# Patient Record
Sex: MALE | Race: WHITE | Employment: FULL TIME | URBAN - METROPOLITAN AREA
[De-identification: names, ages, dates, MRNs, and addresses within clinical notes are randomized per-mention and may not be internally consistent; named-entity substitution may affect disease eponyms.]

---

## 2017-01-23 ENCOUNTER — OFFICE VISIT (OUTPATIENT)
Dept: DERMATOLOGY | Facility: AMBULATORY SURGERY CENTER | Age: 52
End: 2017-01-23

## 2017-01-23 VITALS
OXYGEN SATURATION: 96 % | WEIGHT: 202 LBS | HEIGHT: 68 IN | SYSTOLIC BLOOD PRESSURE: 124 MMHG | HEART RATE: 68 BPM | BODY MASS INDEX: 30.62 KG/M2 | DIASTOLIC BLOOD PRESSURE: 80 MMHG | RESPIRATION RATE: 18 BRPM | TEMPERATURE: 98.5 F

## 2017-01-23 DIAGNOSIS — D22.9 MULTIPLE BENIGN NEVI: ICD-10-CM

## 2017-01-23 DIAGNOSIS — D18.01 CHERRY ANGIOMA: ICD-10-CM

## 2017-01-23 DIAGNOSIS — L70.8 OTHER ACNE: ICD-10-CM

## 2017-01-23 DIAGNOSIS — L81.4 LENTIGINES: ICD-10-CM

## 2017-01-23 DIAGNOSIS — E80.1 PORPHYRIA CUTANEA TARDA (HCC): ICD-10-CM

## 2017-01-23 DIAGNOSIS — L57.0 ACTINIC KERATOSES: Primary | ICD-10-CM

## 2017-01-23 DIAGNOSIS — Z85.828 HISTORY OF NONMELANOMA SKIN CANCER: ICD-10-CM

## 2017-01-23 RX ORDER — FLUOROURACIL 50 MG/G
CREAM TOPICAL
Qty: 40 G | Refills: 0 | Status: SHIPPED | OUTPATIENT
Start: 2017-01-23 | End: 2017-05-05

## 2017-01-23 NOTE — PROGRESS NOTES
Chief Complaint   Patient presents with    New Patient    Skin Exam     concern with several areas on face, nose, and ears

## 2017-01-23 NOTE — MR AVS SNAPSHOT
Visit Information Date & Time Provider Department Dept. Phone Encounter #  
 1/23/2017  8:00 AM Sandy Fallon NP [facility] [phone] [id number] Upcoming Health Maintenance Date Due Hepatitis C Screening 1965 FOBT Q 1 YEAR AGE 50-75 11/8/2013 INFLUENZA AGE 9 TO ADULT 8/1/2016 DTaP/Tdap/Td series (2 - Td) 8/16/2023 Allergies as of 1/23/2017  Review Complete On: 1/23/2017 By: Keke Obregon LPN Severity Noted Reaction Type Reactions Aldara [Imiquimod]  05/05/2015   Topical Contact Dermatitis Bactrim [Sulfamethoprim Ds]  03/27/2011    Other (comments) Lipitor [Atorvastatin]  05/05/2015   Side Effect Myalgia Current Immunizations  Reviewed on 3/22/2013 Name Date Tdap 8/16/2013 11:38 AM  
  
 Not reviewed this visit Vitals BP Pulse Temp Resp Height(growth percentile) Weight(growth percentile) 124/80 (BP 1 Location: Left arm, BP Patient Position: Sitting) 68 98.5 °F (36.9 °C) (Oral) 18 5' 8\" (1.727 m) 202 lb (91.6 kg) SpO2 BMI Smoking Status 96% 30.71 kg/m2 Never Smoker Vitals History BMI and BSA Data Body Mass Index Body Surface Area 30.71 kg/m 2 2.1 m 2 Preferred Pharmacy Pharmacy Name Phone [id number] [place], [place] - [address] [place] [address] [phone] Your Updated Medication List  
  
   
This list is accurate as of: 1/23/17  8:20 AM.  Always use your most recent med list.  
  
  
  
  
 albuterol 90 mcg/actuation inhaler Commonly known as:  PROVENTIL HFA, VENTOLIN HFA, PROAIR HFA Take 2 Puffs by inhalation every four (4) hours as needed for Wheezing. ALDARA 5 % cream  
Generic drug:  imiquimod Apply  to affected area nightly. apply a thin layer as directed  
  
 aspirin 81 mg chewable tablet Take 1 Tab by mouth daily. azelastine 137 mcg (0.1 %) nasal spray Commonly known as:  ASTELIN  
 1 [employer] [place] by Both Nostrils route two (2) times a day. Use in each nostril as directed Dextromethorphan-guaiFENesin 60-1,200 mg Tm12 Commonly known as:  Noe & Noe DM Take 1 Tab by mouth two (2) times daily as needed. Indications: COUGH  
  
 dicyclomine 10 mg capsule Commonly known as:  BENTYL Take 10 mg by mouth as needed. For abd cramping DULoxetine 30 mg capsule Commonly known as:  CYMBALTA TAKE ONE CAPSULE BY MOUTH EVERY DAY FOR BACK PAIN  
  
 fenofibrate nanocrystallized 145 mg tablet Commonly known as:  [employer] Take 1 Tab by mouth daily. For hypercholesterolemia FLORASTOR 250 mg capsule Generic drug:  Saccharomyces boulardii Take 250 mg by mouth two (2) times a day. fluticasone 50 mcg/actuation nasal spray Commonly known as:  [place] Fryer Use two sprays in each nostril once daily for allergies. HYDROcodone-acetaminophen 7.5-325 mg per tablet Commonly known as:  [place] Mello Take 1 Tab by mouth every six (6) hours as needed for Pain.  
  
 montelukast 10 mg tablet Commonly known as:  SINGULAIR  
TAKE 1 TABLET BY MOUTH EVERY DAY FOR ALLERGIES  
  
 NAPROSYN 500 mg tablet Generic drug:  naproxen Take 500 mg by mouth two (2) times daily (with meals). omeprazole 20 mg capsule Commonly known as:  PRILOSEC  
TAKE ONE CAPSULE BY MOUTH EVERY DAY FOR IBS  
  
 propranolol LA 60 mg SR capsule Commonly known as:  INDERAL LA Take 1 Cap by mouth daily. For migraine prevention SIMETHICONE Take 120 mg by mouth as needed. SUMAtriptan 100 mg tablet Commonly known as:  IMITREX Take 1 Tab by mouth once as needed for Migraine. ZyrTEC 10 mg tablet Generic drug:  cetirizine Take 10 mg by mouth daily. ZyrTEC-D 5-120 mg per tablet Generic drug:  cetirizine-psuedoePHEDrine Take 1 Tab by mouth daily. Introducing \[facility]\"" & HEALTH SERVICES! Dear Vianney Brenner: Thank you for requesting a [employer] account.   Our records indicate that you already have an active [employer] account. You can access your account anytime at [email or web address]. "[employer] Did you know that you can access your hospital and ER discharge instructions at any time in [employer]ia? You can also review all of your test results from your hospital stay or ER visit. Additional Information If you have questions, please visit the Frequently Asked Questions section of the Med[employer]ia website at [email or web address]. "[employer] Remember, [employer]ia is NOT to be used for urgent needs. For medical emergencies, dial 911. Now available from your iPhone and Android! Please provide this summary of care documentation to your next provider. Your primary care clinician is listed as Vesna Ayers. If you have any questions after today's visit, please call [phone].

## 2017-01-23 NOTE — PROGRESS NOTES
Written by Fina Mckeon, as dictated by Kei Loredo, [address]. Name: Kendra Butler       Age: 46 y.o. Date: 1/23/2017    Chief Complaint:   Chief Complaint   Patient presents with    New Patient    Skin Exam     concern with several areas on face, nose, and ears       Subjective:    HPI  Mr. Kendra Butler is a 46 y.o. male who presents as a new patient to [place] [address] for a skin exam.  The patient was self-referred for this evaluation. The patient has had a skin exam in the past (he states 1.5 yrs ago) - he used to follow with Dr. Miguel Euceda every 3-6 months until he retired - and the patient does have current complaints related to his skin. He is concerned about a recurrent spot on his right cheek and a few bumps on his nose, non-resolving and tender that repeatedly scale and peel. His wife is concerned about a crusted lesion on his right ear, also non-resolving with recurrent scabbing. He also reports that he's broken out recently on his neck, likely from sweating and his sweatshirt rubbing. He admits to picking. Mr. Kendra Butler is feeling well and in his usual state of health today. The patient's pertinent skin history includes: Multiple basal cell carcinomas; multiple rounds of Aldara - face, forehead, neck - for 8 weeks approx. 2-3 years ago. He reports that he started wearing sun screen and sun shirts approximately four years ago. History of porphyria cutaneous tarda. ROS: Constitutional: Negative. Dermatological : positive for - skin lesion changes      Social History     Social History    Marital status:      Spouse name: N/A    Number of children: N/A    Years of education: N/A     Occupational History    Not on file.      Social History Main Topics    Smoking status: Never Smoker    Smokeless tobacco: Never Used    Alcohol use Yes    Drug use: No      Comment: occasional marijuana, quit cocaine 15 yrs ago    Sexual activity: Yes     Partners: Female     Other Topics Concern    Not on file     Social History Narrative       Family History   Problem Relation Age of Onset    Hypertension Brother     Diabetes Brother     Diabetes Mother     Hypertension Mother     Cancer Mother      lung       Past Medical History   Diagnosis Date    Back pain     Cancer ([facility] [address]      skin cancer    Frequent nosebleeds     Joint pain     Joint swelling     Other ill-defined conditions(799.89)      High cholesterol. 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[facility] [address]     Sinus complaint        Past Surgical History   Procedure Laterality Date    Hx orthopaedic       bilateral thumb surgery    Hx heent       septurhinoplasty       Current Outpatient Prescriptions   Medication Sig Dispense Refill    SUMAtriptan (IMITREX) 100 mg tablet Take 1 Tab by mouth once as needed for Migraine. 12 Tab 1    Dextromethorphan-guaiFENesin (MUCINEX DM) 60-1,200 mg TM12 Take 1 Tab by mouth two (2) times daily as needed. Indications: COUGH 20 Tab 1    omeprazole (PRILOSEC) 20 mg capsule TAKE ONE CAPSULE BY MOUTH EVERY DAY FOR IBS 90 Cap 11    fenofibrate nanocrystallized (TRICOR) 145 mg tablet Take 1 Tab by mouth daily. For hypercholesterolemia 90 Tab 11    DULoxetine (CYMBALTA) 30 mg capsule TAKE ONE CAPSULE BY MOUTH EVERY DAY FOR BACK PAIN 90 Cap 11    azelastine (ASTELIN) 137 mcg (0.1 %) nasal spray 1 [place] by Both Nostrils route two (2) times a day. Use in each nostril as directed 3 Bottle 11    fluticasone (FLONASE) 50 mcg/actuation nasal spray Use two sprays in each nostril once daily for allergies. 3 Bottle 11    montelukast (SINGULAIR) 10 mg tablet TAKE 1 TABLET BY MOUTH EVERY DAY FOR ALLERGIES 90 Tab 11    cetirizine-psuedoePHEDrine (ZYRTEC-D) 5-120 mg per tablet Take 1 Tab by mouth daily.  propranolol LA (INDERAL LA) 60 mg SR capsule Take 1 Cap by mouth daily.  For migraine prevention 30 Cap 5    albuterol (PROVENTIL HFA, VENTOLIN HFA) 90 mcg/actuation inhaler Take 2 Puffs by inhalation every four (4) hours as needed for Wheezing. 1 Inhaler 0    HYDROcodone-acetaminophen (NORCO) 7.5-325 mg per tablet Take 1 Tab by mouth every six (6) hours as needed for Pain. 80 Tab 3    aspirin 81 mg chewable tablet Take 1 Tab by mouth daily. 30 Tab 11    saccharomyces boulardii (FLORASTOR) 250 mg capsule Take 250 mg by mouth two (2) times a day.  dicyclomine (BENTYL) 10 mg capsule Take 10 mg by mouth as needed. For abd cramping      SIMETHICONE Take 120 mg by mouth as needed.  imiquimod (ALDARA) 5 % cream Apply  to affected area nightly. apply a thin layer as directed      naproxen (NAPROSYN) 500 mg tablet Take 500 mg by mouth two (2) times daily (with meals).  cetirizine (ZYRTEC) 10 mg tablet Take 10 mg by mouth daily. Allergies   Allergen Reactions    Aldara [Imiquimod] Contact Dermatitis    Bactrim [Sulfamethoprim Ds] Other (comments)    Lipitor [Atorvastatin] Myalgia         Objective:    Visit Vitals    /80 (BP 1 Location: Left arm, BP Patient Position: Sitting)    Pulse 68    Temp 98.5 °F (36.9 °C) (Oral)    Resp 18    Ht 5' 8\" (1.727 m)    Wt 91.6 kg (202 lb)    SpO2 96%    BMI 30.71 kg/m2       Iban Montalvo is a 46 y.o. male who appears well and in no distress. He is awake, alert, and oriented. There is no preauricular, submandibular, or cervical lymphadenopathy. A skin examination was performed including his scalp, face (including eyelid), ears, neck, chest, back, abdomen, upper extremities (including digits/nails), lower extremities, breasts, buttocks; genital skin was not examined. He is heavily freckled and fair skinned. He has acne with multiple excoriations on his arms and neck. He has diffuse lentigines, scattered red papules consistent with cherry angiomas, and few pink intradermal nevi, no concerning features.  He has numerous thin-scaled actinic keratoses on his cheeks, forehead, upper lip, ears, and hands. Assessment/Plan:    1. Actinic Keratoses. The diagnosis of this precancerous lesion related to sun exposure was reviewed. Verbal consent was obtained. I treated 2 lesions with cryotherapy and post-cryotherapy care was reviewed. We discussed options for treating the diffuse actinic keratoses on his face including cryotherapy, Sampson-U light therapy and Efudex chemo cream. Given the patient's history of prophyria, the patient was advised that he may react intensely to Sampson-U light therapy. I instead prescribed Efudex to apply twice daily for one week - use for one week out of each month for four months straight. 2. Acne. The diagnosis was discussed and over the counter recommendations including Selsun Sampson were made for treatment and to avoid picking. 3. Normal nevi. The diagnosis of normal nevi was reviewed. I discussed sun protection, sunscreen use, the warning signs of skin cancer, the need for self-skin examinations, and the need for regular practitioner exams every 6 months. The patient should follow up sooner as needed if new, changing, or symptomatic skin lesions arise. 4. Cherry angiomas. The diagnosis was reviewed and the patient was reassured that no treatment is needed for these benign lesions. 5. Solar lentigos. The diagnosis and relationship to sun exposure was reviewed. Sun protection advised. 6. Personal history of skin cancer. I discussed sun protection, sunscreen use, the warning signs of skin cancer, the need for self-skin examinations, and the need for regular practitioner exams every 6 months. The patient should follow up sooner as needed if new, changing, or symptomatic skin lesions arise. 7. History of porphyria. No evidence of activity at this time. He should continue to avoid triggers. This plan was reviewed with the patient and patient agrees. All questions were answered.     This scribe documentation was reviewed by me and accurately reflects the examination and decisions made by me. [facility]   OFFICE PROCEDURE PROGRESS NOTE   Chart reviewed for the following:   Pierce WEBB, have reviewed the History, Physical and updated the Allergic reactions for [place] [facility]. TIME OUT performed immediately prior to start of procedure:   Sharon WEBB, have performed the following reviews on [place] [facility]    prior to the start of the procedure:     * Patient was identified by name and date of birth   * Agreement on procedure being performed was verified   * Risks and Benefits explained to the patient   * Procedure site verified and marked as necessary   * Patient was positioned for comfort   * Verbal consent was obtained    Time: 8:42 AM  Date of procedure: 1/23/2017  Procedure performed by: Victoria Clemente.  Jermain Stephens DNP  Provider assisted by: self   Patient assisted by: self   How tolerated by patient: tolerated the procedure well with no complications   Comments: none

## 2017-01-24 ENCOUNTER — HOSPITAL ENCOUNTER (EMERGENCY)
Age: 52
Discharge: HOME | End: 2017-01-24
Attending: EMERGENCY MEDICINE
Payer: COMMERCIAL

## 2017-01-24 ENCOUNTER — APPOINTMENT (OUTPATIENT)
Dept: CT IMAGING | Age: 52
End: 2017-01-24
Attending: EMERGENCY MEDICINE
Payer: COMMERCIAL

## 2017-01-24 VITALS
OXYGEN SATURATION: 98 % | RESPIRATION RATE: 18 BRPM | HEIGHT: 68 IN | WEIGHT: 212.52 LBS | DIASTOLIC BLOOD PRESSURE: 89 MMHG | BODY MASS INDEX: 32.21 KG/M2 | TEMPERATURE: 98.5 F | HEART RATE: 98 BPM | SYSTOLIC BLOOD PRESSURE: 147 MMHG

## 2017-01-24 DIAGNOSIS — J20.9 ACUTE BRONCHITIS, UNSPECIFIED ORGANISM: Primary | ICD-10-CM

## 2017-01-24 LAB
ALBUMIN SERPL BCP-MCNC: 4 G/DL (ref 3.5–5)
ALBUMIN/GLOB SERPL: 1.3 {RATIO} (ref 1.1–2.2)
ALP SERPL-CCNC: 53 U/L (ref 45–117)
ALT SERPL-CCNC: 33 U/L (ref 12–78)
ANION GAP BLD CALC-SCNC: 10 MMOL/L (ref 5–15)
APPEARANCE UR: CLEAR
AST SERPL W P-5'-P-CCNC: 25 U/L (ref 15–37)
BACTERIA URNS QL MICRO: NEGATIVE /HPF
BASOPHILS # BLD AUTO: 0 K/UL (ref 0–0.1)
BASOPHILS # BLD: 1 % (ref 0–1)
BILIRUB SERPL-MCNC: 0.5 MG/DL (ref 0.2–1)
BILIRUB UR QL: NEGATIVE
BUN SERPL-MCNC: 19 MG/DL (ref 6–20)
BUN/CREAT SERPL: 16 (ref 12–20)
CALCIUM SERPL-MCNC: 8 MG/DL (ref 8.5–10.1)
CHLORIDE SERPL-SCNC: 110 MMOL/L (ref 97–108)
CO2 SERPL-SCNC: 25 MMOL/L (ref 21–32)
COLOR UR: NORMAL
CREAT SERPL-MCNC: 1.2 MG/DL (ref 0.7–1.3)
EOSINOPHIL # BLD: 0.1 K/UL (ref 0–0.4)
EOSINOPHIL NFR BLD: 2 % (ref 0–7)
EPITH CASTS URNS QL MICRO: NORMAL /LPF
ERYTHROCYTE [DISTWIDTH] IN BLOOD BY AUTOMATED COUNT: 12.6 % (ref 11.5–14.5)
GLOBULIN SER CALC-MCNC: 3 G/DL (ref 2–4)
GLUCOSE BLD STRIP.AUTO-MCNC: 82 MG/DL (ref 65–100)
GLUCOSE SERPL-MCNC: 75 MG/DL (ref 65–100)
GLUCOSE UR STRIP.AUTO-MCNC: NEGATIVE MG/DL
HCT VFR BLD AUTO: 34.8 % (ref 36.6–50.3)
HGB BLD-MCNC: 12 G/DL (ref 12.1–17)
HGB UR QL STRIP: NEGATIVE
HYALINE CASTS URNS QL MICRO: NORMAL /LPF (ref 0–5)
KETONES UR QL STRIP.AUTO: NEGATIVE MG/DL
LEUKOCYTE ESTERASE UR QL STRIP.AUTO: NEGATIVE
LIPASE SERPL-CCNC: 77 U/L (ref 73–393)
LYMPHOCYTES # BLD AUTO: 24 % (ref 12–49)
LYMPHOCYTES # BLD: 1.1 K/UL (ref 0.8–3.5)
MCH RBC QN AUTO: 30 PG (ref 26–34)
MCHC RBC AUTO-ENTMCNC: 34.5 G/DL (ref 30–36.5)
MCV RBC AUTO: 87 FL (ref 80–99)
MONOCYTES # BLD: 0.3 K/UL (ref 0–1)
MONOCYTES NFR BLD AUTO: 7 % (ref 5–13)
NEUTS SEG # BLD: 3.2 K/UL (ref 1.8–8)
NEUTS SEG NFR BLD AUTO: 66 % (ref 32–75)
NITRITE UR QL STRIP.AUTO: NEGATIVE
PH UR STRIP: 6 [PH] (ref 5–8)
PLATELET # BLD AUTO: 196 K/UL (ref 150–400)
POTASSIUM SERPL-SCNC: 3.4 MMOL/L (ref 3.5–5.1)
PROT SERPL-MCNC: 7 G/DL (ref 6.4–8.2)
PROT UR STRIP-MCNC: NEGATIVE MG/DL
RBC # BLD AUTO: 4 M/UL (ref 4.1–5.7)
RBC #/AREA URNS HPF: NORMAL /HPF (ref 0–5)
SERVICE CMNT-IMP: NORMAL
SODIUM SERPL-SCNC: 145 MMOL/L (ref 136–145)
SP GR UR REFRACTOMETRY: 1.03 (ref 1–1.03)
UROBILINOGEN UR QL STRIP.AUTO: 0.2 EU/DL (ref 0.2–1)
WBC # BLD AUTO: 4.7 K/UL (ref 4.1–11.1)
WBC URNS QL MICRO: NORMAL /HPF (ref 0–4)

## 2017-01-24 PROCEDURE — 80053 COMPREHEN METABOLIC PANEL: CPT | Performed by: EMERGENCY MEDICINE

## 2017-01-24 PROCEDURE — 99283 EMERGENCY DEPT VISIT LOW MDM: CPT

## 2017-01-24 PROCEDURE — 77030029684 HC NEB SM VOL KT MONA -A

## 2017-01-24 PROCEDURE — 36415 COLL VENOUS BLD VENIPUNCTURE: CPT | Performed by: EMERGENCY MEDICINE

## 2017-01-24 PROCEDURE — 96374 THER/PROPH/DIAG INJ IV PUSH: CPT

## 2017-01-24 PROCEDURE — 74011636320 HC RX REV CODE- 636/320: Performed by: EMERGENCY MEDICINE

## 2017-01-24 PROCEDURE — 83690 ASSAY OF LIPASE: CPT | Performed by: EMERGENCY MEDICINE

## 2017-01-24 PROCEDURE — 74177 CT ABD & PELVIS W/CONTRAST: CPT

## 2017-01-24 PROCEDURE — 96361 HYDRATE IV INFUSION ADD-ON: CPT

## 2017-01-24 PROCEDURE — 85025 COMPLETE CBC W/AUTO DIFF WBC: CPT | Performed by: EMERGENCY MEDICINE

## 2017-01-24 PROCEDURE — 74011000250 HC RX REV CODE- 250: Performed by: EMERGENCY MEDICINE

## 2017-01-24 PROCEDURE — 81001 URINALYSIS AUTO W/SCOPE: CPT | Performed by: EMERGENCY MEDICINE

## 2017-01-24 PROCEDURE — 82962 GLUCOSE BLOOD TEST: CPT

## 2017-01-24 PROCEDURE — 94640 AIRWAY INHALATION TREATMENT: CPT

## 2017-01-24 PROCEDURE — 74011250636 HC RX REV CODE- 250/636: Performed by: EMERGENCY MEDICINE

## 2017-01-24 RX ORDER — METHYLPREDNISOLONE 4 MG/1
TABLET ORAL
Qty: 1 DOSE PACK | Refills: 0 | Status: SHIPPED | OUTPATIENT
Start: 2017-01-24 | End: 2017-05-05 | Stop reason: ALTCHOICE

## 2017-01-24 RX ORDER — AZITHROMYCIN 250 MG/1
TABLET, FILM COATED ORAL
Qty: 6 TAB | Refills: 0 | Status: SHIPPED | OUTPATIENT
Start: 2017-01-24 | End: 2017-01-29

## 2017-01-24 RX ORDER — ALBUTEROL SULFATE 90 UG/1
2 AEROSOL, METERED RESPIRATORY (INHALATION)
Qty: 1 INHALER | Refills: 1 | Status: SHIPPED | OUTPATIENT
Start: 2017-01-24 | End: 2018-06-12 | Stop reason: SDUPTHER

## 2017-01-24 RX ORDER — SODIUM CHLORIDE 9 MG/ML
50 INJECTION, SOLUTION INTRAVENOUS
Status: COMPLETED | OUTPATIENT
Start: 2017-01-24 | End: 2017-01-24

## 2017-01-24 RX ORDER — SODIUM CHLORIDE 0.9 % (FLUSH) 0.9 %
10 SYRINGE (ML) INJECTION
Status: COMPLETED | OUTPATIENT
Start: 2017-01-24 | End: 2017-01-24

## 2017-01-24 RX ORDER — IPRATROPIUM BROMIDE AND ALBUTEROL SULFATE 2.5; .5 MG/3ML; MG/3ML
3 SOLUTION RESPIRATORY (INHALATION) ONCE
Status: COMPLETED | OUTPATIENT
Start: 2017-01-24 | End: 2017-01-24

## 2017-01-24 RX ADMIN — IPRATROPIUM BROMIDE AND ALBUTEROL SULFATE 3 ML: .5; 3 SOLUTION RESPIRATORY (INHALATION) at 13:25

## 2017-01-24 RX ADMIN — Medication 10 ML: at 14:32

## 2017-01-24 RX ADMIN — SODIUM CHLORIDE 1000 ML: 900 INJECTION, SOLUTION INTRAVENOUS at 13:27

## 2017-01-24 RX ADMIN — SODIUM CHLORIDE 50 ML/HR: 900 INJECTION, SOLUTION INTRAVENOUS at 14:32

## 2017-01-24 RX ADMIN — SODIUM CHLORIDE 1000 ML: 900 INJECTION, SOLUTION INTRAVENOUS at 12:42

## 2017-01-24 RX ADMIN — IOPAMIDOL 100 ML: 755 INJECTION, SOLUTION INTRAVENOUS at 14:32

## 2017-01-24 RX ADMIN — METHYLPREDNISOLONE SODIUM SUCCINATE 125 MG: 125 INJECTION, POWDER, FOR SOLUTION INTRAMUSCULAR; INTRAVENOUS at 13:21

## 2017-01-24 NOTE — ED TRIAGE NOTES
Pt is not complaining of any abd pain at this time but instead the upper resp stuff. Last BM per pt was possibly Sunday, PO intake has been soup for the last 4 days, has been taking tylenol for possible fevers.

## 2017-01-24 NOTE — ED PROVIDER NOTES
HPI Comments: Jerrell Christian is a 46 y.o. male with PMHx significant for sinusitis,allergic rhinitis,bronchitis who presents ambulatory from [facility] to the ED for CT scan for possible ileus . Pt reports that he has been sick with sinusitis like sxs for about a week and developed a hacking cough with mild white sputum production two days ago. He notes that his sxs are consistent with prior episodes of sinusitis/bronchitis. He states that he went to urgent care a where he had a blood sugar of 69 and a heart rate of 120 (states he was given a liter of fluid). Pt notes that he had a normal CBC/BMP/EKG and had an x-ray that ruled out SBO and suggested possible ileus. Pt also notes that he has taken ibuprofen and cough syrup over the weekend. He states that his last bowel movement was on 1/22/17. Pt denies any abdominal pain, N/V/D, fever. PCP: Rich Klein MD    Social hx: smoking (-) EtOH (+)    There are no other complaints, changes, or physical findings at this time. The history is provided by the patient. Past Medical History:   Diagnosis Date    Back pain     Basal cell carcinoma 2012     3 on left side of neck and 1 on right side of neck and nasal bridge area with first dx starting aprroximately 12 yrs ago    Cancer [facility])      skin cancer    Family history of skin cancer     Frequent nosebleeds     Joint pain     Joint swelling     Other ill-defined conditions(799.89)      High cholesterol.     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[facility] [address]     Sinus complaint     Skin cancer     Sun-damaged skin     Sunburn, blistering        Past Surgical History:   Procedure Laterality Date    Hx orthopaedic       bilateral thumb surgery    Hx heent       septurhinoplasty         Family History:   Problem Relation Age of Onset    Hypertension Brother     Diabetes Brother     Diabetes Mother     Hypertension Mother     Cancer Mother      lung       Social History     Social History    Marital status:      Spouse name: N/A    Number of children: N/A    Years of education: N/A     Occupational History    Not on file. Social History Main Topics    Smoking status: Never Smoker    Smokeless tobacco: Never Used    Alcohol use Yes    Drug use: No      Comment: occasional marijuana, quit cocaine 15 yrs ago    Sexual activity: Yes     Partners: Female     Other Topics Concern    Not on file     Social History Narrative         ALLERGIES: Aldara [imiquimod]; Bactrim [sulfamethoprim ds]; and Lipitor [atorvastatin]    Review of Systems   Constitutional: Negative for activity change, chills and fever. HENT: Negative for congestion and sore throat. Eyes: Negative for pain and redness. Respiratory: Positive for cough. Negative for chest tightness and shortness of breath. Cardiovascular: Negative for chest pain and palpitations. Gastrointestinal: Negative for abdominal pain, diarrhea, nausea and vomiting. Genitourinary: Negative for dysuria, frequency and urgency. Musculoskeletal: Negative for back pain and neck pain. Skin: Negative for rash. Neurological: Negative for syncope, light-headedness and headaches. Psychiatric/Behavioral: Negative for confusion. All other systems reviewed and are negative. Vitals:    01/24/17 1223 01/24/17 1521 01/24/17 1537   BP: 167/84 (!) 154/94 147/89   Pulse: (!) 110 98    Resp: 16 16 18   Temp: 98.5 °F (36.9 °C)     SpO2: 97% 100% 98%   Weight: 96.4 kg (212 lb 8.4 oz)     Height: 5' 8\" (1.727 m)              Physical Exam   Constitutional: He is oriented to person, place, and time. He appears well-developed and well-nourished. No distress. HENT:   Head: Normocephalic. Nose: Nose normal.   Mouth/Throat: Oropharynx is clear and moist. No oropharyngeal exudate. Eyes: Conjunctivae are normal. Pupils are equal, round, and reactive to light. No scleral icterus. Neck: Normal range of motion. Neck supple. No JVD present.  No tracheal deviation present. No thyromegaly present. Cardiovascular: Normal rate, regular rhythm and intact distal pulses. Exam reveals no gallop and no friction rub. No murmur heard. Pulmonary/Chest: Effort normal and breath sounds normal. No stridor. No respiratory distress. He has no wheezes. He has no rales. Abdominal: Soft. Bowel sounds are normal. He exhibits no distension. There is no tenderness. There is no rebound and no guarding. Musculoskeletal: Normal range of motion. He exhibits no edema. Lymphadenopathy:     He has no cervical adenopathy. Neurological: He is alert and oriented to person, place, and time. No cranial nerve deficit. He exhibits normal muscle tone. Coordination normal.   Skin: Skin is warm and dry. No rash noted. He is not diaphoretic. No erythema. Psychiatric: He has a normal mood and affect. His behavior is normal.   Nursing note and vitals reviewed.        MDM  Number of Diagnoses or Management Options  Acute bronchitis, unspecified organism:   Diagnosis management comments: DDx: ileus, bronchitis, sinustitis       Amount and/or Complexity of Data Reviewed  Clinical lab tests: ordered and reviewed  Tests in the radiology section of CPT®: ordered and reviewed  Review and summarize past medical records: yes    Risk of Complications, Morbidity, and/or Mortality  General comments: Pt well appearing; abd benign; abd pelvis ct negative; cbc normal; lipase normal; cmp negative will treat as sinusitis/bronchitis; Lilian Crowe MD      Patient Progress  Patient progress: stable    ED Course       Procedures      LABORATORY TESTS:  Recent Results (from the past 12 hour(s))   CBC WITH AUTOMATED DIFF    Collection Time: 01/24/17 12:51 PM   Result Value Ref Range    WBC 4.7 4.1 - 11.1 K/uL    RBC 4.00 (L) 4.10 - 5.70 M/uL    HGB 12.0 (L) 12.1 - 17.0 g/dL    HCT 34.8 (L) 36.6 - 50.3 %    MCV 87.0 80.0 - 99.0 FL    MCH 30.0 26.0 - 34.0 PG    MCHC 34.5 30.0 - 36.5 g/dL    RDW 12.6 11.5 - 14.5 % PLATELET 225 400 - 702 K/uL    NEUTROPHILS 66 32 - 75 %    LYMPHOCYTES 24 12 - 49 %    MONOCYTES 7 5 - 13 %    EOSINOPHILS 2 0 - 7 %    BASOPHILS 1 0 - 1 %    ABS. NEUTROPHILS 3.2 1.8 - 8.0 K/UL    ABS. LYMPHOCYTES 1.1 0.8 - 3.5 K/UL    ABS. MONOCYTES 0.3 0.0 - 1.0 K/UL    ABS. EOSINOPHILS 0.1 0.0 - 0.4 K/UL    ABS. BASOPHILS 0.0 0.0 - 0.1 K/UL   METABOLIC PANEL, COMPREHENSIVE    Collection Time: 01/24/17 12:51 PM   Result Value Ref Range    Sodium 145 136 - 145 mmol/L    Potassium 3.4 (L) 3.5 - 5.1 mmol/L    Chloride 110 (H) 97 - 108 mmol/L    CO2 25 21 - 32 mmol/L    Anion gap 10 5 - 15 mmol/L    Glucose 75 65 - 100 mg/dL    BUN 19 6 - 20 MG/DL    Creatinine 1.20 0.70 - 1.30 MG/DL    BUN/Creatinine ratio 16 12 - 20      GFR est AA >60 >60 ml/min/1.73m2    GFR est non-AA >60 >60 ml/min/1.73m2    Calcium 8.0 (L) 8.5 - 10.1 MG/DL    Bilirubin, total 0.5 0.2 - 1.0 MG/DL    ALT 33 12 - 78 U/L    AST 25 15 - 37 U/L    Alk.  phosphatase 53 45 - 117 U/L    Protein, total 7.0 6.4 - 8.2 g/dL    Albumin 4.0 3.5 - 5.0 g/dL    Globulin 3.0 2.0 - 4.0 g/dL    A-G Ratio 1.3 1.1 - 2.2     LIPASE    Collection Time: 01/24/17 12:51 PM   Result Value Ref Range    Lipase 77 73 - 393 U/L   GLUCOSE, POC    Collection Time: 01/24/17  1:29 PM   Result Value Ref Range    Glucose (POC) 82 65 - 100 mg/dL    Performed by Titi Rand W/MICROSCOPIC    Collection Time: 01/24/17  2:56 PM   Result Value Ref Range    Color YELLOW/STRAW      Appearance CLEAR CLEAR      Specific gravity 1.029 1.003 - 1.030      pH (UA) 6.0 5.0 - 8.0      Protein NEGATIVE  NEG mg/dL    Glucose NEGATIVE  NEG mg/dL    Ketone NEGATIVE  NEG mg/dL    Bilirubin NEGATIVE  NEG      Blood NEGATIVE  NEG      Urobilinogen 0.2 0.2 - 1.0 EU/dL    Nitrites NEGATIVE  NEG      Leukocyte Esterase NEGATIVE  NEG      WBC 0-4 0 - 4 /hpf    RBC 0-5 0 - 5 /hpf    Epithelial cells FEW FEW /lpf    Bacteria NEGATIVE  NEG /hpf    Hyaline Cast 0-2 0 - 5 /lpf       IMAGING RESULTS:  CT ABD PELV W CONT   Final ResultINDICATION: Abdominal pain; sent here for abd pelvis ct; ileus on plain films;     COMPARISON: 2/10/2008     TECHNIQUE:   Following the uneventful intravenous administration of 100 cc Isovue-370, thin  axial images were obtained through the abdomen and pelvis. Coronal and sagittal  reconstructions were generated. Oral contrast was not administered. CT dose  reduction was achieved through use of a standardized protocol tailored for this  examination and automatic exposure control for dose modulation.     FINDINGS:   LUNG BASES: Clear. LIVER: Generalized fatty infiltration of the liver is noted. There is no biliary  dilatation or focal hepatic abnormality. GALLBLADDER: Unremarkable. SPLEEN: No mass.     PANCREAS: No mass or ductal dilatation. ADRENALS: Unremarkable. KIDNEYS: Small lucency measuring several millimeters noted along the periphery  of the right mid kidney which is too small to characterize completely kidneys  are otherwise unremarkable. No hydronephrosis.     GI: No dilatation or wall thickening. No evidence of bowel obstruction. There is  fecal material primarily in the right colon.     APPENDIX: Unremarkable. PERITONEUM: No ascites or pneumoperitoneum. RETROPERITONEUM: No lymphadenopathy or aortic aneurysm.        URINARY BLADDER: No mass or calculus. PELVIS: No adenopathy or abnormal mass. BONES: Degenerative changes of the lumbar spine noted. ADDITIONAL COMMENTS: N/A     IMPRESSION  IMPRESSION:     1. No acute finding in the abdomen or pelvis. Specifically there is no evidence  of bowel obstruction or inflammation. 2. Generalized fatty infiltration of the liver.           MEDICATIONS GIVEN:  Medications   sodium chloride 0.9 % bolus infusion 1,000 mL (1,000 mL IntraVENous New Bag 1/24/17 1327)   sodium chloride 0.9 % bolus infusion 1,000 mL (1,000 mL IntraVENous New Bag 1/24/17 1242)   albuterol-ipratropium (DUO-NEB) 2.5 MG-0.5 MG/3 ML (3 mL Nebulization Given 1/24/17 1325)   methylPREDNISolone (PF) (SOLU-MEDROL) injection 125 mg (125 mg IntraVENous Given 1/24/17 1321)   0.9% sodium chloride infusion (50 mL/hr IntraVENous New Bag 1/24/17 1432)   iopamidol (ISOVUE-370) 76 % injection 100 mL (100 mL IntraVENous Given 1/24/17 1432)   sodium chloride (NS) flush 10 mL (10 mL IntraVENous Given 1/24/17 1432)       IMPRESSION:  1. Acute bronchitis, unspecified organism        PLAN:  1. Current Discharge Medication List      START taking these medications    Details   azithromycin (ZITHROMAX Z-HUMBLE) 250 mg tablet Take as directed  Qty: 6 Tab, Refills: 0      methylPREDNISolone (MEDROL, HUMBLE,) 4 mg tablet Take as directed  Qty: 1 Dose Pack, Refills: 0         CONTINUE these medications which have CHANGED    Details   albuterol (PROVENTIL HFA, VENTOLIN HFA, PROAIR HFA) 90 mcg/actuation inhaler Take 2 Puffs by inhalation every four (4) hours as needed for Wheezing. Qty: 1 Inhaler, Refills: 1         CONTINUE these medications which have NOT CHANGED    Details   fluorouracil (EFUDEX) 5 % chemo cream Apply twice daily for one week - use for one week of each month for four months straight then stop  Qty: 40 g, Refills: 0    Associated Diagnoses: Actinic keratoses      SUMAtriptan (IMITREX) 100 mg tablet Take 1 Tab by mouth once as needed for Migraine. Qty: 12 Tab, Refills: 1    Associated Diagnoses: Nonintractable migraine, unspecified migraine type      Dextromethorphan-guaiFENesin (MUCINEX DM) 60-1,200 mg TM12 Take 1 Tab by mouth two (2) times daily as needed. Indications: COUGH  Qty: 20 Tab, Refills: 1    Associated Diagnoses: Cough      omeprazole (PRILOSEC) 20 mg capsule TAKE ONE CAPSULE BY MOUTH EVERY DAY FOR IBS  Qty: 90 Cap, Refills: 11    Associated Diagnoses: Irritable bowel syndrome without diarrhea      fenofibrate nanocrystallized (TRICOR) 145 mg tablet Take 1 Tab by mouth daily.  For hypercholesterolemia  Qty: 90 Tab, Refills: 11    Associated Diagnoses: Statin intolerance; Hypercholesterolemia without hypertriglyceridemia      DULoxetine (CYMBALTA) 30 mg capsule TAKE ONE CAPSULE BY MOUTH EVERY DAY FOR BACK PAIN  Qty: 90 Cap, Refills: 11    Associated Diagnoses: Pain      azelastine (ASTELIN) 137 mcg (0.1 %) nasal spray 1 [place] by Both Nostrils route two (2) times a day. Use in each nostril as directed  Qty: 3 Bottle, Refills: 11    Associated Diagnoses: Allergic rhinitis due to pollen      fluticasone (FLONASE) 50 mcg/actuation nasal spray Use two sprays in each nostril once daily for allergies. Qty: 3 Bottle, Refills: 11    Associated Diagnoses: Allergic rhinitis due to pollen      montelukast (SINGULAIR) 10 mg tablet TAKE 1 TABLET BY MOUTH EVERY DAY FOR ALLERGIES  Qty: 90 Tab, Refills: 11    Associated Diagnoses: Other allergic rhinitis      cetirizine-psuedoePHEDrine (ZYRTEC-D) 5-120 mg per tablet Take 1 Tab by mouth daily. propranolol LA (INDERAL LA) 60 mg SR capsule Take 1 Cap by mouth daily. For migraine prevention  Qty: 30 Cap, Refills: 5    Associated Diagnoses: Nonintractable migraine, unspecified migraine type      HYDROcodone-acetaminophen (NORCO) 7.5-325 mg per tablet Take 1 Tab by mouth every six (6) hours as needed for Pain. Qty: 80 Tab, Refills: 3      aspirin 81 mg chewable tablet Take 1 Tab by mouth daily. Qty: 30 Tab, Refills: 11      saccharomyces boulardii (FLORASTOR) 250 mg capsule Take 250 mg by mouth two (2) times a day. dicyclomine (BENTYL) 10 mg capsule Take 10 mg by mouth as needed. For abd cramping      SIMETHICONE Take 120 mg by mouth as needed. imiquimod (ALDARA) 5 % cream Apply  to affected area nightly. apply a thin layer as directed      naproxen (NAPROSYN) 500 mg tablet Take 500 mg by mouth two (2) times daily (with meals). cetirizine (ZYRTEC) 10 mg tablet Take 10 mg by mouth daily.            2.   Follow-up Information     Follow up With Details Comments Contact Vanna Kinney MD Schedule an appointment as soon as possible for a visit in 1 week  [address] [id number]  [phone]          Return to ED if worse     DISCHARGE NOTE  3:40 PM  The patient has been re-evaluated and is ready for discharge. Reviewed available results with patient. Counseled pt on diagnosis and care plan. Pt has expressed understanding, and all questions have been answered. Pt agrees with plan and agrees to F/U as recommended, or return to the ED if their sxs worsen. Discharge instructions have been provided and explained to the pt, along with reasons to return to the ED. Written by Viry Reyes, ED Scribe, as dictated by Angella Ojeda MD.    This note is prepared by Viry Reyes, acting as Scribe for Angella Ojeda MD.    Angella Ojeda MD: The scribe's documentation has been prepared under my direction and personally reviewed by me in its entirety. I confirm that the note above accurately reflects all work, treatment, procedures, and medical decision making performed by me.

## 2017-01-24 NOTE — DISCHARGE INSTRUCTIONS
Bronchitis: Care Instructions  Your Care Instructions    Bronchitis is inflammation of the bronchial tubes, which carry air to the lungs. The tubes swell and produce mucus, or phlegm. The mucus and inflamed bronchial tubes make you cough. You may have trouble breathing. Most cases of bronchitis are caused by viruses like those that cause colds. Antibiotics usually do not help and they may be harmful. Bronchitis usually develops rapidly and lasts about 2 to 3 weeks in otherwise healthy people. Follow-up care is a key part of your treatment and safety. Be sure to make and go to all appointments, and call your doctor if you are having problems. It's also a good idea to know your test results and keep a list of the medicines you take. How can you care for yourself at home? · Take all medicines exactly as prescribed. Call your doctor if you think you are having a problem with your medicine. · Get some extra rest.  · Take an over-the-counter pain medicine, such as acetaminophen (Tylenol), ibuprofen (Advil, Motrin), or naproxen (Aleve) to reduce fever and relieve body aches. Read and follow all instructions on the label. · Do not take two or more pain medicines at the same time unless the doctor told you to. Many pain medicines have acetaminophen, which is Tylenol. Too much acetaminophen (Tylenol) can be harmful. · Take an over-the-counter cough medicine that contains dextromethorphan to help quiet a dry, hacking cough so that you can sleep. Avoid cough medicines that have more than one active ingredient. Read and follow all instructions on the label. · Breathe moist air from a humidifier, hot shower, or sink filled with hot water. The heat and moisture will thin mucus so you can cough it out. · Do not smoke. Smoking can make bronchitis worse. If you need help quitting, talk to your doctor about stop-smoking programs and medicines. These can increase your chances of quitting for good.   When should you call for help? Call 911 anytime you think you may need emergency care. For example, call if:  · You have severe trouble breathing. Call your doctor now or seek immediate medical care if:  · You have new or worse trouble breathing. · You cough up dark brown or bloody mucus (sputum). · You have a new or higher fever. · You have a new rash. Watch closely for changes in your health, and be sure to contact your doctor if:  · You cough more deeply or more often, especially if you notice more mucus or a change in the color of your mucus. · You are not getting better as expected. Where can you learn more? Go to http://marie-coleman.info/. Enter H333 in the search box to learn more about \"Bronchitis: Care Instructions. \"  Current as of: May 23, 2016  Content Version: 11.1  © 7709-0030 [employer]. Care instructions adapted under license by [employer] (which disclaims liability or warranty for this information). If you have questions about a medical condition or this instruction, always ask your healthcare professional. [address] any warranty or liability for your use of this information.

## 2017-01-24 NOTE — ED NOTES
Patient discharged by LP. No acute distress noted at discharge. Family at bedside at [facility]. Patient alert, oriented, ambulatory; refuses questions at DC. IV removed catheter fully intact.

## 2017-01-24 NOTE — ED TRIAGE NOTES
Pt coming here from an urgent care, he originally went for upper resp and after and xray was sent here for possible ileus.

## 2017-01-24 NOTE — ED NOTES
Bedside and Verbal shift change report given to BOUBACAR Marie (oncoming nurse) by Vin Olmedo RN (offgoing nurse). Report included the following information SBAR, Kardex, ED Summary and MAR.

## 2017-04-04 ENCOUNTER — OFFICE VISIT (OUTPATIENT)
Dept: PRIMARY CARE CLINIC | Age: 52
End: 2017-04-04

## 2017-04-04 VITALS
WEIGHT: 213.8 LBS | OXYGEN SATURATION: 96 % | HEART RATE: 88 BPM | TEMPERATURE: 97.7 F | BODY MASS INDEX: 32.4 KG/M2 | RESPIRATION RATE: 16 BRPM | DIASTOLIC BLOOD PRESSURE: 111 MMHG | SYSTOLIC BLOOD PRESSURE: 153 MMHG | HEIGHT: 68 IN

## 2017-04-04 DIAGNOSIS — M54.42 ACUTE LEFT-SIDED LOW BACK PAIN WITH LEFT-SIDED SCIATICA: Primary | ICD-10-CM

## 2017-04-04 RX ORDER — CYCLOBENZAPRINE HCL 10 MG
5-10 TABLET ORAL
Qty: 20 TAB | Refills: 0 | Status: SHIPPED | OUTPATIENT
Start: 2017-04-04 | End: 2017-10-20 | Stop reason: SDUPTHER

## 2017-04-04 RX ORDER — METHOCARBAMOL 500 MG/1
1000 TABLET, FILM COATED ORAL
Qty: 45 TAB | Refills: 0 | Status: SHIPPED | OUTPATIENT
Start: 2017-04-04 | End: 2017-10-20 | Stop reason: ALTCHOICE

## 2017-04-04 NOTE — PATIENT INSTRUCTIONS
Sciatica: Care Instructions  Your Care Instructions    Sciatica (say \"lqe-VQ-ez-kuh\") is an irritation of one of the sciatic nerves, which come from the spinal cord in the lower back. The sciatic nerves and their branches extend down through the buttock to the foot. Sciatica can develop when an injured disc in the back presses against a spinal nerve root. Its main symptom is pain, numbness, or weakness that is often worse in the leg or foot than in the back. Sciatica often will improve and go away with time. Early treatment usually includes medicines and exercises to relieve pain. Follow-up care is a key part of your treatment and safety. Be sure to make and go to all appointments, and call your doctor if you are having problems. It's also a good idea to know your test results and keep a list of the medicines you take. How can you care for yourself at home? · Take pain medicines exactly as directed. ¨ If the doctor gave you a prescription medicine for pain, take it as prescribed. ¨ If you are not taking a prescription pain medicine, ask your doctor if you can take an over-the-counter medicine. · Use heat or ice to relieve pain. ¨ To apply heat, put a warm water bottle, heating pad set on low, or warm cloth on your back. Do not go to sleep with a heating pad on your skin. ¨ To use ice, put ice or a cold pack on the area for 10 to 20 minutes at a time. Put a thin cloth between the ice and your skin. · Avoid sitting if possible, unless it feels better than standing. · Alternate lying down with short walks. Increase your walking distance as you are able to without making your symptoms worse. · Do not do anything that makes your symptoms worse. When should you call for help? Call 911 anytime you think you may need emergency care. For example, call if:  · You are unable to move a leg at all.   Call your doctor now or seek immediate medical care if:  · You have new or worse symptoms in your legs or buttocks. Symptoms may include:  ¨ Numbness or tingling. ¨ Weakness. ¨ Pain. · You lose bladder or bowel control. Watch closely for changes in your health, and be sure to contact your doctor if:  · You are not getting better as expected. Where can you learn more? Go to http://marie-coleman.info/. Enter [phone] in the search box to learn more about \"Sciatica: Care Instructions. \"  Current as of: May 23, 2016  Content Version: 11.2  © 2384-3011 [employer]. Care instructions adapted under license by [employer] (which disclaims liability or warranty for this information). If you have questions about a medical condition or this instruction, always ask your healthcare professional. [address] any warranty or liability for your use of this information. Sciatica: Exercises  Your Care Instructions  Here are some examples of typical rehabilitation exercises for your condition. Start each exercise slowly. Ease off the exercise if you start to have pain. Your doctor or physical therapist will tell you when you can start these exercises and which ones will work best for you. When you are not being active, find a comfortable position for rest. Some people are comfortable on the floor or a medium-firm bed with a small pillow under their head and another under their knees. Some people prefer to lie on their side with a pillow between their knees. Don't stay in one position for too long. Take short walks (10 to 20 minutes) every 2 to 3 hours. Avoid slopes, hills, and stairs until you feel better. Walk only distances you can manage without pain, especially leg pain. How to do the exercises  Back stretches    1. Get down on your hands and knees on the floor. 2. Relax your head and allow it to droop. Round your back up toward the ceiling until you feel a nice stretch in your upper, middle, and lower back.  Hold this stretch for as long as it feels comfortable, or about 15 to 30 seconds. 3. Return to the starting position with a flat back while you are on your hands and knees. 4. Let your back sway by pressing your stomach toward the floor. Lift your buttocks toward the ceiling. 5. Hold this position for 15 to 30 seconds. 6. Repeat 2 to 4 times. Follow-up care is a key part of your treatment and safety. Be sure to make and go to all appointments, and call your doctor if you are having problems. It's also a good idea to know your test results and keep a list of the medicines you take. Where can you learn more? Go to http://marie-coleman.info/. Enter M684 in the search box to learn more about \"Sciatica: Exercises. \"  Current as of: May 23, 2016  Content Version: 11.2  © 1621-9097 [employer]. Care instructions adapted under license by [employer] (which disclaims liability or warranty for this information). If you have questions about a medical condition or this instruction, always ask your healthcare professional. [place] [address] any warranty or liability for your use of this information.

## 2017-04-04 NOTE — PROGRESS NOTES
Chief Complaint   Patient presents with    Back Pain     c/o consistent back pain radiating down his L leg x 4 weeks , states he has had back problems in the past, has been taking BC powder to help     Spasms

## 2017-04-04 NOTE — PROGRESS NOTES
Subjective:   History: This patient is a 46 y.o. male with a chief complaint of back pain. Left sided started 4 weeks ago after pushing heavy stuff at work. Has hx of spinal stenosis, scoliosis, bone spurs. He has been going to the chiropractor which helps. He has been taking alleve and BC powder. He was seen at [facility] 10 days ago, and was given solumedrol IM and 3 days of prednisone which helped for 2 days, states pain is back now. Reports pain shooting to left leg. No bowel or bladder incontinence. No fever, night sweats, or weight changes. No saddle anesthesia. Review of Systems:  General/Constitutional:  No fever, chills, sweats, fatigue, night sweats, weakness, weight loss or weight gain   Head: No headache, no trauma   Neck: No swelling, masses, stiffness, pain, or limited movement   Cardiac: No chest pain   Respiratory: No cough, shortness of breath, or dyspnea on exertion   GI: No incontinence. No nausea/vomiting, diarrhea, abdominal pain, bloody or dark stools  : No incontinence. No change in urinary habits. Peripheral Vascular: No edema, coldness, numbness, discoloration, pain, or paresthesias   Musculoskeletal: As per HPI  Neurological: No saddle distribution paresthesia. No siatic pain. No loss of consciousness, dizziness, seizures, dysarthria, cognitive changes, problems with balance, or unilateral weakness. Past Medical History:   Diagnosis Date    Back pain     Basal cell carcinoma 2012    3 on left side of neck and 1 on right side of neck and nasal bridge area with first dx starting aprroximately 12 yrs ago    Cancer [facility])     skin cancer    Family history of skin cancer     Frequent nosebleeds     Joint pain     Joint swelling     Other ill-defined conditions     High cholesterol.     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[facility] [address]     Sinus complaint     Skin cancer     Sun-damaged skin     Sunburn, blistering      Family History   Problem Relation Age of Onset    Hypertension Brother  Diabetes Brother     Diabetes Mother     Hypertension Mother     Cancer Mother      lung     Current Outpatient Prescriptions   Medication Sig Dispense Refill    fluorouracil (EFUDEX) 5 % chemo cream Apply twice daily for one week - use for one week of each month for four months straight then stop 40 g 0    SUMAtriptan (IMITREX) 100 mg tablet Take 1 Tab by mouth once as needed for Migraine. 12 Tab 1    omeprazole (PRILOSEC) 20 mg capsule TAKE ONE CAPSULE BY MOUTH EVERY DAY FOR IBS 90 Cap 11    fenofibrate nanocrystallized (TRICOR) 145 mg tablet Take 1 Tab by mouth daily. For hypercholesterolemia 90 Tab 11    DULoxetine (CYMBALTA) 30 mg capsule TAKE ONE CAPSULE BY MOUTH EVERY DAY FOR BACK PAIN 90 Cap 11    montelukast (SINGULAIR) 10 mg tablet TAKE 1 TABLET BY MOUTH EVERY DAY FOR ALLERGIES 90 Tab 11    propranolol LA (INDERAL LA) 60 mg SR capsule Take 1 Cap by mouth daily. For migraine prevention 30 Cap 5    aspirin 81 mg chewable tablet Take 1 Tab by mouth daily. 30 Tab 11    dicyclomine (BENTYL) 10 mg capsule Take 10 mg by mouth as needed. For abd cramping      naproxen (NAPROSYN) 500 mg tablet Take 500 mg by mouth two (2) times daily (with meals).  cetirizine (ZYRTEC) 10 mg tablet Take 10 mg by mouth daily.  methylPREDNISolone (MEDROL, HUMBLE,) 4 mg tablet Take as directed 1 Dose Pack 0    albuterol (PROVENTIL HFA, VENTOLIN HFA, PROAIR HFA) 90 mcg/actuation inhaler Take 2 Puffs by inhalation every four (4) hours as needed for Wheezing. 1 Inhaler 1    Dextromethorphan-guaiFENesin (MUCINEX DM) 60-1,200 mg TM12 Take 1 Tab by mouth two (2) times daily as needed. Indications: COUGH 20 Tab 1    azelastine (ASTELIN) 137 mcg (0.1 %) nasal spray 1 [place] by Both Nostrils route two (2) times a day. Use in each nostril as directed 3 Bottle 11    fluticasone (FLONASE) 50 mcg/actuation nasal spray Use two sprays in each nostril once daily for allergies.  3 Bottle 11    cetirizine-psuedoePHEDrine (ZYRTEC-D) 5-120 mg per tablet Take 1 Tab by mouth daily.  HYDROcodone-acetaminophen (NORCO) 7.5-325 mg per tablet Take 1 Tab by mouth every six (6) hours as needed for Pain. 80 Tab 3    saccharomyces boulardii (FLORASTOR) 250 mg capsule Take 250 mg by mouth two (2) times a day.  SIMETHICONE Take 120 mg by mouth as needed.  imiquimod (ALDARA) 5 % cream Apply  to affected area nightly. apply a thin layer as directed       Allergies   Allergen Reactions    Aldara [Imiquimod] Contact Dermatitis    Bactrim [Sulfamethoprim Ds] Other (comments)    Lipitor [Atorvastatin] Myalgia     Social History     Social History    Marital status:      Spouse name: N/A    Number of children: N/A    Years of education: N/A     Occupational History    Not on file. Social History Main Topics    Smoking status: Never Smoker    Smokeless tobacco: Never Used    Alcohol use Yes    Drug use: No      Comment: occasional marijuana, quit cocaine 15 yrs ago    Sexual activity: Yes     Partners: Female     Other Topics Concern    Not on file     Social History Narrative       Objective:     Visit Vitals    BP (!) 153/111 (BP 1 Location: Left arm, BP Patient Position: Sitting)    Pulse 88    Temp 97.7 °F (36.5 °C) (Oral)    Resp 16    Ht 5' 8\" (1.727 m)    Wt 213 lb 12.8 oz (97 kg)    SpO2 96%    BMI 32.51 kg/m2       General: Alert and oriented and in no acute distress. Responds to all questions appropriately. LUNGS: Respirations unlabored. Skin: No obvious rash.   MSK:    Posture: Normal   Deformity: None    ROM: limited due to pain       Gait: Normal       Palpation:    L1-L5: No tenderness    Sacrum: No tenderness    Coccyx: No tenderness    Left Paraspinal: pain in lower muscle    Right Paraspinal: No tenderness     Strength (0-5/5)    Hip Flexion:   Left: 5/5  Right: 5/5    Hip Extension:  Left: 5/5  Right: 5/5    Hip Abduction:  Left: 5/5  Right: 5/5    Hip Adduction:  Left: 5/5  Right: 5/5    Knee Extension:  Left: 4/5  Right: 5/5    Knee Flexion:   Left: 5/5  Right: 5/5    Ankle dorsiflexion:  Left: 5/5  Right: 5/5    Ankle plantarflexion:  Left: 5/5  Right: 5/5    Great toe extension:  Left: 5/5  Right: 5/5     Sensation: Intact, no deficits      DTR:    Patella:  Left: +2  Right: +2       Special test:    Straight leg: Left: positive   Right: Negative         Assessment:       ICD-10-CM ICD-9-CM    1. Acute left-sided low back pain with left-sided sciatica M54.42 724.2 methocarbamol (ROBAXIN) 500 mg tablet     724.3 cyclobenzaprine (FLEXERIL) 10 mg tablet     Sciatica with positive SLR on left side. Finished course of steroids last week. Currently taking plenty of NSAIDs with inadequate pain relief. Advised patient and wife to avoid too much NSAIDs to prevent GI ulcers or kidney problems. Patient states robaxin and flexeril usually help with such spasms, take as prescribed. Advised to see ortho to consider local injection. Continue seeing chiropractor. 1. Home Exercise Program as per handout. 2. Ice 15 minutes, three times a day PRN and after exercise. Can alternate with heat for 15 minutes.

## 2017-04-04 NOTE — MR AVS SNAPSHOT
Visit Information Date & Time Provider Department Dept. Phone Encounter #  
 4/4/2017  8:15 AM Yohan BanksAmish [id number] Your Appointments 7/27/2017  8:00 AM  
Office Visit with JOHN Simms [address] [place] [address]) Appt Note: est pt; 6 mo skin exam  
 [facility] [address]  
[address] [place] [address] [address] [facility] [address] Upcoming Health Maintenance Date Due Hepatitis C Screening 1965 FOBT Q 1 YEAR AGE 50-75 11/8/2013 INFLUENZA AGE 9 TO ADULT 8/1/2016 DTaP/Tdap/Td series (2 - Td) 8/16/2023 Allergies as of 4/4/2017  Review Complete On: 4/4/2017 By: Yohan Banks MD  
  
 Severity Noted Reaction Type Reactions Aldara [Imiquimod]  05/05/2015   Topical Contact Dermatitis Bactrim [Sulfamethoprim Ds]  03/27/2011    Other (comments) Lipitor [Atorvastatin]  05/05/2015   Side Effect Myalgia Current Immunizations  Reviewed on 3/22/2013 Name Date Tdap 8/16/2013 11:38 AM  
  
 Not reviewed this visit You Were Diagnosed With   
  
 Codes Comments Acute left-sided low back pain with left-sided sciatica    -  Primary ICD-10-CM: M54.42 
ICD-9-CM: 724.2, 724.3 Vitals BP Pulse Temp Resp Height(growth percentile) Weight(growth percentile) (!) 153/111 (BP 1 Location: Left arm, BP Patient Position: Sitting) 88 97.7 °F (36.5 °C) (Oral) 16 5' 8\" (1.727 m) 213 lb 12.8 oz (97 kg) SpO2 BMI Smoking Status 96% 32.51 kg/m2 Never Smoker Vitals History BMI and BSA Data Body Mass Index Body Surface Area 32.51 kg/m 2 2.16 m 2 Preferred Pharmacy Pharmacy Name Phone [id number] [place] [facility] [phone] [phone] Your Updated Medication List  
  
   
This list is accurate as of: 4/4/17  8:38 AM.  Always use your most recent med list.  
  
  
  
  
 albuterol 90 mcg/actuation inhaler Commonly known as:  PROVENTIL HFA, VENTOLIN HFA, PROAIR HFA Take 2 Puffs by inhalation every four (4) hours as needed for Wheezing. ALDARA 5 % cream  
Generic drug:  imiquimod Apply  to affected area nightly. apply a thin layer as directed  
  
 aspirin 81 mg chewable tablet Take 1 Tab by mouth daily. azelastine 137 mcg (0.1 %) nasal spray Commonly known as:  ASTELIN  
1 Spray by Both Nostrils route two (2) times a day. Use in each nostril as directed  
  
 cyclobenzaprine 10 mg tablet Commonly known as:  FLEXERIL Take 0.5-1 Tabs by mouth nightly as needed for Muscle Spasm(s). Dextromethorphan-guaiFENesin 60-1,200 mg Tm12 Commonly known as:  [address] Take 1 Tab by mouth two (2) times daily as needed. Indications: COUGH  
  
 dicyclomine 10 mg capsule Commonly known as:  BENTYL Take 10 mg by mouth as needed. For abd cramping DULoxetine 30 mg capsule Commonly known as:  CYMBALTA TAKE ONE CAPSULE BY MOUTH EVERY DAY FOR BACK PAIN  
  
 fenofibrate nanocrystallized 145 mg tablet Commonly known as:  [employer] Take 1 Tab by mouth daily. For hypercholesterolemia FLORASTOR 250 mg capsule Generic drug:  Saccharomyces boulardii Take 250 mg by mouth two (2) times a day. fluorouracil 5 % chemo cream  
Commonly known as:  EFUDEX Apply twice daily for one week - use for one week of each month for four months straight then stop  
  
 fluticasone 50 mcg/actuation nasal spray Commonly known as:  [place] Use two sprays in each nostril once daily for allergies. HYDROcodone-acetaminophen 7.5-325 mg per tablet Commonly known as:   [place] Take 1 Tab by mouth every six (6) hours as needed for Pain. methocarbamol 500 mg tablet Commonly known as:  ROBAXIN Take 2 Tabs by mouth four (4) times daily as needed. methylPREDNISolone 4 mg tablet Commonly known as:  MEDROL (HUMBLE) Take as directed  
  
 montelukast 10 mg tablet Commonly known as:  SINGULAIR  
TAKE 1 TABLET BY MOUTH EVERY DAY FOR ALLERGIES  
  
 NAPROSYN 500 mg tablet Generic drug:  naproxen Take 500 mg by mouth two (2) times daily (with meals). omeprazole 20 mg capsule Commonly known as:  PRILOSEC  
TAKE ONE CAPSULE BY MOUTH EVERY DAY FOR IBS  
  
 propranolol LA 60 mg SR capsule Commonly known as:  INDERAL LA Take 1 Cap by mouth daily. For migraine prevention SIMETHICONE Take 120 mg by mouth as needed. SUMAtriptan 100 mg tablet Commonly known as:  IMITREX Take 1 Tab by mouth once as needed for Migraine. ZyrTEC 10 mg tablet Generic drug:  cetirizine Take 10 mg by mouth daily. ZyrTEC-D 5-120 mg per tablet Generic drug:  cetirizine-psuedoePHEDrine Take 1 Tab by mouth daily. Prescriptions Sent to Pharmacy Refills  
 methocarbamol (ROBAXIN) 500 mg tablet 0 Sig: Take 2 Tabs by mouth four (4) times daily as needed. Class: Normal  
 Pharmacy: [id number] [address] Ph #: [phone] Route: Oral  
 cyclobenzaprine (FLEXERIL) 10 mg tablet 0 Sig: Take 0.5-1 Tabs by mouth nightly as needed for Muscle Spasm(s). Class: Normal  
 Pharmacy: [facility]/pharmacy #[id number] [address] Ph #: [phone] Route: Oral  
  
Patient Instructions Sciatica: Care Instructions Your Care Instructions Sciatica (say \"dts-YU-md-kuh\") is an irritation of one of the sciatic nerves, which come from the spinal cord in the lower back. The sciatic nerves and their branches extend down through the buttock to the foot. Sciatica can develop when an injured disc in the back presses against a spinal nerve root. Its main symptom is pain, numbness, or weakness that is often worse in the leg or foot than in the back. Sciatica often will improve and go away with time.  Early treatment usually includes medicines and exercises to relieve pain. Follow-up care is a key part of your treatment and safety. Be sure to make and go to all appointments, and call your doctor if you are having problems. It's also a good idea to know your test results and keep a list of the medicines you take. How can you care for yourself at home? · Take pain medicines exactly as directed. ¨ If the doctor gave you a prescription medicine for pain, take it as prescribed. ¨ If you are not taking a prescription pain medicine, ask your doctor if you can take an over-the-counter medicine. · Use heat or ice to relieve pain. ¨ To apply heat, put a warm water bottle, heating pad set on low, or warm cloth on your back. Do not go to sleep with a heating pad on your skin. ¨ To use ice, put ice or a cold pack on the area for 10 to 20 minutes at a time. Put a thin cloth between the ice and your skin. · Avoid sitting if possible, unless it feels better than standing. · Alternate lying down with short walks. Increase your walking distance as you are able to without making your symptoms worse. · Do not do anything that makes your symptoms worse. When should you call for help? Call 911 anytime you think you may need emergency care. For example, call if: 
· You are unable to move a leg at all. Call your doctor now or seek immediate medical care if: 
· You have new or worse symptoms in your legs or buttocks. Symptoms may include: ¨ Numbness or tingling. ¨ Weakness. ¨ Pain. · You lose bladder or bowel control. Watch closely for changes in your health, and be sure to contact your doctor if: 
· You are not getting better as expected. Where can you learn more? Go to http://marie-coleman.info/. Enter [phone] in the search box to learn more about \"Sciatica: Care Instructions. \" Current as of: May 23, 2016 Content Version: 11.2 © 1507-0681 [employer].  Care instructions adapted under license by [address] (which disclaims liability or warranty for this information). If you have questions about a medical condition or this instruction, always ask your healthcare professional. [address] any warranty or liability for your use of this information. Sciatica: Exercises Your Care Instructions Here are some examples of typical rehabilitation exercises for your condition. Start each exercise slowly. Ease off the exercise if you start to have pain. Your doctor or physical therapist will tell you when you can start these exercises and which ones will work best for you. When you are not being active, find a comfortable position for rest. Some people are comfortable on the floor or a medium-firm bed with a small pillow under their head and another under their knees. Some people prefer to lie on their side with a pillow between their knees. Don't stay in one position for too long. Take short walks (10 to 20 minutes) every 2 to 3 hours. Avoid slopes, hills, and stairs until you feel better. Walk only distances you can manage without pain, especially leg pain. How to do the exercises Back stretches 1. Get down on your hands and knees on the floor. 2. Relax your head and allow it to droop. Round your back up toward the ceiling until you feel a nice stretch in your upper, middle, and lower back. Hold this stretch for as long as it feels comfortable, or about 15 to 30 seconds. 3. Return to the starting position with a flat back while you are on your hands and knees. 4. Let your back sway by pressing your stomach toward the floor. Lift your buttocks toward the ceiling. 5. Hold this position for 15 to 30 seconds. 6. Repeat 2 to 4 times. Follow-up care is a key part of your treatment and safety. Be sure to make and go to all appointments, and call your doctor if you are having problems.  It's also a good idea to know your test results and keep a list of the medicines you take. Where can you learn more? Go to http://marie-coleman.info/. Enter N982 in the search box to learn more about \"Sciatica: Exercises. \" Current as of: May 23, 2016 Content Version: 11.2 © 2051-7314 [employer]. Care instructions adapted under license by [employer] (which disclaims liability or warranty for this information). If you have questions about a medical condition or this instruction, always ask your healthcare professional. [address] any warranty or liability for your use of this information. Introducing [facility]! Dear Birdie Johnson: Thank you for requesting a [employer] account. Our records indicate that you already have an active [employer] account. You can access your account anytime at [email or web address] Did you know that you can access your hospital and ER discharge instructions at any time in [employer]? You can also review all of your test results from your hospital stay or ER visit. Additional Information If you have questions, please visit the Frequently Asked Questions section of the [employer] website at [email or web address] Remember, [employer] is NOT to be used for urgent needs. For medical emergencies, dial 911. Now available from your iPhone and Android! Please provide this summary of care documentation to your next provider. Your primary care clinician is listed as Eloisa Bunn. If you have any questions after today's visit, please call [phone].

## 2017-05-05 ENCOUNTER — OFFICE VISIT (OUTPATIENT)
Dept: FAMILY MEDICINE CLINIC | Age: 52
End: 2017-05-05

## 2017-05-05 VITALS
HEIGHT: 68 IN | TEMPERATURE: 98.5 F | OXYGEN SATURATION: 96 % | RESPIRATION RATE: 18 BRPM | HEART RATE: 94 BPM | SYSTOLIC BLOOD PRESSURE: 139 MMHG | BODY MASS INDEX: 31.52 KG/M2 | DIASTOLIC BLOOD PRESSURE: 83 MMHG | WEIGHT: 208 LBS

## 2017-05-05 DIAGNOSIS — G89.29 CHRONIC BILATERAL LOW BACK PAIN, WITH SCIATICA PRESENCE UNSPECIFIED: Primary | ICD-10-CM

## 2017-05-05 DIAGNOSIS — G43.009 NONINTRACTABLE MIGRAINE, UNSPECIFIED MIGRAINE TYPE: ICD-10-CM

## 2017-05-05 DIAGNOSIS — J32.9 SINUSITIS, UNSPECIFIED CHRONICITY, UNSPECIFIED LOCATION: ICD-10-CM

## 2017-05-05 DIAGNOSIS — R04.0 FREQUENT EPISTAXIS: ICD-10-CM

## 2017-05-05 DIAGNOSIS — M54.5 CHRONIC BILATERAL LOW BACK PAIN, WITH SCIATICA PRESENCE UNSPECIFIED: Primary | ICD-10-CM

## 2017-05-05 DIAGNOSIS — E78.5 HYPERLIPIDEMIA, UNSPECIFIED HYPERLIPIDEMIA TYPE: ICD-10-CM

## 2017-05-05 RX ORDER — SUMATRIPTAN 100 MG/1
100 TABLET, FILM COATED ORAL
Qty: 12 TAB | Refills: 1 | Status: SHIPPED | OUTPATIENT
Start: 2017-05-05 | End: 2017-10-20 | Stop reason: SDUPTHER

## 2017-05-05 NOTE — PROGRESS NOTES
Chief Complaint   Patient presents with   [facility] Patient, former patient of Dr. Cherry Rodgers, routine visit

## 2017-05-05 NOTE — PROGRESS NOTES
Dheeraj La. is a 46 y.o. male who presents with the following complaints:  Chief Complaint   Patient presents with   [address] Patient, former patient of Dr. Christoper Cushing, routine visit     Sinus Infection     Patient currently being treated for active sinus infection        Subjective:    HPI:   Presents to establish care, PCP leaving clinic. Accompanied by wife, who is an RN at [employer] [place]. C/o frequent nosebleeds, ongoing for several years. Reports cold air is a primary trigger, but has had several in the past 1-2 weeks since weather is warmer. Reports frequent sinus infections, currently on day 3-4 of doxycyline, prednisone prescribed at [facility] earlier this week for sinusitis. Hx of septoplasty. Reports good compliance with flonase, astelin. Reports chronic lumbar pain, has been well controlled recently on cymbalta. Wife reports hx of spinal stenosis, bone spurs, and thoracic scoliosis. Tried gabapentin in the past for the pain, could not tolerate due to drowsiness. Requests refill on imitrex. Reports use a few times a month, some months not at all. Previously tried propranolol for headaches, but his made him too drowsy. F/u hyperlipidemia: reports good compliance with fenofibrate, unable to tolerate statins. Last checked about 2 years ago. No fasting today. Generally compliant with diet recommendations. Has ongoing f/u with dermatology for several skin cancers on the face and neck. Pertinent PMH/FH/SH:  Past Medical History:   Diagnosis Date    Back pain     Basal cell carcinoma 2012    3 on left side of neck and 1 on right side of neck and nasal bridge area with first dx starting aprroximately 12 yrs ago    Cancer [facility])     skin cancer    Family history of skin cancer     Frequent nosebleeds     Joint pain     Joint swelling     Other ill-defined conditions     High cholesterol.     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[facility] [address]     Sinus complaint     Skin cancer     Sun-damaged skin     Sunburn, blistering      Past Surgical History:   Procedure Laterality Date    HX HEENT      septurhinoplasty    HX ORTHOPAEDIC      bilateral thumb surgery     Family History   Problem Relation Age of Onset    Hypertension Brother     Diabetes Brother     Diabetes Mother     Hypertension Mother     Cancer Mother      lung     Social History     Social History    Marital status:      Spouse name: N/A    Number of children: N/A    Years of education: N/A     Social History Main Topics    Smoking status: Never Smoker    Smokeless tobacco: Never Used    Alcohol use Yes    Drug use: No      Comment: occasional marijuana, quit cocaine 15 yrs ago    Sexual activity: Yes     Partners: Female     Other Topics Concern    None     Social History Narrative     Advanced Directives: N      Patient Active Problem List    Diagnosis    Sinusitis    Hypercholesterolemia without hypertriglyceridemia    H/O cardiac catheterization     3/2013 with EF 60%, no significant CAD noted.       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[facility] [address]    Actinic keratosis due to exposure to sunlight    IBS (irritable bowel syndrome)    Statin intolerance    High triglycerides    ACS (acute coronary syndrome) ([facility] [address]    Hyperlipidemia       Nurse notes were reviewed and are correct  Review of Systems - negative except as listed above in the HPI    Objective:     Vitals:    05/05/17 0837   BP: 139/83   Pulse: 94   Resp: 18   Temp: 98.5 °F (36.9 °C)   TempSrc: Oral   SpO2: 96%   Weight: 208 lb (94.3 kg)   Height: 5' 8\" (1.727 m)     Physical Examination: General appearance - alert, well appearing, and in no distress, oriented to person, place, and time, overweight and well hydrated  Mental status - normal mood, behavior, speech, dress, motor activity, and thought processes  Nose - normal and patent, no erythema, discharge or polyps  Neck - supple, no significant adenopathy  Chest - clear to auscultation, no wheezes, rales or rhonchi, symmetric air entry  Heart - normal rate, regular rhythm, normal S1, S2, no murmurs, rubs, clicks or gallops  Back exam - full range of motion, no tenderness, palpable spasm or pain on motion  Neurological - alert, oriented, normal speech, no focal findings or movement disorder noted  Skin - normal coloration and turgor    Assessment/ Plan:   Cecil Cruz was seen today for establish care and sinus infection. Diagnoses and all orders for this visit:    Chronic bilateral low back pain, with sciatica presence unspecified  Doing well on duloxetine, continue at current dose    Frequent epistaxis  Refer to ent for further assessment- may have something a little further back than we can see that could be cauterized  -     REFERRAL TO ENT-OTOLARYNGOLOGY    Nonintractable migraine, unspecified migraine type  Symptoms less than 4x a month- ok to stay off of prophylaxis for now  Sumatriptan abortive therapy  -     SUMAtriptan (IMITREX) 100 mg tablet; Take 1 Tab by mouth once as needed for Migraine. Hyperlipidemia, unspecified hyperlipidemia type  Continue fenofibrate  Statin intolerant  TLC Diet:  -- Saturated fat <7% of calories, cholesterol <200 mg/day  -- Consider increased viscous (soluble) fiber (10-25 g/day) and plant stanols/sterols  (2g/day) as therapeutic options to enhance LDL lowering  Weight management  Increased physical activity. Check fasting lipids at next appt    Sinusitis, unspecified chronicity, unspecified location  Current acute episode       Follow-up Disposition:  Return in about 3 months (around 8/5/2017) for cpe, fasting labs. I have discussed the diagnosis with the patient and the intended plan as seen in the above orders. The patient has received an after-visit summary and questions were answered concerning future plans. The patient verbalizes understanding.     Medication Side Effects and Warnings were discussed with patient: yes  Patient Labs were reviewed and or requested: yes  Patient Past Records were reviewed and or requested: yes    Patient Instructions        Learning About Relief for Back Pain  What is back tension and strain? Back strain happens when you overstretch, or pull, a muscle in your back. You may hurt your back in an accident or when you exercise or lift something. Most back pain will get better with rest and time. You can take care of yourself at home to help your back heal.  What can you do first to relieve back pain? When you first feel back pain, try these steps:  · Walk. Take a short walk (10 to 20 minutes) on a level surface (no slopes, hills, or stairs) every 2 to 3 hours. Walk only distances you can manage without pain, especially leg pain. · Relax. Find a comfortable position for rest. Some people are comfortable on the floor or a medium-firm bed with a small pillow under their head and another under their knees. Some people prefer to lie on their side with a pillow between their knees. Don't stay in one position for too long. · Try heat or ice. Try using a heating pad on a low or medium setting, or take a warm shower, for 15 to 20 minutes every 2 to 3 hours. Or you can buy single-use heat wraps that last up to 8 hours. You can also try an ice pack for 10 to 15 minutes every 2 to 3 hours. You can use an ice pack or a bag of frozen vegetables wrapped in a thin towel. There is not strong evidence that either heat or ice will help, but you can try them to see if they help. You may also want to try switching between heat and cold. · Take pain medicine exactly as directed. ¨ If the doctor gave you a prescription medicine for pain, take it as prescribed. ¨ If you are not taking a prescription pain medicine, ask your doctor if you can take an over-the-counter medicine. What else can you do? · Stretch and exercise.  Exercises that increase flexibility may relieve your pain and make it easier for your muscles to keep your spine in a good, neutral position. And don't forget to keep walking. · Do self-massage. You can use self-massage to unwind after work or school or to energize yourself in the morning. You can easily massage your feet, hands, or neck. Self-massage works best if you are in comfortable clothes and are sitting or lying in a comfortable position. Use oil or lotion to massage bare skin. · Reduce stress. Back pain can lead to a vicious [place]: Distress about the pain tenses the muscles in your back, which in turn causes more pain. Learn how to relax your mind and your muscles to lower your stress. Where can you learn more? Go to http://marie-coleman.info/. Enter K343 in the search box to learn more about \"Learning About Relief for Back Pain. \"  Current as of: May 23, 2016  Content Version: 11.2  © 6860-9298 [employer]. Care instructions adapted under license by [employer] (which disclaims liability or warranty for this information). If you have questions about a medical condition or this instruction, always ask your healthcare professional. [address] any warranty or liability for your use of this information. Chronic Sinusitis: Care Instructions  Your Care Instructions    Sinusitis is an infection of the lining of the sinus cavities in your head. It causes pain and pressure in your head and face. Sinusitis can be short-term (acute) or long-term (chronic). Chronic sinusitis lasts 12 weeks or longer. It is often caused by a bacterial or fungal infection. Other things, such as allergies, may also be involved. Chronic sinusitis may be hard to treat. It can lead to permanent changes in the mucous membranes that line the sinuses. It may make future sinus infections more likely. The infection may take some time to treat. Antibiotics are usually used if the infection is caused by bacteria. You may also need to use a corticosteroid nasal spray. If the infection is not cured after you try two or more different antibiotics, you may want to talk with your doctor about surgery or allergy testing. If the sinusitis is caused by a fungal infection, you may need to take antifungals or other medicines. You may also need surgery. Follow-up care is a key part of your treatment and safety. Be sure to make and go to all appointments, and call your doctor if you are having problems. It's also a good idea to know your test results and keep a list of the medicines you take. How can you care for yourself at home? Medicines  · Be safe with medicines. Take your medicines exactly as prescribed. Call your doctor if you think you are having a problem with your medicine. You will get more details on the specific medicines your doctor prescribes. · Take your antibiotics as directed. Do not stop taking them just because you feel better. You need to take the full course of antibiotics. · Your doctor may recommend a corticosteroid nasal spray, wash, drops, or pills. Take this medicine exactly as prescribed. At home  · Breathe warm, moist air. You can use a steamy shower, a hot bath, or a sink filled with hot water. Avoid cold, dry air. Using a humidifier in your home may help. Follow the instructions for cleaning the machine. · Use saline (saltwater) nasal washes every day. This helps keep your nasal passages open. It also can wash out mucus and bacteria. ¨ You can buy saline nose drops at a grocery store or drugstore. ¨ You can make your own at home. Add 1 teaspoon of salt and 1 teaspoon of baking soda to 2 cups of distilled water. If you make your own, fill a bulb syringe with the solution. Then insert the tip into your nostril and squeeze gently. Damian Nunnery your nose. · Put a warm, wet towel or a warm gel pack on your face 3 or 4 times a day. Leave it on 5 to 10 minutes each time.   · Do not smoke or breathe secondhand smoke. Smoking can make sinusitis worse. If you need help quitting, talk to your doctor about stop-smoking programs and medicines. These can increase your chances of quitting for good. When should you call for help? Call your doctor now or seek immediate medical care if:  · You have new or worse swelling or redness in face or around eyes. Watch closely for changes in your health, and be sure to contact your doctor if:  · You have a new or higher fever. · You have new or worse facial pain. · The mucus from your nose becomes thicker (like pus) or has new blood in it. · You do not get better as expected. Where can you learn more? Go to http://marie-coleman.info/. Enter R190 in the search box to learn more about \"Chronic Sinusitis: Care Instructions. \"  Current as of: July 29, 2016  Content Version: 11.2  © 5319-3702 [employer]. Care instructions adapted under license by [employer] (which disclaims liability or warranty for this information). If you have questions about a medical condition or this instruction, always ask your healthcare professional. [address] any warranty or liability for your use of this information. Nosebleeds: Care Instructions  Your Care Instructions    Nosebleeds are common, especially if you have colds or allergies. Many things can cause a nosebleed. Some nosebleeds stop on their own with pressure. Others need packing. Some get cauterized (sealed). If you have gauze or other packing materials in your nose, you will need to follow up with your doctor to have the packing removed. You may need more treatment if you get nosebleeds a lot. The doctor has checked you carefully, but problems can develop later. If you notice any problems or new symptoms, get medical treatment right away. Follow-up care is a key part of your treatment and safety.  Be sure to make and go to all appointments, and call your doctor if you are having problems. It's also a good idea to know your test results and keep a list of the medicines you take. How can you care for yourself at home? · If you get another nosebleed:  ¨ Sit up and tilt your head slightly forward. This keeps blood from going down your throat. ¨ Use your thumb and index finger to pinch your nose shut for 10 minutes. Use a clock. Do not check to see if the bleeding has stopped before the 10 minutes are up. If the bleeding has not stopped, pinch your nose shut for another 10 minutes. ¨ When the bleeding has stopped, try not to pick, rub, or blow your nose for 12 hours. Avoiding these things helps keep your nose from bleeding again. · If your doctor prescribed antibiotics, take them as directed. Do not stop taking them just because you feel better. You need to take the full course of antibiotics. To prevent nosebleeds  · Do not blow your nose too hard. · Try not to lift or strain after a nosebleed. · Raise your head on a pillow while you sleep. · Put a thin layer of a saline- or water-based nasal gel, such as NasoGel, inside your nose. Put it on the septum, which divides your nostrils. This will prevent dryness that can cause nosebleeds. · Use a vaporizer or humidifier to add moisture to your bedroom. Follow the directions for cleaning the machine. · Do not use aspirin, ibuprofen (Advil, Motrin), or naproxen (Aleve) for 36 to 48 hours after a nosebleed unless your doctor tells you to. You can use acetaminophen (Tylenol) for pain relief. · Talk to your doctor about stopping any other medicines you are taking. Some medicines may make you more likely to get a nosebleed. · Do not use cold medicines or nasal sprays without first talking to your doctor. They can make your nose dry. When should you call for help? Call 911 anytime you think you may need emergency care. For example, call if:  · You passed out (lost consciousness).   Call your doctor now or seek immediate medical care if:  · You get another nosebleed and your nose is still bleeding after you have applied pressure 3 times for 10 minutes each time (30 minutes total). · There is a lot of blood running down the back of your throat even after you pinch your nose and tilt your head forward. · You have a fever. · You have sinus pain. Watch closely for changes in your health, and be sure to contact your doctor if:  · You get nosebleeds often, even if they stop. · You do not get better as expected. Where can you learn more? Go to http://marie-coleman.info/. Enter S156 in the search box to learn more about \"Nosebleeds: Care Instructions. \"  Current as of: May 27, 2016  Content Version: 11.2  © 1154-0308 [employer]. Care instructions adapted under license by [employer] (which disclaims liability or warranty for this information). If you have questions about a medical condition or this instruction, always ask your healthcare professional. [place] [address] any warranty or liability for your use of this information.           Leila COBURN

## 2017-05-05 NOTE — MR AVS SNAPSHOT
Visit Information Date & Time Provider Department Dept. Phone Encounter #  
 5/5/2017  8:30 AM Brenda Dominguez  [facility] [phone] [id number] Follow-up Instructions Return in about 3 months (around 8/5/2017) for cpe, fasting labs. Your Appointments 7/27/2017  8:00 AM  
Office Visit with Rudy Horn NP [facility] [address] [facility]) Appt Note: est pt; 6 mo skin exam  
 [facility] [address]  
[address] [place] [address] [address] [place] [id number] Upcoming Health Maintenance Date Due Hepatitis C Screening 1965 FOBT Q 1 YEAR AGE 50-75 11/8/2013 INFLUENZA AGE 9 TO ADULT 8/1/2017 DTaP/Tdap/Td series (2 - Td) 8/16/2023 Allergies as of 5/5/2017  Review Complete On: 5/5/2017 By: Brenda Dominguez NP Severity Noted Reaction Type Reactions Aldara [Imiquimod]  05/05/2015   Topical Contact Dermatitis States not allergic to this medication Bactrim [Sulfamethoprim Ds]  03/27/2011    Other (comments) Lipitor [Atorvastatin]  05/05/2015   Side Effect Myalgia Current Immunizations  Reviewed on 3/22/2013 Name Date Tdap 8/16/2013 11:38 AM  
  
 Not reviewed this visit You Were Diagnosed With   
  
 Codes Comments Chronic bilateral low back pain, with sciatica presence unspecified    -  Primary ICD-10-CM: M54.5, G89.29 ICD-9-CM: 724.2, 338.29 Frequent epistaxis     ICD-10-CM: R04.0 ICD-9-CM: 784.7 Nonintractable migraine, unspecified migraine type     ICD-10-CM: G43.009 ICD-9-CM: 346.10 Hyperlipidemia, unspecified hyperlipidemia type     ICD-10-CM: E78.5 ICD-9-CM: 272.4 Sinusitis, unspecified chronicity, unspecified location     ICD-10-CM: J32.9 ICD-9-CM: 473.9 Vitals BP Pulse Temp Resp Height(growth percentile) Weight(growth percentile) 139/83 (BP 1 Location: Right arm, BP Patient Position: Sitting) 94 98.5 °F (36.9 °C) (Oral) 18 5' 8\" (1.727 m) 208 lb (94.3 kg) SpO2 BMI Smoking Status 96% 31.63 kg/m2 Never Smoker Vitals History BMI and BSA Data Body Mass Index Body Surface Area  
 31.63 kg/m 2 2.13 m 2 Preferred Pharmacy Pharmacy Name Phone [facility]/PHARMACY #[id number] [facility] [place] - [address] [facility] [phone] Your Updated Medication List  
  
   
This list is accurate as of: 5/5/17  9:09 AM.  Always use your most recent med list.  
  
  
  
  
 albuterol 90 mcg/actuation inhaler Commonly known as:  PROVENTIL HFA, VENTOLIN HFA, PROAIR HFA Take 2 Puffs by inhalation every four (4) hours as needed for Wheezing. ALDARA 5 % cream  
Generic drug:  imiquimod Apply  to affected area nightly. apply a thin layer as directed  
  
 aspirin 81 mg chewable tablet Take 1 Tab by mouth daily. azelastine 137 mcg (0.1 %) nasal spray Commonly known as:  ASTELIN  
1 Spray by Both Nostrils route two (2) times a day. Use in each nostril as directed  
  
 cyclobenzaprine 10 mg tablet Commonly known as:  FLEXERIL Take 0.5-1 Tabs by mouth nightly as needed for Muscle Spasm(s). Dextromethorphan-guaiFENesin 60-1,200 mg Tm12 Commonly known as:  Noe & Noe DM Take 1 Tab by mouth two (2) times daily as needed. Indications: COUGH  
  
 dicyclomine 10 mg capsule Commonly known as:  BENTYL Take 10 mg by mouth as needed. For abd cramping DULoxetine 30 mg capsule Commonly known as:  CYMBALTA TAKE ONE CAPSULE BY MOUTH EVERY DAY FOR BACK PAIN  
  
 fenofibrate nanocrystallized 145 mg tablet Commonly known as:  TRICOR  
TAKE 1 TABLET BY MOUTH DAILY. FOR HYPERCHOLESTEROLEMIA FLORASTOR 250 mg capsule Generic drug:  Saccharomyces boulardii Take 250 mg by mouth two (2) times a day. fluorouracil 5 % chemo cream  
Commonly known as:  EFUDEX Apply twice daily for one week - use for one week of each month for four months straight then stop  
  
 fluticasone 50 mcg/actuation nasal spray Commonly known as:  Colleen Mad Use two sprays in each nostril once daily for allergies. HYDROcodone-acetaminophen 7.5-325 mg per tablet Commonly known as:  Jarome Spring Take 1 Tab by mouth every six (6) hours as needed for Pain. methocarbamol 500 mg tablet Commonly known as:  ROBAXIN Take 2 Tabs by mouth four (4) times daily as needed. methylPREDNISolone 4 mg tablet Commonly known as:  MEDROL (HUMBLE) Take as directed  
  
 montelukast 10 mg tablet Commonly known as:  SINGULAIR  
TAKE 1 TABLET BY MOUTH EVERY DAY FOR ALLERGIES  
  
 NAPROSYN 500 mg tablet Generic drug:  naproxen Take 500 mg by mouth two (2) times daily (with meals). omeprazole 20 mg capsule Commonly known as:  PRILOSEC  
TAKE ONE CAPSULE BY MOUTH EVERY DAY FOR IBS  
  
 propranolol LA 60 mg SR capsule Commonly known as:  INDERAL LA Take 1 Cap by mouth daily. For migraine prevention SIMETHICONE Take 120 mg by mouth as needed. SUMAtriptan 100 mg tablet Commonly known as:  IMITREX Take 1 Tab by mouth once as needed for Migraine. ZyrTEC 10 mg tablet Generic drug:  cetirizine Take 10 mg by mouth daily. ZyrTEC-D 5-120 mg per tablet Generic drug:  cetirizine-psuedoePHEDrine Take 1 Tab by mouth daily. Prescriptions Sent to Pharmacy Refills SUMAtriptan (IMITREX) 100 mg tablet 1 Sig: Take 1 Tab by mouth once as needed for Migraine. Class: Normal  
 Pharmacy: [facility]/pharmacy #[id number] [address]ian [address] #: [phone] Route: Oral  
  
We Performed the Following REFERRAL TO ENT-OTOLARYNGOLOGY [JZZ27 Custom] Comments:  
 Frequent epistaxis Follow-up Instructions Return in about 3 months (around 8/5/2017) for cpe, fasting labs. Referral Information Referral ID Referred By Referred To  
  
 [id number] [facility] Bee Roque [address] [place] [address] Fax: [phone] Visits Status Start Date End Date 1 New Request 5/5/17 5/5/18 If your referral has a status of pending review or denied, additional information will be sent to support the outcome of this decision. Patient Instructions Learning About Relief for Back Pain What is back tension and strain? Back strain happens when you overstretch, or pull, a muscle in your back. You may hurt your back in an accident or when you exercise or lift something. Most back pain will get better with rest and time. You can take care of yourself at home to help your back heal. 
What can you do first to relieve back pain? When you first feel back pain, try these steps: 
· Walk. Take a short walk (10 to 20 minutes) on a level surface (no slopes, hills, or stairs) every 2 to 3 hours. Walk only distances you can manage without pain, especially leg pain. · Relax. Find a comfortable position for rest. Some people are comfortable on the floor or a medium-firm bed with a small pillow under their head and another under their knees. Some people prefer to lie on their side with a pillow between their knees. Don't stay in one position for too long. · Try heat or ice. Try using a heating pad on a low or medium setting, or take a warm shower, for 15 to 20 minutes every 2 to 3 hours. Or you can buy single-use heat wraps that last up to 8 hours. You can also try an ice pack for 10 to 15 minutes every 2 to 3 hours. You can use an ice pack or a bag of frozen vegetables wrapped in a thin towel. There is not strong evidence that either heat or ice will help, but you can try them to see if they help. You may also want to try switching between heat and cold. · Take pain medicine exactly as directed. ¨ If the doctor gave you a prescription medicine for pain, take it as prescribed. ¨ If you are not taking a prescription pain medicine, ask your doctor if you can take an over-the-counter medicine. What else can you do? · Stretch and exercise. Exercises that increase flexibility may relieve your pain and make it easier for your muscles to keep your spine in a good, neutral position. And don't forget to keep walking. · Do self-massage. You can use self-massage to unwind after work or school or to energize yourself in the morning. You can easily massage your feet, hands, or neck. Self-massage works best if you are in comfortable clothes and are sitting or lying in a comfortable position. Use oil or lotion to massage bare skin. · Reduce stress. Back pain can lead to a vicious [place]: Distress about the pain tenses the muscles in your back, which in turn causes more pain. Learn how to relax your mind and your muscles to lower your stress. Where can you learn more? Go to http://marie-coleman.info/. Enter V264 in the search box to learn more about \"Learning About Relief for Back Pain. \" Current as of: May 23, 2016 Content Version: 11.2 © 3571-7271 [employer]. Care instructions adapted under license by [employer] (which disclaims liability or warranty for this information). If you have questions about a medical condition or this instruction, always ask your healthcare professional. [place] [address] any warranty or liability for your use of this information. Chronic Sinusitis: Care Instructions Your Care Instructions Sinusitis is an infection of the lining of the sinus cavities in your head. It causes pain and pressure in your head and face. Sinusitis can be short-term (acute) or long-term (chronic). Chronic sinusitis lasts 12 weeks or longer.  It is often caused by a bacterial or fungal infection. Other things, such as allergies, may also be involved. Chronic sinusitis may be hard to treat. It can lead to permanent changes in the mucous membranes that line the sinuses. It may make future sinus infections more likely. The infection may take some time to treat. Antibiotics are usually used if the infection is caused by bacteria. You may also need to use a corticosteroid nasal spray. If the infection is not cured after you try two or more different antibiotics, you may want to talk with your doctor about surgery or allergy testing. If the sinusitis is caused by a fungal infection, you may need to take antifungals or other medicines. You may also need surgery. Follow-up care is a key part of your treatment and safety. Be sure to make and go to all appointments, and call your doctor if you are having problems. It's also a good idea to know your test results and keep a list of the medicines you take. How can you care for yourself at home? Medicines · Be safe with medicines. Take your medicines exactly as prescribed. Call your doctor if you think you are having a problem with your medicine. You will get more details on the specific medicines your doctor prescribes. · Take your antibiotics as directed. Do not stop taking them just because you feel better. You need to take the full course of antibiotics. · Your doctor may recommend a corticosteroid nasal spray, wash, drops, or pills. Take this medicine exactly as prescribed. At home · Breathe warm, moist air. You can use a steamy shower, a hot bath, or a sink filled with hot water. Avoid cold, dry air. Using a humidifier in your home may help. Follow the instructions for cleaning the machine. · Use saline (saltwater) nasal washes every day. This helps keep your nasal passages open. It also can wash out mucus and bacteria. ¨ You can buy saline nose drops at a grocery store or drugstore. ¨ You can make your own at home. Add 1 teaspoon of salt and 1 teaspoon of baking soda to 2 cups of distilled water. If you make your own, fill a bulb syringe with the solution. Then insert the tip into your nostril and squeeze gently. Silvia Conine your nose. · Put a warm, wet towel or a warm gel pack on your face 3 or 4 times a day. Leave it on 5 to 10 minutes each time. · Do not smoke or breathe secondhand smoke. Smoking can make sinusitis worse. If you need help quitting, talk to your doctor about stop-smoking programs and medicines. These can increase your chances of quitting for good. When should you call for help? Call your doctor now or seek immediate medical care if: 
· You have new or worse swelling or redness in face or around eyes. Watch closely for changes in your health, and be sure to contact your doctor if: 
· You have a new or higher fever. · You have new or worse facial pain. · The mucus from your nose becomes thicker (like pus) or has new blood in it. · You do not get better as expected. Where can you learn more? Go to http://marie-coleman.info/. Enter I915 in the search box to learn more about \"Chronic Sinusitis: Care Instructions. \" Current as of: July 29, 2016 Content Version: 11.2 © 2900-4608 [employer]. Care instructions adapted under license by [employer] (which disclaims liability or warranty for this information). If you have questions about a medical condition or this instruction, always ask your healthcare professional. [place] [address] any warranty or liability for your use of this information. Nosebleeds: Care Instructions Your Care Instructions Nosebleeds are common, especially if you have colds or allergies. Many things can cause a nosebleed. Some nosebleeds stop on their own with pressure. Others need packing. Some get cauterized (sealed).  If you have gauze or other packing materials in your nose, you will need to follow up with your doctor to have the packing removed. You may need more treatment if you get nosebleeds a lot. The doctor has checked you carefully, but problems can develop later. If you notice any problems or new symptoms, get medical treatment right away. Follow-up care is a key part of your treatment and safety. Be sure to make and go to all appointments, and call your doctor if you are having problems. It's also a good idea to know your test results and keep a list of the medicines you take. How can you care for yourself at home? · If you get another nosebleed: ¨ Sit up and tilt your head slightly forward. This keeps blood from going down your throat. ¨ Use your thumb and index finger to pinch your nose shut for 10 minutes. Use a clock. Do not check to see if the bleeding has stopped before the 10 minutes are up. If the bleeding has not stopped, pinch your nose shut for another 10 minutes. ¨ When the bleeding has stopped, try not to pick, rub, or blow your nose for 12 hours. Avoiding these things helps keep your nose from bleeding again. · If your doctor prescribed antibiotics, take them as directed. Do not stop taking them just because you feel better. You need to take the full course of antibiotics. To prevent nosebleeds · Do not blow your nose too hard. · Try not to lift or strain after a nosebleed. · Raise your head on a pillow while you sleep. · Put a thin layer of a saline- or water-based nasal gel, such as NasoGel, inside your nose. Put it on the septum, which divides your nostrils. This will prevent dryness that can cause nosebleeds. · Use a vaporizer or humidifier to add moisture to your bedroom. Follow the directions for cleaning the machine. · Do not use aspirin, ibuprofen (Advil, Motrin), or naproxen (Aleve) for 36 to 48 hours after a nosebleed unless your doctor tells you to. You can use acetaminophen (Tylenol) for pain relief. · Talk to your doctor about stopping any other medicines you are taking. Some medicines may make you more likely to get a nosebleed. · Do not use cold medicines or nasal sprays without first talking to your doctor. They can make your nose dry. When should you call for help? Call 911 anytime you think you may need emergency care. For example, call if: 
· You passed out (lost consciousness). Call your doctor now or seek immediate medical care if: 
· You get another nosebleed and your nose is still bleeding after you have applied pressure 3 times for 10 minutes each time (30 minutes total). · There is a lot of blood running down the back of your throat even after you pinch your nose and tilt your head forward. · You have a fever. · You have sinus pain. Watch closely for changes in your health, and be sure to contact your doctor if: 
· You get nosebleeds often, even if they stop. · You do not get better as expected. Where can you learn more? Go to http://marie-coleman.info/. Enter S156 in the search box to learn more about \"Nosebleeds: Care Instructions. \" Current as of: May 27, 2016 Content Version: 11.2 © 9074-8179 [employer]. Care instructions adapted under license by [employer] (which disclaims liability or warranty for this information). If you have questions about a medical condition or this instruction, always ask your healthcare professional. [place] [address] any warranty or liability for your use of this information. Introducing [facility]! Dear Tenisha Schwarz: Thank you for requesting a Ezuza account. Our records indicate that you already have an active Ezuza account. You can access your account anytime at [email or web address] Did you know that you can access your hospital and ER discharge instructions at any time in Ezuza?   You can also review all of your test results from your hospital stay or ER visit. Additional Information If you have questions, please visit the Frequently Asked Questions section of the Fitfu website at [email or web address] Remember, Fitfu is NOT to be used for urgent needs. For medical emergencies, dial 911. Now available from your iPhone and Android! Please provide this summary of care documentation to your next provider. Your primary care clinician is listed as Kylie Isbell. If you have any questions after today's visit, please call [phone].

## 2017-06-01 ENCOUNTER — OFFICE VISIT (OUTPATIENT)
Dept: FAMILY MEDICINE CLINIC | Age: 52
End: 2017-06-01

## 2017-06-01 VITALS
TEMPERATURE: 97.7 F | RESPIRATION RATE: 20 BRPM | DIASTOLIC BLOOD PRESSURE: 84 MMHG | WEIGHT: 199 LBS | OXYGEN SATURATION: 98 % | HEART RATE: 95 BPM | HEIGHT: 68 IN | BODY MASS INDEX: 30.16 KG/M2 | SYSTOLIC BLOOD PRESSURE: 134 MMHG

## 2017-06-01 DIAGNOSIS — M54.42 LEFT-SIDED LOW BACK PAIN WITH LEFT-SIDED SCIATICA, UNSPECIFIED CHRONICITY: Primary | ICD-10-CM

## 2017-06-01 RX ORDER — DICLOFENAC SODIUM 75 MG/1
75 TABLET, DELAYED RELEASE ORAL 2 TIMES DAILY
Qty: 60 TAB | Refills: 2 | Status: SHIPPED | OUTPATIENT
Start: 2017-06-01 | End: 2017-06-15

## 2017-06-01 NOTE — MR AVS SNAPSHOT
Visit Information Date & Time Provider Department Dept. Phone Encounter #  
 6/1/2017  9:00 AM Sim Cheek  [facility] [phone] [id number] Your Appointments 7/27/2017  8:00 AM  
Office Visit with Prudence Lennox, NP [place] [address] [facility]) Appt Note: est pt; 6 mo skin exam  
 [facility] [id number] [place] [address] [facility] [address] Upcoming Health Maintenance Date Due Hepatitis C Screening 1965 FOBT Q 1 YEAR AGE 50-75 11/8/2013 INFLUENZA AGE 9 TO ADULT 8/1/2017 DTaP/Tdap/Td series (2 - Td) 8/16/2023 Allergies as of 6/1/2017  Review Complete On: 6/1/2017 By: Krishna Carr Severity Noted Reaction Type Reactions Aldara [Imiquimod]  05/05/2015   Topical Contact Dermatitis States not allergic to this medication Bactrim [Sulfamethoprim Ds]  03/27/2011    Other (comments) Lipitor [Atorvastatin]  05/05/2015   Side Effect Myalgia Current Immunizations  Reviewed on 3/22/2013 Name Date Tdap 8/16/2013 11:38 AM  
  
 Not reviewed this visit You Were Diagnosed With   
  
 Codes Comments Left-sided low back pain with left-sided sciatica, unspecified chronicity    -  Primary ICD-10-CM: M54.42 
ICD-9-CM: 724.3 Vitals BP Pulse Temp Resp Height(growth percentile) Weight(growth percentile) 134/84 (BP 1 Location: Left arm, BP Patient Position: Sitting) 95 97.7 °F (36.5 °C) (Oral) 20 5' 8\" (1.727 m) 199 lb (90.3 kg) SpO2 BMI Smoking Status 98% 30.26 kg/m2 Never Smoker Vitals History BMI and BSA Data Body Mass Index Body Surface Area  
 30.26 kg/m 2 2.08 m 2 Preferred Pharmacy Pharmacy Name Phone [id number] - [phone] [place] [address] [phone] Your Updated Medication List  
  
   
 This list is accurate as of: 6/1/17  9:40 AM.  Always use your most recent med list.  
  
  
  
  
 albuterol 90 mcg/actuation inhaler Commonly known as:  PROVENTIL HFA, VENTOLIN HFA, PROAIR HFA Take 2 Puffs by inhalation every four (4) hours as needed for Wheezing. aspirin 81 mg chewable tablet Take 1 Tab by mouth daily. azelastine 137 mcg (0.1 %) nasal spray Commonly known as:  ASTELIN  
1 Spray by Both Nostrils route two (2) times a day. Use in each nostril as directed  
  
 cyclobenzaprine 10 mg tablet Commonly known as:  FLEXERIL Take 0.5-1 Tabs by mouth nightly as needed for Muscle Spasm(s). Dextromethorphan-guaiFENesin 60-1,200 mg Tm12 Commonly known as:  Noe & Noe DM Take 1 Tab by mouth two (2) times daily as needed. Indications: COUGH  
  
 diclofenac EC 75 mg EC tablet Commonly known as:  VOLTAREN Take 1 Tab by mouth two (2) times a day for 14 days. Take with food  
  
 dicyclomine 10 mg capsule Commonly known as:  BENTYL Take 10 mg by mouth as needed. For abd cramping DULoxetine 30 mg capsule Commonly known as:  CYMBALTA TAKE ONE CAPSULE BY MOUTH EVERY DAY FOR BACK PAIN  
  
 fenofibrate nanocrystallized 145 mg tablet Commonly known as:  TRICOR  
TAKE 1 TABLET BY MOUTH DAILY. FOR HYPERCHOLESTEROLEMIA  
  
 fluticasone 50 mcg/actuation nasal spray Commonly known as:  Hussein Genia Use two sprays in each nostril once daily for allergies. methocarbamol 500 mg tablet Commonly known as:  ROBAXIN Take 2 Tabs by mouth four (4) times daily as needed. montelukast 10 mg tablet Commonly known as:  SINGULAIR  
TAKE 1 TABLET BY MOUTH EVERY DAY FOR ALLERGIES  
  
 omeprazole 20 mg capsule Commonly known as:  PRILOSEC  
TAKE ONE CAPSULE BY MOUTH EVERY DAY FOR IBS  
  
 SIMETHICONE Take 120 mg by mouth as needed. SUMAtriptan 100 mg tablet Commonly known as:  IMITREX Take 1 Tab by mouth once as needed for Migraine. ZyrTEC 10 mg tablet Generic drug:  cetirizine Take 10 mg by mouth daily. ZyrTEC-D 5-120 mg per tablet Generic drug:  cetirizine-psuedoePHEDrine Take 1 Tab by mouth daily. Prescriptions Sent to Pharmacy Refills  
 diclofenac EC (VOLTAREN) 75 mg EC tablet 2 Sig: Take 1 Tab by mouth two (2) times a day for 14 days. Take with food Class: Normal  
 Pharmacy: [id number] [address] #: [phone] Route: Oral  
  
We Performed the Following REFERRAL TO PAIN MANAGEMENT [HGJ259 Custom] Comments:  
 Please evaluate patient for left low back pain with sciatica. REFERRAL TO PHYSICAL THERAPY [IQJ50 Custom] Comments: Low back pain with sciatica- rx given to patient for Stride PT Referral Information Referral ID Referred By Referred To  
  
 [id number] [facility] [address] Visits Status Start Date End Date 1 New Request 6/1/17 6/1/18 If your referral has a status of pending review or denied, additional information will be sent to support the outcome of this decision. Referral ID Referred By Referred To  
 [id number] Anni Shock Not Available Visits Status Start Date End Date 1 New Request 6/1/17 6/1/18 If your referral has a status of pending review or denied, additional information will be sent to support the outcome of this decision. Patient Instructions Back Pain, Emergency or Urgent Symptoms: Care Instructions Your Care Instructions Many people have back pain at one time or another. In most cases, pain gets better with self-care that includes over-the-counter pain medicine, ice, heat, and exercises. Unless you have symptoms of a severe injury or heart attack, you may be able to give yourself a few days before you call a doctor.  But some back problems are very serious. Do not ignore symptoms that need to be checked right away. Follow-up care is a key part of your treatment and safety. Be sure to make and go to all appointments, and call your doctor if you are having problems. It's also a good idea to know your test results and keep a list of the medicines you take. How can you care for yourself at home? · Sit or lie in positions that are most comfortable and that reduce your pain. Try one of these positions when you lie down: ¨ Lie on your back with your knees bent and supported by large pillows. ¨ Lie on the floor with your legs on the seat of a sofa or chair. [place] Neighbor on your side with your knees and hips bent and a pillow between your legs. ¨ Lie on your stomach if it does not make pain worse. · Do not sit up in bed, and avoid soft couches and twisted positions. Bed rest can help relieve pain at first, but it delays healing. Avoid bed rest after the first day. · Change positions every 30 minutes. If you must sit for long periods of time, take breaks from sitting. Get up and walk around, or lie flat. · Try using a heating pad on a low or medium setting, for 15 to 20 minutes every 2 or 3 hours. Try a warm shower in place of one session with the heating pad. You can also buy single-use heat wraps that last up to 8 hours. You can also try ice or cold packs on your back for 10 to 20 minutes at a time, several times a day. (Put a thin cloth between the ice pack and your skin.) This reduces pain and makes it easier to be active and exercise. · Take pain medicines exactly as directed. ¨ If the doctor gave you a prescription medicine for pain, take it as prescribed. ¨ If you are not taking a prescription pain medicine, ask your doctor if you can take an over-the-counter medicine. When should you call for help? Call 911 anytime you think you may need emergency care. For example, call if: 
· You are unable to move a leg at all. · You have back pain with severe belly pain. · You have symptoms of a heart attack. These may include: ¨ Chest pain or pressure, or a strange feeling in the chest. 
¨ Sweating. ¨ Shortness of breath. ¨ Nausea or vomiting. ¨ Pain, pressure, or a strange feeling in the back, neck, jaw, or upper belly or in one or both shoulders or arms. ¨ Lightheadedness or sudden weakness. ¨ A fast or irregular heartbeat. After you call 911, the  may tell you to chew 1 adult-strength or 2 to 4 low-dose aspirin. Wait for an ambulance. Do not try to drive yourself. Call your doctor now or seek immediate medical care if: 
· You have new or worse symptoms in your arms, legs, chest, belly, or buttocks. Symptoms may include: ¨ Numbness or tingling. ¨ Weakness. ¨ Pain. · You lose bladder or bowel control. · You have back pain and: 
¨ You have injured your back while lifting or doing some other activity. Call if the pain is severe, has not gone away after 1 or 2 days, and you cannot do your normal daily activities. ¨ You have had a back injury before that needed treatment. ¨ Your pain has lasted longer than 4 weeks. ¨ You have had weight loss you cannot explain. ¨ You are age 48 or older. ¨ You have cancer now or have had it before. Watch closely for changes in your health, and be sure to contact your doctor if you are not getting better as expected. Where can you learn more? Go to http://marie-coleman.info/. Enter U146 in the search box to learn more about \"Back Pain, Emergency or Urgent Symptoms: Care Instructions. \" Current as of: May 27, 2016 Content Version: 11.2 © 8468-7195 WibiData. Care instructions adapted under license by K[employer] (which disclaims liability or warranty for this information).  If you have questions about a medical condition or this instruction, always ask your healthcare professional. Carmela Osborne[employer] disclaims any warranty or liability for your use of this information. Sciatica: Care Instructions Your Care Instructions Sciatica (say \"ijj-KW-rl-kuh\") is an irritation of one of the sciatic nerves, which come from the spinal cord in the lower back. The sciatic nerves and their branches extend down through the buttock to the foot. Sciatica can develop when an injured disc in the back presses against a spinal nerve root. Its main symptom is pain, numbness, or weakness that is often worse in the leg or foot than in the back. Sciatica often will improve and go away with time. Early treatment usually includes medicines and exercises to relieve pain. Follow-up care is a key part of your treatment and safety. Be sure to make and go to all appointments, and call your doctor if you are having problems. It's also a good idea to know your test results and keep a list of the medicines you take. How can you care for yourself at home? · Take pain medicines exactly as directed. ¨ If the doctor gave you a prescription medicine for pain, take it as prescribed. ¨ If you are not taking a prescription pain medicine, ask your doctor if you can take an over-the-counter medicine. · Use heat or ice to relieve pain. ¨ To apply heat, put a warm water bottle, heating pad set on low, or warm cloth on your back. Do not go to sleep with a heating pad on your skin. ¨ To use ice, put ice or a cold pack on the area for 10 to 20 minutes at a time. Put a thin cloth between the ice and your skin. · Avoid sitting if possible, unless it feels better than standing. · Alternate lying down with short walks. Increase your walking distance as you are able to without making your symptoms worse. · Do not do anything that makes your symptoms worse. When should you call for help? Call 911 anytime you think you may need emergency care. For example, call if: 
· You are unable to move a leg at all. Call your doctor now or seek immediate medical care if: 
· You have new or worse symptoms in your legs or buttocks. Symptoms may include: ¨ Numbness or tingling. ¨ Weakness. ¨ Pain. · You lose bladder or bowel control. Watch closely for changes in your health, and be sure to contact your doctor if: 
· You are not getting better as expected. Where can you learn more? Go to http://marie-coleman.info/. Enter [phone] in the search box to learn more about \"Sciatica: Care Instructions. \" Current as of: May 23, 2016 Content Version: 11.2 © 1678-3821 StyleZen. Care instructions adapted under license by TeachScape (which disclaims liability or warranty for this information). If you have questions about a medical condition or this instruction, always ask your healthcare professional. [address] any warranty or liability for your use of this information. Introducing \[facility]\"" & HEALTH SERVICES! Dear Kathy Del Cid: Thank you for requesting a Biglion account. Our records indicate that you already have an active Biglion account. You can access your account anytime at [email or web address] [email or web address]/WaveTec Vision Did you know that you can access your hospital and ER discharge instructions at any time in Biglion? You can also review all of your test results from your hospital stay or ER visit. Additional Information If you have questions, please visit the Frequently Asked Questions section of the Biglion website at [email or web address] Remember, Biglion is NOT to be used for urgent needs. For medical emergencies, dial 911. Now available from your iPhone and Android! Please provide this summary of care documentation to your next provider. Your primary care clinician is listed as Darlin Ward. If you have any questions after today's visit, please call [phone].

## 2017-06-01 NOTE — PATIENT INSTRUCTIONS
Back Pain, Emergency or Urgent Symptoms: Care Instructions  Your Care Instructions  Many people have back pain at one time or another. In most cases, pain gets better with self-care that includes over-the-counter pain medicine, ice, heat, and exercises. Unless you have symptoms of a severe injury or heart attack, you may be able to give yourself a few days before you call a doctor. But some back problems are very serious. Do not ignore symptoms that need to be checked right away. Follow-up care is a key part of your treatment and safety. Be sure to make and go to all appointments, and call your doctor if you are having problems. It's also a good idea to know your test results and keep a list of the medicines you take. How can you care for yourself at home? · Sit or lie in positions that are most comfortable and that reduce your pain. Try one of these positions when you lie down:  ¨ Lie on your back with your knees bent and supported by large pillows. ¨ Lie on the floor with your legs on the seat of a sofa or chair. Florentin Coral on your side with your knees and hips bent and a pillow between your legs. ¨ Lie on your stomach if it does not make pain worse. · Do not sit up in bed, and avoid soft couches and twisted positions. Bed rest can help relieve pain at first, but it delays healing. Avoid bed rest after the first day. · Change positions every 30 minutes. If you must sit for long periods of time, take breaks from sitting. Get up and walk around, or lie flat. · Try using a heating pad on a low or medium setting, for 15 to 20 minutes every 2 or 3 hours. Try a warm shower in place of one session with the heating pad. You can also buy single-use heat wraps that last up to 8 hours. You can also try ice or cold packs on your back for 10 to 20 minutes at a time, several times a day. (Put a thin cloth between the ice pack and your skin.) This reduces pain and makes it easier to be active and exercise.   · Take pain medicines exactly as directed. ¨ If the doctor gave you a prescription medicine for pain, take it as prescribed. ¨ If you are not taking a prescription pain medicine, ask your doctor if you can take an over-the-counter medicine. When should you call for help? Call 911 anytime you think you may need emergency care. For example, call if:  · You are unable to move a leg at all. · You have back pain with severe belly pain. · You have symptoms of a heart attack. These may include:  ¨ Chest pain or pressure, or a strange feeling in the chest.  ¨ Sweating. ¨ Shortness of breath. ¨ Nausea or vomiting. ¨ Pain, pressure, or a strange feeling in the back, neck, jaw, or upper belly or in one or both shoulders or arms. ¨ Lightheadedness or sudden weakness. ¨ A fast or irregular heartbeat. After you call 911, the  may tell you to chew 1 adult-strength or 2 to 4 low-dose aspirin. Wait for an ambulance. Do not try to drive yourself. Call your doctor now or seek immediate medical care if:  · You have new or worse symptoms in your arms, legs, chest, belly, or buttocks. Symptoms may include:  ¨ Numbness or tingling. ¨ Weakness. ¨ Pain. · You lose bladder or bowel control. · You have back pain and:  ¨ You have injured your back while lifting or doing some other activity. Call if the pain is severe, has not gone away after 1 or 2 days, and you cannot do your normal daily activities. ¨ You have had a back injury before that needed treatment. ¨ Your pain has lasted longer than 4 weeks. ¨ You have had weight loss you cannot explain. ¨ You are age 48 or older. ¨ You have cancer now or have had it before. Watch closely for changes in your health, and be sure to contact your doctor if you are not getting better as expected. Where can you learn more? Go to http://marie-coleman.info/.   Enter O797 in the search box to learn more about \"Back Pain, Emergency or Urgent Symptoms: Care Instructions. \"  Current as of: May 27, 2016  Content Version: 11.2  © 0989-2477 [employer]. Care instructions adapted under license by [employer]ren [employer] Communications (which disclaims liability or warranty for this information). If you have questions about a medical condition or this instruction, always ask your healthcare professional. [address] any warranty or liability for your use of this information. Sciatica: Care Instructions  Your Care Instructions    Sciatica (say \"chp-FF-pk-kuh\") is an irritation of one of the sciatic nerves, which come from the spinal cord in the lower back. The sciatic nerves and their branches extend down through the buttock to the foot. Sciatica can develop when an injured disc in the back presses against a spinal nerve root. Its main symptom is pain, numbness, or weakness that is often worse in the leg or foot than in the back. Sciatica often will improve and go away with time. Early treatment usually includes medicines and exercises to relieve pain. Follow-up care is a key part of your treatment and safety. Be sure to make and go to all appointments, and call your doctor if you are having problems. It's also a good idea to know your test results and keep a list of the medicines you take. How can you care for yourself at home? · Take pain medicines exactly as directed. ¨ If the doctor gave you a prescription medicine for pain, take it as prescribed. ¨ If you are not taking a prescription pain medicine, ask your doctor if you can take an over-the-counter medicine. · Use heat or ice to relieve pain. ¨ To apply heat, put a warm water bottle, heating pad set on low, or warm cloth on your back. Do not go to sleep with a heating pad on your skin. ¨ To use ice, put ice or a cold pack on the area for 10 to 20 minutes at a time. Put a thin cloth between the ice and your skin.   · Avoid sitting if possible, unless it feels better than standing. · Alternate lying down with short walks. Increase your walking distance as you are able to without making your symptoms worse. · Do not do anything that makes your symptoms worse. When should you call for help? Call 911 anytime you think you may need emergency care. For example, call if:  · You are unable to move a leg at all. Call your doctor now or seek immediate medical care if:  · You have new or worse symptoms in your legs or buttocks. Symptoms may include:  ¨ Numbness or tingling. ¨ Weakness. ¨ Pain. · You lose bladder or bowel control. Watch closely for changes in your health, and be sure to contact your doctor if:  · You are not getting better as expected. Where can you learn more? Go to http://marie-coleman.info/. Enter [phone] in the search box to learn more about \"Sciatica: Care Instructions. \"  Current as of: May 23, 2016  Content Version: 11.2  © 1534-9774 [employer]. Care instructions adapted under license by VerbalizeIt (which disclaims liability or warranty for this information). If you have questions about a medical condition or this instruction, always ask your healthcare professional. [address] any warranty or liability for your use of this information.

## 2017-06-01 NOTE — PROGRESS NOTES
Veronica Peters. is a 46 y.o. male who presents with the following complaints:  Chief Complaint   Patient presents with    Elevated Blood Pressure     Was treated by [facility] 2 weeks ago for acute sick symptoms. BP was found to be high during that visit     Back Pain     Cites history of sciatica, c/o pain radiating down left hip & leg. Onset 3 months ago, Has been seeing a chiropractor        Subjective:    HPI:   Co continued back pain, low, on the left side, with sciatica. Numbness and pain are now radiating all the way to the toes. No leg weakness. No falls. No bladder or bowel difficulties. Has been seeing chiropractor on the weeks he works \"[employer]\"- states symptoms will be nearly resolved after visits 3x a week for 2 weeks. But when he goes on to 2 weeks of 12 hour days (7 days a week), he is not able to to chiro at all and his symptoms flare back up again. 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 Had injections in the past into sciatic nerve, which did provide good relief. BP elevated at urgent care. No chest pain, dyspnea, headache, blurred vision, LE edema. No known hx of HTN. Pertinent PMH/FH/SH:  Past Medical History:   Diagnosis Date    Back pain     Basal cell carcinoma 2012    3 on left side of neck and 1 on right side of neck and nasal bridge area with first dx starting aprroximately 12 yrs ago    Cancer [facility])     skin cancer    Family history of skin cancer     Frequent nosebleeds     Joint pain     Joint swelling     Other ill-defined conditions     High cholesterol.     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[address]     Sinus complaint     Skin cancer     Sun-damaged skin     Sunburn, blistering      Past Surgical History:   Procedure Laterality Date    HX HEENT      septurhinoplasty    HX ORTHOPAEDIC      bilateral thumb surgery     Family History   Problem Relation Age of Onset    Hypertension Brother     Diabetes Brother     Diabetes Mother     Hypertension Mother     Cancer Mother      lung     Social History     Social History    Marital status:      Spouse name: N/A    Number of children: N/A    Years of education: N/A     Social History Main Topics    Smoking status: Never Smoker    Smokeless tobacco: Never Used    Alcohol use Yes    Drug use: No      Comment: occasional marijuana, quit cocaine 15 yrs ago    Sexual activity: Yes     Partners: Female     Other Topics Concern    None     Social History Narrative     Advanced Directives: N      Patient Active Problem List    Diagnosis    Sinusitis    Hypercholesterolemia without hypertriglyceridemia    H/O cardiac catheterization     3/2013 with EF 60%, no significant CAD noted.       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 [address]    Actinic keratosis due to exposure to sunlight    IBS (irritable bowel syndrome)    Statin intolerance    High triglycerides    ACS (acute coronary syndrome) [facility] [address]    Hyperlipidemia       Nurse notes were reviewed and are correct  Review of Systems - negative except as listed above in the HPI    Objective:     Vitals:    06/01/17 0913   BP: 134/84   Pulse: 95   Resp: 20   Temp: 97.7 °F (36.5 °C)   TempSrc: Oral   SpO2: 98%   Weight: 199 lb (90.3 kg)   Height: 5' 8\" (1.727 m)     Physical Examination: General appearance - alert, well appearing, and in no distress, oriented to person, place, and time and well hydrated  Mental status - normal mood, behavior, speech, dress, motor activity, and thought processes  Neck - supple, no significant adenopathy  Chest - clear to auscultation, no wheezes, rales or rhonchi, symmetric air entry  Heart - normal rate, regular rhythm, normal S1, S2, no murmurs, rubs, clicks or gallops  Back exam - pain with motion noted during exam, tenderness noted left lumbar, left posterior hip/buttocks; no palpable spasm or mass; limiting ROM lumbar, normal reflexes and strength bilateral lower extremities  Neurological - alert, oriented, normal speech, no focal findings or movement disorder noted  Skin - normal coloration and turgor, no rashes, no suspicious skin lesions noted    Assessment/ Plan:   Darien Mcknight was seen today for elevated blood pressure and back pain. Diagnoses and all orders for this visit:    Left-sided low back pain with left-sided sciatica, unspecified chronicity  Refer to pain management, PT  Continue chiropractic  Add Rx  He is looking for another position within his company- would definitely benefit from a position that would put less strain on his back. Would also benefit from working less hours per day or week- unfortunately he reports this is out of his control at the moment  Rest  Heat  Continue cymbalta  -     REFERRAL TO PAIN MANAGEMENT  -     diclofenac EC (VOLTAREN) 75 mg EC tablet; Take 1 Tab by mouth two (2) times a day for 14 days. Take with food  -     REFERRAL TO PHYSICAL THERAPY       Follow-up Disposition:  Return in about 6 weeks (around 7/13/2017). I have discussed the diagnosis with the patient and the intended plan as seen in the above orders. The patient has received an after-visit summary and questions were answered concerning future plans. The patient verbalizes understanding. Medication Side Effects and Warnings were discussed with patient: yes  Patient Labs were reviewed and or requested: no  Patient Past Records were reviewed and or requested: yes    Patient Instructions        Back Pain, Emergency or Urgent Symptoms: Care Instructions  Your Care Instructions  Many people have back pain at one time or another. In most cases, pain gets better with self-care that includes over-the-counter pain medicine, ice, heat, and exercises. Unless you have symptoms of a severe injury or heart attack, you may be able to give yourself a few days before you call a doctor. But some back problems are very serious.  Do not ignore symptoms that need to be checked right away. Follow-up care is a key part of your treatment and safety. Be sure to make and go to all appointments, and call your doctor if you are having problems. It's also a good idea to know your test results and keep a list of the medicines you take. How can you care for yourself at home? · Sit or lie in positions that are most comfortable and that reduce your pain. Try one of these positions when you lie down:  ¨ Lie on your back with your knees bent and supported by large pillows. ¨ Lie on the floor with your legs on the seat of a sofa or chair. Zita Shakira on your side with your knees and hips bent and a pillow between your legs. ¨ Lie on your stomach if it does not make pain worse. · Do not sit up in bed, and avoid soft couches and twisted positions. Bed rest can help relieve pain at first, but it delays healing. Avoid bed rest after the first day. · Change positions every 30 minutes. If you must sit for long periods of time, take breaks from sitting. Get up and walk around, or lie flat. · Try using a heating pad on a low or medium setting, for 15 to 20 minutes every 2 or 3 hours. Try a warm shower in place of one session with the heating pad. You can also buy single-use heat wraps that last up to 8 hours. You can also try ice or cold packs on your back for 10 to 20 minutes at a time, several times a day. (Put a thin cloth between the ice pack and your skin.) This reduces pain and makes it easier to be active and exercise. · Take pain medicines exactly as directed. ¨ If the doctor gave you a prescription medicine for pain, take it as prescribed. ¨ If you are not taking a prescription pain medicine, ask your doctor if you can take an over-the-counter medicine. When should you call for help? Call 911 anytime you think you may need emergency care. For example, call if:  · You are unable to move a leg at all. · You have back pain with severe belly pain.   · You have symptoms of a heart attack. These may include:  ¨ Chest pain or pressure, or a strange feeling in the chest.  ¨ Sweating. ¨ Shortness of breath. ¨ Nausea or vomiting. ¨ Pain, pressure, or a strange feeling in the back, neck, jaw, or upper belly or in one or both shoulders or arms. ¨ Lightheadedness or sudden weakness. ¨ A fast or irregular heartbeat. After you call 911, the  may tell you to chew 1 adult-strength or 2 to 4 low-dose aspirin. Wait for an ambulance. Do not try to drive yourself. Call your doctor now or seek immediate medical care if:  · You have new or worse symptoms in your arms, legs, chest, belly, or buttocks. Symptoms may include:  ¨ Numbness or tingling. ¨ Weakness. ¨ Pain. · You lose bladder or bowel control. · You have back pain and:  ¨ You have injured your back while lifting or doing some other activity. Call if the pain is severe, has not gone away after 1 or 2 days, and you cannot do your normal daily activities. ¨ You have had a back injury before that needed treatment. ¨ Your pain has lasted longer than 4 weeks. ¨ You have had weight loss you cannot explain. ¨ You are age 48 or older. ¨ You have cancer now or have had it before. Watch closely for changes in your health, and be sure to contact your doctor if you are not getting better as expected. Where can you learn more? Go to http://marie-coleman.info/. Enter B199 in the search box to learn more about \"Back Pain, Emergency or Urgent Symptoms: Care Instructions. \"  Current as of: May 27, 2016  Content Version: 11.2  © 6907-2511 [employer]. Care instructions adapted under license by [employer] (which disclaims liability or warranty for this information). If you have questions about a medical condition or this instruction, always ask your healthcare professional. [address] any warranty or liability for your use of this information.        Sciatica: Care Instructions  Your Care Instructions    Sciatica (say \"zxt-UI-xb-kuh\") is an irritation of one of the sciatic nerves, which come from the spinal cord in the lower back. The sciatic nerves and their branches extend down through the buttock to the foot. Sciatica can develop when an injured disc in the back presses against a spinal nerve root. Its main symptom is pain, numbness, or weakness that is often worse in the leg or foot than in the back. Sciatica often will improve and go away with time. Early treatment usually includes medicines and exercises to relieve pain. Follow-up care is a key part of your treatment and safety. Be sure to make and go to all appointments, and call your doctor if you are having problems. It's also a good idea to know your test results and keep a list of the medicines you take. How can you care for yourself at home? · Take pain medicines exactly as directed. ¨ If the doctor gave you a prescription medicine for pain, take it as prescribed. ¨ If you are not taking a prescription pain medicine, ask your doctor if you can take an over-the-counter medicine. · Use heat or ice to relieve pain. ¨ To apply heat, put a warm water bottle, heating pad set on low, or warm cloth on your back. Do not go to sleep with a heating pad on your skin. ¨ To use ice, put ice or a cold pack on the area for 10 to 20 minutes at a time. Put a thin cloth between the ice and your skin. · Avoid sitting if possible, unless it feels better than standing. · Alternate lying down with short walks. Increase your walking distance as you are able to without making your symptoms worse. · Do not do anything that makes your symptoms worse. When should you call for help? Call 911 anytime you think you may need emergency care. For example, call if:  · You are unable to move a leg at all. Call your doctor now or seek immediate medical care if:  · You have new or worse symptoms in your legs or buttocks.  Symptoms may include:  ¨ Numbness or tingling. ¨ Weakness. ¨ Pain. · You lose bladder or bowel control. Watch closely for changes in your health, and be sure to contact your doctor if:  · You are not getting better as expected. Where can you learn more? Go to http://marie-coleman.info/. Enter 763-558-1987 in the search box to learn more about \"Sciatica: Care Instructions. \"  Current as of: May 23, 2016  Content Version: 11.2  © 5266-2843 [employer]. Care instructions adapted under license by [employer] (which disclaims liability or warranty for this information). If you have questions about a medical condition or this instruction, always ask your healthcare professional. [address] any warranty or liability for your use of this information.           Rafita YARBROUGH

## 2017-06-01 NOTE — PROGRESS NOTES
Chief Complaint   Patient presents with    Elevated Blood Pressure     [facility] for acute sick symptoms. BP was found to be high during that visit     Back Pain     Cites history of sciatica, c/o pain radiating down left hip & leg. Onset 3 months ago, Has been seeing a chiropractor        1. Have you been to the ER, urgent care clinic since your last visit? Hospitalized since your last visit? No    2. Have you seen or consulted any other health care providers outside of the [address] [place] [address] since your last visit? Include any pap smears or colon screening.  Yes [facility] 2 weeks ago

## 2017-06-22 ENCOUNTER — HOSPITAL ENCOUNTER (EMERGENCY)
Age: 52
Discharge: HOME | End: 2017-06-22
Attending: FAMILY MEDICINE

## 2017-06-22 VITALS
RESPIRATION RATE: 16 BRPM | HEIGHT: 68 IN | HEART RATE: 90 BPM | BODY MASS INDEX: 28.79 KG/M2 | DIASTOLIC BLOOD PRESSURE: 78 MMHG | TEMPERATURE: 98 F | SYSTOLIC BLOOD PRESSURE: 127 MMHG | OXYGEN SATURATION: 96 % | WEIGHT: 190 LBS

## 2017-06-22 DIAGNOSIS — R21 RASH: Primary | ICD-10-CM

## 2017-06-22 RX ORDER — CIPROFLOXACIN 500 MG/1
500 TABLET ORAL 2 TIMES DAILY
Qty: 20 TAB | Refills: 0 | Status: SHIPPED | OUTPATIENT
Start: 2017-06-22 | End: 2017-06-28 | Stop reason: SDUPTHER

## 2017-06-22 RX ORDER — DICLOFENAC SODIUM 75 MG/1
TABLET, DELAYED RELEASE ORAL
COMMUNITY
End: 2017-08-25

## 2017-06-23 NOTE — DISCHARGE INSTRUCTIONS

## 2017-06-23 NOTE — UC PROVIDER NOTE
Patient is a 46 y.o. male presenting with rash. The history is provided by the patient. Rash    This is a recurrent problem. The current episode started more than 1 week ago. The problem has been gradually worsening. The problem is associated with an unknown factor. There has been no fever. The rash is present on the neck, trunk, left arm, left hand, left upper leg, left lower leg, right upper leg and right lower leg. The pain is at a severity of 1/10. Past Medical History:   Diagnosis Date    Back pain     Basal cell carcinoma 2012    3 on left side of neck and 1 on right side of neck and nasal bridge area with first dx starting aprroximately 12 yrs ago    Cancer [facility])     skin cancer    Family history of skin cancer     Frequent nosebleeds     Joint pain     Joint swelling     Other ill-defined conditions     High cholesterol. 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[facility] [address]     Sinus complaint     Skin cancer     Sun-damaged skin     Sunburn, blistering         Past Surgical History:   Procedure Laterality Date    HX HEENT      septurhinoplasty    HX ORTHOPAEDIC      bilateral thumb surgery         Family History   Problem Relation Age of Onset    Hypertension Brother     Diabetes Brother     Diabetes Mother     Hypertension Mother     Cancer Mother      lung        Social History     Social History    Marital status:      Spouse name: N/A    Number of children: N/A    Years of education: N/A     Occupational History    Not on file. Social History Main Topics    Smoking status: Never Smoker    Smokeless tobacco: Never Used    Alcohol use Yes    Drug use: No      Comment: occasional marijuana, quit cocaine 15 yrs ago    Sexual activity: Yes     Partners: Female     Other Topics Concern    Not on file     Social History Narrative                ALLERGIES: Aldara [imiquimod];  Bactrim [sulfamethoprim ds]; and Lipitor [atorvastatin]    Review of Systems   Constitutional: Negative for activity change and fever. Skin: Positive for rash. Vitals:    06/22/17 1945   BP: 127/78   Pulse: 90   Resp: 16   Temp: 98 °F (36.7 °C)   SpO2: 96%   Weight: 86.2 kg (190 lb)   Height: 5' 8\" (1.727 m)       Physical Exam   Constitutional: He is oriented to person, place, and time. He appears well-developed and well-nourished. Eyes: Conjunctivae and EOM are normal.   Pulmonary/Chest: Effort normal.   Neurological: He is alert and oriented to person, place, and time. Skin: Skin is warm and dry. Rash noted. There is erythema. Psychiatric: He has a normal mood and affect. His behavior is normal. Judgment and thought content normal.   Nursing note and vitals reviewed. MDM     Differential Diagnosis; Clinical Impression; Plan:     CLINICAL IMPRESSION:  Rash  (primary encounter diagnosis)    Plan:  1. Cipro  2.   3.   Risk of Significant Complications, Morbidity, and/or Mortality:   Presenting problems: Moderate  Diagnostic procedures: Moderate  Management options:   Moderate  Progress:   Patient progress:  Stable      Procedures

## 2017-06-28 ENCOUNTER — PATIENT MESSAGE (OUTPATIENT)
Dept: FAMILY MEDICINE CLINIC | Age: 52
End: 2017-06-28

## 2017-06-28 RX ORDER — CIPROFLOXACIN 500 MG/1
500 TABLET ORAL 2 TIMES DAILY
Qty: 20 TAB | Refills: 0 | Status: SHIPPED | OUTPATIENT
Start: 2017-06-28 | End: 2017-07-08

## 2017-07-15 ENCOUNTER — HOSPITAL ENCOUNTER (OUTPATIENT)
Dept: MRI IMAGING | Age: 52
Discharge: HOME | End: 2017-07-15
Attending: ORTHOPAEDIC SURGERY
Payer: COMMERCIAL

## 2017-07-15 DIAGNOSIS — M75.121 COMPLETE TEAR OF RIGHT ROTATOR CUFF: ICD-10-CM

## 2017-07-15 PROCEDURE — 73221 MRI JOINT UPR EXTREM W/O DYE: CPT

## 2017-08-25 RX ORDER — CYCLOBENZAPRINE HCL 10 MG
10 TABLET ORAL
COMMUNITY
End: 2017-10-21 | Stop reason: ALTCHOICE

## 2017-08-25 RX ORDER — DULOXETIN HYDROCHLORIDE 30 MG/1
CAPSULE, DELAYED RELEASE ORAL
COMMUNITY
Start: 2017-04-28 | End: 2017-08-25

## 2017-08-25 RX ORDER — LORATADINE 10 MG/1
10 TABLET ORAL
COMMUNITY
End: 2017-08-25

## 2017-08-25 RX ORDER — OMEPRAZOLE 20 MG/1
CAPSULE, DELAYED RELEASE ORAL
COMMUNITY
Start: 2017-04-28 | End: 2017-08-25

## 2017-08-25 RX ORDER — MONTELUKAST SODIUM 10 MG/1
TABLET ORAL
COMMUNITY
Start: 2017-04-28 | End: 2017-08-25

## 2017-08-25 RX ORDER — SUMATRIPTAN 100 MG/1
TABLET, FILM COATED ORAL
COMMUNITY
Start: 2017-05-30 | End: 2017-08-25

## 2017-08-25 RX ORDER — DICLOFENAC SODIUM 75 MG/1
TABLET, DELAYED RELEASE ORAL
COMMUNITY
Start: 2017-06-26 | End: 2017-09-01

## 2017-08-25 RX ORDER — FENOFIBRATE 145 MG/1
TABLET, COATED ORAL
COMMUNITY
Start: 2017-04-28 | End: 2017-08-25

## 2017-08-25 NOTE — PERIOP NOTES
[facility]  Ambulatory Surgery Unit  Pre-operative Instructions    Surgery/Procedure Date  9/1            Tentative Arrival Time TBD      1. On the day of your surgery/procedure, please report to the Ambulatory Surgery Unit Registration Desk and sign in at your designated time. The Ambulatory Surgery Unit is located in [facility] on the [facility] side of the [facility] across from the [address] [place] [address]. Please have all of your health insurance cards and a photo ID. 2. You must have someone with you to drive you home, as you should not drive a car for 24 hours following anesthesia. Please make arrangements for a responsible adult friend or family member to stay with you for at least the first 24 hours after your surgery. 3. Do not have anything to eat or drink (including water, gum, mints, coffee, juice) after midnight   8/31. This may not apply to medications prescribed by your physician. (Please note below the special instructions with medications to take the morning of surgery, if applicable.)    4. We recommend you do not drink any alcoholic beverages for 24 hours before and after your surgery. 5. Stop all Aspirin, non-steroidal anti-inflammatory drugs (i.e. Advil, Aleve), vitamins, and supplements as directed by your surgeon's office. **If you are currently taking Plavix, Coumadin, or other blood-thinning agents, contact your surgeon for instructions. **    6. In an effort to help prevent surgical site infection, we ask that you shower with an anti-bacterial soap (i.e. Dial or Safeguard) for 3 days prior to and on the morning of surgery, using a fresh towel after each shower. (Please begin this process with fresh bed linens.) Do not apply any lotions, powders, or deodorants after the shower on the day of your procedure. If applicable, please do not shave the operative site for 48 hours prior to surgery. 7. Wear comfortable clothes. Wear glasses instead of contacts.  Do not bring any jewelry or money (other than copays or fees as instructed). Do not wear make-up, particularly mascara, the morning of your surgery. Do not wear nail polish, particularly if you are having foot /hand surgery. Wear your hair loose or down, no ponytails, buns, patrick pins or clips. All body piercings must be removed. 8. You should understand that if you do not follow these instructions your surgery may be cancelled. If your physical condition changes (i.e. fever, cold or flu) please contact your surgeon as soon as possible. 9. It is important that you be on time. If a situation occurs where you may be late, or if you have any questions or problems, please call [phone].    10. Your surgery time may be subject to change. You will receive a phone call the day prior to surgery to confirm your arrival time. Special Instructions: Take all medications and inhalers, as prescribed, on the morning of surgery with a sip of water EXCEPT: none      I understand a pre-operative phone call will be made to verify my surgery time. In the event that I am not available, I give permission for a message to be left on my answering service and/or with another person? yes      Reviewed by phone with wife.   Verbalized understanding   ___________________      ___________________      ________________  (Signature of Patient)          (Witness)                   (Date and Time)

## 2017-08-25 NOTE — PERIOP NOTES
LM on  Deepthi in Dr. Pacheco Fernandez' office. Pt has unconfirmed hx of MRSA. Per Dr. Cortney Hinton, may proceed without further testing.

## 2017-08-31 ENCOUNTER — PATIENT MESSAGE (OUTPATIENT)
Dept: FAMILY MEDICINE CLINIC | Age: 52
End: 2017-08-31

## 2017-08-31 ENCOUNTER — ANESTHESIA EVENT (OUTPATIENT)
Dept: SURGERY | Age: 52
End: 2017-08-31
Payer: COMMERCIAL

## 2017-08-31 DIAGNOSIS — K58.9 IRRITABLE BOWEL SYNDROME WITHOUT DIARRHEA: ICD-10-CM

## 2017-08-31 DIAGNOSIS — J30.1 SEASONAL ALLERGIC RHINITIS DUE TO POLLEN: ICD-10-CM

## 2017-08-31 DIAGNOSIS — R52 PAIN: ICD-10-CM

## 2017-08-31 DIAGNOSIS — Z78.9 STATIN INTOLERANCE: ICD-10-CM

## 2017-08-31 DIAGNOSIS — E78.00 HYPERCHOLESTEROLEMIA WITHOUT HYPERTRIGLYCERIDEMIA: ICD-10-CM

## 2017-08-31 PROBLEM — M75.121 COMPLETE TEAR OF RIGHT ROTATOR CUFF: Status: ACTIVE | Noted: 2017-08-31

## 2017-08-31 RX ORDER — MONTELUKAST SODIUM 10 MG/1
TABLET ORAL
Qty: 90 TAB | Refills: 0 | Status: SHIPPED | OUTPATIENT
Start: 2017-08-31 | End: 2017-12-23 | Stop reason: SDUPTHER

## 2017-08-31 RX ORDER — FENOFIBRATE 145 MG/1
TABLET, COATED ORAL
Qty: 90 TAB | Refills: 0 | Status: SHIPPED | OUTPATIENT
Start: 2017-08-31 | End: 2017-12-23 | Stop reason: SDUPTHER

## 2017-08-31 RX ORDER — DULOXETIN HYDROCHLORIDE 30 MG/1
CAPSULE, DELAYED RELEASE ORAL
Qty: 90 CAP | Refills: 0 | Status: SHIPPED | OUTPATIENT
Start: 2017-08-31 | End: 2017-12-23 | Stop reason: SDUPTHER

## 2017-08-31 RX ORDER — AZELASTINE 1 MG/ML
1 SPRAY, METERED NASAL 2 TIMES DAILY
Qty: 3 BOTTLE | Refills: 0 | Status: SHIPPED | OUTPATIENT
Start: 2017-08-31 | End: 2017-12-23 | Stop reason: SDUPTHER

## 2017-08-31 RX ORDER — OMEPRAZOLE 20 MG/1
CAPSULE, DELAYED RELEASE ORAL
Qty: 90 CAP | Refills: 0 | Status: SHIPPED | OUTPATIENT
Start: 2017-08-31 | End: 2017-12-23 | Stop reason: SDUPTHER

## 2017-08-31 RX ORDER — DICYCLOMINE HYDROCHLORIDE 10 MG/1
10 CAPSULE ORAL
Qty: 160 CAP | Refills: 0 | Status: SHIPPED | OUTPATIENT
Start: 2017-08-31 | End: 2018-02-15 | Stop reason: SDUPTHER

## 2017-08-31 RX ORDER — FLUTICASONE PROPIONATE 50 MCG
SPRAY, SUSPENSION (ML) NASAL
Qty: 3 BOTTLE | Refills: 0 | Status: SHIPPED | OUTPATIENT
Start: 2017-08-31 | End: 2017-12-23 | Stop reason: SDUPTHER

## 2017-08-31 NOTE — H&P
PRE- OP History and Physical                             Subjective:     Patient is a 46 y.o. male presented with a history of traumatic right shoulder pain that occurred while pushing a large 4000 lb roll three months ago. He was last seen about 2 weeks ago and had a MRI is here today to discuss MRI results. He notes that his pain is relatively unchanged since his last visit. He still has moderate to severe pain a locates anterior shoulder and lateral deltoid. Pain worse with raising his arm. Better with rest.  He also notes significant night pain. .  The patient's condition has been resistant to non-operative treatment and is being admitted for surgical management of this condition.      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[facility] [address] 05/05/2015    Actinic keratosis due to exposure to sunlight 05/05/2015    IBS (irritable bowel syndrome) 05/05/2015    Statin intolerance 05/05/2015    High triglycerides 05/05/2015    ACS (acute coronary syndrome) ([address] 03/21/2013    Hyperlipidemia 03/21/2013     Past Medical History:   Diagnosis Date    Arthritis     Back pain     Basal cell carcinoma 2012    3 on left side of neck and 1 on right side of neck and nasal bridge area with first dx starting aprroximately 12 yrs ago    Family history of skin cancer     Frequent nosebleeds     H/O seasonal allergies     High cholesterol     Joint pain     Joint swelling     Migraine    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[address]     followed by Satish Flores Sinus complaint     Skin cancer     Sun-damaged skin     Sunburn, blistering Past Surgical History:   Procedure Laterality Date    HX HEART CATHETERIZATION  2013    minimal disease per cardio notes.  HX ORTHOPAEDIC Bilateral     bilateral thumb surgery    HX ORTHOPAEDIC Left ~2013    elbow surgery    HX SEPTOPLASTY  2002    HX SHOULDER ARTHROSCOPY Left ~2013      Prior to Admission medications    Medication Sig Start Date End Date Taking? Authorizing Provider   montelukast (SINGULAIR) 10 mg tablet TAKE 1 TABLET BY MOUTH EVERY DAY FOR ALLERGIES 8/31/17  Yes Katelyn Velazquez NP   fenofibrate nanocrystallized (TRICOR) 145 mg tablet TAKE 1 TABLET BY MOUTH DAILY. FOR HYPERCHOLESTEROLEMIA 8/31/17  Yes Katelyn Velazquez NP   azelastine (ASTELIN) 137 mcg (0.1 %) nasal spray 1 [place] by Both Nostrils route two (2) times a day. Use in each nostril as directed 8/31/17  Yes Bob Hoff NP   dicyclomine (BENTYL) 10 mg capsule Take 1 Cap by mouth three (3) times daily as needed. For abd cramping 8/31/17  Yes Katelyn Velazquez NP   fluticasone (FLONASE) 50 mcg/actuation nasal spray Use two sprays in each nostril once daily for allergies. 8/31/17  Yes Katelyn Velazquez NP   cyclobenzaprine (FLEXERIL) 10 mg tablet Take 10 mg by mouth. Yes Historical Provider   diclofenac EC (VOLTAREN) 75 mg EC tablet TAKE 1 TABLET TWICE DAILY FOR 14 DAYS WITH FOOD as needed 6/26/17  Yes Historical Provider   ASPIRIN/CAFFEINE (BC ARTHRITIS PO) Take 1 Dose Pack by mouth as needed. Yes Historical Provider   SUMAtriptan (IMITREX) 100 mg tablet Take 1 Tab by mouth once as needed for Migraine. 5/5/17  Yes Katelyn Velazquez NP   cyclobenzaprine (FLEXERIL) 10 mg tablet Take 0.5-1 Tabs by mouth nightly as needed for Muscle Spasm(s). 4/4/17  Yes Luke Bowens MD   cetirizine-psuedoePHEDrine (ZYRTEC-D) 5-120 mg per tablet Take 1 Tab by mouth daily. Yes Historical Provider   aspirin 81 mg chewable tablet Take 1 Tab by mouth daily.  3/22/13  Yes Derek Parham NP   DULoxetine (CYMBALTA) 30 mg capsule TAKE ONE CAPSULE BY MOUTH EVERY DAY FOR BACK PAIN 8/31/17   Catrachito Jorge NP   omeprazole (PRILOSEC) 20 mg capsule TAKE ONE CAPSULE BY MOUTH EVERY DAY FOR IBS 8/31/17   Catrachito Jorge NP   methocarbamol (ROBAXIN) 500 mg tablet Take 2 Tabs by mouth four (4) times daily as needed. 4/4/17   Mich Bill MD   albuterol (PROVENTIL HFA, VENTOLIN HFA, PROAIR HFA) 90 mcg/actuation inhaler Take 2 Puffs by inhalation every four (4) hours as needed for Wheezing. 1/24/17   Larry Blackburn MD   SIMETHICONE Take 120 mg by mouth as needed. Phys MD Marcos     Allergies   Allergen Reactions    Bactrim [Sulfamethoprim Ds] Rash    Lipitor [Atorvastatin] Myalgia      Social History   Substance Use Topics    Smoking status: Never Smoker    Smokeless tobacco: Never Used    Alcohol use 3.6 oz/week     6 Cans of beer per week      Family History   Problem Relation Age of Onset    Hypertension Brother     Diabetes Brother     Diabetes Mother     Hypertension Mother     Cancer Mother      lung      Review of Systems  A comprehensive review of systems was negative except for that written in the HPI. Objective:     Patient Vitals for the past 8 hrs:   BP Temp Pulse Resp SpO2 Height Weight   09/01/17 0812 (!) 160/97 98.1 °F (36.7 °C) 69 16 98 % 5' 8\" (1.727 m) 88.5 kg (195 lb)     Visit Vitals    BP (!) 160/97 (BP 1 Location: Left arm, BP Patient Position: At rest)    Pulse 69    Temp 98.1 °F (36.7 °C)    Resp 16    Ht 5' 8\" (1.727 m)    Wt 88.5 kg (195 lb)    SpO2 98%    BMI 29.65 kg/m2     General:  Alert, cooperative, no distress, appears stated age. Head:  Normocephalic, without obvious abnormality, atraumatic. Eyes:  Conjunctivae/corneas clear. PERRL, EOMs intact. Ears:  Normal TMs and external ear canals both ears. Nose: Nares normal. Septum midline. Mucosa normal. No drainage or sinus tenderness.    Throat: Lips, mucosa, and tongue normal. Teeth and gums normal.   Neck: Supple, symmetrical, trachea midline, no adenopathy, thyroid: no enlargement/tenderness/nodules, no carotid bruit and no JVD. Back:   Symmetric, no curvature. ROM normal. No CVA tenderness. Lungs:   Clear to auscultation bilaterally. Chest wall:  No tenderness or deformity. Heart:  Regular rate and rhythm, S1, S2, no murmur, click, rub or gallop. Abdomen:   Soft, non-tender. Bowel sounds normal. No masses,  No organomegaly. Extremities: Extremities normal except Range of motion 130/160, he has a positive drop-arm sign and a positive painful arc. External rotation 50/50 with slight weakness in external rotation on the right compared to the left. Good biceps and triceps strength, no biceps deformity. Positive painful Swann on the right, atraumatic, no cyanosis or edema. Pulses: 2+ and symmetric all extremities. Skin: Skin color, texture, turgor normal. No rashes or lesions   Lymph nodes: Cervical, supraclavicular, and axillary nodes normal.   Neurologic: CNII-XII intact. Neurovascular exam intact in distal extremities        Imaging Review   x-rays show no significant osteoarthritis, type 2 acromion. MRI was reviewed that shows full-thickness tear of the anterior supraspinatus. Partial-thickness tear of the infraspinatus. Assessment:     Active Problems:    Complete tear of right rotator cuff (8/31/2017)        Plan:     Operative and non-operative treatments have been discussed with the patient including risks and benefits of each. He would like to proceed with surgical intervention to include a right rotator cuff repair, subacromial decompression,  With possible distal claviculectomy, and biceps tenodesis. Patient understands risks of the procedure to include re-tear, bleeding, infection, stiffness, and inability to reduce pain. After consideration of risks, benefits limitations to the consented procedures and alternative options for treatment, the patient has consented to surgical interventions.   Questions were answered and Pre-op teaching was completed.       Kandi Guzman, PA

## 2017-08-31 NOTE — TELEPHONE ENCOUNTER
From: Neal Grimm. To: Natty Sauer NP  Sent: 8/31/2017 2:30 AM EDT  Subject: Prescription Question    Could I get 90 day refills sent to [facility] on [facility] in [place] please. I visited you in April for a new patient establishment visit after the office of Dr. Duke Camarena was closed but had just gotten my Rx's refilled. You had relayed to me to let you know when I need to get refills. I now need refills on the  Duloxetine  Montelukast  Fenofibrate  Omeprazole  Azelastine  Fluticasone  Dicyclomine    Thank you for your time. Could you please let me know if this is a problem.   Sincerely,  Livier Go

## 2017-09-01 ENCOUNTER — HOSPITAL ENCOUNTER (OUTPATIENT)
Age: 52
Setting detail: OUTPATIENT SURGERY
Discharge: HOME | End: 2017-09-01
Attending: ORTHOPAEDIC SURGERY | Admitting: ORTHOPAEDIC SURGERY
Payer: COMMERCIAL

## 2017-09-01 ENCOUNTER — ANESTHESIA (OUTPATIENT)
Dept: SURGERY | Age: 52
End: 2017-09-01
Payer: COMMERCIAL

## 2017-09-01 VITALS
DIASTOLIC BLOOD PRESSURE: 88 MMHG | HEIGHT: 68 IN | OXYGEN SATURATION: 97 % | WEIGHT: 195 LBS | BODY MASS INDEX: 29.55 KG/M2 | SYSTOLIC BLOOD PRESSURE: 146 MMHG | RESPIRATION RATE: 14 BRPM | TEMPERATURE: 97.8 F | HEART RATE: 55 BPM

## 2017-09-01 PROCEDURE — 77030011930 HC WND ARTHRO ABLT S&N -C: Performed by: ORTHOPAEDIC SURGERY

## 2017-09-01 PROCEDURE — 74011250636 HC RX REV CODE- 250/636

## 2017-09-01 PROCEDURE — 77030008571 HC TBNG SUC IRR S&N -B: Performed by: ORTHOPAEDIC SURGERY

## 2017-09-01 PROCEDURE — 74011250636 HC RX REV CODE- 250/636: Performed by: ANESTHESIOLOGY

## 2017-09-01 PROCEDURE — 76030000003 HC AMB SURG OR TIME 1.5 TO 2: Performed by: ORTHOPAEDIC SURGERY

## 2017-09-01 PROCEDURE — 76060000063 HC AMB SURG ANES 1.5 TO 2 HR: Performed by: ORTHOPAEDIC SURGERY

## 2017-09-01 PROCEDURE — 77030003598 HC NDL MULT/FIRE ARTH -C: Performed by: ORTHOPAEDIC SURGERY

## 2017-09-01 PROCEDURE — 77030025419 HC BLD SHV ACRMNZR LG S&N -B: Performed by: ORTHOPAEDIC SURGERY

## 2017-09-01 PROCEDURE — 77030002916 HC SUT ETHLN J&J -A: Performed by: ORTHOPAEDIC SURGERY

## 2017-09-01 PROCEDURE — 74011250636 HC RX REV CODE- 250/636: Performed by: ORTHOPAEDIC SURGERY

## 2017-09-01 PROCEDURE — 77030010509 HC AIRWY LMA MSK TELE -A: Performed by: NURSE ANESTHETIST, CERTIFIED REGISTERED

## 2017-09-01 PROCEDURE — 77030018835 HC SOL IRR LR ICUM -A: Performed by: ORTHOPAEDIC SURGERY

## 2017-09-01 PROCEDURE — 76210000050 HC AMBSU PH II REC 0.5 TO 1 HR: Performed by: ORTHOPAEDIC SURGERY

## 2017-09-01 PROCEDURE — C1713 ANCHOR/SCREW BN/BN,TIS/BN: HCPCS | Performed by: ORTHOPAEDIC SURGERY

## 2017-09-01 PROCEDURE — 76210000040 HC AMBSU PH I REC FIRST 0.5 HR: Performed by: ORTHOPAEDIC SURGERY

## 2017-09-01 PROCEDURE — 77030004451 HC BUR SHV S&N -B: Performed by: ORTHOPAEDIC SURGERY

## 2017-09-01 PROCEDURE — 74011000250 HC RX REV CODE- 250

## 2017-09-01 PROCEDURE — 77030013316: Performed by: ORTHOPAEDIC SURGERY

## 2017-09-01 DEVICE — ANCHOR SUT L14MM DIA4.5MM PEEK W/ TWO SZ 2 FIBERWIRE CRKSCR: Type: IMPLANTABLE DEVICE | Site: SHOULDER | Status: FUNCTIONAL

## 2017-09-01 DEVICE — ANCHOR SUT L19.1MM DIA5.5MM PEEK FULL THRD KNOTLESS EYELET: Type: IMPLANTABLE DEVICE | Site: SHOULDER | Status: FUNCTIONAL

## 2017-09-01 DEVICE — ANCHOR SUT L14.7MM DIA5.5MM PEEK W/ 3 SZ 2 FIBERWIRE CRKSCR: Type: IMPLANTABLE DEVICE | Site: SHOULDER | Status: FUNCTIONAL

## 2017-09-01 RX ORDER — CEFAZOLIN SODIUM IN 0.9 % NACL 2 G/100 ML
2 PLASTIC BAG, INJECTION (ML) INTRAVENOUS ONCE
Status: COMPLETED | OUTPATIENT
Start: 2017-09-01 | End: 2017-09-01

## 2017-09-01 RX ORDER — FENTANYL CITRATE 50 UG/ML
25 INJECTION, SOLUTION INTRAMUSCULAR; INTRAVENOUS
Status: DISCONTINUED | OUTPATIENT
Start: 2017-09-01 | End: 2017-09-01 | Stop reason: HOSPADM

## 2017-09-01 RX ORDER — OXYCODONE AND ACETAMINOPHEN 5; 325 MG/1; MG/1
1 TABLET ORAL ONCE
Status: DISCONTINUED | OUTPATIENT
Start: 2017-09-01 | End: 2017-09-01 | Stop reason: HOSPADM

## 2017-09-01 RX ORDER — ROPIVACAINE HYDROCHLORIDE 5 MG/ML
INJECTION, SOLUTION EPIDURAL; INFILTRATION; PERINEURAL
Status: DISCONTINUED
Start: 2017-09-01 | End: 2017-09-01 | Stop reason: HOSPADM

## 2017-09-01 RX ORDER — SODIUM CHLORIDE 0.9 % (FLUSH) 0.9 %
5-10 SYRINGE (ML) INJECTION AS NEEDED
Status: DISCONTINUED | OUTPATIENT
Start: 2017-09-01 | End: 2017-09-01 | Stop reason: HOSPADM

## 2017-09-01 RX ORDER — MIDAZOLAM HYDROCHLORIDE 1 MG/ML
INJECTION, SOLUTION INTRAMUSCULAR; INTRAVENOUS AS NEEDED
Status: DISCONTINUED | OUTPATIENT
Start: 2017-09-01 | End: 2017-09-01 | Stop reason: HOSPADM

## 2017-09-01 RX ORDER — MIDAZOLAM HYDROCHLORIDE 1 MG/ML
INJECTION, SOLUTION INTRAMUSCULAR; INTRAVENOUS
Status: DISCONTINUED
Start: 2017-09-01 | End: 2017-09-01 | Stop reason: HOSPADM

## 2017-09-01 RX ORDER — MORPHINE SULFATE 10 MG/ML
2 INJECTION, SOLUTION INTRAMUSCULAR; INTRAVENOUS
Status: DISCONTINUED | OUTPATIENT
Start: 2017-09-01 | End: 2017-09-01 | Stop reason: HOSPADM

## 2017-09-01 RX ORDER — LIDOCAINE HYDROCHLORIDE 20 MG/ML
INJECTION, SOLUTION EPIDURAL; INFILTRATION; INTRACAUDAL; PERINEURAL AS NEEDED
Status: DISCONTINUED | OUTPATIENT
Start: 2017-09-01 | End: 2017-09-01 | Stop reason: HOSPADM

## 2017-09-01 RX ORDER — PROPOFOL 10 MG/ML
INJECTION, EMULSION INTRAVENOUS AS NEEDED
Status: DISCONTINUED | OUTPATIENT
Start: 2017-09-01 | End: 2017-09-01 | Stop reason: HOSPADM

## 2017-09-01 RX ORDER — ONDANSETRON 2 MG/ML
INJECTION INTRAMUSCULAR; INTRAVENOUS AS NEEDED
Status: DISCONTINUED | OUTPATIENT
Start: 2017-09-01 | End: 2017-09-01 | Stop reason: HOSPADM

## 2017-09-01 RX ORDER — DEXAMETHASONE SODIUM PHOSPHATE 4 MG/ML
INJECTION, SOLUTION INTRA-ARTICULAR; INTRALESIONAL; INTRAMUSCULAR; INTRAVENOUS; SOFT TISSUE AS NEEDED
Status: DISCONTINUED | OUTPATIENT
Start: 2017-09-01 | End: 2017-09-01 | Stop reason: HOSPADM

## 2017-09-01 RX ORDER — HYDROMORPHONE HYDROCHLORIDE 1 MG/ML
.2-.5 INJECTION, SOLUTION INTRAMUSCULAR; INTRAVENOUS; SUBCUTANEOUS ONCE
Status: DISCONTINUED | OUTPATIENT
Start: 2017-09-01 | End: 2017-09-01 | Stop reason: HOSPADM

## 2017-09-01 RX ORDER — SODIUM CHLORIDE 0.9 % (FLUSH) 0.9 %
5-10 SYRINGE (ML) INJECTION EVERY 8 HOURS
Status: DISCONTINUED | OUTPATIENT
Start: 2017-09-01 | End: 2017-09-01 | Stop reason: HOSPADM

## 2017-09-01 RX ORDER — SODIUM CHLORIDE, SODIUM LACTATE, POTASSIUM CHLORIDE, CALCIUM CHLORIDE 600; 310; 30; 20 MG/100ML; MG/100ML; MG/100ML; MG/100ML
25 INJECTION, SOLUTION INTRAVENOUS CONTINUOUS
Status: DISCONTINUED | OUTPATIENT
Start: 2017-09-01 | End: 2017-09-01 | Stop reason: HOSPADM

## 2017-09-01 RX ORDER — METOPROLOL TARTRATE 5 MG/5ML
INJECTION INTRAVENOUS AS NEEDED
Status: DISCONTINUED | OUTPATIENT
Start: 2017-09-01 | End: 2017-09-01 | Stop reason: HOSPADM

## 2017-09-01 RX ORDER — LIDOCAINE HYDROCHLORIDE 10 MG/ML
0.1 INJECTION, SOLUTION EPIDURAL; INFILTRATION; INTRACAUDAL; PERINEURAL AS NEEDED
Status: DISCONTINUED | OUTPATIENT
Start: 2017-09-01 | End: 2017-09-01 | Stop reason: HOSPADM

## 2017-09-01 RX ORDER — FENTANYL CITRATE 50 UG/ML
INJECTION, SOLUTION INTRAMUSCULAR; INTRAVENOUS AS NEEDED
Status: DISCONTINUED | OUTPATIENT
Start: 2017-09-01 | End: 2017-09-01 | Stop reason: HOSPADM

## 2017-09-01 RX ORDER — DIPHENHYDRAMINE HYDROCHLORIDE 50 MG/ML
12.5 INJECTION, SOLUTION INTRAMUSCULAR; INTRAVENOUS AS NEEDED
Status: DISCONTINUED | OUTPATIENT
Start: 2017-09-01 | End: 2017-09-01 | Stop reason: HOSPADM

## 2017-09-01 RX ORDER — LABETALOL HYDROCHLORIDE 5 MG/ML
INJECTION, SOLUTION INTRAVENOUS AS NEEDED
Status: DISCONTINUED | OUTPATIENT
Start: 2017-09-01 | End: 2017-09-01 | Stop reason: HOSPADM

## 2017-09-01 RX ADMIN — MIDAZOLAM HYDROCHLORIDE 4 MG: 1 INJECTION, SOLUTION INTRAMUSCULAR; INTRAVENOUS at 09:07

## 2017-09-01 RX ADMIN — PROPOFOL 160 MG: 10 INJECTION, EMULSION INTRAVENOUS at 09:38

## 2017-09-01 RX ADMIN — FENTANYL CITRATE 50 MCG: 50 INJECTION, SOLUTION INTRAMUSCULAR; INTRAVENOUS at 09:38

## 2017-09-01 RX ADMIN — CEFAZOLIN 2 G: 10 INJECTION, POWDER, FOR SOLUTION INTRAVENOUS; PARENTERAL at 09:41

## 2017-09-01 RX ADMIN — METOPROLOL TARTRATE 2 MG: 5 INJECTION INTRAVENOUS at 09:43

## 2017-09-01 RX ADMIN — SODIUM CHLORIDE, SODIUM LACTATE, POTASSIUM CHLORIDE, AND CALCIUM CHLORIDE: 600; 310; 30; 20 INJECTION, SOLUTION INTRAVENOUS at 10:47

## 2017-09-01 RX ADMIN — LABETALOL HYDROCHLORIDE 5 MG: 5 INJECTION, SOLUTION INTRAVENOUS at 09:52

## 2017-09-01 RX ADMIN — DEXAMETHASONE SODIUM PHOSPHATE 4 MG: 4 INJECTION, SOLUTION INTRA-ARTICULAR; INTRALESIONAL; INTRAMUSCULAR; INTRAVENOUS; SOFT TISSUE at 09:46

## 2017-09-01 RX ADMIN — ONDANSETRON 4 MG: 2 INJECTION INTRAMUSCULAR; INTRAVENOUS at 09:46

## 2017-09-01 RX ADMIN — FENTANYL CITRATE 25 MCG: 50 INJECTION, SOLUTION INTRAMUSCULAR; INTRAVENOUS at 10:15

## 2017-09-01 RX ADMIN — FENTANYL CITRATE 50 MCG: 50 INJECTION, SOLUTION INTRAMUSCULAR; INTRAVENOUS at 09:52

## 2017-09-01 RX ADMIN — SODIUM CHLORIDE, SODIUM LACTATE, POTASSIUM CHLORIDE, AND CALCIUM CHLORIDE 25 ML/HR: 600; 310; 30; 20 INJECTION, SOLUTION INTRAVENOUS at 08:14

## 2017-09-01 RX ADMIN — LIDOCAINE HYDROCHLORIDE 60 MG: 20 INJECTION, SOLUTION EPIDURAL; INFILTRATION; INTRACAUDAL; PERINEURAL at 09:38

## 2017-09-01 NOTE — ANESTHESIA PREPROCEDURE EVALUATION
Anesthetic History   No history of anesthetic complications            Review of Systems / Medical History  Patient summary reviewed, nursing notes reviewed and pertinent labs reviewed    Pulmonary                Comments: Seasonal allergies   Neuro/Psych         Headaches (migraines)     Cardiovascular                  Exercise tolerance: >4 METS     GI/Hepatic/Renal             Pertinent negatives: No GERD and liver disease   Endo/Other        Arthritis     Other Findings   Comments: Porphyria Cutanea Tarda; has skin effects  Spinal stenosis & scoliosis           Physical Exam    Airway  Mallampati: II  TM Distance: 4 - 6 cm  Neck ROM: normal range of motion   Mouth opening: Normal     Cardiovascular    Rhythm: regular  Rate: normal      Pertinent negatives: No murmur   Dental    Dentition: Caps/crowns     Pulmonary  Breath sounds clear to auscultation               Abdominal  GI exam deferred       Other Findings            Anesthetic Plan    ASA: 2  Anesthesia type: general and regional - interscalene block    Monitoring Plan: BIS  Post-op pain plan if not by surgeon: peripheral nerve block single    Induction: Intravenous  Anesthetic plan and risks discussed with: Patient

## 2017-09-01 NOTE — PERIOP NOTES
Pt tolerating ginger ale w/o difficulty. VSS. Wife at bedside receiving d/c instructions. 1155-D/c instructions reviewed with wife, all questions answered. Discussed when to take pain meds, when to know when block is wearing off, how to apply sling, when to call the doctor. VSS    1224-Pt ambulated to bathroom with 1 assist, pt voided w/o difficulty. 1230-Transported via w/c to awaiting transportation. No complaints at time of d/c home.

## 2017-09-01 NOTE — IP AVS SNAPSHOT
[address] [facility] 
[phone] Patient: Juancho Patel. MRN: [id number] :1965 You are allergic to the following Allergen Reactions Bactrim (Sulfamethoprim Ds) Rash Lipitor (Atorvastatin) Myalgia Recent Documentation Height Weight BMI Smoking Status 1.727 m 88.5 kg 29.65 kg/m2 Never Smoker Emergency Contacts Name Discharge Info Relation Home Work Mobile Jb Taylor DISCHARGE CAREGIVER [3] Spouse [3] [phone] About your hospitalization You were admitted on:  2017 You last received care in the:  [facility] PACU You were discharged on:  2017 Unit phone number:  [phone] Why you were hospitalized Your primary diagnosis was:  Not on File Your diagnoses also included:  Complete Tear Of Right Rotator Cuff Providers Seen During Your Hospitalizations Provider Role Specialty Primary office phone Flakita Keane MD Attending Provider Orthopedic Surgery [phone] Your Primary Care Physician (PCP) Primary Care Physician Office Phone Office Fax Lucio Roblero [phone] Follow-up Information Follow up With Details Comments Contact Info [phone] Current Discharge Medication List  
  
CONTINUE these medications which have NOT CHANGED Dose & Instructions Dispensing Information Comments Morning Noon Evening Bedtime  
 albuterol 90 mcg/actuation inhaler Commonly known as:  PROVENTIL HFA, VENTOLIN HFA, PROAIR HFA Your last dose was: Your next dose is:    
   
   
 Dose:  2 Puff Take 2 Puffs by inhalation every four (4) hours as needed for Wheezing. Quantity:  1 Inhaler Refills:  1 aspirin 81 mg chewable tablet Your last dose was: Your next dose is:    
   
   
 Dose:  81 mg Take 1 Tab by mouth daily. Quantity:  30 Tab Refills:  11  
     
   
   
   
  
 azelastine 137 mcg (0.1 %) nasal spray Commonly known as:  ASTELIN Your last dose was: Your next dose is:    
   
   
 Dose:  1 Spray 1 [place] by Both Nostrils route two (2) times a day. Use in each nostril as directed Quantity:  3 Bottle Refills:  0 BC ARTHRITIS PO Your last dose was: Your next dose is:    
   
   
 Dose:  1 Dose Pack Take 1 Dose Pack by mouth as needed. Refills:  0  
     
   
   
   
  
 * cyclobenzaprine 10 mg tablet Commonly known as:  FLEXERIL Your last dose was: Your next dose is:    
   
   
 Dose:  10 mg Take 10 mg by mouth. Refills:  0  
     
   
   
   
  
 * cyclobenzaprine 10 mg tablet Commonly known as:  FLEXERIL Your last dose was: Your next dose is:    
   
   
 Dose:  5-10 mg Take 0.5-1 Tabs by mouth nightly as needed for Muscle Spasm(s). Quantity:  20 Tab Refills:  0  
     
   
   
   
  
 dicyclomine 10 mg capsule Commonly known as:  BENTYL Your last dose was: Your next dose is:    
   
   
 Dose:  10 mg Take 1 Cap by mouth three (3) times daily as needed. For abd cramping Quantity:  160 Cap Refills:  0 DULoxetine 30 mg capsule Commonly known as:  CYMBALTA Your last dose was: Your next dose is: TAKE ONE CAPSULE BY MOUTH EVERY DAY FOR BACK PAIN Quantity:  90 Cap Refills:  0  
     
   
   
   
  
 fenofibrate nanocrystallized 145 mg tablet Commonly known as:  [employer] Your last dose was: Your next dose is: TAKE 1 TABLET BY MOUTH DAILY. FOR HYPERCHOLESTEROLEMIA Quantity:  90 Tab Refills:  0  
     
   
   
   
  
 fluticasone 50 mcg/actuation nasal spray Commonly known as:  Lorena Mcconnell Your last dose was: Your next dose is:    
   
   
 Use two sprays in each nostril once daily for allergies. Quantity:  3 Bottle Refills:  0  
     
   
   
   
  
 methocarbamol 500 mg tablet Commonly known as:  ROBAXIN Your last dose was: Your next dose is:    
   
   
 Dose:  1000 mg Take 2 Tabs by mouth four (4) times daily as needed. Quantity:  45 Tab Refills:  0  
     
   
   
   
  
 montelukast 10 mg tablet Commonly known as:  SINGULAIR Your last dose was: Your next dose is: TAKE 1 TABLET BY MOUTH EVERY DAY FOR ALLERGIES Quantity:  90 Tab Refills:  0  
     
   
   
   
  
 omeprazole 20 mg capsule Commonly known as:  PRILOSEC Your last dose was: Your next dose is: TAKE ONE CAPSULE BY MOUTH EVERY DAY FOR IBS Quantity:  90 Cap Refills:  0 SIMETHICONE Your last dose was: Your next dose is:    
   
   
 Dose:  120 mg Take 120 mg by mouth as needed. Refills:  0 SUMAtriptan 100 mg tablet Commonly known as:  IMITREX Your last dose was: Your next dose is:    
   
   
 Dose:  100 mg Take 1 Tab by mouth once as needed for Migraine. Quantity:  12 Tab Refills:  1 ZyrTEC-D 5-120 mg per tablet Generic drug:  cetirizine-psuedoePHEDrine Your last dose was: Your next dose is:    
   
   
 Dose:  1 Tab Take 1 Tab by mouth daily. Refills:  0  
     
   
   
   
  
 * Notice: This list has 2 medication(s) that are the same as other medications prescribed for you. Read the directions carefully, and ask your doctor or other care provider to review them with you. STOP taking these medications   
 diclofenac EC 75 mg EC tablet Commonly known as:  VOLTAREN Discharge Instructions Rotator Cuff Repair Post-Op Instructions Gerry Thibodeaux M.D. 
[phone] Sling: You will use your sling for five (5) weeks. You may remove your arm from the sling for showers. You may also remove your arm from the sling and let your arm hang down so your elbow doesn't get too stiff. You are encouraged to perform hand, wrist and elbow range of motion, arm dangles, and shoulder blade pinches at least 5 times per day. These exercises will help to decrease swelling and stiffness. I will teach your how to do dangles and shoulder blade pinches starting right after your surgery. Swelling and bruising is common after surgery. You may also notice swelling in you hand, if you do, adjust your sling so the hand is slightly above the elbow, you may squeeze a ball like a stress ball and you can do some bicep curls without weight. These exercises will help with the swelling. The essential point is that your are NOT allowed to actively lift your elbow away from your side (under its own power) for the first five (5) weeks. Dressing: Your dressing will be left in place for 1-2 days. You may notice bloody drainage on the dressing. That is fine. You will remove the dressing two (2) days after the surgery and cover the incisions with circular band-aids. Shower with band-aids on, then remove and replace band-aids after showers. Sleeping:  Patients are generally more comfortable sleeping in a reclining chair or with some pillows propped behind the shoulder. You can wear your sling when sleeping, or you may remove the sling and just slide a bed pillow up underneath your arm and sleep like that. Some difficulty with sleeping is common for 2-3 weeks after surgery. Therapy:  Arrangements will be made at your post-op appointment. Your Physical Therapist will progress your activity appropriately. Medications:  
-You should resume your daily medications unless instructed differently. -You will go home with two pain medication prescriptions. Hydromorphone and Oxycodone. DO NOT take both at once. Pick one or the other. I recommend trying Hydromorphone first.  If you have problems with it, wait three hours and switch over to the Oxycodone. 
 
-Do not wait until you are in a lot of pain before taking the medication. It takes the medication 30-45 minutes to take effect. -DO NOT take NSAIDs (Advil, Aleve, Motrin, Ibuprofen, etc.) of the first month. NSAIDs are reported to delay tendon healing. Take Tylenol or Acetaminophen instead. No more than 2000 mg daily. 
 
-It is not uncommon to have some stomach upset with the use of narcotic medication. For this reason, take you medication with food. If your symptoms are severe, please call the office. 
 
-The use of narcotics can lead to constipation. A high fiber diet, and lots of fluids can prevent this occurring. Over the counter laxatives (milk of magnesia, dulcolax, magnesium citrate) can be used as directed. -If a refill of medication is needed, please call the office during regular business hours, Monday - Friday 8:00AM-5:00PM.  Dr. Maylin Fernandez may be in surgery and not readily available to sign a prescription so it is important to call at least a day before your prescription will run out. Refills will not be made after hours, so please plan ahead. We also cannot guarantee a same day prescription. Driving: DO NOT drive while taking narcotic medication. It is okay to drive in your sling, just follow same rules, no lifting elbow away from your side. Return to school/work: This will depend on your job requirements and level of activity. If you work at a desk job or in a supervisory role, you may return as early as 3-4 days after surgery. Average return to regular activities is 4-6 months post-op. Follow up:   You will be given an appointment card for your follow-up appointment at our [facility] office. At that time your sutures will be removed and physical therapy will be arranged. If unexpected problems, emergencies or other issues occur and you need to speak with our office, please call. A physician is on call 24 hours a day, 7 days a week for emergencies and may be reached through the answering service by calling the regular office number [phone]. Lidya Perdomo M.D. 
  
 
 
[facility]leasmita Ricardo, [address], [address] [address] [facility] TAKE NARCOTIC PAIN MEDICATIONS WITH FOOD, and if given 2 pain narcotics do NOT take together! Narcotics tend to be constipating and we recommend taking a stool softener such as Colace or Miralax (follow package instructions). If you were given prescriptions, please review the written information on the prescribed medications. DO NOT DRIVE WHILE TAKING NARCOTIC PAIN MEDICATIONS. DISCHARGE SUMMARY from Nurse The following personal items collected during your admission are returned to you:  
Dental Appliance: Dental Appliances: None Vision: Visual Aid: Glasses Hearing Aid:   
Jewelry: Jewelry: None Clothing: Clothing:  (pt belonging bag ) Other Valuables: Other Valuables: Alisson Chauhan Valuables sent to safe:   
 
 
PATIENT INSTRUCTIONS: 
 
After General Anesthesia or Intravenous Sedation, for 24 hours or while taking prescription Narcotics: · Someone should be with you for the next 24 hours. · For your own safety, a responsible adult must drive you home. · Limit your activities · Recommended activity: Rest today, up with assistance today. Do not climb stairs or shower unattended for the next 24 hours. · Start with a soft bland diet and advance as tolerated (no nausea) to regular diet. · If you have a sore throat some things that may help are: fluids, warm salt water gargle, or throat lozenges. If this does not improve after several days please follow up with your family physician. · Do not drive and operate hazardous machinery · Do not make important personal or business decisions · Do  not drink alcoholic beverages · If you have not urinated within 8 hours after discharge, please contact your surgeon on call. Report the following to your surgeon: 
· Excessive pain, swelling, redness or odor of or around the surgical area · Temperature over 100.5 · Nausea and vomiting lasting longer than 4 hours or if unable to take medications · Any signs of decreased circulation or nerve impairment to extremity: change in color, persistent  numbness, tingling, coldness or increase pain · If you received an upper extremity nerve block, please wear your sling until the block has worn off, then refer to your surgeons post-operative instructions. If you have had a shoulder block or a block near your collar bone, you may have              symptoms such as: 1. Mild shortness of breath 2. A hoarse voice 3. Blurry vision 4. Unequal pupils 5. Drooping of your face on the same side as the nerve block. These symptoms will disappear as the nerve block wears off. · If you received a lower extremity nerve block, please use your crutches, walker, or cane until the nerve block has worn off, then refer to your surgeons post-operative instructions. ?  
· You will receive a Post Operative Call from one of the Recovery Room Nurses on the day after your surgery to check on you. It is very important for us to know how you are recovering after your surgery. If you have an issue please call your surgeon, do not wait for the post operative call. · You may receive an e-mail or letter in the mail from [facility] regarding your experience with us in the Ambulatory Surgery Unit. Your feedback is valuable to us and we appreciate your participation in the survey.   
·  
· If the above instructions are not adequate, please contact Vargas Yoon Otf Bentley RN, Genna anesthesia Nurse Manager or our Anesthesiologist, at [phone]. If you are having problems after your surgery, call your physician at his office number. ·  
· We wish youre a speedy recovery ? What to do at Home: *  Please give a list of your current medications to your Primary Care Provider. *  Please update this list whenever your medications are discontinued, doses are 
    changed, or new medications (including over-the-counter products) are added. *  Please carry medication information at all times in case of emergency situations. These are general instructions for a healthy lifestyle: No smoking/ No tobacco products/ Avoid exposure to second hand smoke Surgeon General's Warning:  Quitting smoking now greatly reduces serious risk to your health. Obesity, smoking, and sedentary lifestyle greatly increases your risk for illness A healthy diet, regular physical exercise & weight monitoring are important for maintaining a healthy lifestyle You may be retaining fluid if you have a history of heart failure or if you experience any of the following symptoms:  Weight gain of 3 pounds or more overnight or 5 pounds in a week, increased swelling in our hands or feet or shortness of breath while lying flat in bed. Please call your doctor as soon as you notice any of these symptoms; do not wait until your next office visit. Recognize signs and symptoms of STROKE: 
 
B - Balance E-  Eyes F-  Face looks uneven A-  Arms unable to move or move even S-  Speech slurred or non-existent T-  Time-call 911 as soon as signs and symptoms begin-DO NOT go Back to bed or wait to see if you get better-TIME IS BRAIN. If you have not had your influenza or pneumococcal vaccines, please follow up with your primary care physician.  
 
 
 
 
The discharge information has been reviewed with the patient and caregiver. The patient and caregiver verbalized understanding. [email or web address] THROMBOSIS AND PULMONARY EMBOLUS 
 
SURGICAL PATIENTS Surgical patients are the #1 risk for DVT and PE. WHAT IS DVT? WHAT IS PE? 
DVT is a serious condition where blood clots develop deep in the veins of the legs. PE occurs when a blood clot breaks loose from the wall of a vein and travels to the lungs blocking the pulmonary artery or one of its branches impairing blood flow from the heart, which could result in death. RISK FACTORS 
? Surgery lasting longer than 45 minutes ? History of inflammatory bowel disease 
? Oral contraceptive or hormone replacement therapy ? Immobilization ? Varicose veins / swollen legs ? Smoking  
? CHF / Acute MI / Irregular heart beat ? Family history of thrombosis ? General anesthesia greater than 2 hours ? Obesity ? Infection of less than one month ? Less than 1 month postpartum 
? COPD / Pneumonia ? Arthroscopic surgery ? Malignancy / cancer ? Spine surgery ? Blood abnormalities ? Stroke / Paralysis / Coma SIGNS AND SYMPTOMS OF DEEP VEIN TROMBOSIS Usually occurs in one leg, above or below the knee ? Swelling  one calf or thigh may be larger than the other ? Feeling increased warmth in the area of the leg that is swollen or painful ? Leg pain, which may increase when standing or walking ? Swelling along the vein of the leg ? When swollen areas is pressed with a finger, a depression may remain ? Tenderness of the leg that may be confined to one area ? Change in leg color (bluish or red) SIGNS AND SYMPTOMS OF PULMONARY EMBOLUS 
? Chest pain that gets worse with deep breath, coughing or chest movement ? Coughing up blood ? Sweating ? Shortness of breath or difficulty breathing ? Rapid heart beat ? Lightheadedness HOW TO REDUCE THE POSSIBLE RISK OF DVT ?  Exercise  simple activities as rotating ankles and wrists, wiggling toes and fingers, tightening and relaxing muscles in calves and thighs promotes circulation while recovering from surgery. Please do these exercises every hour during waking hours ? Take mediation as prescribed by your physician (Lovenox, Coumadin, Aspirin) ? Resume your normal activities as soon as your doctor advises you to do so. 
? Remember, when traveling, to [place] your legs frequently. PATIENTS WHO BELIEVE THEY MAY BE EXPERIENCING SIGNS AND SYMPTOMS OF DVT OR PE SHOULD SEEK MEDICAL HELP IMMEDIATELY Discharge Instructions Attachments/References MEFS - ONDANSETRON (ZOFRAN, ZOFRAN ODT, ZUPLENZ) - (BY MOUTH, INTO THE MOUTH) (ENGLISH) MEFS - HYDROMORPHONE (DILAUDID, EXALGO) - (BY MOUTH) (ENGLISH) MEFS - OXYCODONE, RAPID RELEASE (ETH-OXYDOSE, OXY IR, ROXICODONE) - (BY MOUTH) (ENGLISH) Discharge Orders None Introducing [facility] SERVICES! Dear Joanne Matamoros: Thank you for requesting a [employer] account. Our records indicate that you already have an active [employer] account. You can access your account anytime at [email or web address] Did you know that you can access your hospital and ER discharge instructions at any time in [employer]? You can also review all of your test results from your hospital stay or ER visit. Additional Information If you have questions, please visit the Frequently Asked Questions section of the [employer] website at [email or web address] Remember, [employer] is NOT to be used for urgent needs. For medical emergencies, dial 911. Now available from your iPhone and Android! General Information Please provide this summary of care documentation to your next provider. Patient Signature:  ____________________________________________________________ Date:  ____________________________________________________________  
  
Fabiana Shields  Provider Signature: ____________________________________________________________ Date:  ____________________________________________________________ More Information Ondansetron (Zofran, Zofran ODT, Zuplenz) - (By mouth, Into the mouth) Why this medicine is used:  
Prevents nausea and vomiting. Contact a nurse or doctor right away if you have: 
· Fast, pounding, or uneven heartbeat · Lightheadedness or fainting · Trouble breathing Common side effects: 
· Headache, tiredness · Constipation, diarrhea © 2017 [address] Information is for End User's use only and may not be sold, redistributed or otherwise used for commercial purposes. Hydromorphone (Dilaudid, Exalgo) - (By mouth) Why this medicine is used:  
Treats moderate to severe pain. This medicine is a narcotic pain reliever. Contact a nurse or doctor right away if you have: 
· Extreme weakness, shallow breathing, fast or slow heartbeat · Severe confusion, lightheadedness, dizziness, fainting · Sweating or cold, clammy skin · Anxiety, restlessness, fever, sweating, muscle spasms, twitching, seeing or hearing things that are not there · Severe constipation, stomach pain, vomiting Common side effects: · Mild constipation, nausea, diarrhea · Sleepiness, tiredness · Itching, rash © 2017 [address] Information is for End User's use only and may not be sold, redistributed or otherwise used for commercial purposes. Oxycodone, Rapid Release (ETH-Oxydose, Oxy IR, Roxicodone) - (By mouth) Why this medicine is used:  
Treats moderate to severe pain. This medicine is a narcotic pain reliever. Contact a nurse or doctor right away if you have: 
· Fast or slow heart beat, shallow breathing, blue lips, skin or fingernails · Anxiety, restlessness, fever, sweating, muscle spasms, twitching, seeing or hearing things that are not there · Extreme weakness, shallow breathing, slow heartbeat · Severe confusion, lightheadedness, dizziness, fainting · Sweating or cold, clammy skin, seizures · Severe constipation, stomach pain, nausea, vomiting Common side effects: · Mild constipation · Sleepiness, tiredness © 2017 [address] Information is for End User's use only and may not be sold, redistributed or otherwise used for commercial purposes.

## 2017-09-01 NOTE — PERIOP NOTES
Dr. Joni Lassiter performed a ISB Right Shoulder  nerve block with the U/S machine and nerve stimulator. He was on cardiac monitoring, pulse oximetry and 3L NC O2.  Pt. Was given 4mg of versed l IV for sedation. VSS. Pt. Was awake and alert post block.

## 2017-09-01 NOTE — BRIEF OP NOTE
BRIEF OPERATIVE NOTE    Date of Procedure: 9/1/2017   Preoperative Diagnosis: Complete tear of right rotator cuff [M75.121]  Postoperative Diagnosis: Complete tear of right rotator cuff [M75.121]    Procedure(s):  RIGHT SHOULDER ARTHROSCOPY, SUBACROMIAL DECOMPRESSION, ROTATOR CUFF REPAIR, DEBRIDEMENT  Surgeon(s) and Role: * Yursa Cosme MD - Primary       Ying Monsalve PA-C  - Assist    Surgical Staff:  Circ-1: Kassandra Opitz, RN  Scrub Tech-1: Michael Bhatia  Event Time In   Incision Start 4177   Incision Close 1108     Anesthesia: General   Estimated Blood Loss: 5cc  Specimens: * No specimens in log *   Findings: see above   Complications: none  Implants:   Implant Name Type Inv.  Item Serial No.  Lot No. LRB No. Used Action   [facility] CRKSCR PEEK 4.5X16MM --  - SN/A  [facility] CRKSCR PEEK 4.5X16MM --  N/A [employer] [id number] Right 1 Implanted   [facility] CRKSCR PEEK 4.5X16MM --  - SN/A  [facility] CRKSCR PEEK 4.5X16MM --  N/A [employer] [id number] Right 1 Implanted   SCR CORK TRIPL PLAY PEEK 5.5MM --  - SN/A  SCR CORK TRIPL PLAY PEEK 5.5MM --  N/A [employer] [id number] Right 1 Implanted   ANCHOR SUT CLOS EYE 5.5X19.1MM -- PEEK SWIVELOCK - SN/A   ANCHOR SUT CLOS EYE 5.5X19.1MM -- PEEK Kerline Lynne N/A [employer] [id number] Right 1 Implanted

## 2017-09-01 NOTE — ANESTHESIA PROCEDURE NOTES
Peripheral Block    Start time: 9/1/2017 9:04 AM  End time: 9/1/2017 9:13 AM  Performed by: John Pro  Authorized by: John Pro       Pre-procedure: Indications: at surgeon's request and post-op pain management    Preanesthetic Checklist: patient identified, risks and benefits discussed, site marked, timeout performed, anesthesia consent given and patient being monitored    Timeout Time: 09:06          Block Type:   Block Type:   Interscalene  Laterality:  Right  Monitoring:  Standard ASA monitoring, responsive to questions and oxygen  Injection Technique:  Single shot  Procedures: ultrasound guided    Patient Position: supine  Prep: chlorhexidine    Location:  Interscalene  Needle Type:  Stimuplex  Needle Gauge:  22 G  Needle Localization:  Ultrasound guidance  Medication Injected:  0.5%  ropivacaine  Volume (mL):  30    Assessment:  Number of attempts:  1  Injection Assessment:  Incremental injection every 5 mL, no paresthesia, ultrasound image on chart, local visualized surrounding nerve on ultrasound, negative aspiration for blood and no intravascular symptoms  Patient tolerance:  Patient tolerated the procedure well with no immediate complications

## 2017-09-01 NOTE — ANESTHESIA POSTPROCEDURE EVALUATION
Post-Anesthesia Evaluation and Assessment    Patient: Dennis Mora. MRN: [id number]  SSN: [id number]    YOB: 1965  Age: 46 y.o. Sex: male       Cardiovascular Function/Vital Signs  Visit Vitals    /88    Pulse (!) 55    Temp 36.6 °C (97.8 °F)    Resp 14    Ht 5' 8\" (1.727 m)    Wt 88.5 kg (195 lb)    SpO2 97%    BMI 29.65 kg/m2       Patient is status post general, regional anesthesia for Procedure(s):  RIGHT SHOULDER ARTHROSCOPY, SUBACROMIAL DECOMPRESSION, ROTATOR CUFF REPAIR, DEBRIDEMENT. Nausea/Vomiting: None    Postoperative hydration reviewed and adequate. Pain:  Pain Scale 1: Numeric (0 - 10) (09/01/17 1203)  Pain Intensity 1: 0 (09/01/17 1203)   Managed with interscalene block. Sling for postop. Neurological Status:   Neuro (WDL): Exceptions to WDL (09/01/17 1118)  Neuro  Neurologic State: Sleeping (09/01/17 1118)   At baseline    Mental Status and Level of Consciousness: Arousable    Pulmonary Status:   O2 Device: Room air (09/01/17 1148)   Adequate oxygenation and airway patent    Complications related to anesthesia: None    Post-anesthesia assessment completed.  No concerns    Signed By: Falguni Larios MD     September 1, 2017

## 2017-09-01 NOTE — PERIOP NOTES
[employer].  1965  [id number]    Situation:  Verbal report given from: Elizabeth Barker.  Ana Maria MENDOZA RN  Procedure: Procedure(s):  RIGHT SHOULDER ARTHROSCOPY, SUBACROMIAL DECOMPRESSION, ROTATOR CUFF REPAIR, DEBRIDEMENT    Background:    Preoperative diagnosis: Complete tear of right rotator cuff [M75.121]    Postoperative diagnosis: Complete tear of right rotator cuff [M75.121]    :  Dr. Jovanny Espitia    Assistant(s): Circ-1: Linda Bowie RN  Scrub Tech-1: Karen Tomas    Specimens: * No specimens in log *    Assessment:  Intra-procedure medications         Anesthesia gave intra-procedure sedation and medications, see anesthesia flow sheet     Intravenous fluids: LR@ KVO     Vital signs stable       Recommendation:    Permission to share finding with family or friend yes

## 2017-09-01 NOTE — DISCHARGE INSTRUCTIONS
Rotator Cuff Repair  Post-Op Instructions  Gerry Fernandez M.D.  [phone]    Sling: You will use your sling for five (5) weeks. You may remove your arm from the sling for showers. You may also remove your arm from the sling and let your arm hang down so your elbow doesn't get too stiff. You are encouraged to perform hand, wrist and elbow range of motion, arm dangles, and shoulder blade pinches at least 5 times per day. These exercises will help to decrease swelling and stiffness. I will teach your how to do dangles and shoulder blade pinches starting right after your surgery. Swelling and bruising is common after surgery. You may also notice swelling in you hand, if you do, adjust your sling so the hand is slightly above the elbow, you may squeeze a ball like a stress ball and you can do some bicep curls without weight. These exercises will help with the swelling. The essential point is that your are NOT allowed to actively lift your elbow away from your side (under its own power) for the first five (5) weeks. Dressing: Your dressing will be left in place for 1-2 days. You may notice bloody drainage on the dressing. That is fine. You will remove the dressing two (2) days after the surgery and cover the incisions with circular band-aids. Shower with band-aids on, then remove and replace band-aids after showers. Sleeping:  Patients are generally more comfortable sleeping in a reclining chair or with some pillows propped behind the shoulder. You can wear your sling when sleeping, or you may remove the sling and just slide a bed pillow up underneath your arm and sleep like that. Some difficulty with sleeping is common for 2-3 weeks after surgery. Therapy:  Arrangements will be made at your post-op appointment. Your Physical Therapist will progress your activity appropriately.     Medications:   -You should resume your daily medications unless instructed differently.    -You will go home with two pain medication prescriptions. Hydromorphone and Oxycodone. DO NOT take both at once. Pick one or the other. I recommend trying Hydromorphone first.  If you have problems with it, wait three hours and switch over to the Oxycodone.    -Do not wait until you are in a lot of pain before taking the medication. It takes the medication 30-45 minutes to take effect. -DO NOT take NSAIDs (Advil, Aleve, Motrin, Ibuprofen, etc.) of the first month. NSAIDs are reported to delay tendon healing. Take Tylenol or Acetaminophen instead. No more than 2000 mg daily.    -It is not uncommon to have some stomach upset with the use of narcotic medication. For this reason, take you medication with food. If your symptoms are severe, please call the office.    -The use of narcotics can lead to constipation. A high fiber diet, and lots of fluids can prevent this occurring. Over the counter laxatives (milk of magnesia, dulcolax, magnesium citrate) can be used as directed. -If a refill of medication is needed, please call the office during regular business hours, Monday - Friday 8:00AM-5:00PM.  Dr. Shad Thibodeaux may be in surgery and not readily available to sign a prescription so it is important to call at least a day before your prescription will run out. Refills will not be made after hours, so please plan ahead. We also cannot guarantee a same day prescription. Driving: DO NOT drive while taking narcotic medication. It is okay to drive in your sling, just follow same rules, no lifting elbow away from your side. Return to school/work: This will depend on your job requirements and level of activity. If you work at a desk job or in a supervisory role, you may return as early as 3-4 days after surgery. Average return to regular activities is 4-6 months post-op. Follow up: You will be given an appointment card for your follow-up appointment at our [facility] office.  At that time your sutures will be removed and physical therapy will be arranged. If unexpected problems, emergencies or other issues occur and you need to speak with our office, please call. A physician is on call 24 hours a day, 7 days a week for emergencies and may be reached through the answering service by calling the regular office number [phone]. Dejon Singh M.D.         Axelasmita Ricardo, [address], [address] [address] [facility] TAKE NARCOTIC PAIN MEDICATIONS WITH FOOD, and if given 2 pain narcotics do NOT take together! Narcotics tend to be constipating and we recommend taking a stool softener such as Colace or Miralax (follow package instructions). If you were given prescriptions, please review the written information on the prescribed medications. DO NOT DRIVE WHILE TAKING NARCOTIC PAIN MEDICATIONS. DISCHARGE SUMMARY from Nurse    The following personal items collected during your admission are returned to you:   Dental Appliance: Dental Appliances: None  Vision: Visual Aid: Glasses  Hearing Aid:    Jewelry: Jewelry: None  Clothing: Clothing:  (pt belonging bag )  Other Valuables: Other Valuables: Eyeglasses  Valuables sent to safe:        PATIENT INSTRUCTIONS:    After General Anesthesia or Intravenous Sedation, for 24 hours or while taking prescription Narcotics:  · Someone should be with you for the next 24 hours. · For your own safety, a responsible adult must drive you home. · Limit your activities  · Recommended activity: Rest today, up with assistance today. Do not climb stairs or shower unattended for the next 24 hours. · Start with a soft bland diet and advance as tolerated (no nausea) to regular diet. · If you have a sore throat some things that may help are: fluids, warm salt water gargle, or throat lozenges. If this does not improve after several days please follow up with your family physician.   · Do not drive and operate hazardous machinery  · Do not make important personal or business decisions  · Do  not drink alcoholic beverages  · If you have not urinated within 8 hours after discharge, please contact your surgeon on call. Report the following to your surgeon:  · Excessive pain, swelling, redness or odor of or around the surgical area  · Temperature over 100.5  · Nausea and vomiting lasting longer than 4 hours or if unable to take medications  · Any signs of decreased circulation or nerve impairment to extremity: change in color, persistent  numbness, tingling, coldness or increase pain    · If you received an upper extremity nerve block, please wear your sling until the block has worn off, then refer to your surgeons post-operative instructions. If you have had a shoulder block or a block near your collar bone, you may have              symptoms such as:          1. Mild shortness of breath        2. A hoarse voice        3. Blurry vision        4. Unequal pupils        5. Drooping of your face on the same side as the nerve block. These symptoms will disappear as the nerve block wears off. · If you received a lower extremity nerve block, please use your crutches, walker, or cane until the nerve block has worn off, then refer to your surgeons post-operative instructions.    · You will receive a Post Operative Call from one of the Recovery Room Nurses on the day after your surgery to check on you. It is very important for us to know how you are recovering after your surgery. If you have an issue please call your surgeon, do not wait for the post operative call. · You may receive an e-mail or letter in the mail from [facility] regarding your experience with us in the Ambulatory Surgery Unit. Your feedback is valuable to us and we appreciate your participation in the survey. ·   · If the above instructions are not adequate, please contact Quentin Mackenzie RN, [facility] anesthesia Nurse Manager or our Anesthesiologist, at [phone].   If you are having problems after your surgery, call your physician at his office number. ·   · We wish youre a speedy recovery ? What to do at Home:    *  Please give a list of your current medications to your Primary Care Provider. *  Please update this list whenever your medications are discontinued, doses are      changed, or new medications (including over-the-counter products) are added. *  Please carry medication information at all times in case of emergency situations. These are general instructions for a healthy lifestyle:    No smoking/ No tobacco products/ Avoid exposure to second hand smoke    Surgeon General's Warning:  Quitting smoking now greatly reduces serious risk to your health. Obesity, smoking, and sedentary lifestyle greatly increases your risk for illness    A healthy diet, regular physical exercise & weight monitoring are important for maintaining a healthy lifestyle    You may be retaining fluid if you have a history of heart failure or if you experience any of the following symptoms:  Weight gain of 3 pounds or more overnight or 5 pounds in a week, increased swelling in our hands or feet or shortness of breath while lying flat in bed. Please call your doctor as soon as you notice any of these symptoms; do not wait until your next office visit. Recognize signs and symptoms of STROKE:    B - Balance  E-  Eyes    F-  Face looks uneven    A-  Arms unable to move or move even    S-  Speech slurred or non-existent    T-  Time-call 911 as soon as signs and symptoms begin-DO NOT go       Back to bed or wait to see if you get better-TIME IS BRAIN. If you have not had your influenza or pneumococcal vaccines, please follow up with your primary care physician. The discharge information has been reviewed with the patient and caregiver. The patient and caregiver verbalized understanding.     Shivam Cummings THROMBOSIS AND PULMONARY EMBOLUS    SURGICAL PATIENTS  Surgical patients are the #1 risk for DVT and PE. WHAT IS DVT? WHAT IS PE?  DVT is a serious condition where blood clots develop deep in the veins of the legs. PE occurs when a blood clot breaks loose from the wall of a vein and travels to the lungs blocking the pulmonary artery or one of its branches impairing blood flow from the heart, which could result in death. RISK FACTORS   Surgery lasting longer than 45 minutes   History of inflammatory bowel disease   Oral contraceptive or hormone replacement therapy   Immobilization   Varicose veins / swollen legs   Smoking    CHF / Acute MI / Irregular heart beat   Family history of thrombosis   General anesthesia greater than 2 hours   Obesity   Infection of less than one month   Less than 1 month postpartum   COPD / Pneumonia   Arthroscopic surgery   Malignancy / cancer   Spine surgery   Blood abnormalities   Stroke / Paralysis / Coma    SIGNS AND SYMPTOMS OF DEEP VEIN TROMBOSIS  Usually occurs in one leg, above or below the knee   Swelling - one calf or thigh may be larger than the other   Feeling increased warmth in the area of the leg that is swollen or painful   Leg pain, which may increase when standing or walking   Swelling along the vein of the leg   When swollen areas is pressed with a finger, a depression may remain   Tenderness of the leg that may be confined to one area   Change in leg color (bluish or red)    SIGNS AND SYMPTOMS OF PULMONARY EMBOLUS   Chest pain that gets worse with deep breath, coughing or chest movement   Coughing up blood   Sweating   Shortness of breath or difficulty breathing   Rapid heart beat   Lightheadedness    HOW TO REDUCE THE POSSIBLE RISK OF DVT   Exercise - simple activities as rotating ankles and wrists, wiggling toes and fingers, tightening and relaxing muscles in calves and thighs promotes circulation while recovering from surgery.  Please do these exercises every hour during waking hours   Take mediation as prescribed by your physician (Lovenox, Coumadin, Aspirin)   Resume your normal activities as soon as your doctor advises you to do so.  Remember, when traveling, to [place] your legs frequently.         PATIENTS WHO BELIEVE THEY MAY BE EXPERIENCING SIGNS AND SYMPTOMS OF DVT OR PE SHOULD SEEK MEDICAL HELP IMMEDIATELY

## 2017-09-04 NOTE — OP NOTES
[address], [facility]   OP NOTE       Name:  Maureen Carlton   MR#:  [id number]   :  1965   Account #:  [de-identified]    Surgery Date:  2017   Date of Adm:  2017       PREOPERATIVE DIAGNOS ES   1. Right shoulder rotator cuff tear, supraspinatus. 2. Partial long head biceps tear. 3. Type 1 superior labrum anterior and posterior tear. 4. Subacromial bursitis. 5. Outlet impingement. POSTOPERATIVE DIAGNOS ES   1. Right shoulder rotator cuff tear, supraspinatus. 2. Partial long head biceps tear. 3. Type 1 superior labrum anterior and posterior tear. 4. Subacromial bursitis. 5. Outlet impingement. PROCEDURES PERFORMED:     1. Right shoulder arthroscopic rotator cuff repair. 2. Arthroscopic subacromial decompression. 3. Arthroscopic extensive debridement of subacromial bursa, type 1   superior labrum anterior and posterior tear, and partial biceps tendon   tear. SURGEON: Shankar Hernandez. Conrad Severs, MD    ASSISTANT: HONG Hawley    COMPLICATIONS: None. ESTIMATED BLOOD LOSS:  5 mL. SPECIMENS REMOVED:  None. ANESTHESIA:  General endotracheal.    INDICATIONS: This patient has a full-thickness cuff tear, outlet   impingement, type 1 SLAP tear, comes in for surgical treatment. This   is a complex surgical procedure requiring an assistant and one was   used. DESCRIPTION OF PROCEDURE: The patient was brought to the   operating theater, given airway, IV antibiotics, preoperative   interscalene block. He is rolled over into the right side up lateral   position, beanbag exsufflated, down-side axillary roll placed, down-side   peroneal nerve padded, neck placed in neutral extension position on a   folded pillow. Right shoulder prepped and draped, put in 10 pounds of   traction, 30 degrees abduction, 20 degrees of forward flexion.    Established anterior and posterior arthroscopic portals, viewed the   joint's entirety. Glenohumeral joint showed a partial tear of the biceps   tendon down by the base, which was debrided after being   photographed. There is no instability up to the pulley. There was a type   I SLAP tear that was debrided until stable. There was a full-thickness   cuff tear of the supraspinatus retracted 2 cm and there was no   osteoarthritis. No inferior labral tearing. Went into the subacromial   space and did a thorough bursectomy anteriorly, laterally, and   posteriorly. There is abrasion of the outlet impingement spur, so we did   a lateral half CA ligament release and a subacromial decompression   using cutting block technique, creating a type 1 acromion. A full-  thickness cuff tear was visualized. It is a tongue type tear of the   supraspinatus with thick tissue, good mobility of the cuff. So we   roughened up the footprint and put in medial and lateral row anchors,   medial row anchors, vertical mattress brought up 15 mm medial to the   lateral edge. Lateral row simple sutures placed x3. Medial row tied   down and draped over the lateral row into the way lateral SwiveLock. Three side-to-side sutures placed, 2 in the back part of the cuff where   the supraspinatus joins the infraspinatus, one at the front of the cuff   repair where the front of the supraspinatus meets the tissue covering   the biceps. We took pictures, copiously irrigated, closed the portals   and took the patient to the recovery room in stable condition.         Sisi Lemus MD TD / ADONIS   D:  09/03/2017   19:27   T:  09/04/2017   00:30   Job #:  [id number]

## 2017-09-07 ENCOUNTER — OFFICE VISIT (OUTPATIENT)
Dept: PRIMARY CARE CLINIC | Age: 52
End: 2017-09-07

## 2017-09-07 VITALS
OXYGEN SATURATION: 97 % | DIASTOLIC BLOOD PRESSURE: 88 MMHG | BODY MASS INDEX: 29.83 KG/M2 | WEIGHT: 196.8 LBS | SYSTOLIC BLOOD PRESSURE: 135 MMHG | RESPIRATION RATE: 16 BRPM | TEMPERATURE: 98.2 F | HEART RATE: 83 BPM | HEIGHT: 68 IN

## 2017-09-07 DIAGNOSIS — M72.2 PLANTAR FASCIITIS OF RIGHT FOOT: Primary | ICD-10-CM

## 2017-09-07 DIAGNOSIS — M79.671 INTRACTABLE RIGHT HEEL PAIN: ICD-10-CM

## 2017-09-07 RX ORDER — OXYCODONE HYDROCHLORIDE 5 MG/1
5-10 TABLET ORAL
COMMUNITY
Start: 2017-09-07 | End: 2017-10-30 | Stop reason: ALTCHOICE

## 2017-09-07 RX ORDER — ONDANSETRON 4 MG/1
4 TABLET, FILM COATED ORAL
COMMUNITY
Start: 2017-08-31 | End: 2017-10-21 | Stop reason: ALTCHOICE

## 2017-09-07 RX ORDER — DICLOFENAC SODIUM 10 MG/G
GEL TOPICAL 3 TIMES DAILY
Qty: 20 G | Refills: 0 | Status: SHIPPED | OUTPATIENT
Start: 2017-09-07 | End: 2018-06-12

## 2017-09-07 RX ORDER — OXYCODONE HYDROCHLORIDE 5 MG/1
TABLET ORAL
Refills: 0 | COMMUNITY
Start: 2017-09-01 | End: 2017-10-21 | Stop reason: SDUPTHER

## 2017-09-07 RX ORDER — ONDANSETRON 4 MG/1
TABLET, FILM COATED ORAL
Refills: 0 | COMMUNITY
Start: 2017-09-01 | End: 2017-10-21 | Stop reason: ALTCHOICE

## 2017-09-07 RX ORDER — HYDROMORPHONE HYDROCHLORIDE 2 MG/1
TABLET ORAL
Refills: 0 | COMMUNITY
Start: 2017-09-01 | End: 2017-10-21 | Stop reason: ALTCHOICE

## 2017-09-07 NOTE — PROGRESS NOTES
HISTORY OF PRESENT ILLNESS  Jose Alicia is a 46 y.o. male. HPI  Presents for right heel pain. Started a week ago as he kicked a heavy object on the floor with his heel 3 times. He is unable to bare weight on his right heel due to severe pain. Pain radiates to arch, no forefoot pain. Denies falls or prior heel injuries. He recently had right shoulder surgery and was instructed not to take anti-inflammatories until his shoulder is completely healed. He has tried tylenol and dilaudid/oxycodone which were prescribed for his shoulder surgery but nothing has helped. Patient requesting XR to r/o fracture. Review of Systems   Constitutional: Negative for chills and fever. HENT: Negative for congestion, ear pain and sore throat. Eyes: Negative for pain and redness. Respiratory: Negative for cough and stridor. Cardiovascular: Negative for chest pain and palpitations. Gastrointestinal: Negative for abdominal pain, diarrhea and nausea. Genitourinary: Negative for dysuria, frequency and urgency. Musculoskeletal: Positive for joint pain. Negative for back pain, falls and neck pain. Skin: Negative for rash. Neurological: Negative for tingling, sensory change, focal weakness, weakness and headaches. Past Medical History:   Diagnosis Date    Arthritis     Back pain     Basal cell carcinoma 2012    3 on left side of neck and 1 on right side of neck and nasal bridge area with first dx starting aprroximately 12 yrs ago    Family history of skin cancer     Frequent nosebleeds     H/O seasonal allergies     High cholesterol     Joint pain     Joint swelling     Migraine    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[facility] [address]     followed by Alissa Saleem Sinus complaint     Skin cancer     Sun-damaged skin     Sunburn, blistering      Past Surgical History:   Procedure Laterality Date    HX HEART CATHETERIZATION  2013    minimal disease per cardio notes.     HX ORTHOPAEDIC Bilateral     bilateral thumb surgery    HX ORTHOPAEDIC Left ~2013    elbow surgery    HX SEPTOPLASTY  2002    HX SHOULDER ARTHROSCOPY Left ~2013     Social History     Social History    Marital status:      Spouse name: N/A    Number of children: N/A    Years of education: N/A     Social History Main Topics    Smoking status: Never Smoker    Smokeless tobacco: Never Used    Alcohol use 3.6 oz/week     6 Cans of beer per week    Drug use: Yes      Comment: occasional marijuana (not weekly), cocaine use in 20's     Sexual activity: Yes     Partners: Female     Other Topics Concern    None     Social History Narrative     Family History   Problem Relation Age of Onset    Hypertension Brother     Diabetes Brother     Diabetes Mother     Hypertension Mother     Cancer Mother      lung     Current Outpatient Prescriptions on File Prior to Visit   Medication Sig Dispense Refill    DULoxetine (CYMBALTA) 30 mg capsule TAKE ONE CAPSULE BY MOUTH EVERY DAY FOR BACK PAIN 90 Cap 0    montelukast (SINGULAIR) 10 mg tablet TAKE 1 TABLET BY MOUTH EVERY DAY FOR ALLERGIES 90 Tab 0    fenofibrate nanocrystallized (TRICOR) 145 mg tablet TAKE 1 TABLET BY MOUTH DAILY. FOR HYPERCHOLESTEROLEMIA 90 Tab 0    omeprazole (PRILOSEC) 20 mg capsule TAKE ONE CAPSULE BY MOUTH EVERY DAY FOR IBS 90 Cap 0    azelastine (ASTELIN) 137 mcg (0.1 %) nasal spray 1 [place] by Both Nostrils route two (2) times a day. Use in each nostril as directed 3 Bottle 0    dicyclomine (BENTYL) 10 mg capsule Take 1 Cap by mouth three (3) times daily as needed. For abd cramping 160 Cap 0    fluticasone (FLONASE) 50 mcg/actuation nasal spray Use two sprays in each nostril once daily for allergies. 3 Bottle 0    cyclobenzaprine (FLEXERIL) 10 mg tablet Take 10 mg by mouth.  ASPIRIN/CAFFEINE (BC ARTHRITIS PO) Take 1 Dose Pack by mouth as needed.  SUMAtriptan (IMITREX) 100 mg tablet Take 1 Tab by mouth once as needed for Migraine.  12 Tab 1    methocarbamol (ROBAXIN) 500 mg tablet Take 2 Tabs by mouth four (4) times daily as needed. 45 Tab 0    cyclobenzaprine (FLEXERIL) 10 mg tablet Take 0.5-1 Tabs by mouth nightly as needed for Muscle Spasm(s). 20 Tab 0    cetirizine-psuedoePHEDrine (ZYRTEC-D) 5-120 mg per tablet Take 1 Tab by mouth daily.  SIMETHICONE Take 120 mg by mouth as needed.  albuterol (PROVENTIL HFA, VENTOLIN HFA, PROAIR HFA) 90 mcg/actuation inhaler Take 2 Puffs by inhalation every four (4) hours as needed for Wheezing. 1 Inhaler 1     No current facility-administered medications on file prior to visit. Allergies   Allergen Reactions    Bactrim [Sulfamethoprim Ds] Rash    Lipitor [Atorvastatin] Myalgia    Sulfamethoxazole-Trimethoprim Rash       Physical Exam  Visit Vitals    /88 (BP 1 Location: Left arm, BP Patient Position: Sitting)    Pulse 83    Temp 98.2 °F (36.8 °C) (Oral)    Resp 16    Ht 5' 8\" (1.727 m)    Wt 196 lb 12.8 oz (89.3 kg)    SpO2 97%    BMI 29.92 kg/m2   General: well appearing, NAD, wearing right arm sling  Resp: unlabored breahting  Skin: no rash  Ext: tenderness to palpation over right plantar and lateral heel, severe tenderness over plantar fascia. No tenderness over medial and lateral malleoli, no achilis tenderness, no forefoot tenderness  Neurovascular: intact DP pulses and sensation    ASSESSMENT and PLAN    ICD-10-CM ICD-9-CM    1. Plantar fasciitis of right foot M72.2 728.71 diclofenac (VOLTAREN) 1 % gel   2. Intractable right heel pain M79.671 729.5 ondansetron hcl (ZOFRAN) 4 mg tablet      ondansetron hcl (ZOFRAN) 4 mg tablet      HYDROmorphone (DILAUDID) 2 mg tablet      oxyCODONE IR (ROXICODONE) 5 mg immediate release tablet      oxyCODONE IR (ROXICODONE) 5 mg immediate release tablet      XR CALCANEUS RT     XR reviewed personally, no calcaneal fracture noted. Likely plantar fasciitis. Discussed management with patient.  Advised to wear shoes with good arch support and heel cushioning at all times, never walk barefoot. He was instructed by ortho not to take NSAIDs for his shoulder surgery. Will try topical voltaren (systemic absorption ~6% compared to oral, advised patient to call his orthopedist to get clearance first), contrast baths (warm then cold), rub with ice cubes during the day, stretches per handout. If not improving may see ortho. This note will not be viewable in [address]

## 2017-09-07 NOTE — MR AVS SNAPSHOT
Visit Information Date & Time Provider Department Dept. Phone Encounter #  
 9/7/2017 10:30 AM Yohan BanksAmish [id number] Upcoming Health Maintenance Date Due Hepatitis C Screening 1965 FOBT Q 1 YEAR AGE 50-75 11/8/2013 INFLUENZA AGE 9 TO ADULT 8/1/2017 DTaP/Tdap/Td series (2 - Td) 8/16/2023 Allergies as of 9/7/2017  Review Complete On: 9/7/2017 By: Yohan Banks MD  
  
 Severity Noted Reaction Type Reactions Bactrim [Sulfamethoprim Ds]  03/27/2011    Rash Lipitor [Atorvastatin]  05/05/2015   Side Effect Myalgia Sulfamethoxazole-trimethoprim Low 07/05/2017    Rash Current Immunizations  Reviewed on 3/22/2013 Name Date Tdap 8/16/2013 11:38 AM  
  
 Not reviewed this visit You Were Diagnosed With   
  
 Codes Comments Plantar fasciitis of right foot    -  Primary ICD-10-CM: M72.2 ICD-9-CM: 728.71 Intractable right heel pain     ICD-10-CM: M79.671 ICD-9-CM: 729.5 Vitals BP Pulse Temp Resp Height(growth percentile) Weight(growth percentile) 135/88 (BP 1 Location: Left arm, BP Patient Position: Sitting) 83 98.2 °F (36.8 °C) (Oral) 16 5' 8\" (1.727 m) 196 lb 12.8 oz (89.3 kg) SpO2 BMI Smoking Status 97% 29.92 kg/m2 Never Smoker BMI and BSA Data Body Mass Index Body Surface Area  
 29.92 kg/m 2 2.07 m 2 Preferred Pharmacy Pharmacy Name Phone [id number] [address] [phone] Your Updated Medication List  
  
   
This list is accurate as of: 9/7/17 11:25 AM.  Always use your most recent med list.  
  
  
  
  
 albuterol 90 mcg/actuation inhaler Commonly known as:  PROVENTIL HFA, VENTOLIN HFA, PROAIR HFA Take 2 Puffs by inhalation every four (4) hours as needed for Wheezing. azelastine 137 mcg (0.1 %) nasal spray Commonly known as:  ASTELIN  
 1 [place] by Both Nostrils route two (2) times a day. Use in each nostril as directed BC ARTHRITIS PO Take 1 Dose Pack by mouth as needed. * cyclobenzaprine 10 mg tablet Commonly known as:  FLEXERIL Take 10 mg by mouth. * cyclobenzaprine 10 mg tablet Commonly known as:  FLEXERIL Take 0.5-1 Tabs by mouth nightly as needed for Muscle Spasm(s). diclofenac 1 % Gel Commonly known as:  VOLTAREN Apply  to affected area three (3) times daily. Apply thin layer to right heel  
  
 dicyclomine 10 mg capsule Commonly known as:  BENTYL Take 1 Cap by mouth three (3) times daily as needed. For abd cramping DULoxetine 30 mg capsule Commonly known as:  CYMBALTA TAKE ONE CAPSULE BY MOUTH EVERY DAY FOR BACK PAIN  
  
 fenofibrate nanocrystallized 145 mg tablet Commonly known as:  TRICOR  
TAKE 1 TABLET BY MOUTH DAILY. FOR HYPERCHOLESTEROLEMIA  
  
 fluticasone 50 mcg/actuation nasal spray Commonly known as:  Sharon Coffee Use two sprays in each nostril once daily for allergies. HYDROmorphone 2 mg tablet Commonly known as:  DILAUDID  
TAKE 1-2 TABLETS BY MOUTH EVERY 4 HOURS AS NEEDED FOR PAIN  
  
 methocarbamol 500 mg tablet Commonly known as:  ROBAXIN Take 2 Tabs by mouth four (4) times daily as needed. montelukast 10 mg tablet Commonly known as:  SINGULAIR  
TAKE 1 TABLET BY MOUTH EVERY DAY FOR ALLERGIES  
  
 omeprazole 20 mg capsule Commonly known as:  PRILOSEC  
TAKE ONE CAPSULE BY MOUTH EVERY DAY FOR IBS  
  
 * ondansetron hcl 4 mg tablet Commonly known as:  Holly Arana Take 4 mg by mouth. * ondansetron hcl 4 mg tablet Commonly known as:  ZOFRAN  
TAKE 1 TABLET BY MOUTH EVERY 6 HOURS AS NEEDED FOR NAUSEA/VOMITING  
  
 * oxyCODONE IR 5 mg immediate release tablet Commonly known as:  ROXICODONE  
TAKE 1-2 TABLETS BY MOUTH EVERY 3-4 HOURS AS NEEDED FOR PAIN  
  
 * oxyCODONE IR 5 mg immediate release tablet Commonly known as:  Tawanda Melendez  
 Take 5-10 mg by mouth. SIMETHICONE Take 120 mg by mouth as needed. SUMAtriptan 100 mg tablet Commonly known as:  IMITREX Take 1 Tab by mouth once as needed for Migraine. ZyrTEC-D 5-120 mg per tablet Generic drug:  cetirizine-psuedoePHEDrine Take 1 Tab by mouth daily. * Notice: This list has 6 medication(s) that are the same as other medications prescribed for you. Read the directions carefully, and ask your doctor or other care provider to review them with you. Prescriptions Sent to Pharmacy Refills  
 diclofenac (VOLTAREN) 1 % gel 0 Sig: Apply  to affected area three (3) times daily. Apply thin layer to right heel Class: Normal  
 Pharmacy: [id number] Vandana Abebe [place] [address] [place] [address] #: [phone] Route: Topical  
  
To-Do List   
 09/07/2017 Imaging:  XR CALCANEUS RT Patient Instructions Plantar Fasciitis: Care Instructions Your Care Instructions Plantar fasciitis is pain and inflammation of the plantar fascia, the tissue at the bottom of your foot that connects the heel bone to the toes. The plantar fascia also supports the arch. If you strain the plantar fascia, it can develop small tears and cause heel pain when you stand or walk. Plantar fasciitis can be caused by running or other sports. It also may occur in people who are overweight or who have high arches or flat feet. You may get plantar fasciitis if you walk or stand for long periods, or have a tight Achilles tendon or calf muscles. You can improve your foot pain with rest and other care at home. It might take a few weeks to a few months for your foot to heal completely. Follow-up care is a key part of your treatment and safety. Be sure to make and go to all appointments, and call your doctor if you are having problems. It's also a good idea to know your test results and keep a list of the medicines you take. How can you care for yourself at home? · Rest your feet often. Reduce your activity to a level that lets you avoid pain. If possible, do not run or walk on hard surfaces. · Take pain medicines exactly as directed. ¨ If the doctor gave you a prescription medicine for pain, take it as prescribed. ¨ If you are not taking a prescription pain medicine, take an over-the-counter anti-inflammatory medicine for pain and swelling, such as ibuprofen (Advil, Motrin) or naproxen (Aleve). Read and follow all instructions on the label. · Use ice massage to help with pain and swelling. You can use an ice cube or an ice cup several times a day. To make an ice cup, fill a paper cup with water and freeze it. Cut off the top of the cup until a half-inch of ice shows. Hold onto the remaining paper to use the cup. Rub the ice in small circles over the area for 5 to 7 minutes. · Contrast baths, which alternate hot and cold water, can also help reduce swelling. But because heat alone may make pain and swelling worse, end a contrast bath with a soak in cold water. · Wear a night splint if your doctor suggests it. A night splint holds your foot with the toes pointed up and the foot and ankle at a 90-degree angle. This position gives the bottom of your foot a constant, gentle stretch. · Do simple exercises such as calf stretches and towel stretches 2 to 3 times each day, especially when you first get up in the morning. These can help the plantar fascia become more flexible. They also make the muscles that support your arch stronger. Hold these stretches for 15 to 30 seconds per stretch. Repeat 2 to 4 times. ¨ Stand about 1 foot from a wall. Place the palms of both hands against the wall at chest level. Lean forward against the wall, keeping one leg with the knee straight and heel on the ground while bending the knee of the other leg. ¨ Sit down on the floor or a mat with your feet stretched in front of you. Roll up a towel lengthwise, and loop it over the ball of your foot. Holding the towel at both ends, gently pull the towel toward you to stretch your foot. · Wear shoes with good arch support. Athletic shoes or shoes with a well-cushioned sole are good choices. · Try heel cups or shoe inserts (orthotics) to help cushion your heel. You can buy these at many shoe stores. · Put on your shoes as soon as you get out of bed. Going barefoot or wearing slippers may make your pain worse. · Reach and stay at a good weight for your height. This puts less strain on your feet. When should you call for help? Call your doctor now or seek immediate medical care if: 
· You have heel pain with fever, redness, or warmth in your heel. · You cannot put weight on the sore foot. Watch closely for changes in your health, and be sure to contact your doctor if: 
· You have numbness or tingling in your heel. · Your heel pain lasts more than 2 weeks. Where can you learn more? Go to http://marieKonnectscoleman.info/. Rogena Olp in the search box to learn more about \"Plantar Fasciitis: Care Instructions. \" Current as of: March 21, 2017 Content Version: 11.3 © 4997-9162 [employer]. Care instructions adapted under license by [employer] (which disclaims liability or warranty for this information). If you have questions about a medical condition or this instruction, always ask your healthcare professional. [place] [address] any warranty or liability for your use of this information. Plantar Fasciitis: Exercises Your Care Instructions Here are some examples of typical rehabilitation exercises for your condition. Start each exercise slowly. Ease off the exercise if you start to have pain. Your doctor or physical therapist will tell you when you can start these exercises and which ones will work best for you. How to do the exercises Note: Each exercise should create a pulling feeling but should not cause pain. Towel stretch 1. Sit with your legs extended and knees straight. 2. Place a towel around your foot just under the toes. 3. Hold each end of the towel in each hand, with your hands above your knees. 4. Pull back with the towel so that your foot stretches toward you. 5. Hold the position for at least 15 to 30 seconds. 6. Repeat 2 to 4 times a session, up to 5 sessions a day. Calf stretch Note: This exercise stretches the muscles at the back of the lower leg (the calf) and the Achilles tendon. Do this exercise 3 or 4 times a day, 5 days a week. 1. Stand facing a wall with your hands on the wall at about eye level. Put the leg you want to stretch about a step behind your other leg. 2. Keeping your back heel on the floor, bend your front knee until you feel a stretch in the back leg. 3. Hold the stretch for 15 to 30 seconds. Repeat 2 to 4 times. Plantar fascia and calf stretch Note: Stretching the plantar fascia and calf muscles can increase flexibility and decrease heel pain. You can do this exercise several times each day and before and after activity. 1. Stand on a step as shown above. Be sure to hold on to the banister. 2. Slowly let your heels down over the edge of the step as you relax your calf muscles. You should feel a gentle stretch across the bottom of your foot and up the back of your leg to your knee. 3. Hold the stretch about 15 to 30 seconds, and then tighten your calf muscle a little to bring your heel back up to the level of the step. Repeat 2 to 4 times. Towel curls 1. While sitting, place your foot on a towel on the floor and scrunch the towel toward you with your toes. 2. Then, also using your toes, push the towel away from you. Note: Make this exercise more challenging by placing a weighted object, such as a soup can, on the other end of the towel. [place] pickups 1. Put marbles on the floor next to a cup. 
2. Using your toes, try to lift the marbles up from the floor and put them in the cup. Follow-up care is a key part of your treatment and safety. Be sure to make and go to all appointments, and call your doctor if you are having problems. It's also a good idea to know your test results and keep a list of the medicines you take. Where can you learn more? Go to http://marie-coleman.info/. Shadi Pablo in the search box to learn more about \"Plantar Fasciitis: Exercises. \" Current as of: March 21, 2017 Content Version: 11.3 © 7901-0304 [employer]. Care instructions adapted under license by [employer] (which disclaims liability or warranty for this information). If you have questions about a medical condition or this instruction, always ask your healthcare professional. [address] any warranty or liability for your use of this information. Introducing [facility]! Dear Lorri Wu: Thank you for requesting a Springest account. Our records indicate that you already have an active Springest account. You can access your account anytime at [email or web address] Did you know that you can access your hospital and ER discharge instructions at any time in Springest? You can also review all of your test results from your hospital stay or ER visit. Additional Information If you have questions, please visit the Frequently Asked Questions section of the Springest website at [email or web address] Remember, Springest is NOT to be used for urgent needs. For medical emergencies, dial 911. Now available from your iPhone and Android! Please provide this summary of care documentation to your next provider. Your primary care clinician is listed as Vilinda Claude. If you have any questions after today's visit, please call [phone].

## 2017-09-07 NOTE — PROGRESS NOTES
Chief Complaint   Patient presents with    Foot Pain     c/o R heel pain x 1 week, states he injured heel while in the house,states hurts when pressure is applied, recently had R shoulder surgery as well and is taking prn pain medication to help      This note will not be viewable in [address]

## 2017-09-07 NOTE — PATIENT INSTRUCTIONS
Plantar Fasciitis: Care Instructions  Your Care Instructions    Plantar fasciitis is pain and inflammation of the plantar fascia, the tissue at the bottom of your foot that connects the heel bone to the toes. The plantar fascia also supports the arch. If you strain the plantar fascia, it can develop small tears and cause heel pain when you stand or walk. Plantar fasciitis can be caused by running or other sports. It also may occur in people who are overweight or who have high arches or flat feet. You may get plantar fasciitis if you walk or stand for long periods, or have a tight Achilles tendon or calf muscles. You can improve your foot pain with rest and other care at home. It might take a few weeks to a few months for your foot to heal completely. Follow-up care is a key part of your treatment and safety. Be sure to make and go to all appointments, and call your doctor if you are having problems. It's also a good idea to know your test results and keep a list of the medicines you take. How can you care for yourself at home? · Rest your feet often. Reduce your activity to a level that lets you avoid pain. If possible, do not run or walk on hard surfaces. · Take pain medicines exactly as directed. ¨ If the doctor gave you a prescription medicine for pain, take it as prescribed. ¨ If you are not taking a prescription pain medicine, take an over-the-counter anti-inflammatory medicine for pain and swelling, such as ibuprofen (Advil, Motrin) or naproxen (Aleve). Read and follow all instructions on the label. · Use ice massage to help with pain and swelling. You can use an ice cube or an ice cup several times a day. To make an ice cup, fill a paper cup with water and freeze it. Cut off the top of the cup until a half-inch of ice shows. Hold onto the remaining paper to use the cup. Rub the ice in small circles over the area for 5 to 7 minutes.   · Contrast baths, which alternate hot and cold water, can also help reduce swelling. But because heat alone may make pain and swelling worse, end a contrast bath with a soak in cold water. · Wear a night splint if your doctor suggests it. A night splint holds your foot with the toes pointed up and the foot and ankle at a 90-degree angle. This position gives the bottom of your foot a constant, gentle stretch. · Do simple exercises such as calf stretches and towel stretches 2 to 3 times each day, especially when you first get up in the morning. These can help the plantar fascia become more flexible. They also make the muscles that support your arch stronger. Hold these stretches for 15 to 30 seconds per stretch. Repeat 2 to 4 times. ¨ Stand about 1 foot from a wall. Place the palms of both hands against the wall at chest level. Lean forward against the wall, keeping one leg with the knee straight and heel on the ground while bending the knee of the other leg. ¨ Sit down on the floor or a mat with your feet stretched in front of you. Roll up a towel lengthwise, and loop it over the ball of your foot. Holding the towel at both ends, gently pull the towel toward you to stretch your foot. · Wear shoes with good arch support. Athletic shoes or shoes with a well-cushioned sole are good choices. · Try heel cups or shoe inserts (orthotics) to help cushion your heel. You can buy these at many shoe stores. · Put on your shoes as soon as you get out of bed. Going barefoot or wearing slippers may make your pain worse. · Reach and stay at a good weight for your height. This puts less strain on your feet. When should you call for help? Call your doctor now or seek immediate medical care if:  · You have heel pain with fever, redness, or warmth in your heel. · You cannot put weight on the sore foot. Watch closely for changes in your health, and be sure to contact your doctor if:  · You have numbness or tingling in your heel. · Your heel pain lasts more than 2 weeks.   Where can you learn more? Go to http://marie-coleman.info/. Cecilia Bishop in the search box to learn more about \"Plantar Fasciitis: Care Instructions. \"  Current as of: March 21, 2017  Content Version: 11.3  © 1631-1167 [employer]. Care instructions adapted under license by [employer] (which disclaims liability or warranty for this information). If you have questions about a medical condition or this instruction, always ask your healthcare professional. [address] any warranty or liability for your use of this information. Plantar Fasciitis: Exercises  Your Care Instructions  Here are some examples of typical rehabilitation exercises for your condition. Start each exercise slowly. Ease off the exercise if you start to have pain. Your doctor or physical therapist will tell you when you can start these exercises and which ones will work best for you. How to do the exercises  Note: Each exercise should create a pulling feeling but should not cause pain. Towel stretch    1. Sit with your legs extended and knees straight. 2. Place a towel around your foot just under the toes. 3. Hold each end of the towel in each hand, with your hands above your knees. 4. Pull back with the towel so that your foot stretches toward you. 5. Hold the position for at least 15 to 30 seconds. 6. Repeat 2 to 4 times a session, up to 5 sessions a day. Calf stretch    Note: This exercise stretches the muscles at the back of the lower leg (the calf) and the Achilles tendon. Do this exercise 3 or 4 times a day, 5 days a week. 1. Stand facing a wall with your hands on the wall at about eye level. Put the leg you want to stretch about a step behind your other leg. 2. Keeping your back heel on the floor, bend your front knee until you feel a stretch in the back leg. 3. Hold the stretch for 15 to 30 seconds. Repeat 2 to 4 times.   Plantar fascia and calf stretch    Note: Stretching the plantar fascia and calf muscles can increase flexibility and decrease heel pain. You can do this exercise several times each day and before and after activity. 1. Stand on a step as shown above. Be sure to hold on to the banister. 2. Slowly let your heels down over the edge of the step as you relax your calf muscles. You should feel a gentle stretch across the bottom of your foot and up the back of your leg to your knee. 3. Hold the stretch about 15 to 30 seconds, and then tighten your calf muscle a little to bring your heel back up to the level of the step. Repeat 2 to 4 times. Towel curls    1. While sitting, place your foot on a towel on the floor and scrunch the towel toward you with your toes. 2. Then, also using your toes, push the towel away from you. Note: Make this exercise more challenging by placing a weighted object, such as a soup can, on the other end of the towel. [place] pickups    1. Put marbles on the floor next to a cup.  2. Using your toes, try to lift the marbles up from the floor and put them in the cup. Follow-up care is a key part of your treatment and safety. Be sure to make and go to all appointments, and call your doctor if you are having problems. It's also a good idea to know your test results and keep a list of the medicines you take. Where can you learn more? Go to http://marie-coleman.info/. Darreld Landau in the search box to learn more about \"Plantar Fasciitis: Exercises. \"  Current as of: March 21, 2017  Content Version: 11.3  © 1355-0005 [employer]. Care instructions adapted under license by "[employer] (which disclaims liability or warranty for this information). If you have questions about a medical condition or this instruction, always ask your healthcare professional. [address] any warranty or liability for your use of this information.

## 2017-10-20 ENCOUNTER — OFFICE VISIT (OUTPATIENT)
Dept: FAMILY MEDICINE CLINIC | Age: 52
End: 2017-10-20

## 2017-10-20 ENCOUNTER — HOSPITAL ENCOUNTER (OUTPATIENT)
Dept: GENERAL RADIOLOGY | Age: 52
Discharge: HOME | End: 2017-10-20
Attending: NURSE PRACTITIONER
Payer: COMMERCIAL

## 2017-10-20 VITALS
OXYGEN SATURATION: 95 % | RESPIRATION RATE: 20 BRPM | TEMPERATURE: 98.3 F | WEIGHT: 204 LBS | DIASTOLIC BLOOD PRESSURE: 79 MMHG | SYSTOLIC BLOOD PRESSURE: 132 MMHG | HEIGHT: 68 IN | HEART RATE: 79 BPM | BODY MASS INDEX: 30.92 KG/M2

## 2017-10-20 DIAGNOSIS — J20.9 ACUTE BRONCHITIS, UNSPECIFIED ORGANISM: ICD-10-CM

## 2017-10-20 DIAGNOSIS — M54.42 ACUTE LEFT-SIDED LOW BACK PAIN WITH LEFT-SIDED SCIATICA: ICD-10-CM

## 2017-10-20 DIAGNOSIS — G43.009 NONINTRACTABLE MIGRAINE, UNSPECIFIED MIGRAINE TYPE: ICD-10-CM

## 2017-10-20 DIAGNOSIS — J20.9 ACUTE BRONCHITIS, UNSPECIFIED ORGANISM: Primary | ICD-10-CM

## 2017-10-20 PROCEDURE — 71020 XR CHEST PA LAT: CPT

## 2017-10-20 RX ORDER — BENZONATATE 200 MG/1
200 CAPSULE ORAL
Qty: 30 CAP | Refills: 0 | Status: SHIPPED | OUTPATIENT
Start: 2017-10-20 | End: 2017-10-27

## 2017-10-20 RX ORDER — HYDROCODONE POLISTIREX AND CHLORPHENIRAMINE POLISTIREX 10; 8 MG/5ML; MG/5ML
1 SUSPENSION, EXTENDED RELEASE ORAL
Qty: 100 ML | Refills: 0 | Status: SHIPPED | OUTPATIENT
Start: 2017-10-20 | End: 2018-06-12

## 2017-10-20 RX ORDER — CYCLOBENZAPRINE HCL 10 MG
5-10 TABLET ORAL
Qty: 20 TAB | Refills: 1 | Status: SHIPPED | OUTPATIENT
Start: 2017-10-20 | End: 2018-06-12

## 2017-10-20 RX ORDER — AMOXICILLIN AND CLAVULANATE POTASSIUM 875; 125 MG/1; MG/1
TABLET, FILM COATED ORAL
Refills: 0 | COMMUNITY
Start: 2017-10-14 | End: 2017-10-30 | Stop reason: ALTCHOICE

## 2017-10-20 RX ORDER — AZITHROMYCIN 250 MG/1
TABLET, FILM COATED ORAL
Qty: 6 TAB | Refills: 0 | Status: SHIPPED | OUTPATIENT
Start: 2017-10-20 | End: 2017-10-25

## 2017-10-20 RX ORDER — GUAIFENESIN 600 MG/1
600 TABLET, EXTENDED RELEASE ORAL 2 TIMES DAILY
Qty: 10 TAB | Refills: 0 | Status: SHIPPED | OUTPATIENT
Start: 2017-10-20 | End: 2017-10-25

## 2017-10-20 RX ORDER — DICLOFENAC SODIUM 75 MG/1
75 TABLET, DELAYED RELEASE ORAL 2 TIMES DAILY
Refills: 2 | COMMUNITY
Start: 2017-10-05 | End: 2017-10-21 | Stop reason: ALTCHOICE

## 2017-10-20 RX ORDER — PREDNISONE 20 MG/1
40 TABLET ORAL
Qty: 10 TAB | Refills: 0 | Status: SHIPPED | OUTPATIENT
Start: 2017-10-20 | End: 2017-10-25

## 2017-10-20 RX ORDER — SUMATRIPTAN 100 MG/1
100 TABLET, FILM COATED ORAL
Qty: 12 TAB | Refills: 1 | Status: SHIPPED | OUTPATIENT
Start: 2017-10-20 | End: 2018-01-26 | Stop reason: SDUPTHER

## 2017-10-20 NOTE — PATIENT INSTRUCTIONS
Bronchitis: Care Instructions  Your Care Instructions    Bronchitis is inflammation of the bronchial tubes, which carry air to the lungs. The tubes swell and produce mucus, or phlegm. The mucus and inflamed bronchial tubes make you cough. You may have trouble breathing. Most cases of bronchitis are caused by viruses like those that cause colds. Antibiotics usually do not help and they may be harmful. Bronchitis usually develops rapidly and lasts about 2 to 3 weeks in otherwise healthy people. Follow-up care is a key part of your treatment and safety. Be sure to make and go to all appointments, and call your doctor if you are having problems. It's also a good idea to know your test results and keep a list of the medicines you take. How can you care for yourself at home? · Take all medicines exactly as prescribed. Call your doctor if you think you are having a problem with your medicine. · Get some extra rest.  · Take an over-the-counter pain medicine, such as acetaminophen (Tylenol), ibuprofen (Advil, Motrin), or naproxen (Aleve) to reduce fever and relieve body aches. Read and follow all instructions on the label. · Do not take two or more pain medicines at the same time unless the doctor told you to. Many pain medicines have acetaminophen, which is Tylenol. Too much acetaminophen (Tylenol) can be harmful. · Take an over-the-counter cough medicine that contains dextromethorphan to help quiet a dry, hacking cough so that you can sleep. Avoid cough medicines that have more than one active ingredient. Read and follow all instructions on the label. · Breathe moist air from a humidifier, hot shower, or sink filled with hot water. The heat and moisture will thin mucus so you can cough it out. · Do not smoke. Smoking can make bronchitis worse. If you need help quitting, talk to your doctor about stop-smoking programs and medicines. These can increase your chances of quitting for good.   When should you call for help? Call 911 anytime you think you may need emergency care. For example, call if:  · You have severe trouble breathing. Call your doctor now or seek immediate medical care if:  · You have new or worse trouble breathing. · You cough up dark brown or bloody mucus (sputum). · You have a new or higher fever. · You have a new rash. Watch closely for changes in your health, and be sure to contact your doctor if:  · You cough more deeply or more often, especially if you notice more mucus or a change in the color of your mucus. · You are not getting better as expected. Where can you learn more? Go to http://marie-coleman.info/. Enter H333 in the search box to learn more about \"Bronchitis: Care Instructions. \"  Current as of: March 25, 2017  Content Version: 11.3  © 7740-9844 [employer]. Care instructions adapted under license by [employer] (which disclaims liability or warranty for this information). If you have questions about a medical condition or this instruction, always ask your healthcare professional. [place] [address] any warranty or liability for your use of this information. Migraine Headache: Care Instructions  Your Care Instructions  Migraines are painful, throbbing headaches that often start on one side of the head. They may cause nausea and vomiting and make you sensitive to light, sound, or smell. Without treatment, migraines can last from 4 hours to a few days. Medicines can help prevent migraines or stop them after they have started. Your doctor can help you find which ones work best for you. Follow-up care is a key part of your treatment and safety. Be sure to make and go to all appointments, and call your doctor if you are having problems. It's also a good idea to know your test results and keep a list of the medicines you take. How can you care for yourself at home?   · Do not drive if you have taken a prescription pain medicine. · Rest in a quiet, dark room until your headache is gone. Close your eyes, and try to relax or go to sleep. Don't watch TV or read. · Put a cold, moist cloth or cold pack on the painful area for 10 to 20 minutes at a time. Put a thin cloth between the cold pack and your skin. · Use a warm, moist towel or a heating pad set on low to relax tight shoulder and neck muscles. · Have someone gently massage your neck and shoulders. · Take your medicines exactly as prescribed. Call your doctor if you think you are having a problem with your medicine. You will get more details on the specific medicines your doctor prescribes. · Be careful not to take pain medicine more often than the instructions allow. You could get worse or more frequent headaches when the medicine wears off. To prevent migraines  · Keep a headache diary so you can figure out what triggers your headaches. Avoiding triggers may help you prevent headaches. Record when each headache began, how long it lasted, and what the pain was like. (Was it throbbing, aching, stabbing, or dull?) Write down any other symptoms you had with the headache, such as nausea, flashing lights or dark spots, or sensitivity to bright light or loud noise. Note if the headache occurred near your period. List anything that might have triggered the headache. Triggers may include certain foods (chocolate, cheese, wine) or odors, smoke, bright light, stress, or lack of sleep. · If your doctor has prescribed medicine for your migraines, take it as directed. You may have medicine that you take only when you get a migraine and medicine that you take all the time to help prevent migraines. ¨ If your doctor has prescribed medicine for when you get a headache, take it at the first sign of a migraine, unless your doctor has given you other instructions. ¨ If your doctor has prescribed medicine to prevent migraines, take it exactly as prescribed.  Call your doctor if you think you are having a problem with your medicine. · Find healthy ways to deal with stress. Migraines are most common during or right after stressful times. Take time to relax before and after you do something that has caused a migraine in the past.  · Try to keep your muscles relaxed by keeping good posture. Check your jaw, face, neck, and shoulder muscles for tension. Try to relax them. When you sit at a desk, change positions often. And make sure to stretch for 30 seconds each hour. · Get plenty of sleep and exercise. · Eat meals on a regular schedule. Avoid foods and drinks that often trigger migraines. These include chocolate, alcohol (especially red wine and port), aspartame, monosodium glutamate (MSG), and some additives found in foods (such as hot dogs, call, cold cuts, aged cheeses, and pickled foods). · Limit caffeine. Don't drink too much coffee, tea, or soda. But don't quit caffeine suddenly. That can also give you migraines. · Do not smoke or allow others to smoke around you. If you need help quitting, talk to your doctor about stop-smoking programs and medicines. These can increase your chances of quitting for good. · If you are taking birth control pills or hormone therapy, talk to your doctor about whether they are triggering your migraines. When should you call for help? Call 911 anytime you think you may need emergency care. For example, call if:  · You have signs of a stroke. These may include:  ¨ Sudden numbness, paralysis, or weakness in your face, arm, or leg, especially on only one side of your body. ¨ Sudden vision changes. ¨ Sudden trouble speaking. ¨ Sudden confusion or trouble understanding simple statements. ¨ Sudden problems with walking or balance. ¨ A sudden, severe headache that is different from past headaches. Call your doctor now or seek immediate medical care if:  · You have new or worse nausea and vomiting. · You have a new or higher fever.   · Your headache gets much worse. Watch closely for changes in your health, and be sure to contact your doctor if:  · You are not getting better after 2 days (48 hours). Where can you learn more? Go to http://marie-coleman.info/. Enter L651 in the search box to learn more about \"Migraine Headache: Care Instructions. \"  Current as of: October 14, 2016  Content Version: 11.3  © 8547-9106 [employer]. Care instructions adapted under license by [employer] (which disclaims liability or warranty for this information). If you have questions about a medical condition or this instruction, always ask your healthcare professional. [address] any warranty or liability for your use of this information.

## 2017-10-20 NOTE — PROGRESS NOTES
Chief Complaint   Patient presents with    Cough     non productive x 2 weeks     Other     chest congestion x 2 weeks     Ear Pain     Right ear pain     Medication Refill     1. Have you been to the ER, urgent care clinic since your last visit? Hospitalized since your last visit? Yes ED [facility] 9/1/17 Right rotator cuff surgery     2. Have you seen or consulted any other health care providers outside of the [address] [place] [address] since your last visit? Include any pap smears or colon screening.   No

## 2017-10-20 NOTE — MR AVS SNAPSHOT
Visit Information Date & Time Provider Department Dept. Phone Encounter #  
 10/20/2017  2:45 PM Yolanda Tatum  \[facility]\"" Primary Care [phone] [id number] Follow-up Instructions Return in about 1 week (around 10/27/2017). Upcoming Health Maintenance Date Due Hepatitis C Screening 1965 FOBT Q 1 YEAR AGE 50-75 11/8/2013 INFLUENZA AGE 9 TO ADULT 8/1/2017 DTaP/Tdap/Td series (2 - Td) 8/16/2023 Allergies as of 10/20/2017  Review Complete On: 10/20/2017 By: Keenan Billing Severity Noted Reaction Type Reactions Bactrim [Sulfamethoprim Ds]  03/27/2011    Rash Lipitor [Atorvastatin]  05/05/2015   Side Effect Myalgia Sulfamethoxazole-trimethoprim Low 07/05/2017    Rash Current Immunizations  Reviewed on 3/22/2013 Name Date Tdap 8/16/2013 11:38 AM  
  
 Not reviewed this visit You Were Diagnosed With   
  
 Codes Comments Acute bronchitis, unspecified organism    -  Primary ICD-10-CM: J20.9 ICD-9-CM: 466.0 Acute left-sided low back pain with left-sided sciatica     ICD-10-CM: M54.42 
ICD-9-CM: 724.2, 724.3 Nonintractable migraine, unspecified migraine type     ICD-10-CM: G43.009 ICD-9-CM: 346.10 Vitals BP Pulse Temp Resp Height(growth percentile) Weight(growth percentile) 132/79 (BP 1 Location: Right arm, BP Patient Position: Sitting) 79 98.3 °F (36.8 °C) (Oral) 20 5' 8\" (1.727 m) 204 lb (92.5 kg) SpO2 BMI Smoking Status 95% 31.02 kg/m2 Never Smoker BMI and BSA Data Body Mass Index Body Surface Area 31.02 kg/m 2 2.11 m 2 Preferred Pharmacy Pharmacy Name Phone [id number] YESSICA Hawkins - [phone] [phone] Your Updated Medication List  
  
   
This list is accurate as of: 10/20/17  3:21 PM.  Always use your most recent med list.  
  
  
  
  
 albuterol 90 mcg/actuation inhaler Commonly known as:  PROVENTIL HFA, VENTOLIN HFA, PROAIR HFA Take 2 Puffs by inhalation every four (4) hours as needed for Wheezing. amoxicillin-clavulanate 875-125 mg per tablet Commonly known as:  AUGMENTIN  
TAKE 1 TABLET EVERY 12 HOURS FOR 7 DAYS  
  
 azelastine 137 mcg (0.1 %) nasal spray Commonly known as:  ASTELIN  
1 Spray by Both Nostrils route two (2) times a day. Use in each nostril as directed  
  
 azithromycin 250 mg tablet Commonly known as:  Arthea Bruch Take 2 tablets today, then take 1 tablet daily BC ARTHRITIS PO Take 1 Dose Pack by mouth as needed. benzonatate 200 mg capsule Commonly known as:  TESSALON Take 1 Cap by mouth three (3) times daily as needed for Cough for up to 7 days. chlorpheniramine-HYDROcodone 10-8 mg/5 mL suspension Commonly known as:  Marisa Nixon Take 5 mL by mouth every twelve (12) hours as needed for Cough. Max Daily Amount: 10 mL. * cyclobenzaprine 10 mg tablet Commonly known as:  FLEXERIL Take 10 mg by mouth. * cyclobenzaprine 10 mg tablet Commonly known as:  FLEXERIL Take 0.5-1 Tabs by mouth nightly as needed for Muscle Spasm(s). * diclofenac 1 % Gel Commonly known as:  VOLTAREN Apply  to affected area three (3) times daily. Apply thin layer to right heel * diclofenac EC 75 mg EC tablet Commonly known as:  VOLTAREN Take 75 mg by mouth two (2) times a day. dicyclomine 10 mg capsule Commonly known as:  BENTYL Take 1 Cap by mouth three (3) times daily as needed. For abd cramping DULoxetine 30 mg capsule Commonly known as:  CYMBALTA TAKE ONE CAPSULE BY MOUTH EVERY DAY FOR BACK PAIN  
  
 fenofibrate nanocrystallized 145 mg tablet Commonly known as:  TRICOR  
TAKE 1 TABLET BY MOUTH DAILY. FOR HYPERCHOLESTEROLEMIA  
  
 fluticasone 50 mcg/actuation nasal spray Commonly known as:  David Paulinoro Use two sprays in each nostril once daily for allergies. guaiFENesin  mg ER tablet Commonly known as:  Noe & Noe Take 1 Tab by mouth two (2) times a day for 5 days. HYDROmorphone 2 mg tablet Commonly known as:  DILAUDID  
TAKE 1-2 TABLETS BY MOUTH EVERY 4 HOURS AS NEEDED FOR PAIN  
  
 montelukast 10 mg tablet Commonly known as:  SINGULAIR  
TAKE 1 TABLET BY MOUTH EVERY DAY FOR ALLERGIES  
  
 omeprazole 20 mg capsule Commonly known as:  PRILOSEC  
TAKE ONE CAPSULE BY MOUTH EVERY DAY FOR IBS  
  
 * ondansetron hcl 4 mg tablet Commonly known as:  Ricke Beagle Take 4 mg by mouth. * ondansetron hcl 4 mg tablet Commonly known as:  ZOFRAN  
TAKE 1 TABLET BY MOUTH EVERY 6 HOURS AS NEEDED FOR NAUSEA/VOMITING  
  
 * oxyCODONE IR 5 mg immediate release tablet Commonly known as:  ROXICODONE  
TAKE 1-2 TABLETS BY MOUTH EVERY 3-4 HOURS AS NEEDED FOR PAIN  
  
 * oxyCODONE IR 5 mg immediate release tablet Commonly known as:  Christopheraman Cathyl Take 5-10 mg by mouth.  
  
 predniSONE 20 mg tablet Commonly known as:  Katya Mons Take 2 Tabs by mouth daily (with breakfast) for 5 days. SIMETHICONE Take 120 mg by mouth as needed. SUMAtriptan 100 mg tablet Commonly known as:  IMITREX Take 1 Tab by mouth once as needed for Migraine. ZyrTEC-D 5-120 mg per tablet Generic drug:  cetirizine-psuedoePHEDrine Take 1 Tab by mouth daily. * Notice: This list has 8 medication(s) that are the same as other medications prescribed for you. Read the directions carefully, and ask your doctor or other care provider to review them with you. Prescriptions Printed Refills  
 chlorpheniramine-HYDROcodone (TUSSIONEX) 10-8 mg/5 mL suspension 0 Sig: Take 5 mL by mouth every twelve (12) hours as needed for Cough. Max Daily Amount: 10 mL. Class: Print Route: Oral  
  
Prescriptions Sent to Pharmacy  Refills  
 cyclobenzaprine (FLEXERIL) 10 mg tablet 1  
 Sig: Take 0.5-1 Tabs by mouth nightly as needed for Muscle Spasm(s). Class: Normal  
 [id number] Tricia Spaulding [address] Ph #: [phone] Route: Oral  
 SUMAtriptan (IMITREX) 100 mg tablet 1 Sig: Take 1 Tab by mouth once as needed for Migraine. Class: Normal  
 [id number] Tricia Spaulding [address] Ph #: [phone] Route: Oral  
 guaiFENesin ER (MUCINEX) 600 mg ER tablet 0 Sig: Take 1 Tab by mouth two (2) times a day for 5 days. Class: Normal  
 [id number] Tricia Spaulding [address] Ph #: [phone] Route: Oral  
 benzonatate (TESSALON) 200 mg capsule 0 Sig: Take 1 Cap by mouth three (3) times daily as needed for Cough for up to 7 days. Class: Normal  
 [id number] Tricia Spaulding [address] Ph #: [phone] Route: Oral  
 predniSONE (DELTASONE) 20 mg tablet 0 Sig: Take 2 Tabs by mouth daily (with breakfast) for 5 days. Class: Normal  
 [id number] Tricia Spaulding [address] Ph #: [phone] Route: Oral  
 azithromycin (ZITHROMAX) 250 mg tablet 0 Sig: Take 2 tablets today, then take 1 tablet daily Class: Normal  
 [id number] Tricia Spaul[address] Ph #: [phone] Follow-up Instructions Return in about 1 week (around 10/27/2017). To-Do List   
 10/20/2017 Imaging:  XR CHEST PA LAT Patient Instructions Bronchitis: Care Instructions Your Care Instructions Bronchitis is inflammation of the bronchial tubes, which carry air to the lungs. The tubes swell and produce mucus, or phlegm. The mucus and inflamed bronchial tubes make you cough. You may have trouble breathing. Most cases of bronchitis are caused by viruses like those that cause colds. Antibiotics usually do not help and they may be harmful. Bronchitis usually develops rapidly and lasts about 2 to 3 weeks in otherwise healthy people. Follow-up care is a key part of your treatment and safety. Be sure to make and go to all appointments, and call your doctor if you are having problems. It's also a good idea to know your test results and keep a list of the medicines you take. How can you care for yourself at home? · Take all medicines exactly as prescribed. Call your doctor if you think you are having a problem with your medicine. · Get some extra rest. 
· Take an over-the-counter pain medicine, such as acetaminophen (Tylenol), ibuprofen (Advil, Motrin), or naproxen (Aleve) to reduce fever and relieve body aches. Read and follow all instructions on the label. · Do not take two or more pain medicines at the same time unless the doctor told you to. Many pain medicines have acetaminophen, which is Tylenol. Too much acetaminophen (Tylenol) can be harmful. · Take an over-the-counter cough medicine that contains dextromethorphan to help quiet a dry, hacking cough so that you can sleep. Avoid cough medicines that have more than one active ingredient. Read and follow all instructions on the label. · Breathe moist air from a humidifier, hot shower, or sink filled with hot water. The heat and moisture will thin mucus so you can cough it out. · Do not smoke. Smoking can make bronchitis worse. If you need help quitting, talk to your doctor about stop-smoking programs and medicines. These can increase your chances of quitting for good. When should you call for help? Call 911 anytime you think you may need emergency care. For example, call if: 
· You have severe trouble breathing. Call your doctor now or seek immediate medical care if: 
· You have new or worse trouble breathing. · You cough up dark brown or bloody mucus (sputum). · You have a new or higher fever. · You have a new rash.  
Watch closely for changes in your health, and be sure to contact your doctor if: 
· You cough more deeply or more often, especially if you notice more mucus or a change in the color of your mucus. · You are not getting better as expected. Where can you learn more? Go to http://marie-coleman.info/. Enter H333 in the search box to learn more about \"Bronchitis: Care Instructions. \" Current as of: March 25, 2017 Content Version: 11.3 © 5213-8731 [employer]. Care instructions adapted under license by [employer] (which disclaims liability or warranty for this information). If you have questions about a medical condition or this instruction, always ask your healthcare professional. [address] any warranty or liability for your use of this information. Migraine Headache: Care Instructions Your Care Instructions Migraines are painful, throbbing headaches that often start on one side of the head. They may cause nausea and vomiting and make you sensitive to light, sound, or smell. Without treatment, migraines can last from 4 hours to a few days. Medicines can help prevent migraines or stop them after they have started. Your doctor can help you find which ones work best for you. Follow-up care is a key part of your treatment and safety. Be sure to make and go to all appointments, and call your doctor if you are having problems. It's also a good idea to know your test results and keep a list of the medicines you take. How can you care for yourself at home? · Do not drive if you have taken a prescription pain medicine. · Rest in a quiet, dark room until your headache is gone. Close your eyes, and try to relax or go to sleep. Don't watch TV or read. · Put a cold, moist cloth or cold pack on the painful area for 10 to 20 minutes at a time. Put a thin cloth between the cold pack and your skin. · Use a warm, moist towel or a heating pad set on low to relax tight shoulder and neck muscles. · Have someone gently massage your neck and shoulders. · Take your medicines exactly as prescribed. Call your doctor if you think you are having a problem with your medicine. You will get more details on the specific medicines your doctor prescribes. · Be careful not to take pain medicine more often than the instructions allow. You could get worse or more frequent headaches when the medicine wears off. To prevent migraines · Keep a headache diary so you can figure out what triggers your headaches. Avoiding triggers may help you prevent headaches. Record when each headache began, how long it lasted, and what the pain was like. (Was it throbbing, aching, stabbing, or dull?) Write down any other symptoms you had with the headache, such as nausea, flashing lights or dark spots, or sensitivity to bright light or loud noise. Note if the headache occurred near your period. List anything that might have triggered the headache. Triggers may include certain foods (chocolate, cheese, wine) or odors, smoke, bright light, stress, or lack of sleep. · If your doctor has prescribed medicine for your migraines, take it as directed. You may have medicine that you take only when you get a migraine and medicine that you take all the time to help prevent migraines. ¨ If your doctor has prescribed medicine for when you get a headache, take it at the first sign of a migraine, unless your doctor has given you other instructions. ¨ If your doctor has prescribed medicine to prevent migraines, take it exactly as prescribed. Call your doctor if you think you are having a problem with your medicine. · Find healthy ways to deal with stress. Migraines are most common during or right after stressful times. Take time to relax before and after you do something that has caused a migraine in the past. 
· Try to keep your muscles relaxed by keeping good posture.  Check your jaw, face, neck, and shoulder muscles for tension. Try to relax them. When you sit at a desk, change positions often. And make sure to stretch for 30 seconds each hour. · Get plenty of sleep and exercise. · Eat meals on a regular schedule. Avoid foods and drinks that often trigger migraines. These include chocolate, alcohol (especially red wine and port), aspartame, monosodium glutamate (MSG), and some additives found in foods (such as hot dogs, call, cold cuts, aged cheeses, and pickled foods). · Limit caffeine. Don't drink too much coffee, tea, or soda. But don't quit caffeine suddenly. That can also give you migraines. · Do not smoke or allow others to smoke around you. If you need help quitting, talk to your doctor about stop-smoking programs and medicines. These can increase your chances of quitting for good. · If you are taking birth control pills or hormone therapy, talk to your doctor about whether they are triggering your migraines. When should you call for help? Call 911 anytime you think you may need emergency care. For example, call if: 
· You have signs of a stroke. These may include: 
¨ Sudden numbness, paralysis, or weakness in your face, arm, or leg, especially on only one side of your body. ¨ Sudden vision changes. ¨ Sudden trouble speaking. ¨ Sudden confusion or trouble understanding simple statements. ¨ Sudden problems with walking or balance. ¨ A sudden, severe headache that is different from past headaches. Call your doctor now or seek immediate medical care if: 
· You have new or worse nausea and vomiting. · You have a new or higher fever. · Your headache gets much worse. Watch closely for changes in your health, and be sure to contact your doctor if: 
· You are not getting better after 2 days (48 hours). Where can you learn more? Go to http://marie-coleman.info/. Enter L138 in the search box to learn more about \"Migraine Headache: Care Instructions. \" 
 Current as of: October 14, 2016 Content Version: 11.3 © 2198-7869 [employer], [employer]. Care instructions adapted under license by [employer] (which disclaims liability or warranty for this information). If you have questions about a medical condition or this instruction, always ask your healthcare professional. [address] any warranty or liability for your use of this information. Introducing [facility]! Dear Sandro Villagran: Thank you for requesting a OffiSync account. Our records indicate that you already have an active OffiSync account. You can access your account anytime at [email or web address] Did you know that you can access your hospital and ER discharge instructions at any time in OffiSync? You can also review all of your test results from your hospital stay or ER visit. Additional Information If you have questions, please visit the Frequently Asked Questions section of the OffiSync website at [email or web address] Remember, OffiSync is NOT to be used for urgent needs. For medical emergencies, dial 911. Now available from your iPhone and Android! Please provide this summary of care documentation to your next provider. Your primary care clinician is listed as Rai Antonio. If you have any questions after today's visit, please call [phone].

## 2017-10-21 NOTE — PROGRESS NOTES
Chief Complaint   Patient presents with    Cough     non productive x 2 weeks     Other     chest congestion x 2 weeks     Ear Pain     Right ear pain     Medication Refill     Request refill on Imitrex & Flexeril        Subjective:   Leopoldo Grant. is a 46 y.o. male who complains of congestion, dry cough, headache and right ear pain for 14 days, gradually worsening since that time. He denies a history of fevers, nausea and vomiting. Evaluation to date: seen previously and thought to have a sinusitis. Treatment to date: decongestants, antihistamines, antibiotics -augmentin. Began taking 6 days ago. .  Patient does not smoke cigarettes. Relevant PMH: No pertinent additional PMH. 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 [place]    IBS (irritable bowel syndrome) K58.9    Statin intolerance Z78.9    High triglycerides E78.1    Hypercholesterolemia without hypertriglyceridemia E78.00    Sinusitis J32.9    Complete tear of right rotator cuff M75.121     Allergies   Allergen Reactions    Bactrim [Sulfamethoprim Ds] Rash    Lipitor [Atorvastatin] Myalgia    Sulfamethoxazole-Trimethoprim Rash     Past Medical History:   Diagnosis Date    Arthritis     Back pain     Basal cell carcinoma 2012    3 on left side of neck and 1 on right side of neck and nasal bridge area with first dx starting aprroximately 12 yrs ago    Family history of skin cancer     Frequent nosebleeds     H/O seasonal allergies     High cholesterol     Joint pain     Joint swelling     Migraine    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[address]     followed by Janae Brown Sinus complaint     Skin cancer     Sun-damaged skin     Sunburn, blistering      Past Surgical History:   Procedure Laterality Date    HX HEART CATHETERIZATION  2013    minimal disease per cardio notes.  HX ORTHOPAEDIC Bilateral     bilateral thumb surgery    HX ORTHOPAEDIC Left ~2013    elbow surgery    HX ROTATOR CUFF REPAIR      Right shoulder    HX SEPTOPLASTY  2002    HX SHOULDER ARTHROSCOPY Left ~2013     Family History   Problem Relation Age of Onset    Hypertension Brother     Diabetes Brother     Diabetes Mother     Hypertension Mother     Cancer Mother      lung     Social History   Substance Use Topics    Smoking status: Never Smoker    Smokeless tobacco: Never Used    Alcohol use 3.6 oz/week     6 Cans of beer per week        Review of Systems  A comprehensive review of systems was negative except for that written in the HPI. Objective:     Visit Vitals    /79 (BP 1 Location: Right arm, BP Patient Position: Sitting)    Pulse 79    Temp 98.3 °F (36.8 °C) (Oral)    Resp 20    Ht 5' 8\" (1.727 m)    Wt 204 lb (92.5 kg)    SpO2 95%    BMI 31.02 kg/m2     General:  alert, cooperative, mildly ill-appearing   Eyes: negative   Ears: normal TM's and external ear canals AU   Sinuses: Normal paranasal sinuses without tenderness   Mouth:  abnormal findings: mild oropharyngeal erythema   Neck: supple, symmetrical, trachea midline and no adenopathy. Heart: S1 and S2 normal, no murmurs noted. Lungs: Coarse BS right base with first deep breath, clears with coughing. Faint end expiratory wheezing throughout. Frequent bronchospastic cough noted during exam   Abdomen: soft, non-tender. Bowel sounds normal. No masses,  no organomegaly        Assessment/Plan:   bronchitis  Suggested symptomatic OTC remedies. Antibiotics per orders. RTC prn. Diagnoses and all orders for this visit:    1.  Acute bronchitis, unspecified organism  Will get CXR due to course BS, begin treatment for possible pneumonia  Steroid burst  Antibiotics  Albuterol prn  -     XR CHEST PA LAT; Future  -     guaiFENesin ER (MUCINEX) 600 mg ER tablet; Take 1 Tab by mouth two (2) times a day for 5 days. -     benzonatate (TESSALON) 200 mg capsule; Take 1 Cap by mouth three (3) times daily as needed for Cough for up to 7 days. -     chlorpheniramine-HYDROcodone (TUSSIONEX) 10-8 mg/5 mL suspension; Take 5 mL by mouth every twelve (12) hours as needed for Cough. Max Daily Amount: 10 mL. -     predniSONE (DELTASONE) 20 mg tablet; Take 2 Tabs by mouth daily (with breakfast) for 5 days. -     azithromycin (ZITHROMAX) 250 mg tablet; Take 2 tablets today, then take 1 tablet daily    2. Acute left-sided low back pain with left-sided sciatica  -     cyclobenzaprine (FLEXERIL) 10 mg tablet; Take 0.5-1 Tabs by mouth nightly as needed for Muscle Spasm(s). 3. Nonintractable migraine, unspecified migraine type  -     SUMAtriptan (IMITREX) 100 mg tablet; Take 1 Tab by mouth once as needed for Migraine. Follow-up Disposition:  Return in about 1 week (around 10/27/2017). .    I have discussed the diagnosis with the patient and the intended plan as seen in the above orders. The patient has received an after-visit summary and questions were answered concerning future plans. Patient conveyed understanding of the plan at the time of the visit.     Marie Haines NP

## 2017-10-25 ENCOUNTER — OFFICE VISIT (OUTPATIENT)
Dept: FAMILY MEDICINE CLINIC | Age: 52
End: 2017-10-25

## 2017-10-25 VITALS
HEIGHT: 68 IN | DIASTOLIC BLOOD PRESSURE: 81 MMHG | BODY MASS INDEX: 31.95 KG/M2 | OXYGEN SATURATION: 96 % | WEIGHT: 210.8 LBS | TEMPERATURE: 98.3 F | HEART RATE: 79 BPM | SYSTOLIC BLOOD PRESSURE: 128 MMHG | RESPIRATION RATE: 20 BRPM

## 2017-10-25 DIAGNOSIS — H60.503 ACUTE OTITIS EXTERNA OF BOTH EARS, UNSPECIFIED TYPE: ICD-10-CM

## 2017-10-25 DIAGNOSIS — J20.9 ACUTE BRONCHITIS, UNSPECIFIED ORGANISM: Primary | ICD-10-CM

## 2017-10-25 RX ORDER — ALBUTEROL SULFATE 0.83 MG/ML
2.5 SOLUTION RESPIRATORY (INHALATION)
Qty: 25 EACH | Refills: 1 | Status: SHIPPED | OUTPATIENT
Start: 2017-10-25

## 2017-10-25 RX ORDER — NEOMYCIN SULFATE, POLYMYXIN B SULFATE AND HYDROCORTISONE 10; 3.5; 1 MG/ML; MG/ML; [USP'U]/ML
3 SUSPENSION/ DROPS AURICULAR (OTIC) 4 TIMES DAILY
Qty: 10 ML | Refills: 0 | Status: SHIPPED | OUTPATIENT
Start: 2017-10-25 | End: 2017-11-04

## 2017-10-25 RX ORDER — IPRATROPIUM BROMIDE AND ALBUTEROL SULFATE 2.5; .5 MG/3ML; MG/3ML
3 SOLUTION RESPIRATORY (INHALATION)
Qty: 30 NEBULE | Refills: 1 | Status: SHIPPED | OUTPATIENT
Start: 2017-10-25 | End: 2020-01-02 | Stop reason: SDUPTHER

## 2017-10-25 NOTE — MR AVS SNAPSHOT
Visit Information Date & Time Provider Department Dept. Phone Encounter #  
 10/25/2017  8:00 AM Calvin Green NP [facility] [id number] Follow-up Instructions Return if symptoms worsen or fail to improve. Upcoming Health Maintenance Date Due Hepatitis C Screening 1965 FOBT Q 1 YEAR AGE 50-75 11/8/2013 INFLUENZA AGE 9 TO ADULT 8/1/2017 DTaP/Tdap/Td series (2 - Td) 8/16/2023 Allergies as of 10/25/2017  Review Complete On: 10/25/2017 By: Calvin Green NP Severity Noted Reaction Type Reactions Bactrim [Sulfamethoprim Ds]  03/27/2011    Rash Lipitor [Atorvastatin]  05/05/2015   Side Effect Myalgia Sulfamethoxazole-trimethoprim Low 07/05/2017    Rash Current Immunizations  Reviewed on 3/22/2013 Name Date Tdap 8/16/2013 11:38 AM  
  
 Not reviewed this visit You Were Diagnosed With   
  
 Codes Comments Acute bronchitis, unspecified organism    -  Primary ICD-10-CM: J20.9 ICD-9-CM: 466.0 Acute otitis externa of both ears, unspecified type     ICD-10-CM: H60.503 ICD-9-CM: 380.10 Vitals BP Pulse Temp Resp Height(growth percentile) Weight(growth percentile) 128/81 (BP 1 Location: Left arm, BP Patient Position: Sitting) 79 98.3 °F (36.8 °C) (Oral) 20 5' 8\" (1.727 m) 210 lb 12.8 oz (95.6 kg) SpO2 BMI Smoking Status 96% 32.05 kg/m2 Never Smoker BMI and BSA Data Body Mass Index Body Surface Area 32.05 kg/m 2 2.14 m 2 Preferred Pharmacy Pharmacy Name Phone [facility]/PHARMACY #[id number] [place], [place] - [address] [place] [address] [phone] Your Updated Medication List  
  
   
This list is accurate as of: 10/25/17  8:27 AM.  Always use your most recent med list.  
  
  
  
  
 * albuterol 90 mcg/actuation inhaler Commonly known as:  PROVENTIL HFA, VENTOLIN HFA, PROAIR HFA  
 Take 2 Puffs by inhalation every four (4) hours as needed for Wheezing. * albuterol 2.5 mg /3 mL (0.083 %) nebulizer solution Commonly known as:  PROVENTIL VENTOLIN  
3 mL by Nebulization route every four (4) hours as needed for Wheezing. albuterol-ipratropium 2.5 mg-0.5 mg/3 ml Nebu Commonly known as:  DUO-NEB  
3 mL by Nebulization route every six (6) hours as needed. amoxicillin-clavulanate 875-125 mg per tablet Commonly known as:  AUGMENTIN  
TAKE 1 TABLET EVERY 12 HOURS FOR 7 DAYS  
  
 azelastine 137 mcg (0.1 %) nasal spray Commonly known as:  ASTELIN  
1 Spray by Both Nostrils route two (2) times a day. Use in each nostril as directed  
  
 azithromycin 250 mg tablet Commonly known as:  Bustamante Congress Take 2 tablets today, then take 1 tablet daily BC ARTHRITIS PO Take 1 Dose Pack by mouth as needed. benzonatate 200 mg capsule Commonly known as:  TESSALON Take 1 Cap by mouth three (3) times daily as needed for Cough for up to 7 days. chlorpheniramine-HYDROcodone 10-8 mg/5 mL suspension Commonly known as:  Terryann Deiters Take 5 mL by mouth every twelve (12) hours as needed for Cough. Max Daily Amount: 10 mL. cyclobenzaprine 10 mg tablet Commonly known as:  FLEXERIL Take 0.5-1 Tabs by mouth nightly as needed for Muscle Spasm(s). diclofenac 1 % Gel Commonly known as:  VOLTAREN Apply  to affected area three (3) times daily. Apply thin layer to right heel  
  
 dicyclomine 10 mg capsule Commonly known as:  BENTYL Take 1 Cap by mouth three (3) times daily as needed. For abd cramping DULoxetine 30 mg capsule Commonly known as:  CYMBALTA TAKE ONE CAPSULE BY MOUTH EVERY DAY FOR BACK PAIN  
  
 fenofibrate nanocrystallized 145 mg tablet Commonly known as:  TRICOR  
TAKE 1 TABLET BY MOUTH DAILY. FOR HYPERCHOLESTEROLEMIA  
  
 fluticasone 50 mcg/actuation nasal spray Commonly known as:  Tania Riggs Use two sprays in each nostril once daily for allergies. guaiFENesin  mg ER tablet Commonly known as:  Noe & Noe Take 1 Tab by mouth two (2) times a day for 5 days. montelukast 10 mg tablet Commonly known as:  SINGULAIR  
TAKE 1 TABLET BY MOUTH EVERY DAY FOR ALLERGIES  
  
 neomycin-polymyxin-hydrocortisone (buffered) 3.5-10,000-1 mg/mL-unit/mL-% otic suspension Commonly known as:  Alejandra Appleton Administer 3 Drops into each ear four (4) times daily for 10 days. omeprazole 20 mg capsule Commonly known as:  PRILOSEC  
TAKE ONE CAPSULE BY MOUTH EVERY DAY FOR IBS  
  
 oxyCODONE IR 5 mg immediate release tablet Commonly known as:  [place] Take 5-10 mg by mouth.  
  
 predniSONE 20 mg tablet Commonly known as:  Leon Felling Take 2 Tabs by mouth daily (with breakfast) for 5 days. SIMETHICONE Take 120 mg by mouth as needed. SUMAtriptan 100 mg tablet Commonly known as:  IMITREX Take 1 Tab by mouth once as needed for Migraine. ZyrTEC-D 5-120 mg per tablet Generic drug:  cetirizine-psuedoePHEDrine Take 1 Tab by mouth daily. * Notice: This list has 2 medication(s) that are the same as other medications prescribed for you. Read the directions carefully, and ask your doctor or other care provider to review them with you. Prescriptions Sent to Pharmacy Refills  
 albuterol (PROVENTIL VENTOLIN) 2.5 mg /3 mL (0.083 %) nebulizer solution 1 Sig: 3 mL by Nebulization route every four (4) hours as needed for Wheezing. Class: Normal  
 Pharmacy: [facility]/pharmacy #[id number] [address] Ph #: [phone] Route: Nebulization  
 albuterol-ipratropium (DUO-NEB) 2.5 mg-0.5 mg/3 ml nebu 1 Sig: 3 mL by Nebulization route every six (6) hours as needed. Class: Normal  
 Pharmacy: [facility]/pharmacy #[id number] [address] Ph #: [phone] Route: Nebulization neomycin-polymyxin-hydrocortisone, buffered, (PEDIOTIC) 3.5-10,000-1 mg/mL-unit/mL-% otic suspension 0 Sig: Administer 3 Drops into each ear four (4) times daily for 10 days. Class: Normal  
 Pharmacy: [facility]/pharmacy #[id number] Amish Peterson [address] [place] [address] #: [phone] Route: Both Ears Follow-up Instructions Return if symptoms worsen or fail to improve. Patient Instructions Bronchitis: Care Instructions Your Care Instructions Bronchitis is inflammation of the bronchial tubes, which carry air to the lungs. The tubes swell and produce mucus, or phlegm. The mucus and inflamed bronchial tubes make you cough. You may have trouble breathing. Most cases of bronchitis are caused by viruses like those that cause colds. Antibiotics usually do not help and they may be harmful. Bronchitis usually develops rapidly and lasts about 2 to 3 weeks in otherwise healthy people. Follow-up care is a key part of your treatment and safety. Be sure to make and go to all appointments, and call your doctor if you are having problems. It's also a good idea to know your test results and keep a list of the medicines you take. How can you care for yourself at home? · Take all medicines exactly as prescribed. Call your doctor if you think you are having a problem with your medicine. · Get some extra rest. 
· Take an over-the-counter pain medicine, such as acetaminophen (Tylenol), ibuprofen (Advil, Motrin), or naproxen (Aleve) to reduce fever and relieve body aches. Read and follow all instructions on the label. · Do not take two or more pain medicines at the same time unless the doctor told you to. Many pain medicines have acetaminophen, which is Tylenol. Too much acetaminophen (Tylenol) can be harmful. · Take an over-the-counter cough medicine that contains dextromethorphan to help quiet a dry, hacking cough so that you can sleep.  Avoid cough medicines that have more than one active ingredient. Read and follow all instructions on the label. · Breathe moist air from a humidifier, hot shower, or sink filled with hot water. The heat and moisture will thin mucus so you can cough it out. · Do not smoke. Smoking can make bronchitis worse. If you need help quitting, talk to your doctor about stop-smoking programs and medicines. These can increase your chances of quitting for good. When should you call for help? Call 911 anytime you think you may need emergency care. For example, call if: 
· You have severe trouble breathing. Call your doctor now or seek immediate medical care if: 
· You have new or worse trouble breathing. · You cough up dark brown or bloody mucus (sputum). · You have a new or higher fever. · You have a new rash. Watch closely for changes in your health, and be sure to contact your doctor if: 
· You cough more deeply or more often, especially if you notice more mucus or a change in the color of your mucus. · You are not getting better as expected. Where can you learn more? Go to http://marie-coleman.info/. Enter H333 in the search box to learn more about \"Bronchitis: Care Instructions. \" Current as of: March 25, 2017 Content Version: 11.3 © 6342-8499 [employer]. Care instructions adapted under license by [employer] (which disclaims liability or warranty for this information). If you have questions about a medical condition or this instruction, always ask your healthcare professional. [place] [address] any warranty or liability for your use of this information. Swimmer's Ear: Care Instructions Your Care Instructions Swimmer's ear (otitis externa) is inflammation or infection of the ear canal. This is the passage that leads from the outer ear to the eardrum.  Any water, sand, or other debris that gets into the ear canal and stays there can cause swimmer's ear. Putting cotton swabs or other items in the ear to clean it can also cause this problem. Swimmer's ear can be very painful. But you can treat the pain and infection with medicines. You should feel better in a few days. Follow-up care is a key part of your treatment and safety. Be sure to make and go to all appointments, and call your doctor if you are having problems. It's also a good idea to know your test results and keep a list of the medicines you take. How can you care for yourself at home? Cleaning and care · Use antibiotic drops as your doctor directs. · Do not insert ear drops (other than the antibiotic ear drops) or anything else into the ear unless your doctor has told you to. · Avoid getting water in the ear until the problem clears up. Use cotton lightly coated with petroleum jelly as an earplug. Do not use plastic earplugs. · Use a hair dryer set on low to carefully dry the ear after you shower. · To ease ear pain, hold a warm washcloth against your ear. · Take pain medicines exactly as directed. ¨ If the doctor gave you a prescription medicine for pain, take it as prescribed. ¨ If you are not taking a prescription pain medicine, ask your doctor if you can take an over-the-counter medicine. Inserting ear drops · Warm the drops to body temperature by rolling the container in your hands. Or you can place it in a cup of warm water for a few minutes. · Lie down, with your ear facing up. · Place drops inside the ear. Follow your doctor's instructions (or the directions on the label) for how many drops to use. Gently wiggle the outer ear or pull the ear up and back to help the drops get into the ear. · It's important to keep the liquid in the ear canal for 3 to 5 minutes. When should you call for help? Call your doctor now or seek immediate medical care if: 
· You have a new or higher fever.  
· You have new or worse pain, swelling, warmth, or redness around or behind your ear. · You have new or increasing pus or blood draining from your ear. Watch closely for changes in your health, and be sure to contact your doctor if: 
· You are not getting better after 2 days (48 hours). Where can you learn more? Go to http://marie-coleman.info/. Enter C706 in the search box to learn more about \"Swimmer's Ear: Care Instructions. \" Current as of: July 29, 2016 Content Version: 11.3 © 8170-2007 [employer]. Care instructions adapted under license by [employer] (which disclaims liability or warranty for this information). If you have questions about a medical condition or this instruction, always ask your healthcare professional. [address] any warranty or liability for your use of this information. Introducing [facility]! Dear Siena Aguilar: Thank you for requesting a [email or web address] account. Our records indicate that you already have an active [email or web address] account. You can access your account anytime at [email or web address] Did you know that you can access your hospital and ER discharge instructions at any time in [email or web address]? You can also review all of your test results from your hospital stay or ER visit. Additional Information If you have questions, please visit the Frequently Asked Questions section of the [email or web address] website at [email or web address] Remember, [email or web address] is NOT to be used for urgent needs. For medical emergencies, dial 911. Now available from your iPhone and Android! Please provide this summary of care documentation to your next provider. Your primary care clinician is listed as Reggie Rojas. If you have any questions after today's visit, please call [phone].

## 2017-12-23 DIAGNOSIS — Z78.9 STATIN INTOLERANCE: ICD-10-CM

## 2017-12-23 DIAGNOSIS — E78.00 HYPERCHOLESTEROLEMIA WITHOUT HYPERTRIGLYCERIDEMIA: ICD-10-CM

## 2017-12-23 DIAGNOSIS — K58.9 IRRITABLE BOWEL SYNDROME WITHOUT DIARRHEA: ICD-10-CM

## 2017-12-23 DIAGNOSIS — J30.1 SEASONAL ALLERGIC RHINITIS DUE TO POLLEN: ICD-10-CM

## 2017-12-23 DIAGNOSIS — R52 PAIN: ICD-10-CM

## 2017-12-25 RX ORDER — AZELASTINE 1 MG/ML
SPRAY, METERED NASAL
Qty: 1 BOTTLE | Refills: 0 | Status: SHIPPED | OUTPATIENT
Start: 2017-12-25 | End: 2019-02-05

## 2017-12-25 RX ORDER — MONTELUKAST SODIUM 10 MG/1
TABLET ORAL
Qty: 90 TAB | Refills: 0 | Status: SHIPPED | OUTPATIENT
Start: 2017-12-25 | End: 2018-03-30 | Stop reason: SDUPTHER

## 2017-12-25 RX ORDER — DULOXETIN HYDROCHLORIDE 30 MG/1
CAPSULE, DELAYED RELEASE ORAL
Qty: 90 CAP | Refills: 0 | Status: SHIPPED | OUTPATIENT
Start: 2017-12-25 | End: 2018-03-30 | Stop reason: SDUPTHER

## 2017-12-25 RX ORDER — FENOFIBRATE 145 MG/1
TABLET, COATED ORAL
Qty: 90 TAB | Refills: 0 | Status: SHIPPED | OUTPATIENT
Start: 2017-12-25 | End: 2018-03-30 | Stop reason: SDUPTHER

## 2017-12-25 RX ORDER — FLUTICASONE PROPIONATE 50 MCG
SPRAY, SUSPENSION (ML) NASAL
Qty: 1 BOTTLE | Refills: 0 | Status: SHIPPED | OUTPATIENT
Start: 2017-12-25 | End: 2019-02-05

## 2017-12-25 RX ORDER — OMEPRAZOLE 20 MG/1
CAPSULE, DELAYED RELEASE ORAL
Qty: 90 CAP | Refills: 0 | Status: SHIPPED | OUTPATIENT
Start: 2017-12-25 | End: 2018-01-03 | Stop reason: SDUPTHER

## 2018-01-03 DIAGNOSIS — K58.9 IRRITABLE BOWEL SYNDROME WITHOUT DIARRHEA: ICD-10-CM

## 2018-01-03 RX ORDER — OMEPRAZOLE 20 MG/1
CAPSULE, DELAYED RELEASE ORAL
Qty: 90 CAP | Refills: 0 | Status: SHIPPED | OUTPATIENT
Start: 2018-01-03 | End: 2018-09-14 | Stop reason: SDUPTHER

## 2018-01-26 ENCOUNTER — OFFICE VISIT (OUTPATIENT)
Dept: FAMILY MEDICINE CLINIC | Age: 53
End: 2018-01-26

## 2018-01-26 VITALS
DIASTOLIC BLOOD PRESSURE: 77 MMHG | TEMPERATURE: 98.5 F | HEART RATE: 67 BPM | OXYGEN SATURATION: 94 % | WEIGHT: 214 LBS | SYSTOLIC BLOOD PRESSURE: 139 MMHG | BODY MASS INDEX: 32.43 KG/M2 | RESPIRATION RATE: 18 BRPM | HEIGHT: 68 IN

## 2018-01-26 DIAGNOSIS — R06.09 DYSPNEA ON EXERTION: Primary | ICD-10-CM

## 2018-01-26 DIAGNOSIS — R05.9 COUGH: ICD-10-CM

## 2018-01-26 DIAGNOSIS — G43.009 NONINTRACTABLE MIGRAINE, UNSPECIFIED MIGRAINE TYPE: ICD-10-CM

## 2018-01-26 RX ORDER — SUMATRIPTAN 100 MG/1
100 TABLET, FILM COATED ORAL
Qty: 12 TAB | Refills: 1 | Status: SHIPPED | OUTPATIENT
Start: 2018-01-26 | End: 2018-06-12 | Stop reason: SDUPTHER

## 2018-01-26 NOTE — MR AVS SNAPSHOT
[address] [address] 
[phone] Patient: Dc Arita. MRN: [id number] :1965 Visit Information Date & Time Provider Department Dept. Phone Encounter #  
 2018  8:00 AM Charles Sánchez  [facility]. [address] [id number] Follow-up Instructions Return in about 3 months (around 2018). Upcoming Health Maintenance Date Due Hepatitis C Screening 1965 FOBT Q 1 YEAR AGE 50-75 2013 Influenza Age 5 to Adult 2017 DTaP/Tdap/Td series (2 - Td) 2023 Allergies as of 2018  Review Complete On: 2018 By: Charles Sánchez NP Severity Noted Reaction Type Reactions Bactrim [Sulfamethoprim Ds]  2011    Rash Lipitor [Atorvastatin]  2015   Side Effect Myalgia Sulfamethoxazole-trimethoprim Low 2017    Rash Current Immunizations  Reviewed on 3/22/2013 Name Date Tdap 2013 11:38 AM  
  
 Not reviewed this visit You Were Diagnosed With   
  
 Codes Comments Dyspnea on exertion    -  Primary ICD-10-CM: R06.09 
ICD-9-CM: 786.09 Nonintractable migraine, unspecified migraine type     ICD-10-CM: G43.009 ICD-9-CM: 346.10 Cough     ICD-10-CM: R05 ICD-9-CM: 637. 2 Vitals BP Pulse Temp Resp Height(growth percentile) Weight(growth percentile) 139/77 (BP 1 Location: Right arm, BP Patient Position: Sitting) 67 98.5 °F (36.9 °C) (Oral) 18 5' 8\" (1.727 m) 214 lb (97.1 kg) SpO2 BMI Smoking Status 94% 32.54 kg/m2 Never Smoker Vitals History BMI and BSA Data Body Mass Index Body Surface Area 32.54 kg/m 2 2.16 m 2 Preferred Pharmacy Pharmacy Name Phone [id number] YESSICA Bajwa  [address] [place] [address] [phone] Your Updated Medication List  
  
   
 This list is accurate as of: 1/26/18  8:33 AM.  Always use your most recent med list.  
  
  
  
  
 * albuterol 90 mcg/actuation inhaler Commonly known as:  PROVENTIL HFA, VENTOLIN HFA, PROAIR HFA Take 2 Puffs by inhalation every four (4) hours as needed for Wheezing. * albuterol 2.5 mg /3 mL (0.083 %) nebulizer solution Commonly known as:  PROVENTIL VENTOLIN  
3 mL by Nebulization route every four (4) hours as needed for Wheezing. albuterol-ipratropium 2.5 mg-0.5 mg/3 ml Nebu Commonly known as:  DUO-NEB  
3 mL by Nebulization route every six (6) hours as needed. azelastine 137 mcg (0.1 %) nasal spray Commonly known as:  ASTELIN  
1 SPRAY BY BOTH NOSTRILS ROUTE TWO (2) TIMES A DAY. USE IN EACH NOSTRIL AS DIRECTED  
  
 BC ARTHRITIS PO Take 1 Dose Pack by mouth as needed. chlorpheniramine-HYDROcodone 10-8 mg/5 mL suspension Commonly known as:  Isabela Lowing Take 5 mL by mouth every twelve (12) hours as needed for Cough. Max Daily Amount: 10 mL. cyclobenzaprine 10 mg tablet Commonly known as:  FLEXERIL Take 0.5-1 Tabs by mouth nightly as needed for Muscle Spasm(s). diclofenac 1 % Gel Commonly known as:  VOLTAREN Apply  to affected area three (3) times daily. Apply thin layer to right heel  
  
 dicyclomine 10 mg capsule Commonly known as:  BENTYL Take 1 Cap by mouth three (3) times daily as needed. For abd cramping DULoxetine 30 mg capsule Commonly known as:  CYMBALTA TAKE ONE CAPSULE BY MOUTH EVERY DAY FOR BACK PAIN  
  
 fenofibrate nanocrystallized 145 mg tablet Commonly known as:  TRICOR  
TAKE 1 TABLET BY MOUTH DAILY. FOR HYPERCHOLESTEROLEMIA  
  
 fluticasone 50 mcg/actuation nasal spray Commonly known as:  FLONASE  
USE TWO SPRAYS IN EACH NOSTRIL ONCE DAILY FOR ALLERGIES. montelukast 10 mg tablet Commonly known as:  SINGULAIR  
TAKE 1 TABLET BY MOUTH EVERY DAY FOR ALLERGIES  
  
 omeprazole 20 mg capsule Commonly known as:  PRILOSEC  
TAKE ONE CAPSULE BY MOUTH EVERY DAY FOR IBS  
  
 SIMETHICONE Take 120 mg by mouth as needed. SUMAtriptan 100 mg tablet Commonly known as:  IMITREX Take 1 Tab by mouth once as needed for Migraine. * Notice: This list has 2 medication(s) that are the same as other medications prescribed for you. Read the directions carefully, and ask your doctor or other care provider to review them with you. Prescriptions Sent to Pharmacy Refills SUMAtriptan (IMITREX) 100 mg tablet 1 Sig: Take 1 Tab by mouth once as needed for Migraine. Class: Normal  
 Pharmacy: [id number] [place], [place] [address] [place] [address] Ph #: [phone] Route: Oral  
  
We Performed the Following REFERRAL TO PULMONARY DISEASE [DPL62 Custom] Comments:  
 Persistent cough, dyspnea on exertion Follow-up Instructions Return in about 3 months (around 4/26/2018). Referral Information Referral ID Referred By Referred To  
  
 [id number] [facility] [address] [facility] [address] [facility] [address] [place], [address] [facility] Visits Status Start Date End Date 1 New Request 1/26/18 1/26/19 If your referral has a status of pending review or denied, additional information will be sent to support the outcome of this decision. Patient Instructions Chronic Cough: Care Instructions Your Care Instructions A cough is your body's response to something that bothers your throat or airways. Many things can cause a cough. You might cough because of a cold or the flu, bronchitis, or asthma. Smoking, postnasal drip, allergies, and stomach acid that backs up into your throat also can cause a cough. A cough can be short-term (acute) or long-term (chronic). A chronic cough lasts more than 3 weeks.  A chronic cough is often caused by a long-term problem, such as asthma. Another cause might be a medicine, such as an ACE inhibitor. A cough is a symptom, not a disease. To treat a chronic cough, you may need to treat the problem that causes it. You can take a few steps at home to cough less and feel better. Some people cough or clear their throat out of habit for no clear reason. Follow-up care is a key part of your treatment and safety. Be sure to make and go to all appointments, and call your doctor if you are having problems. It's also a good idea to know your test results and keep a list of the medicines you take. How can you care for yourself at home? · Drink plenty of water and other fluids. This may help soothe a dry or sore throat. Honey or lemon juice in hot water or tea may ease a dry cough. · Prop up your head on pillows to help you breathe and ease a cough. · Do not smoke or allow others to smoke around you. Smoke can make a cough worse. If you need help quitting, talk to your doctor about stop-smoking programs and medicines. These can increase your chances of quitting for good. · Avoid exposure to smoke, dust, or other pollutants, or wear a face mask. Check with your doctor or pharmacist to find out which type of face mask will give you the most benefit. · Take cough medicine as directed by your doctor. · Try cough drops to soothe a dry or sore throat. Cough drops don't stop a cough. Medicine-flavored cough drops are no better than candy-flavored drops or hard candy. Throat clearing When you have a chronic cough or a disease that may cause this type of cough, you may often feel like you want to clear your throat. This helps bring up mucus. But throat clearing does not always have a cause. Throat clearing can become a habit. The more you do it, the more you feel like you need to do it. But frequent throat clearing can be hard on your vocal cords. It's like slamming them together. To help lessen throat clearing, you can try: · Taking small sips of water. · Not clearing your throat when you feel you need to. · Swallowing hard when you want to clear your throat. You may want to ask your doctor if a medicine that thins mucus would help. When should you call for help? Call 911 anytime you think you may need emergency care. For example, call if: 
? · You have severe trouble breathing. ?Call your doctor now or seek immediate medical care if: 
? · You cough up blood. ? · You have new or worse trouble breathing. ? · You have a new or higher fever. ? Watch closely for changes in your health, and be sure to contact your doctor if: 
? · You cough more deeply or more often, especially if you notice more mucus or a change in the color of your mucus. ? · You do not get better as expected. Where can you learn more? Go to http://marieISBXcoleman.info/. Enter R204 in the search box to learn more about \"Chronic Cough: Care Instructions. \" Current as of: May 12, 2017 Content Version: 11.4 © 5139-3198 [employer]. Care instructions adapted under license by [employer] (which disclaims liability or warranty for this information). If you have questions about a medical condition or this instruction, always ask your healthcare professional. [address] any warranty or liability for your use of this information. Recurring Migraine Headache: Care Instructions Your Care Instructions Migraines are painful, throbbing headaches. They often start on one side of the head. They may cause nausea and vomiting and make you sensitive to light, sound, or smell. Some people may have only a few migraines throughout life. Others have them as often as several times a month. The goal of treatment is to reduce the number of migraines you have and relieve your symptoms. Even with treatment, you may continue to have migraines. You play an important role in dealing with your headaches.  Work on avoiding things that seem to trigger your migraines. When you feel a headache coming on, act quickly to stop it before it gets worse. Follow-up care is a key part of your treatment and safety. Be sure to make and go to all appointments, and call your doctor if you are having problems. It's also a good idea to know your test results and keep a list of the medicines you take. How can you care for yourself at home? · Do not drive if you have taken a prescription pain medicine. · Rest in a quiet, dark room until your headache is gone. Close your eyes and try to relax or go to sleep. Do not watch TV or read. · Put a cold, moist cloth or cold pack on the painful area for 10 to 20 minutes at a time. Put a thin cloth between the cold pack and your skin. · Have someone gently massage your neck and shoulders. · Take your medicines exactly as prescribed. Call your doctor if you think you are having a problem with your medicine. You will get more details on the specific medicines your doctor prescribes. To prevent migraines · Keep a headache diary so you can figure out what triggers your headaches. Avoiding triggers may help you prevent headaches. Record when each headache began, how long it lasted, and what the pain was like. Use words like throbbing, aching, stabbing, or dull. Write down any other symptoms you had with the headache. These may include nausea, flashing lights or dark spots, or sensitivity to bright light or loud noise. Note if the headache occurred near your period. List anything that might have triggered the headache. Triggers may include certain foods (chocolate, cheese, wine) or odors, smoke, bright light, stress, or lack of sleep. · If your doctor has prescribed medicine for your migraines, take it as directed. You may have medicine that you take only when you get a migraine and medicine that you take all the time to help prevent migraines. ¨ If your doctor has prescribed medicine for when you get a headache, take it at the first sign of a migraine, unless your doctor has given you other instructions. ¨ If your doctor has prescribed medicine to prevent migraines, take it exactly as prescribed. Call your doctor if you think you are having a problem with your medicine. · Find healthy ways to deal with stress. Migraines are most common during or right after stressful times. Take time to relax before and after you do something that has caused a migraine in the past. 
· Try to keep your muscles relaxed by keeping good posture. Check your jaw, face, neck, and shoulder muscles for tension. Try to relax them. When sitting at a desk, change positions often. Stretch for 30 seconds each hour. · Get regular sleep and exercise. · Eat regular meals, and avoid foods and drinks that often trigger migraines. These include chocolate and alcohol, especially red wine and port. Chemicals used in food, such as aspartame and monosodium glutamate (MSG), also can trigger migraines. So can some food additives, such as those found in hot dogs, call, cold cuts, aged cheeses, and pickled foods. · Limit caffeine by not drinking too much coffee, tea, or soda. Do not quit caffeine suddenly, because that can also give you migraines. · Do not smoke or allow others to smoke around you. If you need help quitting, talk to your doctor about stop-smoking programs and medicines. These can increase your chances of quitting for good. · If you are taking birth control pills or hormone therapy, talk to your doctor about whether they are triggering your migraines. When should you call for help? Call 911 anytime you think you may need emergency care. For example, call if: 
? · You have symptoms of a stroke. These may include: 
¨ Sudden numbness, tingling, weakness, or loss of movement in your face, arm, or leg, especially on only one side of your body. ¨ Sudden vision changes. ¨ Sudden trouble speaking. ¨ Sudden confusion or trouble understanding simple statements. ¨ Sudden problems with walking or balance. ¨ A sudden, severe headache that is different from past headaches. ?Call your doctor now or seek immediate medical care if: 
? · You develop a fever and a stiff neck. ? · You have new nausea and vomiting, or you cannot keep down food or liquids. ? Watch closely for changes in your health, and be sure to contact your doctor if: 
? · You have a headache that does not get better within 1 or 2 days. ? · Your headaches get worse or happen more often. Where can you learn more? Go to http://marie-coleman.info/. Enter V975 in the search box to learn more about \"Recurring Migraine Headache: Care Instructions. \" Current as of: October 14, 2016 Content Version: 11.4 © 0030-0212 [employer]IN. Care instructions adapted under license by [employer] (which disclaims liability or warranty for this information). If you have questions about a medical condition or this instruction, always ask your healthcare professional. [place] [address] any warranty or liability for your use of this information. Introducing [facility]! Dear Loly Valverde: Thank you for requesting a [employer] account. Our records indicate that you already have an active [employer] account. You can access your account anytime at [email or web address] Did you know that you can access your hospital and ER discharge instructions at any time in [employer]? You can also review all of your test results from your hospital stay or ER visit. Additional Information If you have questions, please visit the Frequently Asked Questions section of the [employer] website at [email or web address] Remember, [employer] is NOT to be used for urgent needs. For medical emergencies, dial 911. Now available from your iPhone and Android! Please provide this summary of care documentation to your next provider. Your primary care clinician is listed as Jammie Patrick. If you have any questions after today's visit, please call [phone].

## 2018-01-26 NOTE — PATIENT INSTRUCTIONS
Chronic Cough: Care Instructions  Your Care Instructions    A cough is your body's response to something that bothers your throat or airways. Many things can cause a cough. You might cough because of a cold or the flu, bronchitis, or asthma. Smoking, postnasal drip, allergies, and stomach acid that backs up into your throat also can cause a cough. A cough can be short-term (acute) or long-term (chronic). A chronic cough lasts more than 3 weeks. A chronic cough is often caused by a long-term problem, such as asthma. Another cause might be a medicine, such as an ACE inhibitor. A cough is a symptom, not a disease. To treat a chronic cough, you may need to treat the problem that causes it. You can take a few steps at home to cough less and feel better. Some people cough or clear their throat out of habit for no clear reason. Follow-up care is a key part of your treatment and safety. Be sure to make and go to all appointments, and call your doctor if you are having problems. It's also a good idea to know your test results and keep a list of the medicines you take. How can you care for yourself at home? · Drink plenty of water and other fluids. This may help soothe a dry or sore throat. Honey or lemon juice in hot water or tea may ease a dry cough. · Prop up your head on pillows to help you breathe and ease a cough. · Do not smoke or allow others to smoke around you. Smoke can make a cough worse. If you need help quitting, talk to your doctor about stop-smoking programs and medicines. These can increase your chances of quitting for good. · Avoid exposure to smoke, dust, or other pollutants, or wear a face mask. Check with your doctor or pharmacist to find out which type of face mask will give you the most benefit. · Take cough medicine as directed by your doctor. · Try cough drops to soothe a dry or sore throat. Cough drops don't stop a cough.  Medicine-flavored cough drops are no better than candy-flavored drops or hard candy. Throat clearing  When you have a chronic cough or a disease that may cause this type of cough, you may often feel like you want to clear your throat. This helps bring up mucus. But throat clearing does not always have a cause. Throat clearing can become a habit. The more you do it, the more you feel like you need to do it. But frequent throat clearing can be hard on your vocal cords. It's like slamming them together. To help lessen throat clearing, you can try:  · Taking small sips of water. · Not clearing your throat when you feel you need to. · Swallowing hard when you want to clear your throat. You may want to ask your doctor if a medicine that thins mucus would help. When should you call for help? Call 911 anytime you think you may need emergency care. For example, call if:  ? · You have severe trouble breathing. ?Call your doctor now or seek immediate medical care if:  ? · You cough up blood. ? · You have new or worse trouble breathing. ? · You have a new or higher fever. ? Watch closely for changes in your health, and be sure to contact your doctor if:  ? · You cough more deeply or more often, especially if you notice more mucus or a change in the color of your mucus. ? · You do not get better as expected. Where can you learn more? Go to http://marie-coleman.info/. Enter N497 in the search box to learn more about \"Chronic Cough: Care Instructions. \"  Current as of: May 12, 2017  Content Version: 11.4  © 8822-0946 [employer]. Care instructions adapted under license by [employer] (which disclaims liability or warranty for this information). If you have questions about a medical condition or this instruction, always ask your healthcare professional. [place] [address] any warranty or liability for your use of this information.        Recurring Migraine Headache: Care Instructions  Your Care Instructions  Migraines are painful, throbbing headaches. They often start on one side of the head. They may cause nausea and vomiting and make you sensitive to light, sound, or smell. Some people may have only a few migraines throughout life. Others have them as often as several times a month. The goal of treatment is to reduce the number of migraines you have and relieve your symptoms. Even with treatment, you may continue to have migraines. You play an important role in dealing with your headaches. Work on avoiding things that seem to trigger your migraines. When you feel a headache coming on, act quickly to stop it before it gets worse. Follow-up care is a key part of your treatment and safety. Be sure to make and go to all appointments, and call your doctor if you are having problems. It's also a good idea to know your test results and keep a list of the medicines you take. How can you care for yourself at home? · Do not drive if you have taken a prescription pain medicine. · Rest in a quiet, dark room until your headache is gone. Close your eyes and try to relax or go to sleep. Do not watch TV or read. · Put a cold, moist cloth or cold pack on the painful area for 10 to 20 minutes at a time. Put a thin cloth between the cold pack and your skin. · Have someone gently massage your neck and shoulders. · Take your medicines exactly as prescribed. Call your doctor if you think you are having a problem with your medicine. You will get more details on the specific medicines your doctor prescribes. To prevent migraines  · Keep a headache diary so you can figure out what triggers your headaches. Avoiding triggers may help you prevent headaches. Record when each headache began, how long it lasted, and what the pain was like. Use words like throbbing, aching, stabbing, or dull. Write down any other symptoms you had with the headache. These may include nausea, flashing lights or dark spots, or sensitivity to bright light or loud noise. Note if the headache occurred near your period. List anything that might have triggered the headache. Triggers may include certain foods (chocolate, cheese, wine) or odors, smoke, bright light, stress, or lack of sleep. · If your doctor has prescribed medicine for your migraines, take it as directed. You may have medicine that you take only when you get a migraine and medicine that you take all the time to help prevent migraines. ¨ If your doctor has prescribed medicine for when you get a headache, take it at the first sign of a migraine, unless your doctor has given you other instructions. ¨ If your doctor has prescribed medicine to prevent migraines, take it exactly as prescribed. Call your doctor if you think you are having a problem with your medicine. · Find healthy ways to deal with stress. Migraines are most common during or right after stressful times. Take time to relax before and after you do something that has caused a migraine in the past.  · Try to keep your muscles relaxed by keeping good posture. Check your jaw, face, neck, and shoulder muscles for tension. Try to relax them. When sitting at a desk, change positions often. Stretch for 30 seconds each hour. · Get regular sleep and exercise. · Eat regular meals, and avoid foods and drinks that often trigger migraines. These include chocolate and alcohol, especially red wine and port. Chemicals used in food, such as aspartame and monosodium glutamate (MSG), also can trigger migraines. So can some food additives, such as those found in hot dogs, call, cold cuts, aged cheeses, and pickled foods. · Limit caffeine by not drinking too much coffee, tea, or soda. Do not quit caffeine suddenly, because that can also give you migraines. · Do not smoke or allow others to smoke around you. If you need help quitting, talk to your doctor about stop-smoking programs and medicines. These can increase your chances of quitting for good.   · If you are taking birth control pills or hormone therapy, talk to your doctor about whether they are triggering your migraines. When should you call for help? Call 911 anytime you think you may need emergency care. For example, call if:  ? · You have symptoms of a stroke. These may include:  ¨ Sudden numbness, tingling, weakness, or loss of movement in your face, arm, or leg, especially on only one side of your body. ¨ Sudden vision changes. ¨ Sudden trouble speaking. ¨ Sudden confusion or trouble understanding simple statements. ¨ Sudden problems with walking or balance. ¨ A sudden, severe headache that is different from past headaches. ?Call your doctor now or seek immediate medical care if:  ? · You develop a fever and a stiff neck. ? · You have new nausea and vomiting, or you cannot keep down food or liquids. ? Watch closely for changes in your health, and be sure to contact your doctor if:  ? · You have a headache that does not get better within 1 or 2 days. ? · Your headaches get worse or happen more often. Where can you learn more? Go to http://marie-coleman.info/. Enter V975 in the search box to learn more about \"Recurring Migraine Headache: Care Instructions. \"  Current as of: October 14, 2016  Content Version: 11.4  © 1693-4864 [employer]. Care instructions adapted under license by [employer] (which disclaims liability or warranty for this information). If you have questions about a medical condition or this instruction, always ask your healthcare professional. [place] [address] any warranty or liability for your use of this information.

## 2018-01-26 NOTE — PROGRESS NOTES
Chief Complaint   Patient presents with    Follow-up     Patient still having cough & chest congestion     Medication Refill     1. Have you been to the ER, urgent care clinic since your last visit? Hospitalized since your last visit? No    2. Have you seen or consulted any other health care providers outside of the [address] [place] [address] since your last visit? Include any pap smears or colon screening.  [facility]

## 2018-01-29 NOTE — PROGRESS NOTES
Reinaldo Rodriguez. is a 45 y.o. male who presents with the following complaints:  Chief Complaint   Patient presents with    Follow-up     Patient still having cough & chest congestion     Medication Refill       Subjective:    HPI:   C/o continued cough and feeling of chest congestion. Cough is nonproductive. Has noted some shortness of breath with exertion. Denies chest pain. No fever or chills. Has not been using albuterol recently. No fever or chills. Taking montelukast, astelin, and flonase as ordered. Requests refill on imitrex. Reports good response to treatment when needed. Pertinent PMH/FH/SH:  Past Medical History:   Diagnosis Date    Arthritis     Back pain     Basal cell carcinoma 2012    3 on left side of neck and 1 on right side of neck and nasal bridge area with first dx starting aprroximately 12 yrs ago    Family history of skin cancer     Frequent nosebleeds     H/O seasonal allergies     High cholesterol     Joint pain     Joint swelling     Migraine    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[address]     followed by Tarsha Anglin Sinus complaint     Skin cancer     Sun-damaged skin     Sunburn, blistering      Past Surgical History:   Procedure Laterality Date    HX HEART CATHETERIZATION  2013    minimal disease per cardio notes.     HX ORTHOPAEDIC Bilateral     bilateral thumb surgery    HX ORTHOPAEDIC Left ~2013    elbow surgery    HX ROTATOR CUFF REPAIR      Right shoulder    HX SEPTOPLASTY  2002    HX SHOULDER ARTHROSCOPY Left ~2013     Family History   Problem Relation Age of Onset    Hypertension Brother     Diabetes Brother     Diabetes Mother     Hypertension Mother     Cancer Mother      lung     Social History     Social History    Marital status:      Spouse name: N/A    Number of children: N/A    Years of education: N/A     Social History Main Topics    Smoking status: Never Smoker    Smokeless tobacco: Never Used    Alcohol use 3.6 oz/week     6 Cans of beer per week    Drug use: Yes      Comment: occasional marijuana (not weekly), cocaine use in 20's     Sexual activity: Yes     Partners: Female     Other Topics Concern    None     Social History Narrative     Advanced Directives: N      Patient Active Problem List    Diagnosis    Complete tear of right rotator cuff    Sinusitis    Hypercholesterolemia without hypertriglyceridemia    H/O cardiac catheterization     3/2013 with EF 60%, no significant CAD noted.       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 [address]    Actinic keratosis due to exposure to sunlight    IBS (irritable bowel syndrome)    Statin intolerance    High triglycerides    ACS (acute coronary syndrome) [address] [address]    Hyperlipidemia       Nurse notes were reviewed and are correct  Review of Systems - negative except as listed above in the HPI    Objective:     Vitals:    01/26/18 0820   BP: 139/77   Pulse: 67   Resp: 18   Temp: 98.5 °F (36.9 °C)   TempSrc: Oral   SpO2: 94%   Weight: 214 lb (97.1 kg)   Height: 5' 8\" (1.727 m)     Physical Examination: General appearance - alert, well appearing, and in no distress, oriented to person, place, and time, overweight and well hydrated  Mental status - normal mood, behavior, speech, dress, motor activity, and thought processes  Eyes - pupils equal and reactive, extraocular eye movements intact  Ears - bilateral TM's and external ear canals normal  Nose - normal and patent, no erythema, discharge or polyps  Mouth - mucous membranes moist, pharynx normal without lesions  Neck - supple, no significant adenopathy  Chest - clear to auscultation, no wheezes, rales or rhonchi, symmetric air entry  Heart - normal rate, regular rhythm, normal S1, S2, no murmurs, rubs, clicks or gallops  Abdomen - soft, nontender, nondistended, no masses or organomegaly  Neurological - alert, oriented, normal speech, no focal findings or movement disorder noted  Skin - normal coloration and turgor, no rashes, no suspicious skin lesions noted    Assessment/ Plan:   Diagnoses and all orders for this visit:    1. Dyspnea on exertion  ? asthma vs other  Refer for further eval  -     REFERRAL TO PULMONARY DISEASE    2. Nonintractable migraine, unspecified migraine type  Good response to imitrex  Consider prophylaxis if increased in frequecy. -     SUMAtriptan (IMITREX) 100 mg tablet; Take 1 Tab by mouth once as needed for Migraine. 3. Cough  -     REFERRAL TO PULMONARY DISEASE       Follow-up Disposition:  Return in about 3 months (around 4/26/2018). I have discussed the diagnosis with the patient and the intended plan as seen in the above orders. The patient has received an after-visit summary and questions were answered concerning future plans. The patient verbalizes understanding. Medication Side Effects and Warnings were discussed with patient: yes  Patient Labs were reviewed and or requested: yes  Patient Past Records were reviewed and or requested: yes    Patient Instructions          Chronic Cough: Care Instructions  Your Care Instructions    A cough is your body's response to something that bothers your throat or airways. Many things can cause a cough. You might cough because of a cold or the flu, bronchitis, or asthma. Smoking, postnasal drip, allergies, and stomach acid that backs up into your throat also can cause a cough. A cough can be short-term (acute) or long-term (chronic). A chronic cough lasts more than 3 weeks. A chronic cough is often caused by a long-term problem, such as asthma. Another cause might be a medicine, such as an ACE inhibitor. A cough is a symptom, not a disease. To treat a chronic cough, you may need to treat the problem that causes it. You can take a few steps at home to cough less and feel better. Some people cough or clear their throat out of habit for no clear reason.   Follow-up care is a key part of your treatment and safety. Be sure to make and go to all appointments, and call your doctor if you are having problems. It's also a good idea to know your test results and keep a list of the medicines you take. How can you care for yourself at home? · Drink plenty of water and other fluids. This may help soothe a dry or sore throat. Honey or lemon juice in hot water or tea may ease a dry cough. · Prop up your head on pillows to help you breathe and ease a cough. · Do not smoke or allow others to smoke around you. Smoke can make a cough worse. If you need help quitting, talk to your doctor about stop-smoking programs and medicines. These can increase your chances of quitting for good. · Avoid exposure to smoke, dust, or other pollutants, or wear a face mask. Check with your doctor or pharmacist to find out which type of face mask will give you the most benefit. · Take cough medicine as directed by your doctor. · Try cough drops to soothe a dry or sore throat. Cough drops don't stop a cough. Medicine-flavored cough drops are no better than candy-flavored drops or hard candy. Throat clearing  When you have a chronic cough or a disease that may cause this type of cough, you may often feel like you want to clear your throat. This helps bring up mucus. But throat clearing does not always have a cause. Throat clearing can become a habit. The more you do it, the more you feel like you need to do it. But frequent throat clearing can be hard on your vocal cords. It's like slamming them together. To help lessen throat clearing, you can try:  · Taking small sips of water. · Not clearing your throat when you feel you need to. · Swallowing hard when you want to clear your throat. You may want to ask your doctor if a medicine that thins mucus would help. When should you call for help? Call 911 anytime you think you may need emergency care. For example, call if:  ? · You have severe trouble breathing.    ?Call your doctor now or seek immediate medical care if:  ? · You cough up blood. ? · You have new or worse trouble breathing. ? · You have a new or higher fever. ? Watch closely for changes in your health, and be sure to contact your doctor if:  ? · You cough more deeply or more often, especially if you notice more mucus or a change in the color of your mucus. ? · You do not get better as expected. Where can you learn more? Go to http://marie-coleman.info/. Enter Y838 in the search box to learn more about \"Chronic Cough: Care Instructions. \"  Current as of: May 12, 2017  Content Version: 11.4  © 6253-4082 [employer]. Care instructions adapted under license by [employer] (which disclaims liability or warranty for this information). If you have questions about a medical condition or this instruction, always ask your healthcare professional. [place] [address] any warranty or liability for your use of this information. Recurring Migraine Headache: Care Instructions  Your Care Instructions  Migraines are painful, throbbing headaches. They often start on one side of the head. They may cause nausea and vomiting and make you sensitive to light, sound, or smell. Some people may have only a few migraines throughout life. Others have them as often as several times a month. The goal of treatment is to reduce the number of migraines you have and relieve your symptoms. Even with treatment, you may continue to have migraines. You play an important role in dealing with your headaches. Work on avoiding things that seem to trigger your migraines. When you feel a headache coming on, act quickly to stop it before it gets worse. Follow-up care is a key part of your treatment and safety. Be sure to make and go to all appointments, and call your doctor if you are having problems. It's also a good idea to know your test results and keep a list of the medicines you take.   How can you care for yourself at home? · Do not drive if you have taken a prescription pain medicine. · Rest in a quiet, dark room until your headache is gone. Close your eyes and try to relax or go to sleep. Do not watch TV or read. · Put a cold, moist cloth or cold pack on the painful area for 10 to 20 minutes at a time. Put a thin cloth between the cold pack and your skin. · Have someone gently massage your neck and shoulders. · Take your medicines exactly as prescribed. Call your doctor if you think you are having a problem with your medicine. You will get more details on the specific medicines your doctor prescribes. To prevent migraines  · Keep a headache diary so you can figure out what triggers your headaches. Avoiding triggers may help you prevent headaches. Record when each headache began, how long it lasted, and what the pain was like. Use words like throbbing, aching, stabbing, or dull. Write down any other symptoms you had with the headache. These may include nausea, flashing lights or dark spots, or sensitivity to bright light or loud noise. Note if the headache occurred near your period. List anything that might have triggered the headache. Triggers may include certain foods (chocolate, cheese, wine) or odors, smoke, bright light, stress, or lack of sleep. · If your doctor has prescribed medicine for your migraines, take it as directed. You may have medicine that you take only when you get a migraine and medicine that you take all the time to help prevent migraines. ¨ If your doctor has prescribed medicine for when you get a headache, take it at the first sign of a migraine, unless your doctor has given you other instructions. ¨ If your doctor has prescribed medicine to prevent migraines, take it exactly as prescribed. Call your doctor if you think you are having a problem with your medicine. · Find healthy ways to deal with stress. Migraines are most common during or right after stressful times.  Take time to relax before and after you do something that has caused a migraine in the past.  · Try to keep your muscles relaxed by keeping good posture. Check your jaw, face, neck, and shoulder muscles for tension. Try to relax them. When sitting at a desk, change positions often. Stretch for 30 seconds each hour. · Get regular sleep and exercise. · Eat regular meals, and avoid foods and drinks that often trigger migraines. These include chocolate and alcohol, especially red wine and port. Chemicals used in food, such as aspartame and monosodium glutamate (MSG), also can trigger migraines. So can some food additives, such as those found in hot dogs, call, cold cuts, aged cheeses, and pickled foods. · Limit caffeine by not drinking too much coffee, tea, or soda. Do not quit caffeine suddenly, because that can also give you migraines. · Do not smoke or allow others to smoke around you. If you need help quitting, talk to your doctor about stop-smoking programs and medicines. These can increase your chances of quitting for good. · If you are taking birth control pills or hormone therapy, talk to your doctor about whether they are triggering your migraines. When should you call for help? Call 911 anytime you think you may need emergency care. For example, call if:  ? · You have symptoms of a stroke. These may include:  ¨ Sudden numbness, tingling, weakness, or loss of movement in your face, arm, or leg, especially on only one side of your body. ¨ Sudden vision changes. ¨ Sudden trouble speaking. ¨ Sudden confusion or trouble understanding simple statements. ¨ Sudden problems with walking or balance. ¨ A sudden, severe headache that is different from past headaches. ?Call your doctor now or seek immediate medical care if:  ? · You develop a fever and a stiff neck. ? · You have new nausea and vomiting, or you cannot keep down food or liquids. ? Watch closely for changes in your health, and be sure to contact your doctor if:  ? · You have a headache that does not get better within 1 or 2 days. ? · Your headaches get worse or happen more often. Where can you learn more? Go to http://marie-coleman.info/. Enter V975 in the search box to learn more about \"Recurring Migraine Headache: Care Instructions. \"  Current as of: October 14, 2016  Content Version: 11.4  © 0031-1308 [employer]. Care instructions adapted under license by [employer] (which disclaims liability or warranty for this information). If you have questions about a medical condition or this instruction, always ask your healthcare professional. [place] [address] any warranty or liability for your use of this information.           Burt Fothergill FNP

## 2018-02-18 RX ORDER — DICYCLOMINE HYDROCHLORIDE 10 MG/1
CAPSULE ORAL
Qty: 160 CAP | Refills: 0 | Status: SHIPPED | OUTPATIENT
Start: 2018-02-18 | End: 2018-10-06 | Stop reason: SDUPTHER

## 2018-03-30 DIAGNOSIS — R52 PAIN: ICD-10-CM

## 2018-03-30 DIAGNOSIS — E78.00 HYPERCHOLESTEROLEMIA WITHOUT HYPERTRIGLYCERIDEMIA: ICD-10-CM

## 2018-03-30 DIAGNOSIS — Z78.9 STATIN INTOLERANCE: ICD-10-CM

## 2018-03-30 RX ORDER — MONTELUKAST SODIUM 10 MG/1
TABLET ORAL
Qty: 90 TAB | Refills: 0 | Status: SHIPPED | OUTPATIENT
Start: 2018-03-30 | End: 2018-09-14 | Stop reason: SDUPTHER

## 2018-03-30 RX ORDER — FENOFIBRATE 145 MG/1
TABLET, COATED ORAL
Qty: 90 TAB | Refills: 0 | Status: SHIPPED | OUTPATIENT
Start: 2018-03-30 | End: 2018-09-14 | Stop reason: SDUPTHER

## 2018-03-30 RX ORDER — DULOXETIN HYDROCHLORIDE 30 MG/1
CAPSULE, DELAYED RELEASE ORAL
Qty: 90 CAP | Refills: 0 | Status: SHIPPED | OUTPATIENT
Start: 2018-03-30 | End: 2018-09-14 | Stop reason: SDUPTHER

## 2018-06-12 ENCOUNTER — OFFICE VISIT (OUTPATIENT)
Dept: FAMILY MEDICINE CLINIC | Age: 53
End: 2018-06-12

## 2018-06-12 VITALS
RESPIRATION RATE: 19 BRPM | WEIGHT: 203.4 LBS | BODY MASS INDEX: 30.83 KG/M2 | TEMPERATURE: 97.5 F | DIASTOLIC BLOOD PRESSURE: 85 MMHG | HEART RATE: 78 BPM | HEIGHT: 68 IN | SYSTOLIC BLOOD PRESSURE: 129 MMHG | OXYGEN SATURATION: 97 %

## 2018-06-12 DIAGNOSIS — G43.909 MIGRAINE WITHOUT STATUS MIGRAINOSUS, NOT INTRACTABLE, UNSPECIFIED MIGRAINE TYPE: ICD-10-CM

## 2018-06-12 DIAGNOSIS — J20.9 ACUTE BRONCHITIS, UNSPECIFIED ORGANISM: Primary | ICD-10-CM

## 2018-06-12 RX ORDER — BENZONATATE 200 MG/1
200 CAPSULE ORAL
Qty: 30 CAP | Refills: 1 | Status: SHIPPED | OUTPATIENT
Start: 2018-06-12 | End: 2018-06-19

## 2018-06-12 RX ORDER — SUMATRIPTAN 100 MG/1
100 TABLET, FILM COATED ORAL
Qty: 15 TAB | Refills: 1 | Status: SHIPPED | OUTPATIENT
Start: 2018-06-12 | End: 2018-09-14 | Stop reason: SDUPTHER

## 2018-06-12 RX ORDER — ALBUTEROL SULFATE 90 UG/1
2 AEROSOL, METERED RESPIRATORY (INHALATION)
Qty: 1 INHALER | Refills: 5 | Status: SHIPPED | OUTPATIENT
Start: 2018-06-12 | End: 2020-11-03 | Stop reason: SDUPTHER

## 2018-06-12 NOTE — PATIENT INSTRUCTIONS
Recurring Migraine Headache: Care Instructions  Your Care Instructions  Migraines are painful, throbbing headaches. They often start on one side of the head. They may cause nausea and vomiting and make you sensitive to light, sound, or smell. Some people may have only a few migraines throughout life. Others have them as often as several times a month. The goal of treatment is to reduce the number of migraines you have and relieve your symptoms. Even with treatment, you may continue to have migraines. You play an important role in dealing with your headaches. Work on avoiding things that seem to trigger your migraines. When you feel a headache coming on, act quickly to stop it before it gets worse. Follow-up care is a key part of your treatment and safety. Be sure to make and go to all appointments, and call your doctor if you are having problems. It's also a good idea to know your test results and keep a list of the medicines you take. How can you care for yourself at home? · Do not drive if you have taken a prescription pain medicine. · Rest in a quiet, dark room until your headache is gone. Close your eyes and try to relax or go to sleep. Do not watch TV or read. · Put a cold, moist cloth or cold pack on the painful area for 10 to 20 minutes at a time. Put a thin cloth between the cold pack and your skin. · Have someone gently massage your neck and shoulders. · Take your medicines exactly as prescribed. Call your doctor if you think you are having a problem with your medicine. You will get more details on the specific medicines your doctor prescribes. To prevent migraines  · Keep a headache diary so you can figure out what triggers your headaches. Avoiding triggers may help you prevent headaches. Record when each headache began, how long it lasted, and what the pain was like. Use words like throbbing, aching, stabbing, or dull. Write down any other symptoms you had with the headache.  These may include nausea, flashing lights or dark spots, or sensitivity to bright light or loud noise. Note if the headache occurred near your period. List anything that might have triggered the headache. Triggers may include certain foods (chocolate, cheese, wine) or odors, smoke, bright light, stress, or lack of sleep. · If your doctor has prescribed medicine for your migraines, take it as directed. You may have medicine that you take only when you get a migraine and medicine that you take all the time to help prevent migraines. ¨ If your doctor has prescribed medicine for when you get a headache, take it at the first sign of a migraine, unless your doctor has given you other instructions. ¨ If your doctor has prescribed medicine to prevent migraines, take it exactly as prescribed. Call your doctor if you think you are having a problem with your medicine. · Find healthy ways to deal with stress. Migraines are most common during or right after stressful times. Take time to relax before and after you do something that has caused a migraine in the past.  · Try to keep your muscles relaxed by keeping good posture. Check your jaw, face, neck, and shoulder muscles for tension. Try to relax them. When sitting at a desk, change positions often. Stretch for 30 seconds each hour. · Get regular sleep and exercise. · Eat regular meals, and avoid foods and drinks that often trigger migraines. These include chocolate and alcohol, especially red wine and port. Chemicals used in food, such as aspartame and monosodium glutamate (MSG), also can trigger migraines. So can some food additives, such as those found in hot dogs, call, cold cuts, aged cheeses, and pickled foods. · Limit caffeine by not drinking too much coffee, tea, or soda. Do not quit caffeine suddenly, because that can also give you migraines. · Do not smoke or allow others to smoke around you.  If you need help quitting, talk to your doctor about stop-smoking programs and medicines. These can increase your chances of quitting for good. · If you are taking birth control pills or hormone therapy, talk to your doctor about whether they are triggering your migraines. When should you call for help? Call 911 anytime you think you may need emergency care. For example, call if:  ? · You have symptoms of a stroke. These may include:  ¨ Sudden numbness, tingling, weakness, or loss of movement in your face, arm, or leg, especially on only one side of your body. ¨ Sudden vision changes. ¨ Sudden trouble speaking. ¨ Sudden confusion or trouble understanding simple statements. ¨ Sudden problems with walking or balance. ¨ A sudden, severe headache that is different from past headaches. ?Call your doctor now or seek immediate medical care if:  ? · You develop a fever and a stiff neck. ? · You have new nausea and vomiting, or you cannot keep down food or liquids. ? Watch closely for changes in your health, and be sure to contact your doctor if:  ? · You have a headache that does not get better within 1 or 2 days. ? · Your headaches get worse or happen more often. Where can you learn more? Go to http://marie-coleman.info/. Enter V975 in the search box to learn more about \"Recurring Migraine Headache: Care Instructions. \"  Current as of: October 14, 2016  Content Version: 11.4  © 2227-8876 [employer]. Care instructions adapted under license by [employer] (which disclaims liability or warranty for this information). If you have questions about a medical condition or this instruction, always ask your healthcare professional. [place] [address] any warranty or liability for your use of this information. Bronchitis: Care Instructions  Your Care Instructions    Bronchitis is inflammation of the bronchial tubes, which carry air to the lungs. The tubes swell and produce mucus, or phlegm.  The mucus and inflamed bronchial tubes make you cough. You may have trouble breathing. Most cases of bronchitis are caused by viruses like those that cause colds. Antibiotics usually do not help and they may be harmful. Bronchitis usually develops rapidly and lasts about 2 to 3 weeks in otherwise healthy people. Follow-up care is a key part of your treatment and safety. Be sure to make and go to all appointments, and call your doctor if you are having problems. It's also a good idea to know your test results and keep a list of the medicines you take. How can you care for yourself at home? · Take all medicines exactly as prescribed. Call your doctor if you think you are having a problem with your medicine. · Get some extra rest.  · Take an over-the-counter pain medicine, such as acetaminophen (Tylenol), ibuprofen (Advil, Motrin), or naproxen (Aleve) to reduce fever and relieve body aches. Read and follow all instructions on the label. · Do not take two or more pain medicines at the same time unless the doctor told you to. Many pain medicines have acetaminophen, which is Tylenol. Too much acetaminophen (Tylenol) can be harmful. · Take an over-the-counter cough medicine that contains dextromethorphan to help quiet a dry, hacking cough so that you can sleep. Avoid cough medicines that have more than one active ingredient. Read and follow all instructions on the label. · Breathe moist air from a humidifier, hot shower, or sink filled with hot water. The heat and moisture will thin mucus so you can cough it out. · Do not smoke. Smoking can make bronchitis worse. If you need help quitting, talk to your doctor about stop-smoking programs and medicines. These can increase your chances of quitting for good. When should you call for help? Call 911 anytime you think you may need emergency care. For example, call if:  ? · You have severe trouble breathing.    ?Call your doctor now or seek immediate medical care if:  ? · You have new or worse trouble breathing. ? · You cough up dark brown or bloody mucus (sputum). ? · You have a new or higher fever. ? · You have a new rash. ? Watch closely for changes in your health, and be sure to contact your doctor if:  ? · You cough more deeply or more often, especially if you notice more mucus or a change in the color of your mucus. ? · You are not getting better as expected. Where can you learn more? Go to http://marie-coleman.info/. Enter H333 in the search box to learn more about \"Bronchitis: Care Instructions. \"  Current as of: May 12, 2017  Content Version: 11.4  © 0016-9159 [employer]. Care instructions adapted under license by [employer] (which disclaims liability or warranty for this information). If you have questions about a medical condition or this instruction, always ask your healthcare professional. [address] any warranty or liability for your use of this information.

## 2018-06-12 NOTE — MR AVS SNAPSHOT
[address] [facility] [address] 
[phone] Patient: Chris Arriaga. MRN: [id number] :1965 Visit Information Date & Time Provider Department Dept. Phone Encounter #  
 2018 10:00 AM Marian Hernandez NP [address] [facility] [id number] Follow-up Instructions Return in about 6 weeks (around 2018). Upcoming Health Maintenance Date Due Hepatitis C Screening 1965 FOBT Q 1 YEAR AGE 50-75 2013 Influenza Age 5 to Adult 2018 DTaP/Tdap/Td series (2 - Td) 2023 Allergies as of 2018  Review Complete On: 2018 By: Marian Hernandez NP Severity Noted Reaction Type Reactions Bactrim [Sulfamethoprim Ds]  2011    Rash Lipitor [Atorvastatin]  2015   Side Effect Myalgia Sulfamethoxazole-trimethoprim Low 2017    Rash Current Immunizations  Reviewed on 3/22/2013 Name Date Tdap 2013 11:38 AM  
  
 Not reviewed this visit You Were Diagnosed With   
  
 Codes Comments Acute bronchitis, unspecified organism    -  Primary ICD-10-CM: J20.9 ICD-9-CM: 466.0 Migraine without status migrainosus, not intractable, unspecified migraine type     ICD-10-CM: G43.909 ICD-9-CM: 346.90 Vitals BP Pulse Temp Resp Height(growth percentile) Weight(growth percentile) 129/85 78 97.5 °F (36.4 °C) (Oral) 19 5' 8\" (1.727 m) 203 lb 6.4 oz (92.3 kg) SpO2 BMI Smoking Status 97% 30.93 kg/m2 Never Smoker BMI and BSA Data Body Mass Index Body Surface Area 30.93 kg/m 2 2.1 m 2 Preferred Pharmacy Pharmacy Name Phone [id number] [address] [phone] Your Updated Medication List  
  
   
This list is accurate as of 18 10:52 AM.  Always use your most recent med list.  
  
  
  
  
 * albuterol 2.5 mg /3 mL (0.083 %) nebulizer solution Commonly known as:  PROVENTIL VENTOLIN  
3 mL by Nebulization route every four (4) hours as needed for Wheezing. * albuterol 90 mcg/actuation inhaler Commonly known as:  PROVENTIL HFA, VENTOLIN HFA, PROAIR HFA Take 2 Puffs by inhalation every four (4) hours as needed for Wheezing. albuterol-ipratropium 2.5 mg-0.5 mg/3 ml Nebu Commonly known as:  DUO-NEB  
3 mL by Nebulization route every six (6) hours as needed. azelastine 137 mcg (0.1 %) nasal spray Commonly known as:  ASTELIN  
1 SPRAY BY BOTH NOSTRILS ROUTE TWO (2) TIMES A DAY. USE IN EACH NOSTRIL AS DIRECTED  
  
 BC ARTHRITIS PO Take 1 Dose Pack by mouth as needed. benzonatate 200 mg capsule Commonly known as:  TESSALON Take 1 Cap by mouth three (3) times daily as needed for Cough for up to 7 days. dicyclomine 10 mg capsule Commonly known as:  BENTYL TAKE ONE CAPSULE 3 TIMES A DAY AS NEEDED FOR ABDOMINAL CRAMPING DULoxetine 30 mg capsule Commonly known as:  CYMBALTA TAKE ONE CAPSULE BY MOUTH EVERY DAY FOR BACK PAIN  
  
 fenofibrate nanocrystallized 145 mg tablet Commonly known as:  TRICOR  
TAKE 1 TABLET BY MOUTH DAILY. FOR HYPERCHOLESTEROLEMIA  
  
 fluticasone 50 mcg/actuation nasal spray Commonly known as:  FLONASE  
USE TWO SPRAYS IN EACH NOSTRIL ONCE DAILY FOR ALLERGIES. montelukast 10 mg tablet Commonly known as:  SINGULAIR  
TAKE 1 TABLET BY MOUTH EVERY DAY FOR ALLERGIES  
  
 omeprazole 20 mg capsule Commonly known as:  PRILOSEC  
TAKE ONE CAPSULE BY MOUTH EVERY DAY FOR IBS  
  
 SIMETHICONE Take 120 mg by mouth as needed. SUMAtriptan 100 mg tablet Commonly known as:  IMITREX Take 1 Tab by mouth once as needed for Migraine for up to 1 dose. * Notice: This list has 2 medication(s) that are the same as other medications prescribed for you.  Read the directions carefully, and ask your doctor or other care provider to review them with you. Prescriptions Sent to Pharmacy Refills  
 benzonatate (TESSALON) 200 mg capsule 1 Sig: Take 1 Cap by mouth three (3) times daily as needed for Cough for up to 7 days. Class: Normal  
 Pharmacy: [id number] [place] Ph #: [phone] Route: Oral  
 albuterol (PROVENTIL HFA, VENTOLIN HFA, PROAIR HFA) 90 mcg/actuation inhaler 5 Sig: Take 2 Puffs by inhalation every four (4) hours as needed for Wheezing. Class: Normal  
 Pharmacy: [id number] [place] [address] Ph #: [phone] Route: Inhalation SUMAtriptan (IMITREX) 100 mg tablet 1 Sig: Take 1 Tab by mouth once as needed for Migraine for up to 1 dose. Class: Normal  
 [id number] [place] [address] Ph #: [phone] Route: Oral  
  
Follow-up Instructions Return in about 6 weeks (around 7/24/2018). Patient Instructions Recurring Migraine Headache: Care Instructions Your Care Instructions Migraines are painful, throbbing headaches. They often start on one side of the head. They may cause nausea and vomiting and make you sensitive to light, sound, or smell. Some people may have only a few migraines throughout life. Others have them as often as several times a month. The goal of treatment is to reduce the number of migraines you have and relieve your symptoms. Even with treatment, you may continue to have migraines. You play an important role in dealing with your headaches. Work on avoiding things that seem to trigger your migraines. When you feel a headache coming on, act quickly to stop it before it gets worse. Follow-up care is a key part of your treatment and safety. Be sure to make and go to all appointments, and call your doctor if you are having problems.  It's also a good idea to know your test results and keep a list of the medicines you take. How can you care for yourself at home? · Do not drive if you have taken a prescription pain medicine. · Rest in a quiet, dark room until your headache is gone. Close your eyes and try to relax or go to sleep. Do not watch TV or read. · Put a cold, moist cloth or cold pack on the painful area for 10 to 20 minutes at a time. Put a thin cloth between the cold pack and your skin. · Have someone gently massage your neck and shoulders. · Take your medicines exactly as prescribed. Call your doctor if you think you are having a problem with your medicine. You will get more details on the specific medicines your doctor prescribes. To prevent migraines · Keep a headache diary so you can figure out what triggers your headaches. Avoiding triggers may help you prevent headaches. Record when each headache began, how long it lasted, and what the pain was like. Use words like throbbing, aching, stabbing, or dull. Write down any other symptoms you had with the headache. These may include nausea, flashing lights or dark spots, or sensitivity to bright light or loud noise. Note if the headache occurred near your period. List anything that might have triggered the headache. Triggers may include certain foods (chocolate, cheese, wine) or odors, smoke, bright light, stress, or lack of sleep. · If your doctor has prescribed medicine for your migraines, take it as directed. You may have medicine that you take only when you get a migraine and medicine that you take all the time to help prevent migraines. ¨ If your doctor has prescribed medicine for when you get a headache, take it at the first sign of a migraine, unless your doctor has given you other instructions. ¨ If your doctor has prescribed medicine to prevent migraines, take it exactly as prescribed. Call your doctor if you think you are having a problem with your medicine. · Find healthy ways to deal with stress. Migraines are most common during or right after stressful times. Take time to relax before and after you do something that has caused a migraine in the past. 
· Try to keep your muscles relaxed by keeping good posture. Check your jaw, face, neck, and shoulder muscles for tension. Try to relax them. When sitting at a desk, change positions often. Stretch for 30 seconds each hour. · Get regular sleep and exercise. · Eat regular meals, and avoid foods and drinks that often trigger migraines. These include chocolate and alcohol, especially red wine and port. Chemicals used in food, such as aspartame and monosodium glutamate (MSG), also can trigger migraines. So can some food additives, such as those found in hot dogs, call, cold cuts, aged cheeses, and pickled foods. · Limit caffeine by not drinking too much coffee, tea, or soda. Do not quit caffeine suddenly, because that can also give you migraines. · Do not smoke or allow others to smoke around you. If you need help quitting, talk to your doctor about stop-smoking programs and medicines. These can increase your chances of quitting for good. · If you are taking birth control pills or hormone therapy, talk to your doctor about whether they are triggering your migraines. When should you call for help? Call 911 anytime you think you may need emergency care. For example, call if: 
? · You have symptoms of a stroke. These may include: 
¨ Sudden numbness, tingling, weakness, or loss of movement in your face, arm, or leg, especially on only one side of your body. ¨ Sudden vision changes. ¨ Sudden trouble speaking. ¨ Sudden confusion or trouble understanding simple statements. ¨ Sudden problems with walking or balance. ¨ A sudden, severe headache that is different from past headaches. ?Call your doctor now or seek immediate medical care if: 
? · You develop a fever and a stiff neck. ? · You have new nausea and vomiting, or you cannot keep down food or liquids. ? Watch closely for changes in your health, and be sure to contact your doctor if: 
? · You have a headache that does not get better within 1 or 2 days. ? · Your headaches get worse or happen more often. Where can you learn more? Go to http://marie-coleman.info/. Enter V975 in the search box to learn more about \"Recurring Migraine Headache: Care Instructions. \" Current as of: October 14, 2016 Content Version: 11.4 © 0492-0668 [employer]. Care instructions adapted under license by [employer] (which disclaims liability or warranty for this information). If you have questions about a medical condition or this instruction, always ask your healthcare professional. [address] any warranty or liability for your use of this information. Bronchitis: Care Instructions Your Care Instructions Bronchitis is inflammation of the bronchial tubes, which carry air to the lungs. The tubes swell and produce mucus, or phlegm. The mucus and inflamed bronchial tubes make you cough. You may have trouble breathing. Most cases of bronchitis are caused by viruses like those that cause colds. Antibiotics usually do not help and they may be harmful. Bronchitis usually develops rapidly and lasts about 2 to 3 weeks in otherwise healthy people. Follow-up care is a key part of your treatment and safety. Be sure to make and go to all appointments, and call your doctor if you are having problems. It's also a good idea to know your test results and keep a list of the medicines you take. How can you care for yourself at home? · Take all medicines exactly as prescribed. Call your doctor if you think you are having a problem with your medicine.  
· Get some extra rest. 
· Take an over-the-counter pain medicine, such as acetaminophen (Tylenol), ibuprofen (Advil, Motrin), or naproxen (Aleve) to reduce fever and relieve body aches. Read and follow all instructions on the label. · Do not take two or more pain medicines at the same time unless the doctor told you to. Many pain medicines have acetaminophen, which is Tylenol. Too much acetaminophen (Tylenol) can be harmful. · Take an over-the-counter cough medicine that contains dextromethorphan to help quiet a dry, hacking cough so that you can sleep. Avoid cough medicines that have more than one active ingredient. Read and follow all instructions on the label. · Breathe moist air from a humidifier, hot shower, or sink filled with hot water. The heat and moisture will thin mucus so you can cough it out. · Do not smoke. Smoking can make bronchitis worse. If you need help quitting, talk to your doctor about stop-smoking programs and medicines. These can increase your chances of quitting for good. When should you call for help? Call 911 anytime you think you may need emergency care. For example, call if: 
? · You have severe trouble breathing. ?Call your doctor now or seek immediate medical care if: 
? · You have new or worse trouble breathing. ? · You cough up dark brown or bloody mucus (sputum). ? · You have a new or higher fever. ? · You have a new rash. ? Watch closely for changes in your health, and be sure to contact your doctor if: 
? · You cough more deeply or more often, especially if you notice more mucus or a change in the color of your mucus. ? · You are not getting better as expected. Where can you learn more? Go to http://marie-coleman.info/. Enter H333 in the search box to learn more about \"Bronchitis: Care Instructions. \" Current as of: May 12, 2017 Content Version: 11.4 © 1060-5123 [employer], [employer].  Care instructions adapted under license by [employer] (which disclaims liability or warranty for this information). If you have questions about a medical condition or this instruction, always ask your healthcare professional. [address] any warranty or liability for your use of this information. Introducing hospitals & [facility] SERVICES! Dear Ozzie Azevedo: Thank you for requesting a [employer] account. Our records indicate that you already have an active [employer] account. You can access your account anytime at [email or web address] Did you know that you can access your hospital and ER discharge instructions at any time in [employer]? You can also review all of your test results from your hospital stay or ER visit. Additional Information If you have questions, please visit the Frequently Asked Questions section of the [employer] website at [email or web address] Remember, [employer] is NOT to be used for urgent needs. For medical emergencies, dial 911. Now available from your iPhone and Android! Please provide this summary of care documentation to your next provider. Your primary care clinician is listed as Ole Mcknight. If you have any questions after today's visit, please call [phone].

## 2018-06-12 NOTE — PROGRESS NOTES
Chief Complaint   Patient presents with    Headache     Pt here c/o frequent headaches, occurring 5/7 days in a week. Pt states pain at worse is 7-8/10, Pt has tried OTC medications with no relief. Pt reports going to sleep and wakes up still having headache.

## 2018-06-19 NOTE — PROGRESS NOTES
Subjective:   Robert Garcia is a 45 y.o. male who complains of congestion, dry cough and shortness of breath for 2-3 weeks, gradually worsening since that time. He denies a history of chills, fevers, wheezing and sputum production. Having headaches 5 days a week, notes onset with frequent coughing. + nausea. + sensitivity to light. No vision changes or other neurological deficits. He notes he is out of imitrex. He's had several episodes of persistent bronchitis in the past year. Was referred to pulmonary but did not go as he thought he was doing better before current episode. Evaluation to date: none. Treatment to date: OTC products, which have been not very effective. Patient does not smoke cigarettes. Relevant PMH: No pertinent additional PMH. 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 Marilyne Buffy    IBS (irritable bowel syndrome) K58.9    Statin intolerance Z78.9    High triglycerides E78.1    Hypercholesterolemia without hypertriglyceridemia E78.00    Sinusitis J32.9    Complete tear of right rotator cuff M75.121     Allergies   Allergen Reactions    Bactrim [Sulfamethoprim Ds] Rash    Lipitor [Atorvastatin] Myalgia    Sulfamethoxazole-Trimethoprim Rash     Past Medical History:   Diagnosis Date    Arthritis     Back pain     Basal cell carcinoma 2012    3 on left side of neck and 1 on right side of neck and nasal bridge area with first dx starting aprroximately 12 yrs ago    Family history of skin cancer     Frequent nosebleeds     H/O seasonal allergies     High cholesterol     Joint pain     Joint swelling     Migraine     Porphyria cutanea tarda [facility])     followed by Jo Espinoza Sinus complaint     Skin cancer     Sun-damaged skin     Sunburn, blistering      Past Surgical History:   Procedure Laterality Date    HX HEART CATHETERIZATION  2013    minimal disease per cardio notes.  HX ORTHOPAEDIC Bilateral     bilateral thumb surgery    HX ORTHOPAEDIC Left ~2013    elbow surgery    HX ROTATOR CUFF REPAIR      Right shoulder    HX SEPTOPLASTY  2002    HX SHOULDER ARTHROSCOPY Left ~2013     Family History   Problem Relation Age of Onset    Hypertension Brother     Diabetes Brother     Diabetes Mother     Hypertension Mother     Cancer Mother      lung     Social History   Substance Use Topics    Smoking status: Never Smoker    Smokeless tobacco: Never Used    Alcohol use 3.6 oz/week     6 Cans of beer per week        Review of Systems  A comprehensive review of systems was negative except for that written in the HPI. Objective:     Visit Vitals    /85    Pulse 78    Temp 97.5 °F (36.4 °C) (Oral)    Resp 19    Ht 5' 8\" (1.727 m)    Wt 203 lb 6.4 oz (92.3 kg)    SpO2 97%    BMI 30.93 kg/m2     General:  alert, cooperative, no distress   Eyes: negative   Ears: normal TM's and external ear canals AU   Sinuses: Normal paranasal sinuses without tenderness   Mouth:  Lips, mucosa, and tongue normal. Teeth and gums normal   Neck: supple, symmetrical, trachea midline and no adenopathy. Heart: S1 and S2 normal, no murmurs noted. Lungs: clear to auscultation bilaterally   Abdomen: soft, non-tender. Bowel sounds normal. No masses,  no organomegaly        Assessment/Plan:   bronchitis  Suggested symptomatic OTC remedies. RTC prn. Discussed diagnosis and treatment of viral URIs. Discussed the importance of avoiding unnecessary antibiotic therapy. Diagnoses and all orders for this visit:    1.  Acute bronchitis, unspecified organism  Add rx  Schedule pulmonary consult- suspect underlying asthma  -     benzonatate (TESSALON) 200 mg capsule; Take 1 Cap by mouth three (3) times daily as needed for Cough for up to 7 days. -     albuterol (PROVENTIL HFA, VENTOLIN HFA, PROAIR HFA) 90 mcg/actuation inhaler; Take 2 Puffs by inhalation every four (4) hours as needed for Wheezing. 2. Migraine without status migrainosus, not intractable, unspecified migraine type  Cough identified as trigger for migraine  Control cough  imitrex prn  -     SUMAtriptan (IMITREX) 100 mg tablet; Take 1 Tab by mouth once as needed for Migraine for up to 1 dose. Follow-up Disposition:  Return in about 6 weeks (around 7/24/2018). .    I have discussed the diagnosis with the patient and the intended plan as seen in the above orders. The patient has received an after-visit summary and questions were answered concerning future plans. Patient conveyed understanding of the plan at the time of the visit.     Marian Hernandez, NP

## 2018-09-14 ENCOUNTER — OFFICE VISIT (OUTPATIENT)
Dept: FAMILY MEDICINE CLINIC | Age: 53
End: 2018-09-14

## 2018-09-14 VITALS
WEIGHT: 196.5 LBS | DIASTOLIC BLOOD PRESSURE: 77 MMHG | HEIGHT: 68 IN | RESPIRATION RATE: 19 BRPM | BODY MASS INDEX: 29.78 KG/M2 | OXYGEN SATURATION: 96 % | HEART RATE: 74 BPM | SYSTOLIC BLOOD PRESSURE: 116 MMHG | TEMPERATURE: 98.1 F

## 2018-09-14 DIAGNOSIS — Z78.9 STATIN INTOLERANCE: ICD-10-CM

## 2018-09-14 DIAGNOSIS — R52 PAIN: ICD-10-CM

## 2018-09-14 DIAGNOSIS — K21.9 GASTROESOPHAGEAL REFLUX DISEASE WITHOUT ESOPHAGITIS: ICD-10-CM

## 2018-09-14 DIAGNOSIS — G43.909 MIGRAINE WITHOUT STATUS MIGRAINOSUS, NOT INTRACTABLE, UNSPECIFIED MIGRAINE TYPE: ICD-10-CM

## 2018-09-14 DIAGNOSIS — E78.00 HYPERCHOLESTEROLEMIA WITHOUT HYPERTRIGLYCERIDEMIA: Primary | ICD-10-CM

## 2018-09-14 DIAGNOSIS — K58.9 IRRITABLE BOWEL SYNDROME WITHOUT DIARRHEA: ICD-10-CM

## 2018-09-14 RX ORDER — NAPROXEN 250 MG/1
TABLET ORAL 2 TIMES DAILY WITH MEALS
COMMUNITY
End: 2019-02-07

## 2018-09-14 RX ORDER — SUMATRIPTAN 100 MG/1
100 TABLET, FILM COATED ORAL
COMMUNITY
End: 2020-11-20

## 2018-09-14 RX ORDER — SUMATRIPTAN 100 MG/1
100 TABLET, FILM COATED ORAL
Qty: 15 TAB | Refills: 1 | Status: SHIPPED | OUTPATIENT
Start: 2018-09-14 | End: 2018-09-14

## 2018-09-14 RX ORDER — FENOFIBRATE 145 MG/1
TABLET, COATED ORAL
Qty: 90 TAB | Refills: 1 | Status: SHIPPED | OUTPATIENT
Start: 2018-09-14 | End: 2019-02-05

## 2018-09-14 RX ORDER — OMEPRAZOLE 20 MG/1
20 CAPSULE, DELAYED RELEASE ORAL DAILY
Qty: 90 CAP | Refills: 1 | Status: SHIPPED | OUTPATIENT
Start: 2018-09-14 | End: 2019-03-27 | Stop reason: SDUPTHER

## 2018-09-14 RX ORDER — OMEPRAZOLE 20 MG/1
CAPSULE, DELAYED RELEASE ORAL
Qty: 90 CAP | Refills: 0 | Status: CANCELLED | OUTPATIENT
Start: 2018-09-14

## 2018-09-14 RX ORDER — MONTELUKAST SODIUM 10 MG/1
TABLET ORAL
Qty: 90 TAB | Refills: 1 | Status: SHIPPED | OUTPATIENT
Start: 2018-09-14 | End: 2019-03-27 | Stop reason: SDUPTHER

## 2018-09-14 RX ORDER — DULOXETIN HYDROCHLORIDE 30 MG/1
CAPSULE, DELAYED RELEASE ORAL
Qty: 90 CAP | Refills: 1 | Status: SHIPPED | OUTPATIENT
Start: 2018-09-14 | End: 2019-02-08

## 2018-09-14 NOTE — MR AVS SNAPSHOT
[address] [facility] [address] 
[phone] Patient: Oriana Grier. MRN: [id number] :1965 Visit Information Date & Time Provider Department Dept. Phone Encounter #  
 2018  9:45 AM Alla Rodriguez NP [address] [facility] [phone] [id number] Follow-up Instructions Return in about 3 months (around 2018). Upcoming Health Maintenance Date Due Hepatitis C Screening 1965 FOBT Q 1 YEAR AGE 50-75 2013 Influenza Age 5 to Adult 2018 DTaP/Tdap/Td series (2 - Td) 2023 Allergies as of 2018  Review Complete On: 2018 By: Alla Rodriguez NP Severity Noted Reaction Type Reactions Bactrim [Sulfamethoprim Ds]  2011    Rash Lipitor [Atorvastatin]  2015   Side Effect Myalgia Sulfamethoxazole-trimethoprim Low 2017    Rash Current Immunizations  Reviewed on 3/22/2013 Name Date Tdap 2013 11:38 AM  
  
 Not reviewed this visit You Were Diagnosed With   
  
 Codes Comments Statin intolerance     ICD-10-CM: Z78.9 ICD-9-CM: 995.27 Hypercholesterolemia without hypertriglyceridemia     ICD-10-CM: E78.00 ICD-9-CM: 272.0 Migraine without status migrainosus, not intractable, unspecified migraine type     ICD-10-CM: G43.909 ICD-9-CM: 346.90 Pain     ICD-10-CM: R52 ICD-9-CM: 780.96 Vitals BP Pulse Temp Resp Height(growth percentile) Weight(growth percentile) 116/77 74 98.1 °F (36.7 °C) (Oral) 19 5' 8\" (1.727 m) 196 lb 8 oz (89.1 kg) SpO2 BMI Smoking Status 96% 29.88 kg/m2 Never Smoker BMI and BSA Data Body Mass Index Body Surface Area  
 29.88 kg/m 2 2.07 m 2 Preferred Pharmacy Pharmacy Name Phone [id number] YESSICA Bowen  [address] [place] [address] [phone] Your Updated Medication List  
  
   
 This list is accurate as of 9/14/18 10:14 AM.  Always use your most recent med list.  
  
  
  
  
 * albuterol 2.5 mg /3 mL (0.083 %) nebulizer solution Commonly known as:  PROVENTIL VENTOLIN  
3 mL by Nebulization route every four (4) hours as needed for Wheezing. * albuterol 90 mcg/actuation inhaler Commonly known as:  PROVENTIL HFA, VENTOLIN HFA, PROAIR HFA Take 2 Puffs by inhalation every four (4) hours as needed for Wheezing. albuterol-ipratropium 2.5 mg-0.5 mg/3 ml Nebu Commonly known as:  DUO-NEB  
3 mL by Nebulization route every six (6) hours as needed. azelastine 137 mcg (0.1 %) nasal spray Commonly known as:  ASTELIN  
1 SPRAY BY BOTH NOSTRILS ROUTE TWO (2) TIMES A DAY. USE IN EACH NOSTRIL AS DIRECTED  
  
 BC ARTHRITIS PO Take 1 Dose Pack by mouth as needed. dicyclomine 10 mg capsule Commonly known as:  BENTYL TAKE ONE CAPSULE 3 TIMES A DAY AS NEEDED FOR ABDOMINAL CRAMPING DULoxetine 30 mg capsule Commonly known as:  CYMBALTA TAKE ONE CAPSULE BY MOUTH EVERY DAY FOR BACK PAIN  
  
 fenofibrate nanocrystallized 145 mg tablet Commonly known as:  TRICOR  
TAKE 1 TABLET BY MOUTH DAILY. FOR HYPERCHOLESTEROLEMIA  
  
 fluticasone 50 mcg/actuation nasal spray Commonly known as:  FLONASE  
USE TWO SPRAYS IN EACH NOSTRIL ONCE DAILY FOR ALLERGIES. * IMITREX 100 mg tablet Generic drug:  SUMAtriptan Take 100 mg by mouth once as needed for Migraine. * SUMAtriptan 100 mg tablet Commonly known as:  IMITREX Take 1 Tab by mouth once as needed for Migraine for up to 1 dose. montelukast 10 mg tablet Commonly known as:  SINGULAIR  
TAKE 1 TABLET BY MOUTH EVERY DAY FOR ALLERGIES  
  
 naproxen 250 mg tablet Commonly known as:  NAPROSYN Take  by mouth two (2) times daily (with meals). omeprazole 20 mg capsule Commonly known as:  PRILOSEC  
TAKE ONE CAPSULE BY MOUTH EVERY DAY FOR IBS  
  
 SIMETHICONE  
 Take 120 mg by mouth as needed. * Notice: This list has 4 medication(s) that are the same as other medications prescribed for you. Read the directions carefully, and ask your doctor or other care provider to review them with you. Prescriptions Sent to Pharmacy Refills DULoxetine (CYMBALTA) 30 mg capsule 1 Sig: TAKE ONE CAPSULE BY MOUTH EVERY DAY FOR BACK PAIN Class: Normal  
 [id number] [address] Ph #: [phone] SUMAtriptan (IMITREX) 100 mg tablet 1 Sig: Take 1 Tab by mouth once as needed for Migraine for up to 1 dose. Class: Normal  
 [id number] [address] Ph #: [phone] Route: Oral  
 montelukast (SINGULAIR) 10 mg tablet 1 Sig: TAKE 1 TABLET BY MOUTH EVERY DAY FOR ALLERGIES Class: Normal  
 [id number] [address] [facility] Ph #: [phone]  
 fenofibrate nanocrystallized (TRICOR) 145 mg tablet 1 Sig: TAKE 1 TABLET BY MOUTH DAILY. FOR HYPERCHOLESTEROLEMIA Class: Normal  
 Pharmacy: [id number] Karina Wagner[address] Ph #: [phone] We Performed the Following LIPID PANEL [94778 CPT(R)] METABOLIC PANEL, COMPREHENSIVE [83555 CPT(R)] REFERRAL TO NEUROLOGY [VJS11 Custom] Comments:  
 Daily headaches, failed topiramate, amitriptyline, gabapentin, and propranolol prophylaxis in the past.  
  
Follow-up Instructions Return in about 3 months (around 12/14/2018). Referral Information Referral ID Referred By Referred To  
  
 [id number] Taylor Madrigal MD   
   [address] Phone: [phone] Fax: [phone] Visits Status Start Date End Date 1 New Request 9/14/18 9/14/19  If your referral has a status of pending review or denied, additional information will be sent to support the outcome of this decision. Patient Instructions Recurring Migraine Headache: Care Instructions Your Care Instructions Migraines are painful, throbbing headaches. They often start on one side of the head. They may cause nausea and vomiting and make you sensitive to light, sound, or smell. Some people may have only a few migraines throughout life. Others have them as often as several times a month. The goal of treatment is to reduce the number of migraines you have and relieve your symptoms. Even with treatment, you may continue to have migraines. You play an important role in dealing with your headaches. Work on avoiding things that seem to trigger your migraines. When you feel a headache coming on, act quickly to stop it before it gets worse. Follow-up care is a key part of your treatment and safety. Be sure to make and go to all appointments, and call your doctor if you are having problems. It's also a good idea to know your test results and keep a list of the medicines you take. How can you care for yourself at home? · Do not drive if you have taken a prescription pain medicine. · Rest in a quiet, dark room until your headache is gone. Close your eyes and try to relax or go to sleep. Do not watch TV or read. · Put a cold, moist cloth or cold pack on the painful area for 10 to 20 minutes at a time. Put a thin cloth between the cold pack and your skin. · Have someone gently massage your neck and shoulders. · Take your medicines exactly as prescribed. Call your doctor if you think you are having a problem with your medicine. You will get more details on the specific medicines your doctor prescribes. To prevent migraines · Keep a headache diary so you can figure out what triggers your headaches. Avoiding triggers may help you prevent headaches. Record when each headache began, how long it lasted, and what the pain was like.  Use words like throbbing, aching, stabbing, or dull. Write down any other symptoms you had with the headache. These may include nausea, flashing lights or dark spots, or sensitivity to bright light or loud noise. Note if the headache occurred near your period. List anything that might have triggered the headache. Triggers may include certain foods (chocolate, cheese, wine) or odors, smoke, bright light, stress, or lack of sleep. · If your doctor has prescribed medicine for your migraines, take it as directed. You may have medicine that you take only when you get a migraine and medicine that you take all the time to help prevent migraines. ¨ If your doctor has prescribed medicine for when you get a headache, take it at the first sign of a migraine, unless your doctor has given you other instructions. ¨ If your doctor has prescribed medicine to prevent migraines, take it exactly as prescribed. Call your doctor if you think you are having a problem with your medicine. · Find healthy ways to deal with stress. Migraines are most common during or right after stressful times. Take time to relax before and after you do something that has caused a migraine in the past. 
· Try to keep your muscles relaxed by keeping good posture. Check your jaw, face, neck, and shoulder muscles for tension. Try to relax them. When sitting at a desk, change positions often. Stretch for 30 seconds each hour. · Get regular sleep and exercise. · Eat regular meals, and avoid foods and drinks that often trigger migraines. These include chocolate and alcohol, especially red wine and port. Chemicals used in food, such as aspartame and monosodium glutamate (MSG), also can trigger migraines. So can some food additives, such as those found in hot dogs, call, cold cuts, aged cheeses, and pickled foods. · Limit caffeine by not drinking too much coffee, tea, or soda. Do not quit caffeine suddenly, because that can also give you migraines. · Do not smoke or allow others to smoke around you. If you need help quitting, talk to your doctor about stop-smoking programs and medicines. These can increase your chances of quitting for good. · If you are taking birth control pills or hormone therapy, talk to your doctor about whether they are triggering your migraines. When should you call for help? Call 911 anytime you think you may need emergency care. For example, call if: 
  · You have symptoms of a stroke. These may include: 
¨ Sudden numbness, tingling, weakness, or loss of movement in your face, arm, or leg, especially on only one side of your body. ¨ Sudden vision changes. ¨ Sudden trouble speaking. ¨ Sudden confusion or trouble understanding simple statements. ¨ Sudden problems with walking or balance. ¨ A sudden, severe headache that is different from past headaches.  
 Call your doctor now or seek immediate medical care if: 
  · You develop a fever and a stiff neck.  
  · You have new nausea and vomiting, or you cannot keep down food or liquids.  
 Watch closely for changes in your health, and be sure to contact your doctor if: 
  · You have a headache that does not get better within 1 or 2 days.  
  · Your headaches get worse or happen more often. Where can you learn more? Go to http://marie-coleman.info/. Enter V975 in the search box to learn more about \"Recurring Migraine Headache: Care Instructions. \" Current as of: October 9, 2017 Content Version: 11.7 © 5408-2869 [employer]. Care instructions adapted under license by [employer] (which disclaims liability or warranty for this information). If you have questions about a medical condition or this instruction, always ask your healthcare professional. [place] [address] any warranty or liability for your use of this information. High Cholesterol: Care Instructions Your Care Instructions Cholesterol is a type of fat in your blood. It is needed for many body functions, such as making new cells. Cholesterol is made by your body. It also comes from food you eat. High cholesterol means that you have too much of the fat in your blood. This raises your risk of a heart attack and stroke. LDL and HDL are part of your total cholesterol. LDL is the \"bad\" cholesterol. High LDL can raise your risk for heart disease, heart attack, and stroke. HDL is the \"good\" cholesterol. It helps clear bad cholesterol from the body. High HDL is linked with a lower risk of heart disease, heart attack, and stroke. Your cholesterol levels help your doctor find out your risk for having a heart attack or stroke. You and your doctor can talk about whether you need to lower your risk and what treatment is best for you. A heart-healthy lifestyle along with medicines can help lower your cholesterol and your risk. The way you choose to lower your risk will depend on how high your risk is for heart attack and stroke. It will also depend on how you feel about taking medicines. Follow-up care is a key part of your treatment and safety. Be sure to make and go to all appointments, and call your doctor if you are having problems. It's also a good idea to know your test results and keep a list of the medicines you take. How can you care for yourself at home? · Eat a variety of foods every day. Good choices include fruits, vegetables, whole grains (like oatmeal), dried beans and peas, nuts and seeds, soy products (like tofu), and fat-free or low-fat dairy products. · Replace butter, margarine, and hydrogenated or partially hydrogenated oils with olive and canola oils. (Canola oil margarine without trans fat is fine.) · Replace red meat with fish, poultry, and soy protein (like tofu). · Limit processed and packaged foods like chips, crackers, and cookies. · Bake, broil, or steam foods. Don't kimbrough them. · Be physically active. Get at least 30 minutes of exercise on most days of the week. Walking is a good choice. You also may want to do other activities, such as running, swimming, cycling, or playing tennis or team sports. · Stay at a healthy weight or lose weight by making the changes in eating and physical activity listed above. Losing just a small amount of weight, even 5 to 10 pounds, can reduce your risk for having a heart attack or stroke. · Do not smoke. When should you call for help? Watch closely for changes in your health, and be sure to contact your doctor if: 
  · You need help making lifestyle changes.  
  · You have questions about your medicine. Where can you learn more? Go to http://marie-coleman.info/. Enter D068 in the search box to learn more about \"High Cholesterol: Care Instructions. \" Current as of: May 10, 2017 Content Version: 11.7 © 3314-0062 [employer]. Care instructions adapted under license by [employer] (which disclaims liability or warranty for this information). If you have questions about a medical condition or this instruction, always ask your healthcare professional. [address] any warranty or liability for your use of this information. Introducing [facility]! Dear Humberto Sebastian: Thank you for requesting a [employer] account. Our records indicate that you already have an active [employer] account. You can access your account anytime at [email or web address] Did you know that you can access your hospital and ER discharge instructions at any time in Pro [employer]? You can also review all of your test results from your hospital stay or ER visit. Additional Information If you have questions, please visit the Frequently Asked Questions section of the [employer] website at [email or web address] Remember, [employer] is NOT to be used for urgent needs.  For medical emergencies, dial 911. Now available from your iPhone and Android! Please provide this summary of care documentation to your next provider. Your primary care clinician is listed as Sandra Beltrán. If you have any questions after today's visit, please call [phone].

## 2018-09-14 NOTE — PROGRESS NOTES
Chief Complaint   Patient presents with    Follow-up     lipids    Labs     fasting    Medication Refill     Pt here for follow up with provider for increased lipids. Pt is fasting for labs. Pt needs medication refills today as well.

## 2018-09-14 NOTE — PATIENT INSTRUCTIONS
Recurring Migraine Headache: Care Instructions  Your Care Instructions  Migraines are painful, throbbing headaches. They often start on one side of the head. They may cause nausea and vomiting and make you sensitive to light, sound, or smell. Some people may have only a few migraines throughout life. Others have them as often as several times a month. The goal of treatment is to reduce the number of migraines you have and relieve your symptoms. Even with treatment, you may continue to have migraines. You play an important role in dealing with your headaches. Work on avoiding things that seem to trigger your migraines. When you feel a headache coming on, act quickly to stop it before it gets worse. Follow-up care is a key part of your treatment and safety. Be sure to make and go to all appointments, and call your doctor if you are having problems. It's also a good idea to know your test results and keep a list of the medicines you take. How can you care for yourself at home? · Do not drive if you have taken a prescription pain medicine. · Rest in a quiet, dark room until your headache is gone. Close your eyes and try to relax or go to sleep. Do not watch TV or read. · Put a cold, moist cloth or cold pack on the painful area for 10 to 20 minutes at a time. Put a thin cloth between the cold pack and your skin. · Have someone gently massage your neck and shoulders. · Take your medicines exactly as prescribed. Call your doctor if you think you are having a problem with your medicine. You will get more details on the specific medicines your doctor prescribes. To prevent migraines  · Keep a headache diary so you can figure out what triggers your headaches. Avoiding triggers may help you prevent headaches. Record when each headache began, how long it lasted, and what the pain was like. Use words like throbbing, aching, stabbing, or dull. Write down any other symptoms you had with the headache.  These may include nausea, flashing lights or dark spots, or sensitivity to bright light or loud noise. Note if the headache occurred near your period. List anything that might have triggered the headache. Triggers may include certain foods (chocolate, cheese, wine) or odors, smoke, bright light, stress, or lack of sleep. · If your doctor has prescribed medicine for your migraines, take it as directed. You may have medicine that you take only when you get a migraine and medicine that you take all the time to help prevent migraines. ¨ If your doctor has prescribed medicine for when you get a headache, take it at the first sign of a migraine, unless your doctor has given you other instructions. ¨ If your doctor has prescribed medicine to prevent migraines, take it exactly as prescribed. Call your doctor if you think you are having a problem with your medicine. · Find healthy ways to deal with stress. Migraines are most common during or right after stressful times. Take time to relax before and after you do something that has caused a migraine in the past.  · Try to keep your muscles relaxed by keeping good posture. Check your jaw, face, neck, and shoulder muscles for tension. Try to relax them. When sitting at a desk, change positions often. Stretch for 30 seconds each hour. · Get regular sleep and exercise. · Eat regular meals, and avoid foods and drinks that often trigger migraines. These include chocolate and alcohol, especially red wine and port. Chemicals used in food, such as aspartame and monosodium glutamate (MSG), also can trigger migraines. So can some food additives, such as those found in hot dogs, call, cold cuts, aged cheeses, and pickled foods. · Limit caffeine by not drinking too much coffee, tea, or soda. Do not quit caffeine suddenly, because that can also give you migraines. · Do not smoke or allow others to smoke around you.  If you need help quitting, talk to your doctor about stop-smoking programs and medicines. These can increase your chances of quitting for good. · If you are taking birth control pills or hormone therapy, talk to your doctor about whether they are triggering your migraines. When should you call for help? Call 911 anytime you think you may need emergency care. For example, call if:    · You have symptoms of a stroke. These may include:  ¨ Sudden numbness, tingling, weakness, or loss of movement in your face, arm, or leg, especially on only one side of your body. ¨ Sudden vision changes. ¨ Sudden trouble speaking. ¨ Sudden confusion or trouble understanding simple statements. ¨ Sudden problems with walking or balance. ¨ A sudden, severe headache that is different from past headaches.    Call your doctor now or seek immediate medical care if:    · You develop a fever and a stiff neck.     · You have new nausea and vomiting, or you cannot keep down food or liquids.    Watch closely for changes in your health, and be sure to contact your doctor if:    · You have a headache that does not get better within 1 or 2 days.     · Your headaches get worse or happen more often. Where can you learn more? Go to http://marie-coleman.info/. Enter V975 in the search box to learn more about \"Recurring Migraine Headache: Care Instructions. \"  Current as of: October 9, 2017  Content Version: 11.7  © 8085-1445 Healthwise, [employer]. Care instructions adapted under license by [employer] (which disclaims liability or warranty for this information). If you have questions about a medical condition or this instruction, always ask your healthcare professional. [place] [address] any warranty or liability for your use of this information. High Cholesterol: Care Instructions  Your Care Instructions    Cholesterol is a type of fat in your blood. It is needed for many body functions, such as making new cells. Cholesterol is made by your body.  It also comes from food you eat. High cholesterol means that you have too much of the fat in your blood. This raises your risk of a heart attack and stroke. LDL and HDL are part of your total cholesterol. LDL is the \"bad\" cholesterol. High LDL can raise your risk for heart disease, heart attack, and stroke. HDL is the \"good\" cholesterol. It helps clear bad cholesterol from the body. High HDL is linked with a lower risk of heart disease, heart attack, and stroke. Your cholesterol levels help your doctor find out your risk for having a heart attack or stroke. You and your doctor can talk about whether you need to lower your risk and what treatment is best for you. A heart-healthy lifestyle along with medicines can help lower your cholesterol and your risk. The way you choose to lower your risk will depend on how high your risk is for heart attack and stroke. It will also depend on how you feel about taking medicines. Follow-up care is a key part of your treatment and safety. Be sure to make and go to all appointments, and call your doctor if you are having problems. It's also a good idea to know your test results and keep a list of the medicines you take. How can you care for yourself at home? · Eat a variety of foods every day. Good choices include fruits, vegetables, whole grains (like oatmeal), dried beans and peas, nuts and seeds, soy products (like tofu), and fat-free or low-fat dairy products. · Replace butter, margarine, and hydrogenated or partially hydrogenated oils with olive and canola oils. (Canola oil margarine without trans fat is fine.)  · Replace red meat with fish, poultry, and soy protein (like tofu). · Limit processed and packaged foods like chips, crackers, and cookies. · Bake, broil, or steam foods. Don't kimbrough them. · Be physically active. Get at least 30 minutes of exercise on most days of the week. Walking is a good choice.  You also may want to do other activities, such as running, swimming, cycling, or playing tennis or team sports. · Stay at a healthy weight or lose weight by making the changes in eating and physical activity listed above. Losing just a small amount of weight, even 5 to 10 pounds, can reduce your risk for having a heart attack or stroke. · Do not smoke. When should you call for help? Watch closely for changes in your health, and be sure to contact your doctor if:    · You need help making lifestyle changes.     · You have questions about your medicine. Where can you learn more? Go to http://marie-coleman.info/. Enter R205 in the search box to learn more about \"High Cholesterol: Care Instructions. \"  Current as of: May 10, 2017  Content Version: 11.7  © 1826-7314 [employer]. Care instructions adapted under license by [employer] (which disclaims liability or warranty for this information). If you have questions about a medical condition or this instruction, always ask your healthcare professional. [place] [address] any warranty or liability for your use of this information.

## 2018-09-14 NOTE — PROGRESS NOTES
Subjective:   Rocky Marshall. is a 45 y.o. male who is seen for follow up of hyperlipidemia, GERD, and migraines. History is obtained from patient and wife. He is having migraine headaches daily. The problem has been present for years, the symptoms are stable but poorly controlled. He has tried amitriptyline, topamax, gabapentin, and propranolol, all had to be stopped due to excessive drowsiness. He has never seen a neurologist concerning his symptoms. Cardiovascular risk analysis - 45 y.o. male LDL goal is under 100. Compliance with treatment thus far has been good. ROS: taking medications as instructed, no medication side effects noted, no TIA's, no chest pain on exertion, no dyspnea on exertion, no swelling of ankles, no orthostatic dizziness or lightheadedness. GERD symptoms well controlled with diet and omeprazole. .     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 Les Nilam    IBS (irritable bowel syndrome) K58.9    Statin intolerance Z78.9    High triglycerides E78.1    Hypercholesterolemia without hypertriglyceridemia E78.00    Sinusitis J32.9    Complete tear of right rotator cuff M75.121     Allergies   Allergen Reactions    Bactrim [Sulfamethoprim Ds] Rash    Lipitor [Atorvastatin] Myalgia    Sulfamethoxazole-Trimethoprim Rash     Past Medical History:   Diagnosis Date    Arthritis     Back pain     Basal cell carcinoma 2012    3 on left side of neck and 1 on right side of neck and nasal bridge area with first dx starting aprroximately 12 yrs ago    Family history of skin cancer     Frequent nosebleeds     H/O seasonal allergies     High cholesterol     Joint pain     Joint swelling     Migraine    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[address]     followed by Dennis Kelly Sinus complaint     Skin cancer     Sun-damaged skin     Sunburn, blistering      Past Surgical History:   Procedure Laterality Date    HX HEART CATHETERIZATION  2013    minimal disease per cardio notes.  HX ORTHOPAEDIC Bilateral     bilateral thumb surgery    HX ORTHOPAEDIC Left ~2013    elbow surgery    HX ROTATOR CUFF REPAIR      Right shoulder    HX SEPTOPLASTY  2002    HX SHOULDER ARTHROSCOPY Left ~2013     Family History   Problem Relation Age of Onset    Hypertension Brother     Diabetes Brother     Diabetes Mother     Hypertension Mother     Cancer Mother      lung     Social History   Substance Use Topics    Smoking status: Never Smoker    Smokeless tobacco: Never Used    Alcohol use 3.6 oz/week     6 Cans of beer per week      Lab Results  Component Value Date/Time   Glucose 75 01/24/2017 12:51 PM   Glucose (POC) 82 01/24/2017 01:29 PM   LDL,Direct 121 (H) 03/22/2013 02:15 AM   LDL, calculated 167 (H) 05/05/2015 10:39 AM   Creatinine 1.20 01/24/2017 12:51 PM      Lab Results  Component Value Date/Time   Cholesterol, total 241 (H) 05/05/2015 10:39 AM   HDL Cholesterol 45 05/05/2015 10:39 AM   LDL,Direct 121 (H) 03/22/2013 02:15 AM   LDL, calculated 167 (H) 05/05/2015 10:39 AM   Triglyceride 143 05/05/2015 10:39 AM   CHOL/HDL Ratio 6.3 (H) 03/22/2013 02:15 AM     Lab Results  Component Value Date/Time   ALT (SGPT) 33 01/24/2017 12:51 PM   AST (SGOT) 25 01/24/2017 12:51 PM   Alk.  phosphatase 53 01/24/2017 12:51 PM   Bilirubin, total 0.5 01/24/2017 12:51 PM   Albumin 4.0 01/24/2017 12:51 PM   Protein, total 7.0 01/24/2017 12:51 PM   PLATELET 039 43/45/7158 12:51 PM       Lab Results  Component Value Date/Time   GFR est non-AA >60 01/24/2017 12:51 PM   GFR est AA >60 01/24/2017 12:51 PM   Creatinine 1.20 01/24/2017 12:51 PM   BUN 19 01/24/2017 12:51 PM   Sodium 145 01/24/2017 12:51 PM   Potassium 3.4 (L) 01/24/2017 12:51 PM   Chloride 110 (H) 01/24/2017 12:51 PM   CO2 25 01/24/2017 12:51 PM        Objective:     Visit Vitals    /77    Pulse 74    Temp 98.1 °F (36.7 °C) (Oral)    Resp 19    Ht 5' 8\" (1.727 m)    Wt 196 lb 8 oz (89.1 kg)    SpO2 96%    BMI 29.88 kg/m2      Appearance: alert, well appearing, and in no distress, oriented to person, place, and time, overweight and well hydrated. CVS exam BP noted to be well controlled today in office, S1, S2 normal, no gallop, no murmur, chest clear, no JVD, no HSM, no edema. Neuro exam: normal speech, normal gait. No gross deficits. Assessment/Plan:      .  Diagnoses and all orders for this visit:    1. Hypercholesterolemia without hypertriglyceridemia  TLC Diet:  -- Saturated fat <7% of calories, cholesterol <200 mg/day  -- Consider increased viscous (soluble) fiber (10-25 g/day) and plant stanols/sterols  (2g/day) as therapeutic options to enhance LDL lowering  Weight management  Increased physical activity. Continue tricor  -     fenofibrate nanocrystallized (TRICOR) 145 mg tablet; TAKE 1 TABLET BY MOUTH DAILY. FOR HYPERCHOLESTEROLEMIA  -     LIPID PANEL  -     METABOLIC PANEL, COMPREHENSIVE    2. Statin intolerance  -     fenofibrate nanocrystallized (TRICOR) 145 mg tablet; TAKE 1 TABLET BY MOUTH DAILY. FOR HYPERCHOLESTEROLEMIA    3. Migraine without status migrainosus, not intractable, unspecified migraine type  Discussed trial of trokendi vs referral to neuro, patient and wife prefer to see neuro  -     SUMAtriptan (IMITREX) 100 mg tablet; Take 1 Tab by mouth once as needed for Migraine for up to 1 dose. Toribio Morales Neurology ref [facility]    4.  Pain  -     DULoxetine (CYMBALTA) 30 mg capsule; TAKE ONE CAPSULE BY MOUTH EVERY DAY FOR BACK PAIN    Other orders  -     montelukast (SINGULAIR) 10 mg tablet; TAKE 1 TABLET BY MOUTH EVERY DAY FOR ALLERGIES      Follow-up Disposition:  Return in about 3 months (around 12/14/2018). I have discussed the diagnosis with the patient and the intended plan as seen in the above orders. The patient has received an after-visit summary and questions were answered concerning future plans. Patient conveyed understanding of the plan at the time of the visit.     Grady Latif NP   09/14/18

## 2018-09-16 LAB
ALBUMIN SERPL-MCNC: 4.9 G/DL (ref 3.5–5.5)
ALBUMIN/GLOB SERPL: 1.9 {RATIO} (ref 1.2–2.2)
ALP SERPL-CCNC: 52 IU/L (ref 39–117)
ALT SERPL-CCNC: 34 IU/L (ref 0–44)
AST SERPL-CCNC: 31 IU/L (ref 0–40)
BILIRUB SERPL-MCNC: 0.2 MG/DL (ref 0–1.2)
BUN SERPL-MCNC: 31 MG/DL (ref 6–24)
BUN/CREAT SERPL: 22 (ref 9–20)
CALCIUM SERPL-MCNC: 10 MG/DL (ref 8.7–10.2)
CHLORIDE SERPL-SCNC: 102 MMOL/L (ref 96–106)
CHOLEST SERPL-MCNC: 208 MG/DL (ref 100–199)
CO2 SERPL-SCNC: 22 MMOL/L (ref 20–29)
CREAT SERPL-MCNC: 1.38 MG/DL (ref 0.76–1.27)
GLOBULIN SER CALC-MCNC: 2.6 G/DL (ref 1.5–4.5)
GLUCOSE SERPL-MCNC: 97 MG/DL (ref 65–99)
HDLC SERPL-MCNC: 51 MG/DL
INTERPRETATION, 910389: NORMAL
INTERPRETATION: NORMAL
LDLC SERPL CALC-MCNC: 139 MG/DL (ref 0–99)
PDF IMAGE, 910387: NORMAL
POTASSIUM SERPL-SCNC: 4.6 MMOL/L (ref 3.5–5.2)
PROT SERPL-MCNC: 7.5 G/DL (ref 6–8.5)
SODIUM SERPL-SCNC: 142 MMOL/L (ref 134–144)
TRIGL SERPL-MCNC: 89 MG/DL (ref 0–149)
VLDLC SERPL CALC-MCNC: 18 MG/DL (ref 5–40)

## 2018-09-18 NOTE — PROGRESS NOTES
Your cholesterol is slightly better than before, but still well above goal on your current med. Your chart lists statin intolerance on the problem list- what were your symptoms/side effects with statin use? Which medication did you try and did you try more than one? Creatinine is elevated- recommend avoiding NSAIDs, staying well-hydrated, recheck in 2-3 weeks. Liver function is good.

## 2018-09-18 NOTE — PROGRESS NOTES
Spoke with pt, verified , advised pt per provider note, pt states he took Atorvastatin in the past and he believes it gave him an upset stomach, he states he would be willing to try another statin medication. I advised pt we would send new medication into his pharmacy and to call if he noticed any side effects. Pt verbalized understanding and stated his wife would call back to office to schedule follow up.

## 2018-09-19 RX ORDER — ROSUVASTATIN CALCIUM 10 MG/1
10 TABLET, COATED ORAL
Qty: 30 TAB | Refills: 1 | Status: SHIPPED | OUTPATIENT
Start: 2018-09-19 | End: 2018-11-08 | Stop reason: SDUPTHER

## 2018-09-19 NOTE — PROGRESS NOTES
rx for crestor sent to pharmacy on record, return to office in 6 weeks for fasting labs. Stop taking the fenofibrate.

## 2018-09-19 NOTE — PROGRESS NOTES
Left VM for pt to advise of the new medication sent to his pharmacy, and to stop the fenofibrate. Left office number incase pt had any follow up questions.

## 2018-10-07 RX ORDER — DICYCLOMINE HYDROCHLORIDE 10 MG/1
CAPSULE ORAL
Qty: 160 CAP | Refills: 0 | Status: SHIPPED | OUTPATIENT
Start: 2018-10-07 | End: 2019-01-10 | Stop reason: SDUPTHER

## 2018-11-07 ENCOUNTER — OFFICE VISIT (OUTPATIENT)
Dept: FAMILY MEDICINE CLINIC | Age: 53
End: 2018-11-07

## 2018-11-07 VITALS
DIASTOLIC BLOOD PRESSURE: 75 MMHG | SYSTOLIC BLOOD PRESSURE: 116 MMHG | HEART RATE: 76 BPM | OXYGEN SATURATION: 96 % | RESPIRATION RATE: 18 BRPM | BODY MASS INDEX: 29.86 KG/M2 | TEMPERATURE: 98.2 F | WEIGHT: 197 LBS | HEIGHT: 68 IN

## 2018-11-07 DIAGNOSIS — J20.9 ACUTE BRONCHITIS, UNSPECIFIED ORGANISM: Primary | ICD-10-CM

## 2018-11-07 DIAGNOSIS — E78.00 HYPERCHOLESTEROLEMIA: ICD-10-CM

## 2018-11-07 RX ORDER — BENZONATATE 200 MG/1
200 CAPSULE ORAL
Qty: 30 CAP | Refills: 0 | Status: SHIPPED | OUTPATIENT
Start: 2018-11-07 | End: 2018-11-14

## 2018-11-07 NOTE — PATIENT INSTRUCTIONS
High Cholesterol: Care Instructions  Your Care Instructions    Cholesterol is a type of fat in your blood. It is needed for many body functions, such as making new cells. Cholesterol is made by your body. It also comes from food you eat. High cholesterol means that you have too much of the fat in your blood. This raises your risk of a heart attack and stroke. LDL and HDL are part of your total cholesterol. LDL is the \"bad\" cholesterol. High LDL can raise your risk for heart disease, heart attack, and stroke. HDL is the \"good\" cholesterol. It helps clear bad cholesterol from the body. High HDL is linked with a lower risk of heart disease, heart attack, and stroke. Your cholesterol levels help your doctor find out your risk for having a heart attack or stroke. You and your doctor can talk about whether you need to lower your risk and what treatment is best for you. A heart-healthy lifestyle along with medicines can help lower your cholesterol and your risk. The way you choose to lower your risk will depend on how high your risk is for heart attack and stroke. It will also depend on how you feel about taking medicines. Follow-up care is a key part of your treatment and safety. Be sure to make and go to all appointments, and call your doctor if you are having problems. It's also a good idea to know your test results and keep a list of the medicines you take. How can you care for yourself at home? · Eat a variety of foods every day. Good choices include fruits, vegetables, whole grains (like oatmeal), dried beans and peas, nuts and seeds, soy products (like tofu), and fat-free or low-fat dairy products. · Replace butter, margarine, and hydrogenated or partially hydrogenated oils with olive and canola oils. (Canola oil margarine without trans fat is fine.)  · Replace red meat with fish, poultry, and soy protein (like tofu). · Limit processed and packaged foods like chips, crackers, and cookies.   · Bake, broil, or steam foods. Don't kimbrough them. · Be physically active. Get at least 30 minutes of exercise on most days of the week. Walking is a good choice. You also may want to do other activities, such as running, swimming, cycling, or playing tennis or team sports. · Stay at a healthy weight or lose weight by making the changes in eating and physical activity listed above. Losing just a small amount of weight, even 5 to 10 pounds, can reduce your risk for having a heart attack or stroke. · Do not smoke. When should you call for help? Watch closely for changes in your health, and be sure to contact your doctor if:    · You need help making lifestyle changes.     · You have questions about your medicine. Where can you learn more? Go to http://marieOMsignalcoleman.info/. Enter I388 in the search box to learn more about \"High Cholesterol: Care Instructions. \"  Current as of: December 6, 2017  Content Version: 11.8  © 8408-2880 [employer] Care instructions adapted under license by [employer] (which disclaims liability or warranty for this information). If you have questions about a medical condition or this instruction, always ask your healthcare professional. [place] [address] any warranty or liability for your use of this information. Bronchitis: Care Instructions  Your Care Instructions    Bronchitis is inflammation of the bronchial tubes, which carry air to the lungs. The tubes swell and produce mucus, or phlegm. The mucus and inflamed bronchial tubes make you cough. You may have trouble breathing. Most cases of bronchitis are caused by viruses like those that cause colds. Antibiotics usually do not help and they may be harmful. Bronchitis usually develops rapidly and lasts about 2 to 3 weeks in otherwise healthy people. Follow-up care is a key part of your treatment and safety.  Be sure to make and go to all appointments, and call your doctor if you are having problems. It's also a good idea to know your test results and keep a list of the medicines you take. How can you care for yourself at home? · Take all medicines exactly as prescribed. Call your doctor if you think you are having a problem with your medicine. · Get some extra rest.  · Take an over-the-counter pain medicine, such as acetaminophen (Tylenol), ibuprofen (Advil, Motrin), or naproxen (Aleve) to reduce fever and relieve body aches. Read and follow all instructions on the label. · Do not take two or more pain medicines at the same time unless the doctor told you to. Many pain medicines have acetaminophen, which is Tylenol. Too much acetaminophen (Tylenol) can be harmful. · Take an over-the-counter cough medicine that contains dextromethorphan to help quiet a dry, hacking cough so that you can sleep. Avoid cough medicines that have more than one active ingredient. Read and follow all instructions on the label. · Breathe moist air from a humidifier, hot shower, or sink filled with hot water. The heat and moisture will thin mucus so you can cough it out. · Do not smoke. Smoking can make bronchitis worse. If you need help quitting, talk to your doctor about stop-smoking programs and medicines. These can increase your chances of quitting for good. When should you call for help? Call 911 anytime you think you may need emergency care. For example, call if:    · You have severe trouble breathing.    Call your doctor now or seek immediate medical care if:    · You have new or worse trouble breathing.     · You cough up dark brown or bloody mucus (sputum).     · You have a new or higher fever.     · You have a new rash.    Watch closely for changes in your health, and be sure to contact your doctor if:    · You cough more deeply or more often, especially if you notice more mucus or a change in the color of your mucus.     · You are not getting better as expected. Where can you learn more?   Go to http://marie-coleman.info/. Enter H333 in the search box to learn more about \"Bronchitis: Care Instructions. \"  Current as of: December 6, 2017  Content Version: 11.8  © 5893-6820 Healthwise, [employer]. Care instructions adapted under license by [employer] (which disclaims liability or warranty for this information). If you have questions about a medical condition or this instruction, always ask your healthcare professional. [place] [address] any warranty or liability for your use of this information.

## 2018-11-07 NOTE — PROGRESS NOTES
1. Have you been to the ER, urgent care clinic since your last visit? Hospitalized since your last visit? No    2. Have you seen or consulted any other health care providers outside of the [address] [place] [address] since your last visit? Include any pap smears or colon screening.  No     Chief Complaint   Patient presents with    Cough     since Friday     Sore Throat    Epistaxis

## 2018-11-07 NOTE — PROGRESS NOTES
Subjective:   Venkat Butcher is a 45 y.o. male who complains of congestion, dry cough, left ear muffled hearing and epistaxis right side for 5 days, unchanged since that time. + wheezing. He did not have his inhaler with him over the weekend and this made the symptoms worse. He denies a history of chills, fevers, nausea, shortness of breath, vomiting and sputum production. Evaluation to date: none. Treatment to date: OTC products, which have been not very effective. Patient does not smoke cigarettes. Relevant PMH: No pertinent additional PMH. 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 Franklyn Perkins    IBS (irritable bowel syndrome) K58.9    Statin intolerance Z78.9    High triglycerides E78.1    Hypercholesterolemia without hypertriglyceridemia E78.00    Sinusitis J32.9    Complete tear of right rotator cuff M75.121     Allergies   Allergen Reactions    Bactrim [Sulfamethoprim Ds] Rash    Lipitor [Atorvastatin] Myalgia    Sulfamethoxazole-Trimethoprim Rash     Past Medical History:   Diagnosis Date    Arthritis     Back pain     Basal cell carcinoma 2012    3 on left side of neck and 1 on right side of neck and nasal bridge area with first dx starting aprroximately 12 yrs ago    Family history of skin cancer     Frequent nosebleeds     H/O seasonal allergies     High cholesterol     Joint pain     Joint swelling     Migraine    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[address]     followed by Curly Castaneda Sinus complaint     Skin cancer     Sun-damaged skin     Sunburn, blistering      Past Surgical History:   Procedure Laterality Date    HX HEART CATHETERIZATION  2013 minimal disease per cardio notes.  HX ORTHOPAEDIC Bilateral     bilateral thumb surgery    HX ORTHOPAEDIC Left ~2013    elbow surgery    HX ROTATOR CUFF REPAIR      Right shoulder    HX SEPTOPLASTY  2002    HX SHOULDER ARTHROSCOPY Left ~2013     Family History   Problem Relation Age of Onset    Hypertension Brother     Diabetes Brother     Diabetes Mother     Hypertension Mother     Cancer Mother         lung     Social History     Tobacco Use    Smoking status: Never Smoker    Smokeless tobacco: Never Used   Substance Use Topics    Alcohol use: Yes     Alcohol/week: 3.6 oz     Types: 6 Cans of beer per week        Review of Systems  A comprehensive review of systems was negative except for that written in the HPI. Objective:     Visit Vitals  /75   Pulse 76   Temp 98.2 °F (36.8 °C) (Oral)   Resp 18   Ht 5' 8\" (1.727 m)   Wt 197 lb (89.4 kg)   SpO2 96%   BMI 29.95 kg/m²     General:  alert, cooperative, no distress   Eyes: negative   Ears: normal TM's and external ear canals AU   Sinuses: Normal paranasal sinuses without tenderness   Mouth:  abnormal findings: mild oropharyngeal erythema and clear post nasal drip   Neck: supple, symmetrical, trachea midline and no adenopathy. Heart: S1 and S2 normal, no murmurs noted. Lungs: clear to auscultation bilaterally   Abdomen: soft, non-tender. Bowel sounds normal. No masses,  no organomegaly        Assessment/Plan:   bronchitis  Discussed dx and tx of URIs  Suggested symptomatic OTC remedies. RTC prn. Diagnoses and all orders for this visit:    1. Acute bronchitis, unspecified organism  Add rx  Continue antihistamine  Albuterol mdi prn  If wheezing persists, consider adding steroid burst  -     benzonatate (TESSALON) 200 mg capsule; Take 1 Cap by mouth three (3) times daily as needed for Cough for up to 7 days.     2. Hypercholesterolemia  New start on crestor 6 weeks ago, tolerating well  TLC Diet:  -- Saturated fat <7% of calories, cholesterol <200 mg/day  -- Consider increased viscous (soluble) fiber (10-25 g/day) and plant stanols/sterols  (2g/day) as therapeutic options to enhance LDL lowering  Weight management  Increased physical activity. Continue current dose crestor pending results  -     LIPID PANEL  -     METABOLIC PANEL, COMPREHENSIVE      Follow-up Disposition:  Return in about 3 months (around 2/7/2019). I have discussed the diagnosis with the patient and the intended plan as seen in the above orders. The patient has received an after-visit summary and questions were answered concerning future plans. Patient conveyed understanding of the plan at the time of the visit.     Alida Noriega NP

## 2018-11-08 LAB
ALBUMIN SERPL-MCNC: 4.5 G/DL (ref 3.5–5.5)
ALBUMIN/GLOB SERPL: 2 {RATIO} (ref 1.2–2.2)
ALP SERPL-CCNC: 56 IU/L (ref 39–117)
ALT SERPL-CCNC: 27 IU/L (ref 0–44)
AST SERPL-CCNC: 26 IU/L (ref 0–40)
BILIRUB SERPL-MCNC: 0.4 MG/DL (ref 0–1.2)
BUN SERPL-MCNC: 25 MG/DL (ref 6–24)
BUN/CREAT SERPL: 28 (ref 9–20)
CALCIUM SERPL-MCNC: 9.1 MG/DL (ref 8.7–10.2)
CHLORIDE SERPL-SCNC: 108 MMOL/L (ref 96–106)
CHOLEST SERPL-MCNC: 156 MG/DL (ref 100–199)
CO2 SERPL-SCNC: 24 MMOL/L (ref 20–29)
CREAT SERPL-MCNC: 0.89 MG/DL (ref 0.76–1.27)
GLOBULIN SER CALC-MCNC: 2.2 G/DL (ref 1.5–4.5)
GLUCOSE SERPL-MCNC: 67 MG/DL (ref 65–99)
HDLC SERPL-MCNC: 39 MG/DL
INTERPRETATION, 910389: NORMAL
LDLC SERPL CALC-MCNC: 79 MG/DL (ref 0–99)
POTASSIUM SERPL-SCNC: 3.8 MMOL/L (ref 3.5–5.2)
PROT SERPL-MCNC: 6.7 G/DL (ref 6–8.5)
SODIUM SERPL-SCNC: 147 MMOL/L (ref 134–144)
TRIGL SERPL-MCNC: 189 MG/DL (ref 0–149)
VLDLC SERPL CALC-MCNC: 38 MG/DL (ref 5–40)

## 2018-11-08 RX ORDER — ROSUVASTATIN CALCIUM 10 MG/1
10 TABLET, COATED ORAL
Qty: 90 TAB | Refills: 1 | Status: SHIPPED | OUTPATIENT
Start: 2018-11-08 | End: 2019-05-04 | Stop reason: SDUPTHER

## 2018-11-19 ENCOUNTER — PATIENT MESSAGE (OUTPATIENT)
Dept: FAMILY MEDICINE CLINIC | Age: 53
End: 2018-11-19

## 2018-11-19 RX ORDER — PREDNISONE 20 MG/1
40 TABLET ORAL
Qty: 10 TAB | Refills: 0 | Status: SHIPPED | OUTPATIENT
Start: 2018-11-19 | End: 2018-11-24

## 2018-11-19 NOTE — TELEPHONE ENCOUNTER
From: Iliana Mancera. To: Tamara Leavitt NP  Sent: 11/19/2018 9:43 AM EST  Subject: Update Medical Information    Bill Unger, just writing to request to have you send Rd for the steroids you were talking about. I have not really improved with the cough using the Tessalon and Albuterol inhaler. I would appreciate this.   Thanks  [employer]

## 2019-01-10 RX ORDER — DICYCLOMINE HYDROCHLORIDE 10 MG/1
CAPSULE ORAL
Qty: 160 CAP | Refills: 0 | Status: SHIPPED | OUTPATIENT
Start: 2019-01-10 | End: 2019-03-02 | Stop reason: SDUPTHER

## 2019-02-05 ENCOUNTER — APPOINTMENT (OUTPATIENT)
Dept: CT IMAGING | Age: 54
End: 2019-02-05
Attending: EMERGENCY MEDICINE
Payer: COMMERCIAL

## 2019-02-05 ENCOUNTER — HOSPITAL ENCOUNTER (EMERGENCY)
Age: 54
Discharge: ANOTHER HOSPITAL | End: 2019-02-05
Attending: EMERGENCY MEDICINE
Payer: COMMERCIAL

## 2019-02-05 ENCOUNTER — HOSPITAL ENCOUNTER (INPATIENT)
Age: 54
LOS: 2 days | Discharge: HOME | DRG: 645 | End: 2019-02-07
Attending: EMERGENCY MEDICINE | Admitting: FAMILY MEDICINE
Payer: COMMERCIAL

## 2019-02-05 ENCOUNTER — APPOINTMENT (OUTPATIENT)
Dept: MRI IMAGING | Age: 54
End: 2019-02-05
Attending: EMERGENCY MEDICINE
Payer: COMMERCIAL

## 2019-02-05 ENCOUNTER — APPOINTMENT (OUTPATIENT)
Dept: VASCULAR SURGERY | Age: 54
End: 2019-02-05
Attending: NURSE PRACTITIONER
Payer: COMMERCIAL

## 2019-02-05 ENCOUNTER — APPOINTMENT (OUTPATIENT)
Dept: NON INVASIVE DIAGNOSTICS | Age: 54
End: 2019-02-05
Attending: NURSE PRACTITIONER
Payer: COMMERCIAL

## 2019-02-05 VITALS
HEART RATE: 78 BPM | BODY MASS INDEX: 29.55 KG/M2 | SYSTOLIC BLOOD PRESSURE: 134 MMHG | WEIGHT: 195 LBS | RESPIRATION RATE: 16 BRPM | TEMPERATURE: 98 F | HEIGHT: 68 IN | DIASTOLIC BLOOD PRESSURE: 83 MMHG | OXYGEN SATURATION: 96 %

## 2019-02-05 DIAGNOSIS — D35.2 PITUITARY MACROADENOMA (HCC): Primary | ICD-10-CM

## 2019-02-05 DIAGNOSIS — H53.2 DOUBLE VISION: ICD-10-CM

## 2019-02-05 DIAGNOSIS — D49.7 PITUITARY TUMOR: Primary | ICD-10-CM

## 2019-02-05 LAB
ALBUMIN SERPL-MCNC: 3.9 G/DL (ref 3.5–5)
ALBUMIN/GLOB SERPL: 1.1 {RATIO} (ref 1.1–2.2)
ALP SERPL-CCNC: 64 U/L (ref 45–117)
ALT SERPL-CCNC: 28 U/L (ref 12–78)
ANION GAP SERPL CALC-SCNC: 9 MMOL/L (ref 5–15)
APTT PPP: 25.9 SEC (ref 22.1–32)
AST SERPL-CCNC: 21 U/L (ref 15–37)
BASOPHILS # BLD: 0.1 K/UL (ref 0–0.1)
BASOPHILS NFR BLD: 1 % (ref 0–1)
BILIRUB SERPL-MCNC: 0.6 MG/DL (ref 0.2–1)
BUN SERPL-MCNC: 25 MG/DL (ref 6–20)
BUN/CREAT SERPL: 27 (ref 12–20)
CALCIUM SERPL-MCNC: 8.9 MG/DL (ref 8.5–10.1)
CHLORIDE SERPL-SCNC: 106 MMOL/L (ref 97–108)
CO2 SERPL-SCNC: 29 MMOL/L (ref 21–32)
COMMENT, HOLDF: NORMAL
CREAT SERPL-MCNC: 0.91 MG/DL (ref 0.7–1.3)
DIFFERENTIAL METHOD BLD: NORMAL
ECHO AO ROOT DIAM: 3.61 CM
ECHO AV AREA PEAK VELOCITY: 4.2 CM2
ECHO AV PEAK GRADIENT: 4.1 MMHG
ECHO AV PEAK VELOCITY: 100.94 CM/S
ECHO EST RA PRESSURE: 3 MMHG
ECHO LA MAJOR AXIS: 3.4 CM
ECHO LA TO AORTIC ROOT RATIO: 0.94
ECHO LA VOL 2C: 64.8 ML (ref 18–58)
ECHO LA VOL 4C: 38.39 ML (ref 18–58)
ECHO LA VOL BP: 56.85 ML (ref 18–58)
ECHO LA VOL/BSA BIPLANE: 28.12 ML/M2 (ref 16–28)
ECHO LA VOLUME INDEX A2C: 32.05 ML/M2 (ref 16–28)
ECHO LA VOLUME INDEX A4C: 18.99 ML/M2 (ref 16–28)
ECHO LV INTERNAL DIMENSION DIASTOLIC: 4.87 CM (ref 4.2–5.9)
ECHO LV INTERNAL DIMENSION SYSTOLIC: 3.35 CM
ECHO LV IVSD: 0.94 CM (ref 0.6–1)
ECHO LV MASS 2D: 155.5 G (ref 88–224)
ECHO LV MASS INDEX 2D: 76.9 G/M2 (ref 49–115)
ECHO LV POSTERIOR WALL DIASTOLIC: 0.72 CM (ref 0.6–1)
ECHO LVOT DIAM: 2.94 CM
ECHO LVOT PEAK GRADIENT: 1.5 MMHG
ECHO LVOT PEAK VELOCITY: 61.94 CM/S
ECHO MV A VELOCITY: 48.1 CM/S
ECHO MV E DECELERATION TIME (DT): 263.7 MS
ECHO MV E VELOCITY: 0.61 CM/S
ECHO MV E/A RATIO: 0.01
ECHO MV REGURGITANT PEAK GRADIENT: 25.2 MMHG
ECHO MV REGURGITANT PEAK VELOCITY: 251.17 CM/S
ECHO PULMONARY ARTERY SYSTOLIC PRESSURE (PASP): 22.2 MMHG
ECHO PV MAX VELOCITY: 62.62 CM/S
ECHO PV PEAK GRADIENT: 1.6 MMHG
ECHO RIGHT VENTRICULAR SYSTOLIC PRESSURE (RVSP): 22.2 MMHG
ECHO RV INTERNAL DIMENSION: 3.94 CM
ECHO TV REGURGITANT MAX VELOCITY: 219.24 CM/S
ECHO TV REGURGITANT PEAK GRADIENT: 19.2 MMHG
EOSINOPHIL # BLD: 0.3 K/UL (ref 0–0.4)
EOSINOPHIL NFR BLD: 5 % (ref 0–7)
ERYTHROCYTE [DISTWIDTH] IN BLOOD BY AUTOMATED COUNT: 12.5 % (ref 11.5–14.5)
EST. AVERAGE GLUCOSE BLD GHB EST-MCNC: 103 MG/DL
GLOBULIN SER CALC-MCNC: 3.4 G/DL (ref 2–4)
GLUCOSE SERPL-MCNC: 95 MG/DL (ref 65–100)
HBA1C MFR BLD: 5.2 % (ref 4.2–6.3)
HCT VFR BLD AUTO: 39.2 % (ref 36.6–50.3)
HGB BLD-MCNC: 13.4 G/DL (ref 12.1–17)
IMM GRANULOCYTES # BLD AUTO: 0 K/UL (ref 0–0.04)
IMM GRANULOCYTES NFR BLD AUTO: 0 % (ref 0–0.5)
INR PPP: 1 (ref 0.9–1.1)
LYMPHOCYTES # BLD: 1.2 K/UL (ref 0.8–3.5)
LYMPHOCYTES NFR BLD: 21 % (ref 12–49)
MCH RBC QN AUTO: 30.5 PG (ref 26–34)
MCHC RBC AUTO-ENTMCNC: 34.2 G/DL (ref 30–36.5)
MCV RBC AUTO: 89.3 FL (ref 80–99)
MONOCYTES # BLD: 0.3 K/UL (ref 0–1)
MONOCYTES NFR BLD: 5 % (ref 5–13)
NEUTS SEG # BLD: 3.9 K/UL (ref 1.8–8)
NEUTS SEG NFR BLD: 68 % (ref 32–75)
NRBC # BLD: 0 K/UL (ref 0–0.01)
NRBC BLD-RTO: 0 PER 100 WBC
PLATELET # BLD AUTO: 228 K/UL (ref 150–400)
PMV BLD AUTO: 9.1 FL (ref 8.9–12.9)
POTASSIUM SERPL-SCNC: 3.9 MMOL/L (ref 3.5–5.1)
PROT SERPL-MCNC: 7.3 G/DL (ref 6.4–8.2)
PROTHROMBIN TIME: 10.3 SEC (ref 9–11.1)
RBC # BLD AUTO: 4.39 M/UL (ref 4.1–5.7)
SAMPLES BEING HELD,HOLD: NORMAL
SODIUM SERPL-SCNC: 144 MMOL/L (ref 136–145)
THERAPEUTIC RANGE,PTTT: NORMAL SECS (ref 58–77)
TSH SERPL DL<=0.05 MIU/L-ACNC: 2.61 UIU/ML (ref 0.36–3.74)
WBC # BLD AUTO: 5.7 K/UL (ref 4.1–11.1)

## 2019-02-05 PROCEDURE — 70553 MRI BRAIN STEM W/O & W/DYE: CPT

## 2019-02-05 PROCEDURE — A9575 INJ GADOTERATE MEGLUMI 0.1ML: HCPCS | Performed by: RADIOLOGY

## 2019-02-05 PROCEDURE — 80053 COMPREHEN METABOLIC PANEL: CPT

## 2019-02-05 PROCEDURE — 99285 EMERGENCY DEPT VISIT HI MDM: CPT

## 2019-02-05 PROCEDURE — 84443 ASSAY THYROID STIM HORMONE: CPT

## 2019-02-05 PROCEDURE — 83036 HEMOGLOBIN GLYCOSYLATED A1C: CPT

## 2019-02-05 PROCEDURE — 85730 THROMBOPLASTIN TIME PARTIAL: CPT

## 2019-02-05 PROCEDURE — 70450 CT HEAD/BRAIN W/O DYE: CPT

## 2019-02-05 PROCEDURE — 96375 TX/PRO/DX INJ NEW DRUG ADDON: CPT

## 2019-02-05 PROCEDURE — 85025 COMPLETE CBC W/AUTO DIFF WBC: CPT

## 2019-02-05 PROCEDURE — 83519 RIA NONANTIBODY: CPT

## 2019-02-05 PROCEDURE — 85610 PROTHROMBIN TIME: CPT

## 2019-02-05 PROCEDURE — 95816 EEG AWAKE AND DROWSY: CPT | Performed by: NURSE PRACTITIONER

## 2019-02-05 PROCEDURE — 36415 COLL VENOUS BLD VENIPUNCTURE: CPT

## 2019-02-05 PROCEDURE — 74011250636 HC RX REV CODE- 250/636: Performed by: STUDENT IN AN ORGANIZED HEALTH CARE EDUCATION/TRAINING PROGRAM

## 2019-02-05 PROCEDURE — 65270000029 HC RM PRIVATE

## 2019-02-05 PROCEDURE — 74011250636 HC RX REV CODE- 250/636: Performed by: RADIOLOGY

## 2019-02-05 PROCEDURE — 94761 N-INVAS EAR/PLS OXIMETRY MLT: CPT

## 2019-02-05 PROCEDURE — 65660000000 HC RM CCU STEPDOWN

## 2019-02-05 PROCEDURE — 96374 THER/PROPH/DIAG INJ IV PUSH: CPT

## 2019-02-05 RX ORDER — SODIUM CHLORIDE 450 MG/100ML
75 INJECTION, SOLUTION INTRAVENOUS CONTINUOUS
Status: DISCONTINUED | OUTPATIENT
Start: 2019-02-05 | End: 2019-02-07 | Stop reason: HOSPADM

## 2019-02-05 RX ORDER — DEXAMETHASONE SODIUM PHOSPHATE 10 MG/ML
10 INJECTION INTRAMUSCULAR; INTRAVENOUS
Status: COMPLETED | OUTPATIENT
Start: 2019-02-05 | End: 2019-02-05

## 2019-02-05 RX ORDER — BENZONATATE 200 MG/1
200 CAPSULE ORAL
COMMUNITY
End: 2019-02-07

## 2019-02-05 RX ORDER — GADOTERATE MEGLUMINE 376.9 MG/ML
17 INJECTION INTRAVENOUS
Status: COMPLETED | OUTPATIENT
Start: 2019-02-05 | End: 2019-02-05

## 2019-02-05 RX ORDER — SODIUM CHLORIDE 9 MG/ML
75 INJECTION, SOLUTION INTRAVENOUS CONTINUOUS
Status: CANCELLED | OUTPATIENT
Start: 2019-02-05 | End: 2019-02-06

## 2019-02-05 RX ADMIN — GADOTERATE MEGLUMINE 17 ML: 376.9 INJECTION INTRAVENOUS at 16:32

## 2019-02-05 RX ADMIN — DEXAMETHASONE SODIUM PHOSPHATE 10 MG: 10 INJECTION, SOLUTION INTRAMUSCULAR; INTRAVENOUS at 18:09

## 2019-02-05 NOTE — ED NOTES
Called MRI to ask about status/ETA of MRI. MRI tech reported that there were \"8 or 9 patients in front of\" this pt. Charge nurse notified, provider notified to escalate MRI needs.

## 2019-02-05 NOTE — ED NOTES
3:22 PM 
Change of shift. Care of patient taken over from Dr. Dougie Bell by Dr. Venda Simmonds; H&P reviewed, bedside handoff complete. CONSULT NOTE: 
5:22 PM Alicia Mi MD spoke with Dr. Radha Rangel, Consult for Neurosurgery. Discussed available diagnostic tests and clinical findings. Dr. Cherrie Rivera will see the patient. CONSULT NOTE: 
5:23 PM Alicia Mi MD spoke with Dr. Iban Chang, Consult for ED. Discussed available diagnostic tests and clinical findings.   Dr. Mary Arriaga will accept the patient to the [facility].

## 2019-02-05 NOTE — ED PROVIDER NOTES
52 y.o. male with past medical history significant for migraines, basal, cell carcinoma, porphyria cutanea tarda, high cholesterol, arthritis, and seasonal allergies who presents from home with chief complaint of diplopia. Pt states that he began experiencing blurred vision and diplopia 4 days ago after sneezing and having a \"coughing fit. \" His sx have been constant since that time. He denies any total vision loss. Pt notes that his sx resolve if he closes one of his eyes. Pt reports some slight dizziness en route. He takes [facility] [employer] powder regularly for shoulder pain. Pt denies any recent falls or head injury. He has a h/o migraines behind his right eye so he tried taking his Imitrex yesterday but had no relief of his vision sx. No h/o complex migraines. Pt denies any h/o stroke or MS. He wears glasses daily but no contact lenses. Pt denies headache, eye pain, speech change, difficulty ambulating, numbness, or weakness. There are no other acute medical concerns at this time. Social hx: no tobacco use; (+) EtOH use (6 cans of beer/week) PCP: Andre Anton NP Note written by Karen Calvillo, as dictated by Bruno Sevilla MD 10:57 AM 
 
 
The history is provided by the patient. No  was used. Past Medical History:  
Diagnosis Date  Arthritis  Back pain  Basal cell carcinoma 2012  
 3 on left side of neck and 1 on right side of neck and nasal bridge area with first dx starting aprroximately 12 yrs ago  Family history of skin cancer  Frequent nosebleeds  H/O seasonal allergies  High cholesterol  Joint pain  Joint swelling  Migraine 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[facility] [address] followed by Blanca Casarez  Sinus complaint  Skin cancer  Sun-damaged skin  Sunburn, blistering Past Surgical History:  
Procedure Laterality Date  HX HEART CATHETERIZATION  2013  
 minimal disease per cardio notes.   
 HX ORTHOPAEDIC Bilateral   
 bilateral thumb surgery  HX ORTHOPAEDIC Left ~2013  
 elbow surgery  HX ROTATOR CUFF REPAIR Right shoulder  HX SEPTOPLASTY  2002  HX SHOULDER ARTHROSCOPY Left ~2013 Family History:  
Problem Relation Age of Onset  Hypertension Brother  Diabetes Brother  Diabetes Mother  Hypertension Mother  Cancer Mother   
     lung Social History Socioeconomic History  Marital status:  Spouse name: Not on file  Number of children: Not on file  Years of education: Not on file  Highest education level: Not on file Social Needs  Financial resource strain: Not on file  Food insecurity - worry: Not on file  Food insecurity - inability: Not on file  Transportation needs - medical: Not on file  Transportation needs - non-medical: Not on file Occupational History  Not on file Tobacco Use  Smoking status: Never Smoker  Smokeless tobacco: Never Used Substance and Sexual Activity  Alcohol use: Yes Alcohol/week: 3.6 oz Types: 6 Cans of beer per week  Drug use: Yes Comment: occasional marijuana (not weekly), cocaine use in 20's  Sexual activity: Yes  
  Partners: Female Other Topics Concern  Not on file Social History Narrative  Not on file ALLERGIES: Bactrim [sulfamethoprim ds]; Lipitor [atorvastatin]; and Sulfamethoxazole-trimethoprim Review of Systems Constitutional: Negative for fever. Eyes: Positive for visual disturbance. Negative for pain. Respiratory: Negative for shortness of breath. Cardiovascular: Negative for chest pain. Gastrointestinal: Negative for abdominal pain. Musculoskeletal: Negative for gait problem. Neurological: Positive for dizziness. Negative for speech difficulty, weakness, numbness and headaches. All other systems reviewed and are negative. Vitals:  
 02/05/19 6439 Temp: 98 °F (36.7 °C) Weight: 88.5 kg (195 lb) Height: 5' 8\" (1.727 m) Physical Exam  
Constitutional: He is oriented to person, place, and time. He appears well-developed and well-nourished. Non-toxic appearance. He does not appear ill. No distress. HENT:  
Head: Normocephalic and atraumatic. Right Ear: Tympanic membrane normal.  
Left Ear: Tympanic membrane normal.  
Nose: Nose normal.  
Mouth/Throat: Uvula is midline, oropharynx is clear and moist and mucous membranes are normal.  
Eyes: Conjunctivae and EOM are normal. Pupils are equal, round, and reactive to light. Right eye exhibits normal extraocular motion and no nystagmus. Left eye exhibits normal extraocular motion and no nystagmus. See nursing note for visual acuity. Limited fundoscopic / retinal exam as no dilation done in ED. However, no signs of papilledema noted. Neck: Normal range of motion. Neck supple. Cardiovascular: Normal rate, regular rhythm, normal heart sounds and normal pulses. Pulmonary/Chest: Effort normal and breath sounds normal.  
Abdominal: Soft. Bowel sounds are normal. There is no tenderness. Neurological: He is alert and oriented to person, place, and time. He has normal strength. No cranial nerve deficit or sensory deficit. He displays a negative Romberg sign. GCS eye subscore is 4. GCS verbal subscore is 5. GCS motor subscore is 6. Skin: No rash noted. Psychiatric: He has a normal mood and affect. Note written by Karen Castillo, as dictated by Alma Gross MD 10:57 AM 
 
MDM Number of Diagnoses or Management Options Double vision:  
Pituitary tumor:  
 
  
 
Procedures CONSULT NOTE: 
1:01 PM Alma Gross MD spoke with Vicky Holyl NP Consult for Neurology. Discussed available diagnostic tests and clinical findings. She will come see the pt. CONSULT NOTE: 
2:01 PM Alma Gross MD spoke with Dr. Vicky Holly NP, Consult for Neurology. Discussed available diagnostic tests and clinical findings. She evaluated the pt and recommends admission to Hospitalist for MRI and stroke w/u. If MRI and w/u is negative than they may do some additional testing. CONSULT NOTE: 
2:03 PM Drinda Cockayne, MD spoke with Dr. Miky Manjarrez, Consult for Hospitalist.  Discussed available diagnostic tests and clinical findings. Dr. Miky Manjarrez would like to have the MRI done before accepting the pt for admission and would like to be called after MRI is completed. CONSULT NOTE: 
2:21 PM Drinda Cockayne, MD spoke with Dr. Miky Manjarrez, Consult for Hospitalist.  Discussed available diagnostic tests and clinical findings. He states that he spoke to Neurology who informed him that if the MRI is negative, they will do an outpatient w/u.   
 
15:26 Spoke with Lala Christensen 3:26 PM 
Change of shift. Care of patient signed over to Dr. Atul Garcia . Bedside handoff complete.   Pt currently still waiting MRI that was ordered at 12:30 pm. 
 
Victorina Khanna MD

## 2019-02-05 NOTE — PROGRESS NOTES
Please complete MRI History and Safety Screening Form for this patient Using [employer] only under Orders Requiring a Screening Form: 
Example: 
Orders Requiring a Screening Form Procedure                    Order Status                      Form Status MRI EXAM                   Active                                In Progress Click on blue hyperlink as shown above to launch MRI screening form Answer all questions completely including Weight, Surgery, and Implant History. Document MUST be \"eSigned\" using a \"Signature Pad\" by the person completing form.  (patient and RN or MD completing form with the patient) Patient cannot be scanned until this form is completed and reviewed in MRI to ensure patient is SAFE and eligible for MRI. Call MRI when this has been successfully completed at [phone]. This must be done under [facility]. Do not use the Nursing Flowsheet.

## 2019-02-05 NOTE — ED NOTES
Report given to paramedic for [facility] transport. [facility] is leaving the facility w/ pt at this time. VS remain stable prior to transfer.

## 2019-02-05 NOTE — ED TRIAGE NOTES
Pt states following a hard sneeze and coughing spell on Friday around 4pm has had double vision. Pt states unilateral vision is clear. Denies any N/V, injury, or LOC. States did become dizzy today.

## 2019-02-05 NOTE — ED NOTES
TRANSFER - OUT REPORT: 
 
Verbal report given to Andrew(name) on [address] [place] [address].  being transferred to [facility] ED(unit) for routine progression of care Report consisted of patients Situation, Background, Assessment and  
Recommendations(SBAR). Information from the following report(s) SBAR, ED Summary, Recent Results and Cardiac Rhythm NSR was reviewed with the receiving nurse. Lines:  
Peripheral IV 02/05/19 Left Antecubital (Active) Site Assessment Clean, dry, & intact 2/5/2019 11:23 AM  
Phlebitis Assessment 0 2/5/2019 11:23 AM  
Infiltration Assessment 0 2/5/2019 11:23 AM  
Dressing Status Clean, dry, & intact; New 2/5/2019 11:23 AM  
Dressing Type Transparent 2/5/2019 11:23 AM  
Hub Color/Line Status Pink;Flushed;Patent 2/5/2019 11:23 AM  
Action Taken Blood drawn 2/5/2019 11:23 AM  
  
 
Opportunity for questions and clarification was provided. Patient transported with: 
 [facility]

## 2019-02-05 NOTE — CONSULTS
[facility]: [id number] [address] Neurology  [place] [address]  [phone]        Name:   Cindy Romo. Medical record #: [id number]  Admission Date: 2/5/2019   Who Consulted: Dr. Bola Mckeon  Reason for Consult: Double vision    HISTORY OF PRESENT ILLNESS   This is a 52 y.o. male with  has a past medical history of Arthritis, Back pain, Basal cell carcinoma (2012), Family history of skin cancer, Frequent nosebleeds, H/O seasonal allergies, High cholesterol, Joint pain, Joint swelling, Migraine, Porphyria cutanea tarda ([address] Sinus complaint, Skin cancer, Sun-damaged skin, and Sunburn, blistering. who is admitted for  diplopia. The Neurology Service is asked to evaluate for occular migraine versus stroke. Mr. Eber Salinas presented to the ED with a 5 day history of sudden onset diplopia. He reports that he had been sick with bronchits for approximately two weeks and was coughing when this started. He describes his vision as horizontal blurred vision that resolves with the closing of one eye. He has a history of cluster migraines for which he takes imatrex but denies a headache associated with this episode. His wife reports that he takes [facility] [employer] powder several times a day. Presenting NIH stroke score:   0    Clinical Data  Imaging review:     Current rhythm:  SR    Assessment/Plan:   1. Transient Ischemic Attack, r/o Stroke:    · Hold ASA as pt has already had several doses of his own today  · Neurochecks:  Every 4 hours  · Blood Sugar Goal:  140-180  · BP Goal: Less than 160  · Telemetry for at least 24 hours  · EEG  · Myasthenia panel  · TSH      Stroke work up  · A1C:  · LDL:    · TTE:    · Follow up MRI:  · Carotid vascular imaging:    Risk factors for stroke include: HTN, CAD, HLD, Excessive ETOH, physical inactivity  · Discussed with patient    · Discussed signs/symptoms of stroke and when to call 911    3. Mobility:   · Has not been OOB.       4.  Diet:    · Does not need SLP 5.  VTE Prophylaxes:   · Per Primary team      Thank you for allowing the Neurology Service the pleasure of participating in the care of your patient. This patient will be discussed with my collaborating care team physician Dr. Julieta Mojica and he may have further recommendations regarding this patient's care. Attending Attestation:                        Review of Systems: 10 point ROS was performed. Pertinent positives listed in HPI. Negative ROS is as follows. Pt denies: angina, palpitations, paresthesias, weakness, vision loss, slurred speech, aphasia, confusion, fever, chills, falls, headache, back pain, neck pain, prior episodes of vertigo, hallucinations, new medications or dosage changes. PHYSICAL EXAM     Visit Vitals  Temp 98 °F (36.7 °C)   Ht 5' 8\" (1.727 m)   Wt 88.5 kg (195 lb)   BMI 29.65 kg/m²           Temp (24hrs), Av °F (36.7 °C), Min:98 °F (36.7 °C), Max:98 °F (36.7 °C)    No intake/output data recorded. No intake/output data recorded. General:  Alert, cooperative, no acute distress. Lungs:   Clear to auscultation bilaterally. No crackles/wheezes. Heart:  Abdominal:  Regular rate and rhythm, No murmur, click, rub or gallop. Soft and nondistended   Skin: Skin color, texture, turgor normal.    NEUROLOGICAL EXAM    Appearance:  Well developed, well nourished,  and is in no acute distress. Mental Status: Oriented to time, place and person. Fully attentive. No aphasia. Full fund of knowledge. Normal recent and remote memory. Cranial Nerves:   Intact visual fields. PERRL, EOM's full, no nystagmus, no ptosis. Facial sensation is normal. Facial movement is symmetric. Palate is midline. Normal sternocleidomastoid strength. Tongue is midline. Normal upper and lower face strength   Reflexes:   Deep tendon reflexes 2+/4 and symmetrical in uppers, decreased on lowers   Sensory:   Normal to temperature and vibration. Gait:  Not tested   Tremor:   No tremor noted. Cerebellar:  No cerebellar signs present. Neurovascular:  Normal heart sounds and regular rhythm. No carotid bruits. Motor: No pronator drift of either outstretched arm. Deltoid Biceps Triceps Wrist Extension Finger Abduction   L 5 5 5 5 5   R 5 5 5 5 5      Hip Flexion Hip Extension Knee Flexion Knee Extension Ankle Dorsiflexion Ankle Plantarflexion   L 5 5 5 5 5 5   R 5 5 5 5 5 5        Reflexes:     Biceps Triceps Plantar Patellar   L 2 2 2 1   R 2 2 2 1      History  Past Medical History:   Diagnosis Date    Arthritis     Back pain     Basal cell carcinoma 2012    3 on left side of neck and 1 on right side of neck and nasal bridge area with first dx starting aprroximately 12 yrs ago    Family history of skin cancer     Frequent nosebleeds     H/O seasonal allergies     High cholesterol     Joint pain     Joint swelling     Migraine    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[facility] [address]     followed by Brandon Donovan Sinus complaint     Skin cancer     Sun-damaged skin     Sunburn, blistering      Past Surgical History:   Procedure Laterality Date    HX HEART CATHETERIZATION  2013    minimal disease per cardio notes.     HX ORTHOPAEDIC Bilateral     bilateral thumb surgery    HX ORTHOPAEDIC Left ~2013    elbow surgery    HX ROTATOR CUFF REPAIR      Right shoulder    HX SEPTOPLASTY  2002    HX SHOULDER ARTHROSCOPY Left ~2013     Family History   Problem Relation Age of Onset    Hypertension Brother     Diabetes Brother     Diabetes Mother     Hypertension Mother     Cancer Mother         lung     Social History     Socioeconomic History    Marital status:      Spouse name: Not on file    Number of children: Not on file    Years of education: Not on file    Highest education level: Not on file   Social Needs    Financial resource strain: Not on file    Food insecurity - worry: Not on file    Food insecurity - inability: Not on file    Transportation needs - medical: Not on file   [facility] Transportation needs - non-medical: Not on file   Occupational History    Not on file   Tobacco Use    Smoking status: Never Smoker    Smokeless tobacco: Never Used   Substance and Sexual Activity    Alcohol use: Yes     Alcohol/week: 3.6 oz     Types: 6 Cans of beer per week    Drug use: Yes     Comment: occasional marijuana (not weekly), cocaine use in 20's     Sexual activity: Yes     Partners: Female   Other Topics Concern    Not on file   Social History Narrative    Not on file       Allergies   Allergies   Allergen Reactions    Bactrim [Sulfamethoprim Ds] Rash    Lipitor [Atorvastatin] Myalgia    Sulfamethoxazole-Trimethoprim Rash       Outpatient Meds  No current facility-administered medications on file prior to encounter. Current Outpatient Medications on File Prior to Encounter   Medication Sig Dispense Refill    dicyclomine (BENTYL) 10 mg capsule TAKE 1 CAPSULE 3 TIMES A DAY AS NEEDED FOR ABDOMINAL CRAMPING 160 Cap 0    rosuvastatin (CRESTOR) 10 mg tablet Take 1 Tab by mouth nightly. 90 Tab 1    SUMAtriptan (IMITREX) 100 mg tablet Take 100 mg by mouth once as needed for Migraine.  naproxen (NAPROSYN) 250 mg tablet Take  by mouth two (2) times daily (with meals).  DULoxetine (CYMBALTA) 30 mg capsule TAKE ONE CAPSULE BY MOUTH EVERY DAY FOR BACK PAIN 90 Cap 1    montelukast (SINGULAIR) 10 mg tablet TAKE 1 TABLET BY MOUTH EVERY DAY FOR ALLERGIES 90 Tab 1    fenofibrate nanocrystallized (TRICOR) 145 mg tablet TAKE 1 TABLET BY MOUTH DAILY. FOR HYPERCHOLESTEROLEMIA 90 Tab 1    omeprazole (PRILOSEC) 20 mg capsule Take 1 Cap by mouth daily. 90 Cap 1    albuterol (PROVENTIL HFA, VENTOLIN HFA, PROAIR HFA) 90 mcg/actuation inhaler Take 2 Puffs by inhalation every four (4) hours as needed for Wheezing. 1 Inhaler 5    azelastine (ASTELIN) 137 mcg (0.1 %) nasal spray 1 SPRAY BY BOTH NOSTRILS ROUTE TWO (2) TIMES A DAY.  USE IN EACH NOSTRIL AS DIRECTED 1 Bottle 0    fluticasone (FLONASE) 50 mcg/actuation nasal spray USE TWO SPRAYS IN EACH NOSTRIL ONCE DAILY FOR ALLERGIES. 1 Bottle 0    albuterol (PROVENTIL VENTOLIN) 2.5 mg /3 mL (0.083 %) nebulizer solution 3 mL by Nebulization route every four (4) hours as needed for Wheezing. 25 Each 1    albuterol-ipratropium (DUO-NEB) 2.5 mg-0.5 mg/3 ml nebu 3 mL by Nebulization route every six (6) hours as needed. 30 Nebule 1    ASPIRIN/CAFFEINE (BC ARTHRITIS PO) Take 1 Dose Pack by mouth as needed.  SIMETHICONE Take 120 mg by mouth as needed. Inpatient Meds  No current facility-administered medications for this encounter. Current Outpatient Medications:     dicyclomine (BENTYL) 10 mg capsule, TAKE 1 CAPSULE 3 TIMES A DAY AS NEEDED FOR ABDOMINAL CRAMPING, Disp: 160 Cap, Rfl: 0    rosuvastatin (CRESTOR) 10 mg tablet, Take 1 Tab by mouth nightly., Disp: 90 Tab, Rfl: 1    SUMAtriptan (IMITREX) 100 mg tablet, Take 100 mg by mouth once as needed for Migraine. , Disp: , Rfl:     naproxen (NAPROSYN) 250 mg tablet, Take  by mouth two (2) times daily (with meals). , Disp: , Rfl:     DULoxetine (CYMBALTA) 30 mg capsule, TAKE ONE CAPSULE BY MOUTH EVERY DAY FOR BACK PAIN, Disp: 90 Cap, Rfl: 1    montelukast (SINGULAIR) 10 mg tablet, TAKE 1 TABLET BY MOUTH EVERY DAY FOR ALLERGIES, Disp: 90 Tab, Rfl: 1    fenofibrate nanocrystallized (TRICOR) 145 mg tablet, TAKE 1 TABLET BY MOUTH DAILY. FOR HYPERCHOLESTEROLEMIA, Disp: 90 Tab, Rfl: 1    omeprazole (PRILOSEC) 20 mg capsule, Take 1 Cap by mouth daily. , Disp: 90 Cap, Rfl: 1    albuterol (PROVENTIL HFA, VENTOLIN HFA, PROAIR HFA) 90 mcg/actuation inhaler, Take 2 Puffs by inhalation every four (4) hours as needed for Wheezing., Disp: 1 Inhaler, Rfl: 5    azelastine (ASTELIN) 137 mcg (0.1 %) nasal spray, 1 SPRAY BY BOTH NOSTRILS ROUTE TWO (2) TIMES A DAY.  USE IN EACH NOSTRIL AS DIRECTED, Disp: 1 Bottle, Rfl: 0    fluticasone (FLONASE) 50 mcg/actuation nasal spray, USE TWO SPRAYS IN EACH NOSTRIL ONCE DAILY FOR ALLERGIES., Disp: 1 Bottle, Rfl: 0    albuterol (PROVENTIL VENTOLIN) 2.5 mg /3 mL (0.083 %) nebulizer solution, 3 mL by Nebulization route every four (4) hours as needed for Wheezing., Disp: 25 Each, Rfl: 1    albuterol-ipratropium (DUO-NEB) 2.5 mg-0.5 mg/3 ml nebu, 3 mL by Nebulization route every six (6) hours as needed. , Disp: 30 Nebule, Rfl: 1    ASPIRIN/CAFFEINE (BC ARTHRITIS PO), Take 1 Dose Pack by mouth as needed. , Disp: , Rfl:     SIMETHICONE, Take 120 mg by mouth as needed. , Disp: , Rfl:     Recent Results (from the past 24 hour(s))   CBC WITH AUTOMATED DIFF    Collection Time: 02/05/19 11:24 AM   Result Value Ref Range    WBC 5.7 4.1 - 11.1 K/uL    RBC 4.39 4. 10 - 5.70 M/uL    HGB 13.4 12.1 - 17.0 g/dL    HCT 39.2 36.6 - 50.3 %    MCV 89.3 80.0 - 99.0 FL    MCH 30.5 26.0 - 34.0 PG    MCHC 34.2 30.0 - 36.5 g/dL    RDW 12.5 11.5 - 14.5 %    PLATELET 933 065 - 939 K/uL    MPV 9.1 8.9 - 12.9 FL    NRBC 0.0 0  WBC    ABSOLUTE NRBC 0.00 0.00 - 0.01 K/uL    NEUTROPHILS 68 32 - 75 %    LYMPHOCYTES 21 12 - 49 %    MONOCYTES 5 5 - 13 %    EOSINOPHILS 5 0 - 7 %    BASOPHILS 1 0 - 1 %    IMMATURE GRANULOCYTES 0 0.0 - 0.5 %    ABS. NEUTROPHILS 3.9 1.8 - 8.0 K/UL    ABS. LYMPHOCYTES 1.2 0.8 - 3.5 K/UL    ABS. MONOCYTES 0.3 0.0 - 1.0 K/UL    ABS. EOSINOPHILS 0.3 0.0 - 0.4 K/UL    ABS. BASOPHILS 0.1 0.0 - 0.1 K/UL    ABS. IMM.  GRANS. 0.0 0.00 - 0.04 K/UL    DF AUTOMATED     METABOLIC PANEL, COMPREHENSIVE    Collection Time: 02/05/19 11:24 AM   Result Value Ref Range    Sodium 144 136 - 145 mmol/L    Potassium 3.9 3.5 - 5.1 mmol/L    Chloride 106 97 - 108 mmol/L    CO2 29 21 - 32 mmol/L    Anion gap 9 5 - 15 mmol/L    Glucose 95 65 - 100 mg/dL    BUN 25 (H) 6 - 20 MG/DL    Creatinine 0.91 0.70 - 1.30 MG/DL    BUN/Creatinine ratio 27 (H) 12 - 20      GFR est AA >60 >60 ml/min/1.73m2    GFR est non-AA >60 >60 ml/min/1.73m2    Calcium 8.9 8.5 - 10.1 MG/DL    Bilirubin, total 0.6 0.2 - 1.0 MG/DL    ALT (SGPT) 28 12 - 78 U/L    AST (SGOT) 21 15 - 37 U/L    Alk. phosphatase 64 45 - 117 U/L    Protein, total 7.3 6.4 - 8.2 g/dL    Albumin 3.9 3.5 - 5.0 g/dL    Globulin 3.4 2.0 - 4.0 g/dL    A-G Ratio 1.1 1.1 - 2.2     PROTHROMBIN TIME + INR    Collection Time: 02/05/19 11:24 AM   Result Value Ref Range    INR 1.0 0.9 - 1.1      Prothrombin time 10.3 9.0 - 11.1 sec   PTT    Collection Time: 02/05/19 11:24 AM   Result Value Ref Range    aPTT 25.9 22.1 - 32.0 sec    aPTT, therapeutic range     58.0 - 77.0 SECS   SAMPLES BEING HELD    Collection Time: 02/05/19 11:24 AM   Result Value Ref Range    SAMPLES BEING HELD 1red,1sst     COMMENT        Add-on orders for these samples will be processed based on acceptable specimen integrity and analyte stability, which may vary by analyte. Care Plan discussed with:  Patient x   Family    RN    Care Manager    Consultant/Specialist:       Leah Ng, VAISHALI-BC

## 2019-02-06 LAB
COMMENT, HOLDF: NORMAL
CORTIS SERPL-MCNC: 0.6 UG/DL
EST. AVERAGE GLUCOSE BLD GHB EST-MCNC: 100 MG/DL
HBA1C MFR BLD: 5.1 % (ref 4.2–6.3)
SAMPLES BEING HELD,HOLD: NORMAL

## 2019-02-06 PROCEDURE — 74011000258 HC RX REV CODE- 258: Performed by: FAMILY MEDICINE

## 2019-02-06 PROCEDURE — 74011250637 HC RX REV CODE- 250/637: Performed by: INTERNAL MEDICINE

## 2019-02-06 PROCEDURE — 83003 ASSAY GROWTH HORMONE (HGH): CPT

## 2019-02-06 PROCEDURE — 82533 TOTAL CORTISOL: CPT

## 2019-02-06 PROCEDURE — 84403 ASSAY OF TOTAL TESTOSTERONE: CPT

## 2019-02-06 PROCEDURE — 97161 PT EVAL LOW COMPLEX 20 MIN: CPT

## 2019-02-06 PROCEDURE — 36415 COLL VENOUS BLD VENIPUNCTURE: CPT

## 2019-02-06 PROCEDURE — 83036 HEMOGLOBIN GLYCOSYLATED A1C: CPT

## 2019-02-06 PROCEDURE — 65660000000 HC RM CCU STEPDOWN

## 2019-02-06 PROCEDURE — 80061 LIPID PANEL: CPT

## 2019-02-06 PROCEDURE — 82024 ASSAY OF ACTH: CPT

## 2019-02-06 PROCEDURE — 74011250636 HC RX REV CODE- 250/636: Performed by: NURSE PRACTITIONER

## 2019-02-06 PROCEDURE — 84146 ASSAY OF PROLACTIN: CPT

## 2019-02-06 RX ORDER — HYDROCORTISONE SODIUM SUCCINATE 100 MG/2ML
25 INJECTION, POWDER, FOR SOLUTION INTRAMUSCULAR; INTRAVENOUS EVERY 12 HOURS
Status: DISCONTINUED | OUTPATIENT
Start: 2019-02-06 | End: 2019-02-07 | Stop reason: HOSPADM

## 2019-02-06 RX ORDER — SODIUM CHLORIDE 0.9 % (FLUSH) 0.9 %
5-40 SYRINGE (ML) INJECTION EVERY 8 HOURS
Status: DISCONTINUED | OUTPATIENT
Start: 2019-02-06 | End: 2019-02-07 | Stop reason: HOSPADM

## 2019-02-06 RX ORDER — ACETAMINOPHEN 650 MG/1
650 SUPPOSITORY RECTAL
Status: DISCONTINUED | OUTPATIENT
Start: 2019-02-06 | End: 2019-02-07 | Stop reason: HOSPADM

## 2019-02-06 RX ORDER — FAMOTIDINE 20 MG/1
20 TABLET, FILM COATED ORAL EVERY 12 HOURS
Status: DISCONTINUED | OUTPATIENT
Start: 2019-02-06 | End: 2019-02-06

## 2019-02-06 RX ORDER — SODIUM CHLORIDE 0.9 % (FLUSH) 0.9 %
5-40 SYRINGE (ML) INJECTION AS NEEDED
Status: DISCONTINUED | OUTPATIENT
Start: 2019-02-06 | End: 2019-02-07 | Stop reason: HOSPADM

## 2019-02-06 RX ORDER — ACETAMINOPHEN 325 MG/1
650 TABLET ORAL
Status: DISCONTINUED | OUTPATIENT
Start: 2019-02-06 | End: 2019-02-07 | Stop reason: HOSPADM

## 2019-02-06 RX ORDER — FAMOTIDINE 20 MG/1
20 TABLET, FILM COATED ORAL EVERY 12 HOURS
Status: DISCONTINUED | OUTPATIENT
Start: 2019-02-06 | End: 2019-02-07 | Stop reason: HOSPADM

## 2019-02-06 RX ADMIN — HYDROCORTISONE SODIUM SUCCINATE 25 MG: 100 INJECTION, POWDER, FOR SOLUTION INTRAMUSCULAR; INTRAVENOUS at 20:38

## 2019-02-06 RX ADMIN — FAMOTIDINE 20 MG: 20 TABLET ORAL at 08:30

## 2019-02-06 RX ADMIN — SODIUM CHLORIDE 75 ML/HR: 450 INJECTION, SOLUTION INTRAVENOUS at 02:13

## 2019-02-06 RX ADMIN — Medication 10 ML: at 20:38

## 2019-02-06 RX ADMIN — FAMOTIDINE 20 MG: 20 TABLET ORAL at 20:38

## 2019-02-06 RX ADMIN — HYDROCORTISONE SODIUM SUCCINATE 25 MG: 100 INJECTION, POWDER, FOR SOLUTION INTRAMUSCULAR; INTRAVENOUS at 11:57

## 2019-02-06 NOTE — ED NOTES
Verbal shift change report given to Olamide (oncoming nurse) by Mayra Wood (offgoing nurse). Report included the following information SBAR, ED Summary and Recent Results.

## 2019-02-06 NOTE — PROGRESS NOTES
Consult received and appreciated. Reviewed chart and note nsg dysphagia screen completed and WNL and patient tolerating a regular diet. Only complaint is diplopia. No formal SLP evaluation is indicated at this time. Please re-consult if further needs arise post-op. Thank you. Yoselin Garg M.CD.  CCC-SLP

## 2019-02-06 NOTE — PROGRESS NOTES
Reason for Admission:   Pituitary macroadenoma ([facility] [address] CM met with patient at bedside. Patient alert and oriented. CM verified demographics, insurance coverage, and PCP. Patient resides with spouse in their own 1 story home. Independent with ADL's and IADL's. No DME utilized. Patient currently employed with [employer] and reports no significant financial stressors or concerns at this time. Pharmacy utilized for prescriptions is [facility] located in [place]. Identified support is patient's spouse, Randell Ybarra [phone]. Spouse is employed with [employer] as a RN. RRAT Score:        2 Plan for utilizing home health:    Disposition needs TBD/subject to change pending recommendations. Awaiting   PT/OT and SLP recommendations. Likelihood of Readmission:  Low. Patient with 1 ED visit in the past 6 months. Transition of Care Plan:       CM received a consult for discharge planning. PT/OT and SLP have been consulted. CM awaiting recommendations at this time. Disposition needs TBD/subject to change pending recommendations. CM will continue to follow and assist with disposition needs as they arise. Care Management Interventions PCP Verified by CM: Yes(Miguel Serna) Last Visit to PCP: 11/07/18 Palliative Care Criteria Met (RRAT>21 & CHF Dx)?: No 
Mode of Transport at Discharge: S Transition of Care Consult (CM Consult): Discharge Planning Discharge Durable Medical Equipment: No 
Physical Therapy Consult: Yes Occupational Therapy Consult: Yes Speech Therapy Consult: Yes Current Support Network: Lives with Spouse, Own Home(1 story home) Confirm Follow Up Transport: Family Plan discussed with Pt/Family/Caregiver: Yes Discharge Location Discharge Placement: Other:(Disposition needs TBD/subject to change pending recommendations) NATANAEL Landaverde/NATHANIEL Care Management 10:57 AM

## 2019-02-06 NOTE — ED TRIAGE NOTES
Pt transferred from [facility], abnormal MRI. Pt reports blurred vision when both eyes are open. Pt reports bronchitis a week ago.

## 2019-02-06 NOTE — PROGRESS NOTES
8412 - Bedside and Verbal shift change report given to Fahad Smith RN (oncoming nurse) by Booker Friend (offgoing nurse). Report included the following information SBAR, Kardex, ED Summary, Intake/Output, MAR, Accordion, Recent Results and Cardiac Rhythm NSR.  
 
1345 - TRANSFER - OUT REPORT: 
 
Verbal report given to Tyler Farmer RN (name) on [facility].  being transferred to [address]) for routine progression of care Report consisted of patients Situation, Background, Assessment and  
Recommendations(SBAR). Information from the following report(s) SBAR, Kardex, ED Summary, Intake/Output, MAR, Accordion, Recent Results and Cardiac Rhythm NSR was reviewed with the receiving nurse. Lines:  
Peripheral IV 02/05/19 Left Antecubital (Active) Site Assessment Clean, dry, & intact 2/6/2019  8:00 AM  
Phlebitis Assessment 0 2/6/2019  8:00 AM  
Infiltration Assessment 0 2/6/2019  8:00 AM  
Dressing Status Clean, dry, & intact 2/6/2019  8:00 AM  
Dressing Type Transparent;Tape 2/6/2019  8:00 AM  
Hub Color/Line Status Pink; Infusing;Flushed;Patent 2/6/2019  8:00 AM  
Action Taken Open ports on tubing capped 2/6/2019  8:00 AM  
  
 
Opportunity for questions and clarification was provided. Patient transported with: 
 Registered Nurse

## 2019-02-06 NOTE — H&P
[address]  HISTORY AND PHYSICAL Name:  Orlando Rucker 
MR#:  [id number] :  1965 ACCOUNT #:  [de-identified] ADMIT DATE:  2019 CHIEF COMPLAINT:  Diplopia. HISTORY OF PRESENT ILLNESS:  The patient is a 52-year-old 
gentleman with past medical history of arthritis, back pain, 
basal cell carcinoma, frequent nosebleeds, history of high 
cholesterol, migraine headaches, porphyria cutanea tarda, 
who presents to the hospital with the above-mentioned 
symptoms. The patient apparently has been having diplopia 
for the past few days. The patient reports that about 5 
days ago he had sudden onset of diplopia. He reports that 
he had been sick with bronchitis prior for 2 weeks and was 
coughing when this started. The patient reports that he had 
some blurred vision associated with some haziness, which is 
horizontal in nature. The patient reports that it resolves 
when he closes one of his eyes. The patient reports that he 
got concerned and went to the neurologist today. The 
patient reports he also takes a lot of BC Powder on a daily 
basis, but for the past few days, he had cut down. He had 
been taking 2-3 packs of BC powder in the past few days. The patient reports that he was seen in the neurologist's 
office and was sent to the ER for further management and 
evaluation. He was seen at [facility] and was 
sent to the [facility] for further management and 
evaluation. The patient had an MRI done and was found to 
have a large pituitary macroadenoma and was requested to be 
admitted under the hospitalist service. The patient was 
evaluated by the Neurosurgery. Currently, the patient does 
admit to mild headache. Denies any trouble swallowing, 
trouble with speech, any chest pain, shortness of breath, 
cough, fever, chills, abdominal pain, constipation, 
diarrhea, urinary symptoms, focal or generalized neurological weakness, recent travels, sick contacts, falls, 
injuries, hematemesis, melena, hemoptysis, hematuria, or any 
other concerns or problems. PAST MEDICAL HISTORY:  See above. HOME MEDICATIONS:  Currently, the patient is on; 1. Cymbalta 30 mg daily. 2.  Crestor 10 mg daily. 3.  Tessalon Perles. 4.  Imitrex 100 mg as needed for migraines. 5.  Albuterol p.r.n. 
6.  BC Arthritis one pack daily, but the patient has been 
taking 2-4 packs on a daily basis. 7.  Singulair 10 mg daily. 8.  Omeprazole 20 mg daily. 9.  Simethicone 125 mg daily. SOCIAL HISTORY:  Denies tobacco abuse. Occasional alcohol. Denies IV drug abuse. ALLERGIES:  BACTRIM AND SULFAMETHOXAZOLE. FAMILY HISTORY:  Brother has history of hypertension and 
diabetes. Mother has history of diabetes, hypertension, and 
lung cancer. REVIEW OF SYSTEMS:  All systems were reviewed and were found 
to be essentially negative except for the symptoms mentioned 
above. PHYSICAL EXAMINATION: 
VITAL SIGNS:  Temperature 97.9, pulse 76, respiratory rate 12, blood pressure 147/89, pulse oximetry 94% on room air. GENERAL:  Alert x3, awake, in no acute distress, resting in 
bed, pleasant male, appears to be stated age. HEENT:  Pupils equal and reactive to light. Dry mucous 
membranes. Tympanic membranes are clear. NECK:  Supple. CHEST:  Clear to auscultation bilaterally. COR:  S1, S2 heard. ABDOMEN:  Soft, nontender, nondistended. Bowel sounds are 
physiological. 
EXTREMITIES:  No clubbing, no cyanosis, no edema. NEURO/PSYCH:  Pleasant mood and affect. Cranial nerves II 
through XII grossly intact except for diplopia. Sensory 
grossly within normal limits. Strength 5/5. LABORATORY DATA:  White count 5.7, hemoglobin is 13.4, 
hematocrit 39.2, platelets 288. INR 1.0. Sodium 144, 
potassium 3.9, chloride 106, bicarbonate 29, anion gap 9, 
glucose 95, BUN 25, creatinine 0.91, calcium 8.9. Bilirubin total 0.6, ALT 28, AST 21, alkaline phosphatase 64. Hemoglobin A1c 5.2.  TSH is 2.67. CT of the head shows 
unremarkable head CT. MRI of the brain shows a large 
pituitary mass extending slightly into the suprasellar 
cistern and in the left cavernous sinus that was consistent 
with macroadenoma, does not abut or distort the optic chiasm 
or optic nerve. Echocardiogram shows EF of 56% to 60% with 
hypertrophy. ASSESSMENT AND PLAN: 
1. 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 The patient will be admitted on a neuro/tele bed. Appreciate Neurosurgery input. The patient evaluated by Neurosurgery, awaiting their recommendations. We will 
provide IV hydration, supportive care. We will get TSH, 
ACTH, prolactin levels. We will hold aspirin for now as the 
patient needs to go for surgery. Per Neurosurgery, the 
patient will need to go for surgical resection once stable 
as he has been taking large doses of aspirin. We will 
provide neurovascular checks. Any changes in the 
neurological status will mandate emergent intervention. Further intervention per hospital course. Continue to 
closely monitor. 2.  History of hyperlipidemia. Continue home medications. 3.  History of depression. Continue Cymbalta. 4.  Gastrointestinal and deep venous thrombosis prophylaxis. The patient will be on sequential compression devices. Phyllis Gannon MD 
 
 
[id number] 
D:  02/05/2019 21:33 
T:  02/06/2019 0:30 
JOB #:  [id number]

## 2019-02-06 NOTE — PROGRESS NOTES
Pt seen and examined last night  He has a pituitary adenoma with double vision but no optic nerve compression or acuity changes  Will order pit fct labs and look to get on OR schedule  Full note to follow

## 2019-02-06 NOTE — PROGRESS NOTES
TRANSFER - IN REPORT: 
 
Verbal report received from [facility]) on [employer].  being received from ED(unit) for routine progression of care Report consisted of patients Situation, Background, Assessment and  
Recommendations(SBAR). Information from the following report(s) SBAR was reviewed with the receiving nurse. Opportunity for questions and clarification was provided. Assessment completed upon patients arrival to unit and care assumed.

## 2019-02-06 NOTE — PROGRESS NOTES
Hospitalist Progress Note Abdulaziz Alamo MD 
Answering service: [phone] OR [phone] from in house phone Cell:   
  
Date of Service:  2019 NAME:  Kristyn Scanlon. :  1965 MRN:  [id number] Admission Summary:  
The patient is a 42-year-old 
gentleman with past medical history of arthritis, back pain, 
basal cell carcinoma, frequent nosebleeds, history of high 
cholesterol, migraine headaches, porphyria cutanea tarda, 
who presented to the hospital for diplopia. The patient reported that about 5 
days ago he had sudden onset of diplopia. He reports that 
he had been sick with bronchitis prior for 2 weeks and was 
coughing when this started. The patient reports that he had 
some blurred vision associated with some haziness, which is 
horizontal in nature. The patient reports that it resolves 
when he closes one of his eyes. The patient then went to see a neurologist in the office and sent to ED at [facility]. From [facility] was sent to [facility] for further management and eval. 
The patient had an MRI done and was found to 
have a large pituitary macroadenoma. He was admitted by the hospitalist team and neurosurgery was consulted. Interval history / Subjective:  
Patient admitted for large pituitary macroadenoma. See by neurosurgery,planning for surgery. Meanwhile patient saying that other than the double vision,he is feeling fine. No headaches today,no nausea,no vomiting Assessment & Plan: 1. Pituitary tumor:pituitary macroadenoma.  
-Presented with diplopia. 
-Neurosurgery consulted. Pt will undergo surgery. -TSH 2.61,cortisol 0.6,ACTH pending. Glucose 95 2. History of hyperlipidemia.   
-Continue home medications. 3.History of depression.   
-Continue Cymbalta Code status:scds DVT prophylaxis:full code Care Plan discussed with: Patient/Family Disposition: TBD  
 
 Hospital Problems  Date Reviewed: 2/5/2019 Codes Class Noted POA * (Principal) Pituitary adenoma ([address] ICD-10-CM: D35.2 ICD-9-CM: 227.3  2/5/2019 Unknown Review of Systems: A comprehensive review of systems was negative except for that written in the HPI. Vital Signs:  
 Last 24hrs VS reviewed since prior progress note. Most recent are: 
Visit Vitals /84 Pulse 87 Temp 98 °F (36.7 °C) Resp 19 Ht 5' 8\" (1.727 m) Wt 88.5 kg (195 lb) SpO2 92% BMI 29.65 kg/m² No intake or output data in the 24 hours ending 02/06/19 0530 Physical Examination:  
    
Constitutional:  No acute distress, cooperative, pleasant   
ENT:  Oral mucous moist, oropharynx benign. Neck supple, Resp:  CTA bilaterally. No wheezing/rhonchi/rales. No accessory muscle use CV:  Regular rhythm, normal rate, no murmurs, gallops, rubs GI:  Soft, non distended, non tender. normoactive bowel sounds, no hepatosplenomegaly Musculoskeletal:  No edema, warm, 2+ pulses throughout Neurologic:  Moves all extremities. AAOx3. Diplopia. Moving all extremities. Good muscles tone amd sensation. Psych:  Good insight, Not anxious nor agitated. Data Review:  
 Review and/or order of clinical lab test 
 
 
Labs:  
 
Recent Labs 02/05/19 
1124 WBC 5.7 HGB 13.4 HCT 39.2  Recent Labs 02/05/19 
1124   
K 3.9  CO2 29 BUN 25* CREA 0.91  
GLU 95  
CA 8.9 Recent Labs 02/05/19 
1124 SGOT 21 ALT 28 AP 64 TBILI 0.6 TP 7.3 ALB 3.9 GLOB 3.4 Recent Labs 02/05/19 
1124 INR 1.0 PTP 10.3 APTT 25.9 No results for input(s): FE, TIBC, PSAT, FERR in the last 72 hours. No results found for: FOL, RBCF No results for input(s): PH, PCO2, PO2 in the last 72 hours. No results for input(s): CPK, CKNDX, TROIQ in the last 72 hours. No lab exists for component: CPKMB Lab Results Component Value Date/Time Cholesterol, total 156 11/07/2018 08:53 AM  
 HDL Cholesterol 39 (L) 11/07/2018 08:53 AM  
 LDL,Direct 121 (H) 03/22/2013 02:15 AM  
 LDL, calculated 79 11/07/2018 08:53 AM  
 Triglyceride 189 (H) 11/07/2018 08:53 AM  
 CHOL/HDL Ratio 6.3 (H) 03/22/2013 02:15 AM  
 
Lab Results Component Value Date/Time Glucose (POC) 82 01/24/2017 01:29 PM  
 
Lab Results Component Value Date/Time Color YELLOW/STRAW 01/24/2017 02:56 PM  
 Appearance CLEAR 01/24/2017 02:56 PM  
 Specific gravity 1.029 01/24/2017 02:56 PM  
 pH (UA) 6.0 01/24/2017 02:56 PM  
 Protein NEGATIVE  01/24/2017 02:56 PM  
 Glucose NEGATIVE  01/24/2017 02:56 PM  
 Ketone NEGATIVE  01/24/2017 02:56 PM  
 Bilirubin NEGATIVE  01/24/2017 02:56 PM  
 Urobilinogen 0.2 01/24/2017 02:56 PM  
 Nitrites NEGATIVE  01/24/2017 02:56 PM  
 Leukocyte Esterase NEGATIVE  01/24/2017 02:56 PM  
 Epithelial cells FEW 01/24/2017 02:56 PM  
 Bacteria NEGATIVE  01/24/2017 02:56 PM  
 WBC 0-4 01/24/2017 02:56 PM  
 RBC 0-5 01/24/2017 02:56 PM  
 
 
 
Medications Reviewed:  
 
Current Facility-Administered Medications Medication Dose Route Frequency  sodium chloride (NS) flush 5-40 mL  5-40 mL IntraVENous Q8H  
 sodium chloride (NS) flush 5-40 mL  5-40 mL IntraVENous PRN  
 acetaminophen (TYLENOL) tablet 650 mg  650 mg Oral Q4H PRN Or  
 acetaminophen (TYLENOL) solution 650 mg  650 mg Per NG tube Q4H PRN Or  
 acetaminophen (TYLENOL) suppository 650 mg  650 mg Rectal Q4H PRN  
 famotidine (PEPCID) tablet 20 mg  20 mg Oral Q12H  
 0.45% sodium chloride infusion  75 mL/hr IntraVENous CONTINUOUS Current Outpatient Medications Medication Sig  
 benzonatate (TESSALON) 200 mg capsule Take 200 mg by mouth three (3) times daily as needed for Cough.  SUMAtriptan (IMITREX) 100 mg tablet Take 100 mg by mouth once as needed for Migraine.   
 albuterol (PROVENTIL HFA, VENTOLIN HFA, PROAIR HFA) 90 mcg/actuation inhaler Take 2 Puffs by inhalation every four (4) hours as needed for Wheezing.  albuterol (PROVENTIL VENTOLIN) 2.5 mg /3 mL (0.083 %) nebulizer solution 3 mL by Nebulization route every four (4) hours as needed for Wheezing.  albuterol-ipratropium (DUO-NEB) 2.5 mg-0.5 mg/3 ml nebu 3 mL by Nebulization route every six (6) hours as needed.  ASPIRIN/CAFFEINE (BC ARTHRITIS PO) Take 1 Dose Pack by mouth as needed.  dicyclomine (BENTYL) 10 mg capsule TAKE 1 CAPSULE 3 TIMES A DAY AS NEEDED FOR ABDOMINAL CRAMPING  
 rosuvastatin (CRESTOR) 10 mg tablet Take 1 Tab by mouth nightly.  naproxen (NAPROSYN) 250 mg tablet Take  by mouth two (2) times daily (with meals).  DULoxetine (CYMBALTA) 30 mg capsule TAKE ONE CAPSULE BY MOUTH EVERY DAY FOR BACK PAIN  
 montelukast (SINGULAIR) 10 mg tablet TAKE 1 TABLET BY MOUTH EVERY DAY FOR ALLERGIES  omeprazole (PRILOSEC) 20 mg capsule Take 1 Cap by mouth daily.  SIMETHICONE Take 120 mg by mouth as needed. ______________________________________________________________________ EXPECTED LENGTH OF STAY: - - - 
ACTUAL LENGTH OF STAY:          1 Kiana Shepherd MD

## 2019-02-06 NOTE — PROGRESS NOTES
Neurosurgery Progress Note Full consult to follow by Dr. Mike Smith. Pt continues with horizontal diplopia that resolves when he covers one eye. MRI reveals a suprasellar mass with left cavernous sinus extension. Patient has been taking high doses of BC powder. He will be put on the surgery schedule likely next week.  
 
Imani Hampton NP

## 2019-02-06 NOTE — PROGRESS NOTES
Problem: Mobility Impaired (Adult and Pediatric) Goal: *Acute Goals and Plan of Care (Insert Text) Physical Therapy Goals Initiated 2/6/2019 1. Patient will move from supine to sit and sit to supine  in bed with independence within 7 day(s). 2.  Patient will transfer from bed to chair and chair to bed with independence using the least restrictive device within 7 day(s). 3.  Patient will perform sit to stand with independence within 7 day(s). 4.  Patient will ambulate with independence for 500 feet with the least restrictive device within 7 day(s). 5.  Patient will ascend/descend 3 stairs with 2 handrail(s) with modified independence within 7 day(s). 6. Pt will increase Martinez Balance score by 4-5 points to decrease risk of falls within 7 days. physical Therapy EVALUATION Patient: Tremaine King (52 y.o. male) Date: 2/6/2019 Primary Diagnosis: Pituitary adenoma ([facility] [address] [D35.2] Precautions: diplopia (eye patch) ASSESSMENT : 
Chart reviewed. Patient evaluated in the ED. Patient presents with pituitary adenoma resulting in double vision. Plan is for surgical intervention. Patient admits to having some balance concerns and \"feeling drunk\" while walking. Discussed how vision and balance are connected. Encouraged patient to keep eye patch on with mobility as it helps with the double vision as well as to have supervision from family members or nursing staff to reduce fall risk. Patient scored 50/56 on the Martinez balance test (with eye patch on) indicating low fall risk. Will compare balance test after surgery as appropriate. Suspect safe home discharge when medically appropriate. May benefit from outpatient neuro PT. Plan to re-evaluate post surgical intervention. Patient will benefit from skilled intervention to address the above impairments. Patients rehabilitation potential is considered to be Good Factors which may influence rehabilitation potential include: [x]         None noted 
[]         Mental ability/status []         Medical condition 
[]         Home/family situation and support systems 
[]         Safety awareness 
[]         Pain tolerance/management 
[]         Other: PLAN : 
Recommendations and Planned Interventions: 
[x]           Bed Mobility Training             []    Neuromuscular Re-Education 
[x]           Transfer Training                   []    Orthotic/Prosthetic Training 
[x]           Gait Training                         []    Modalities [x]           Therapeutic Exercises           []    Edema Management/Control 
[x]           Therapeutic Activities            []    Patient and Family Training/Education 
[]           Other (comment): Frequency/Duration: Hold PT until post surgery. Will re-evaluate as appropriate and set plan of care. Discharge Recommendations: Neuro outpatient/ vision therapy, None, and TBD Further Equipment Recommendations for Discharge: none SUBJECTIVE:  
Patient stated I feel like I'm walking like a drunk.  OBJECTIVE DATA SUMMARY:  
HISTORY:   
Past Medical History:  
Diagnosis Date  Arthritis  Back pain  Basal cell carcinoma 2012  
 3 on left side of neck and 1 on right side of neck and nasal bridge area with first dx starting aprroximately 12 yrs ago  Family history of skin cancer  Frequent nosebleeds  H/O seasonal allergies  High cholesterol  Joint pain  Joint swelling  Migraine 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[facility] [address] followed by [facility] [place]  Sinus complaint  Skin cancer  Sun-damaged skin  Sunburn, blistering Past Surgical History:  
Procedure Laterality Date  HX HEART CATHETERIZATION  2013  
 minimal disease per cardio notes.  HX ORTHOPAEDIC Bilateral   
 bilateral thumb surgery  HX ORTHOPAEDIC Left ~2013  
 elbow surgery  HX ROTATOR CUFF REPAIR Right shoulder  HX SEPTOPLASTY  2002  HX SHOULDER ARTHROSCOPY Left ~2013 Prior Level of Function/Home Situation: Patient lives with his wife in a single level house. Works full time and drives. Independent without mobility at baseline. Personal factors and/or comorbidities impacting plan of care: none Home Situation Home Environment: Private residence # Steps to Enter: 3 Rails to Enter: Yes Hand Rails : Bilateral 
Wheelchair Ramp: No 
One/Two Story Residence: One story Living Alone: No 
Support Systems: Spouse/Significant Other/Partner, Family member(s) Patient Expects to be Discharged to[de-identified] Private residence Current DME Used/Available at Home: None EXAMINATION/PRESENTATION/DECISION MAKING: Critical Behavior: 
Neurologic State: Alert Orientation Level: Oriented X4 Cognition: Appropriate decision making, Appropriate for age attention/concentration, Appropriate safety awareness, Follows commands Hearing: Auditory Auditory Impairment: Hard of hearing, bilateral  
Strength:   
Strength: Within functional limits, no unilateral weakness identified Tone & Sensation:  
Tone: Normal 
Sensation: Intact Coordination: 
Coordination: Within functional limits, good finger-to-nose test 
 
Vision:  
 Diplopia, see neuro and OT notes Functional Mobility: 
Bed Mobility: 
Supine to Sit: Supervision Sit to Supine: Supervision Transfers: 
Sit to Stand: Supervision Stand to Sit: Supervision Balance:  
Sitting: Intact Standing: Impaired Standing - Static: Good Standing - Dynamic : Fair Ambulation/Gait Training:Distance (ft): 150 Feet (ft) Ambulation - Level of Assistance: Stand-by assistance;Contact guard assistance(witnessed ambulating to the bathroom with wife) Functional Measure: 
[place] [facility] Balance Test: 
 
Sitting to Standing: 3 Standing Unsupported: 4 Sitting with Back Unsupported: 4 Standing to Sittin Transfers: 4 Standing Unsupported with Eyes Closed: 4 Standing Unsupported with Feet Together: 3 Reach Forward with Outstretched Arm: 4  Object: 4 Turn to Look Over Shoulders: 4 Turn 360 Degrees: 4 Alternate Foot on Step/Stool: 2 Standing Unsupported One Foot in Front: 3 Stand on One Leg: 3 Total: 50 
 
 
 
56=Maximum possible score;  
0-20=High fall risk 21-40=Moderate fall risk 41-56=Low fall risk Physical Therapy Evaluation Charge Determination History Examination Presentation Decision-Making LOW Complexity : Zero comorbidities / personal factors that will impact the outcome / POC LOW Complexity : 1-2 Standardized tests and measures addressing body structure, function, activity limitation and / or participation in recreation  LOW Complexity : Stable, uncomplicated  LOW Complexity : FOTO score of  Based on the above components, the patient evaluation is determined to be of the following complexity level: LOW Pain: 
Pain Scale 1: Numeric (0 - 10) Pain Intensity 1: 0 Activity Tolerance: O2 93% on RA. Minimal SOB observed with activity. /80 Please refer to the flowsheet for vital signs taken during this treatment. After treatment:  
[]         Patient left in no apparent distress sitting up in chair 
[x]         Patient left in no apparent distress in bed 
[x]         Call bell left within reach 
[]         Nursing notified 
[]         Caregiver present 
[]         Bed alarm activated COMMUNICATION/EDUCATION:  
The patients plan of care was discussed with: Occupational Therapist. 
[x]         Fall prevention education was provided and the patient/caregiver indicated understanding. [x]         Patient/family have participated as able in goal setting and plan of care. [x]         Patient/family agree to work toward stated goals and plan of care. []         Patient understands intent and goals of therapy, but is neutral about his/her participation. []         Patient is unable to participate in goal setting and plan of care.  
 
Thank you for this referral. 
Denis Courser, PT, DPT 
 Geriatric Clinical Specialist  
 Time Calculation: 10 mins

## 2019-02-06 NOTE — PROGRESS NOTES
Admission Medication Reconciliation: 
 
Information obtained from: Patient family member (Wife), Patient, Dev Lerner Comments/Recommendations: All medications/allergies have been reviewed and updated; last medication administration times reviewed and recorded. Wife who is [address] [facility] [place] [facility] answered majority of questions and Patient was able to confirm and deny current medications and last dose times when prompted. When asked about allergies Patient was unsure of Lipitor but family members indicated it caused muscle aches. Denied any further questions after interaction. Changes made to Prior to Admission (PTA) Medication List: ? Medications Added:  
- Benzonatate 200 mg cap ? Medications Changed:  
- None ? Medications Removed: - Azelastine 137 mcg nasal spray 
- fenofibrate 145 mg tab 
- fluticasone 50 mcg/actuation nasal spary Significant PMH/Disease States:  
Past Medical History:  
Diagnosis Date  Arthritis  Back pain  Basal cell carcinoma 2012  
 3 on left side of neck and 1 on right side of neck and nasal bridge area with first dx starting aprroximately 12 yrs ago  Family history of skin cancer  Frequent nosebleeds  H/O seasonal allergies  High cholesterol  Joint pain  Joint swelling  Migraine 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[facility] [address] followed by Lenoria Meter  Sinus complaint  Skin cancer  Sun-damaged skin  Sunburn, blistering Chief Complaint for this Admission: Chief Complaint Patient presents with  Double Vision Allergies:  Bactrim [sulfamethoprim ds]; Lipitor [atorvastatin]; and Sulfamethoxazole-trimethoprim Prior to Admission Medications:  
Prior to Admission Medications Prescriptions Last Dose Informant Patient Reported? Taking? ASPIRIN/CAFFEINE (BC ARTHRITIS PO) 2/4/2019 at Unknown time  Yes Yes Sig: Take 1 Dose Pack by mouth as needed.   
DULoxetine (CYMBALTA) 30 mg capsule 2/2/2019  No No  
 Sig: TAKE ONE CAPSULE BY MOUTH EVERY DAY FOR BACK PAIN  
SIMETHICONE 2/1/2019  Yes No  
Sig: Take 120 mg by mouth as needed. SUMAtriptan (IMITREX) 100 mg tablet 2/4/2019 at Unknown time  Yes Yes Sig: Take 100 mg by mouth once as needed for Migraine. albuterol (PROVENTIL HFA, VENTOLIN HFA, PROAIR HFA) 90 mcg/actuation inhaler 1/29/2019 at Unknown time  No Yes Sig: Take 2 Puffs by inhalation every four (4) hours as needed for Wheezing. albuterol (PROVENTIL VENTOLIN) 2.5 mg /3 mL (0.083 %) nebulizer solution 1/5/2019 at Unknown time  No Yes Sig: 3 mL by Nebulization route every four (4) hours as needed for Wheezing. albuterol-ipratropium (DUO-NEB) 2.5 mg-0.5 mg/3 ml nebu 1/5/2019 at Unknown time  No Yes Sig: 3 mL by Nebulization route every six (6) hours as needed. benzonatate (TESSALON) 200 mg capsule 1/5/2019 at Unknown time  Yes Yes Sig: Take 200 mg by mouth three (3) times daily as needed for Cough. dicyclomine (BENTYL) 10 mg capsule 2/1/2019  No No  
Sig: TAKE 1 CAPSULE 3 TIMES A DAY AS NEEDED FOR ABDOMINAL CRAMPING  
montelukast (SINGULAIR) 10 mg tablet 2/2/2019  No No  
Sig: TAKE 1 TABLET BY MOUTH EVERY DAY FOR ALLERGIES  
naproxen (NAPROSYN) 250 mg tablet 2/2/2019  Yes No  
Sig: Take  by mouth two (2) times daily (with meals). omeprazole (PRILOSEC) 20 mg capsule 2/2/2019  No No  
Sig: Take 1 Cap by mouth daily. rosuvastatin (CRESTOR) 10 mg tablet 2/2/2019  No No  
Sig: Take 1 Tab by mouth nightly. Facility-Administered Medications: None

## 2019-02-06 NOTE — ED NOTES
Eye patch placed on right eye to decrease double vision. Pt ambulated to restroom with wife's assistance

## 2019-02-06 NOTE — ED PROVIDER NOTES
52 y.o. male with past medical history significant for nosebleeds, hypercholesterolemia, arthritis, seasonal allergies, and migraine who presents from EMS with chief complaint of diplopia. Pt reports diplopia since Friday at approximately 1600. Pt states he is seeing double. Pt notes he was watching TV Sunday night and saw multiple characters being interviewed on his television screen. Pt states he tried closing one of his eyes while watching TV, which provided minimal relief. Pt was seen at [facility] ED earlier today, who referred him to [facility] [place] ED. Pt had an abnormal MRI at [facility], which was consistent with macroadenoma. Pt notes he was diagnosed with bronchitis 1 week ago. Pt states he takes BC daily, last dose was yesterday. Pt denies hx of DM. Pt denies HA. There are no other acute medical concerns at this time. Social hx: Denies tobacco use. PCP: Rain Garza NP Note written by Karen Cancino, as dictated by Jorge Sarmiento MD 7:35 PM 
 
 
 
 
The history is provided by the patient. No  was used. Past Medical History:  
Diagnosis Date  Arthritis  Back pain  Basal cell carcinoma 2012  
 3 on left side of neck and 1 on right side of neck and nasal bridge area with first dx starting aprroximately 12 yrs ago  Family history of skin cancer  Frequent nosebleeds  H/O seasonal allergies  High cholesterol  Joint pain  Joint swelling  Migraine 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[facility] [address] followed by Jennyfer [place]  Sinus complaint  Skin cancer  Sun-damaged skin  Sunburn, blistering Past Surgical History:  
Procedure Laterality Date  HX HEART CATHETERIZATION  2013  
 minimal disease per cardio notes.  HX ORTHOPAEDIC Bilateral   
 bilateral thumb surgery  HX ORTHOPAEDIC Left ~2013  
 elbow surgery  HX ROTATOR CUFF REPAIR Right shoulder  HX SEPTOPLASTY  2002  HX SHOULDER ARTHROSCOPY Left ~2013 Family History:  
Problem Relation Age of Onset  Hypertension Brother  Diabetes Brother  Diabetes Mother  Hypertension Mother  Cancer Mother   
     lung Social History Socioeconomic History  Marital status:  Spouse name: Not on file  Number of children: Not on file  Years of education: Not on file  Highest education level: Not on file Social Needs  Financial resource strain: Not on file  Food insecurity - worry: Not on file  Food insecurity - inability: Not on file  Transportation needs - medical: Not on file  Transportation needs - non-medical: Not on file Occupational History  Not on file Tobacco Use  Smoking status: Never Smoker  Smokeless tobacco: Never Used Substance and Sexual Activity  Alcohol use: Yes Alcohol/week: 3.6 oz Types: 6 Cans of beer per week  Drug use: Yes Comment: occasional marijuana (not weekly), cocaine use in 20's  Sexual activity: Yes  
  Partners: Female Other Topics Concern  Not on file Social History Narrative  Not on file ALLERGIES: Bactrim [sulfamethoprim ds]; Lipitor [atorvastatin]; and Sulfamethoxazole-trimethoprim Review of Systems Eyes: Positive for visual disturbance. Respiratory: Negative for shortness of breath. Cardiovascular: Negative for chest pain. Neurological: Negative for weakness, numbness and headaches. All other systems reviewed and are negative. There were no vitals filed for this visit. Physical Exam  
Constitutional: He is oriented to person, place, and time. He appears well-developed and well-nourished. HENT:  
Head: Normocephalic and atraumatic. Eyes: Conjunctivae are normal. Pupils are equal, round, and reactive to light. Neck: Normal range of motion. Cardiovascular: Normal rate, regular rhythm, normal heart sounds and intact distal pulses. Pulmonary/Chest: Effort normal and breath sounds normal. No stridor. No respiratory distress. He has no wheezes. He has no rales. Abdominal: Soft. Bowel sounds are normal. He exhibits no distension. There is no tenderness. Musculoskeletal: Normal range of motion. Neurological: He is alert and oriented to person, place, and time. No cranial nerve deficit. Coordination normal.  
Skin: Skin is warm and dry. Nursing note and vitals reviewed. MDM Number of Diagnoses or Management Options Pituitary macroadenoma [facility]):  
Diagnosis management comments: Pt sent for admission. Received decadron at 6 pm. Will discuss with nsgy as they'll likely want to hold off surgery given pts daily bc use Amount and/or Complexity of Data Reviewed Tests in the radiology section of CPT®: reviewed Discuss the patient with other providers: yes (nsgy and hospitalist) Patient Progress Patient progress: stable Procedures CONSULT NOTE: 
8:23 PM Debra Olmedo MD spoke with Dr. Kirsten Cunningham, Consult for Neurosurgery. Discussed available diagnostic tests and clinical findings. Dr. Raymond Porter will evaluate Pt, wants hospitalist to admit. 833 PM- spoke with Dr Rosie Maurer hospitalist who will admit

## 2019-02-06 NOTE — PROGRESS NOTES
Occupational Therapy 5882 -  
[phone] Orders acknowledged and chart reviewed in prep for OT evaluation. Discussed patient case with RN who reports patient is up ad abiodun to the bathroom in [place]. Observed patient ambulating from bathroom IND. Discussed with patient role of OT and symptoms. Patient reporting only symptom is diplopia and that he already has been issued an eye patch which helps minimally. Patient reporting plan is for sx potentially on Friday (2/8) or Monday (2/11). Patient has no further acute OT needs at this time, please re-consult if patient status changes. Will sign off, thank you. Recommend with nursing patient to complete as able in order to maintain strength, endurance and independence: ADLs with supervision/setup, OOB to chair 3x/day and mobilizing to the bathroom for toileting with 1 assist (supervision). Thank you for your assistance. Cole Lee MS, OTR/L Time spent with patient: 6 minutes

## 2019-02-06 NOTE — PROGRESS NOTES
Problem: Falls - Risk of 
Goal: *Absence of Falls Document Jaqueline Anthony Fall Risk and appropriate interventions in the flowsheet. Outcome: Progressing Towards Goal 
Fall Risk Interventions: 
  
 
  
 
Medication Interventions: Teach patient to arise slowly

## 2019-02-06 NOTE — PROGRESS NOTES
Bedside [facility] by Christie (offgoing nurse). Report included the following information SBAR, MAR, Recent Results and Cardiac Rhythm NSR.

## 2019-02-07 VITALS
BODY MASS INDEX: 29.44 KG/M2 | SYSTOLIC BLOOD PRESSURE: 138 MMHG | OXYGEN SATURATION: 94 % | HEART RATE: 74 BPM | DIASTOLIC BLOOD PRESSURE: 80 MMHG | HEIGHT: 68 IN | WEIGHT: 194.22 LBS | TEMPERATURE: 98 F | RESPIRATION RATE: 18 BRPM

## 2019-02-07 LAB
ACTH PLAS-MCNC: 2.2 PG/ML (ref 7.2–63.3)
ALBUMIN SERPL-MCNC: 3.7 G/DL (ref 3.5–5)
ALBUMIN/GLOB SERPL: 1.1 {RATIO} (ref 1.1–2.2)
ALP SERPL-CCNC: 58 U/L (ref 45–117)
ALT SERPL-CCNC: 32 U/L (ref 12–78)
ANION GAP SERPL CALC-SCNC: 9 MMOL/L (ref 5–15)
AST SERPL-CCNC: 22 U/L (ref 15–37)
BASOPHILS # BLD: 0 K/UL (ref 0–0.1)
BASOPHILS NFR BLD: 0 % (ref 0–1)
BILIRUB SERPL-MCNC: 0.6 MG/DL (ref 0.2–1)
BUN SERPL-MCNC: 24 MG/DL (ref 6–20)
BUN/CREAT SERPL: 30 (ref 12–20)
CALCIUM SERPL-MCNC: 8.9 MG/DL (ref 8.5–10.1)
CHLORIDE SERPL-SCNC: 110 MMOL/L (ref 97–108)
CHOLEST SERPL-MCNC: 186 MG/DL
CHOLEST SERPL-MCNC: 209 MG/DL
CO2 SERPL-SCNC: 23 MMOL/L (ref 21–32)
CREAT SERPL-MCNC: 0.81 MG/DL (ref 0.7–1.3)
DIFFERENTIAL METHOD BLD: ABNORMAL
EOSINOPHIL # BLD: 0 K/UL (ref 0–0.4)
EOSINOPHIL NFR BLD: 0 % (ref 0–7)
ERYTHROCYTE [DISTWIDTH] IN BLOOD BY AUTOMATED COUNT: 12.8 % (ref 11.5–14.5)
GH SERPL-MCNC: 0.3 NG/ML (ref 0–10)
GLOBULIN SER CALC-MCNC: 3.3 G/DL (ref 2–4)
GLUCOSE SERPL-MCNC: 105 MG/DL (ref 65–100)
HCT VFR BLD AUTO: 39.6 % (ref 36.6–50.3)
HDLC SERPL-MCNC: 58 MG/DL
HDLC SERPL-MCNC: 63 MG/DL
HDLC SERPL: 3.2 {RATIO} (ref 0–5)
HDLC SERPL: 3.3 {RATIO} (ref 0–5)
HGB BLD-MCNC: 13.2 G/DL (ref 12.1–17)
IMM GRANULOCYTES # BLD AUTO: 0.1 K/UL (ref 0–0.04)
IMM GRANULOCYTES NFR BLD AUTO: 0 % (ref 0–0.5)
LDLC SERPL CALC-MCNC: 112.8 MG/DL (ref 0–100)
LDLC SERPL CALC-MCNC: 126 MG/DL (ref 0–100)
LIPID PROFILE,FLP: ABNORMAL
LIPID PROFILE,FLP: ABNORMAL
LYMPHOCYTES # BLD: 1 K/UL (ref 0.8–3.5)
LYMPHOCYTES NFR BLD: 8 % (ref 12–49)
MCH RBC QN AUTO: 30.1 PG (ref 26–34)
MCHC RBC AUTO-ENTMCNC: 33.3 G/DL (ref 30–36.5)
MCV RBC AUTO: 90.2 FL (ref 80–99)
MONOCYTES # BLD: 0.5 K/UL (ref 0–1)
MONOCYTES NFR BLD: 3 % (ref 5–13)
NEUTS SEG # BLD: 12 K/UL (ref 1.8–8)
NEUTS SEG NFR BLD: 88 % (ref 32–75)
NRBC # BLD: 0 K/UL (ref 0–0.01)
NRBC BLD-RTO: 0 PER 100 WBC
PLATELET # BLD AUTO: 275 K/UL (ref 150–400)
PMV BLD AUTO: 9.5 FL (ref 8.9–12.9)
POTASSIUM SERPL-SCNC: 3.8 MMOL/L (ref 3.5–5.1)
PROLACTIN SERPL-MCNC: 13.3 NG/ML
PROT SERPL-MCNC: 7 G/DL (ref 6.4–8.2)
RBC # BLD AUTO: 4.39 M/UL (ref 4.1–5.7)
SODIUM SERPL-SCNC: 142 MMOL/L (ref 136–145)
TESTOST FREE SERPL-MCNC: 1.3 PG/ML (ref 7.2–24)
TESTOST SERPL-MCNC: 228 NG/DL (ref 264–916)
TRIGL SERPL-MCNC: 100 MG/DL (ref ?–150)
TRIGL SERPL-MCNC: 76 MG/DL (ref ?–150)
VLDLC SERPL CALC-MCNC: 15.2 MG/DL
VLDLC SERPL CALC-MCNC: 20 MG/DL
WBC # BLD AUTO: 13.5 K/UL (ref 4.1–11.1)

## 2019-02-07 PROCEDURE — 74011250637 HC RX REV CODE- 250/637: Performed by: INTERNAL MEDICINE

## 2019-02-07 PROCEDURE — 80053 COMPREHEN METABOLIC PANEL: CPT

## 2019-02-07 PROCEDURE — 85025 COMPLETE CBC W/AUTO DIFF WBC: CPT

## 2019-02-07 PROCEDURE — 74011000258 HC RX REV CODE- 258: Performed by: FAMILY MEDICINE

## 2019-02-07 PROCEDURE — 80061 LIPID PANEL: CPT

## 2019-02-07 PROCEDURE — 74011250637 HC RX REV CODE- 250/637: Performed by: FAMILY MEDICINE

## 2019-02-07 PROCEDURE — 36415 COLL VENOUS BLD VENIPUNCTURE: CPT

## 2019-02-07 PROCEDURE — 74011250636 HC RX REV CODE- 250/636: Performed by: NURSE PRACTITIONER

## 2019-02-07 RX ORDER — HYDROCORTISONE 10 MG/1
TABLET ORAL
Qty: 21 TAB | Refills: 0 | Status: SHIPPED | OUTPATIENT
Start: 2019-02-07 | End: 2019-02-08

## 2019-02-07 RX ORDER — SUMATRIPTAN 25 MG/1
100 TABLET, FILM COATED ORAL ONCE
Status: COMPLETED | OUTPATIENT
Start: 2019-02-07 | End: 2019-02-07

## 2019-02-07 RX ADMIN — SUMATRIPTAN SUCCINATE 100 MG: 25 TABLET ORAL at 10:31

## 2019-02-07 RX ADMIN — HYDROCORTISONE SODIUM SUCCINATE 25 MG: 100 INJECTION, POWDER, FOR SOLUTION INTRAMUSCULAR; INTRAVENOUS at 08:28

## 2019-02-07 RX ADMIN — SODIUM CHLORIDE 75 ML/HR: 450 INJECTION, SOLUTION INTRAVENOUS at 07:47

## 2019-02-07 RX ADMIN — ACETAMINOPHEN 650 MG: 325 TABLET ORAL at 07:46

## 2019-02-07 RX ADMIN — FAMOTIDINE 20 MG: 20 TABLET ORAL at 08:28

## 2019-02-07 RX ADMIN — Medication 10 ML: at 14:49

## 2019-02-07 NOTE — PROGRESS NOTES
Problem: Falls - Risk of 
Goal: *Absence of Falls Document Cy [place] Fall Risk and appropriate interventions in the flowsheet. Outcome: Progressing Towards Goal 
Fall Risk Interventions: 
  
 
  
 
Medication Interventions: Assess postural VS orthostatic hypotension, Evaluate medications/consider consulting pharmacy, Patient to call before getting OOB, Teach patient to arise slowly Problem: Pressure Injury - Risk of 
Goal: *Prevention of pressure injury Document Bert Scale and appropriate interventions in the flowsheet. Outcome: Progressing Towards Goal 
Pressure Injury Interventions:

## 2019-02-07 NOTE — PROGRESS NOTES
Nalini Almanza Sarah [address] 
  
February 7, 2019  
  
  
RE: [address] 
To Whom It May Concern, 
  
This is to certify that [address] [place] [address] Airam was hospitalized at [facility] from February 5 to February 7, 2019. He is expected to have surgery on February 12, 2019 and may not go back to work until after surgery and postoperative recovery period, as directed by surgeon, Dr. Thanh Cole. 
  
Please feel free to contact my office if you have any questions or concerns. Thank you for your assistance in this matter. 
  
  
Sincerely, James Paige DO Adult Hospitalist 
Replaced by [facility], [address] Phone: [phone]

## 2019-02-07 NOTE — PROGRESS NOTES
Problem: Falls - Risk of 
Goal: *Absence of Falls Document Cy [place] Fall Risk and appropriate interventions in the flowsheet. Outcome: Progressing Towards Goal 
Fall Risk Interventions: 
  
 
  
 
Medication Interventions: Patient to call before getting OOB Problem: Pressure Injury - Risk of 
Goal: *Prevention of pressure injury Document Bert Scale and appropriate interventions in the flowsheet. Outcome: Progressing Towards Goal 
Pressure Injury Interventions:

## 2019-02-07 NOTE — PROGRESS NOTES
I have reviewed discharge instructions with the patient and spouse. The patient and spouse verbalized understanding. Bedside RN performed patient education and medication education. Discharge concerns initiated and discussed with patient, including clarification on \"who\" assists the patient at their home and instructions for when the home going patient should call their provider after discharge. Opportunity for questions and clarification was provided. Patient receptive to education: YES Patient stated: Jhonny Hollingsworth Understanding Barriers to Education: None Diagnosis Education given:  YES Length of stay: 3 Expected Day of Discharge: 2/7/2019 Ask if they have \"Help at Home\" & add to white board? YES Education Day #: 3 Medication Education Given:  YES 
M in the box Medication name: hydrocortisone Pt aware of HCAHPS survey: YES

## 2019-02-07 NOTE — PROGRESS NOTES
A Spiritual Care Partner Volunteer visited patient in [place] [address] on 2/07/2019. Documented by: 
Chaplain Simmons MDiv, MS, [address] ([id number])

## 2019-02-07 NOTE — DISCHARGE SUMMARY
Tiigi [address] February 7, 2019 RE: Surinder Verdugo To Whom It May Concern, This is to certify that Surinder Mcneil. was hospitalized at [address] [place] [address] from February 5 to February 7, 2019. He is expected to have surgery on February 12, 2019 and may not go back to work until after surgery and postoperative recovery period, as directed by surgeon, Dr. Mary Rosenberg. Please feel free to contact my office if you have any questions or concerns. Thank you for your assistance in this matter. Sincerely, Lillian Lerner DO Adult Hospitalist 
[address] [place] [address] 
 Effie Gonzalez, [address] Phone: [phone]

## 2019-02-07 NOTE — DISCHARGE SUMMARY
DISCHARGE SUMMARY        PATIENT ID: Jose Alicia MRN: [id number]   YOB: 1965   AGE: 52 y.o. DATE OF ADMISSION: 2/5/2019  7:30 PM    DATE OF DISCHARGE: 2/7/2019  PRIMARY CARE PROVIDER: Arnalod Schwarz NP     Procedure Performed:  none      DISCHARGING PHYSICIAN: Mery Izaguirre DO    To contact physician, please call [phone] and ask the  to page or ask to be transferred the Adult Hospitalist Department. Admission Summary:   The patient is a 12-year-old  gentleman with past medical history of arthritis, chronic low-back pain, cluster headaches, porphyria cutanea tarda ( patient reported getting blisters  at times due to the condition, which he states he was diagnosed with as a child). Patient presented to the hospital  with complaints of diplopia and blurry vision started on Friday, February 1, 2019. The haziness  was horizontal.  Patient had been diagnosed with sinusitis/bronchitis  symptoms that started about 2 weeks prior to this admission. Patient had been taking Tessalon Perles for cough that has improved much, essentially resolved during this admission. Patient states that he especially became aware of  diplopia while he was watching the [employer] game on  February 3, 2019 when 2  people on television screen appeared to be 4. Patient stated that when he closed 1 eye, the diplopia would improve. Patient denied nausea or vomiting. He denied other visual changes. He presented to the [facility] Emergency Department on February 5, 2019  where Neurology consultation was requested. The patient  was found to have a large pituitary macroadenoma  per MRI of the brain. He was admitted by the hospitalist team and neurosurgery was consulted. Interval history / Subjective:     Patient was evaluated by neurosurgeon and decision was made for surgical resection.   However, there  was concern for bleeding complications as patient does take BC Powder several times a day at home for back pain as well as chronic headaches. neuros He also takes Imitrex for what appears to be cluster headaches, at home. During this hospital stay, patient did complain of headache located in the occipital as well as bilateral frontal region. Patient states that he  is prone to unilateral frontal headaches for which he takes Imitrex at home. Although he was given 1 dose of Imitrex here at the hospital which effectively relieve his headache, patient was counseled on the potential  cardiovascular side effects of Imitrex and encouraged to discussed alternative headache medicine during follow up with neurologist.       Patient was started on   IV steroids during this hospital admission. Patient stated that when he had both of his eyes open, he continued to have diplopia. However, with the eye patch on the right eye, his diplopia would improve much. Assessment & Plan: 1. Pituitary tumor:pituitary macroadenoma. Surgery planned for February 12, 2019 per Neurosurgery. Continue steroids with hydrocortisone as prescribed ( see discharge medication list below). Continue eye patch which is helping   symptoms of diplopia. -TSH 2.61 (normal),  prolactin  level is normal  at 13.3, cortisol 0.6  at 1:51 a.m. on February 6,  Growth hormone is low normal at 0.3, ACTH is low at  2. 2. Glucose 95. 2.Hyperlipidemia.    -Continue home medication of  Crestor. 3.Depression. Continue Cymbalta. 4.  Headaches. Patient is  encouraged to not take [facility] [employer] Powder, and to seek alternative to Imitrex for chronic headaches. Unclear chronic headaches are related to pituitary macroadenoma.     Code status:scds  DVT prophylaxis:full code  Care Plan discussed with: Patient/Family  Disposition: TBD           CONSULTATIONS:  Neurosurgeon,  Dr. Stacie Johnson      Physical Exam on Discharge:  Visit Vitals  /80 (BP 1 Location: Left arm, BP Patient Position: At rest;Supine)   Pulse 74 Temp 98 °F (36.7 °C)   Resp 18   Ht 5' 8\" (1.727 m)   Wt 88.1 kg (194 lb 3.6 oz)   SpO2 94%   BMI 29.53 kg/m²       General:  Alert, cooperative, no distress, appears stated age. Eyes:  Extraocular muscles intact. Anicteric sclera. Pupils reactive equally bilaterally. Lungs:   Clear to auscultation bilaterally. Chest wall:  No tenderness or deformity. Heart:  Regular rate and rhythm, S1, S2 normal, no murmur, click, rub or gallop. Abdomen:   Soft, non-tender. Bowel sounds normal. No masses,  No organomegaly. Extremities: Extremities normal, atraumatic, no cyanosis or edema. Pulses: 2+ and symmetric all extremities. Skin: Skin color, texture, turgor normal. No rashes or lesions   Neurologic: CNII-XII intact. No focal motor deficits  in all 4 extremities. DIAGNOSTIC DATA:     MRI OF THE BRAIN ON February 5, 2019:    COMPARISON:  CT head earlier  on February 5, 2019     TECHNIQUE:  Multiplanar multisequence acquisition without and with contrast of  the brain/orbits/pituitary gland.      FINDINGS:       Ventricles:  Midline, no hydrocephalus. Intracranial Hemorrhage:  None. Brain Parenchyma/Brainstem:  Few small T2 hyperintensities scattered throughout  the supratentorial white matter. No acute infarction. Basal Cisterns:  Normal.   Flow Voids:  Normal.  Extraocular Muscles:  Normal.  Optic Nerve Sheath Complex:  Normal  Intraorbital Fat/Lacrimal Glands:  Normal.  Globes/Lenses:  Normal.  Suprasellar Cistern/Optic Chiasm:  Lobulated, enhancing mass within the  pituitary gland, extending laterally into the left cavernous sinus and slightly  into the suprasellar cistern. Infundibulum displaced toward the right. Mass  measures 2.5 x 2.0 x 1.9 cm. This is in close proximity to the undersurface of  the optic chiasm, although it does not contact the chiasm or prechiasmatic optic  nerves. The left cavernous carotid flow void is somewhat displaced by the mass,  there remains patent.   Post Contrast:  No abnormal parenchymal or meningeal enhancement. Additional Comments:  Bilateral maxillary sinus mucosal thickening and fluid  levels.       IMPRESSION:  Large pituitary mass extending slightly into the suprasellar cistern and in the  left cavernous sinus, most consistent with macroadenoma. Does not abut/ distort  the optic chiasm or optic nerves. Ref. Range 2/5/2019 11:24 2/5/2019 15:01 2/6/2019 01:51 2/6/2019 02:24 2/6/2019 10:46 2/7/2019 02:32   WBC Latest Ref Range: 4.1 - 11.1 K/uL 5.7     13.5 (H)   NRBC Latest Ref Range: 0  WBC 0.0     0.0   RBC Latest Ref Range: 4.10 - 5.70 M/uL 4.39     4.39   HGB Latest Ref Range: 12.1 - 17.0 g/dL 13.4     13.2   HCT Latest Ref Range: 36.6 - 50.3 % 39.2     39.6   MCV Latest Ref Range: 80.0 - 99.0 FL 89.3     90.2   MCH Latest Ref Range: 26.0 - 34.0 PG 30.5     30.1   MCHC Latest Ref Range: 30.0 - 36.5 g/dL 34.2     33.3   RDW Latest Ref Range: 11.5 - 14.5 % 12.5     12.8   PLATELET Latest Ref Range: 150 - 400 K/uL 228     275   MPV Latest Ref Range: 8.9 - 12.9 FL 9.1     9.5   NEUTROPHILS Latest Ref Range: 32 - 75 % 68     88 (H)   LYMPHOCYTES Latest Ref Range: 12 - 49 % 21     8 (L)   MONOCYTES Latest Ref Range: 5 - 13 % 5     3 (L)   EOSINOPHILS Latest Ref Range: 0 - 7 % 5     0   BASOPHILS Latest Ref Range: 0 - 1 % 1     0   IMMATURE GRANULOCYTES Latest Ref Range: 0.0 - 0.5 % 0     0   DF Latest Units:   AUTOMATED     AUTOMATED   ABSOLUTE NRBC Latest Ref Range: 0.00 - 0.01 K/uL 0.00     0.00   ABS. NEUTROPHILS Latest Ref Range: 1.8 - 8.0 K/UL 3.9     12.0 (H)   ABS. IMM. GRANS. Latest Ref Range: 0.00 - 0.04 K/UL 0.0     0.1 (H)   ABS. LYMPHOCYTES Latest Ref Range: 0.8 - 3.5 K/UL 1.2     1.0   ABS. MONOCYTES Latest Ref Range: 0.0 - 1.0 K/UL 0.3     0.5   ABS. EOSINOPHILS Latest Ref Range: 0.0 - 0.4 K/UL 0.3     0.0   ABS.  BASOPHILS Latest Ref Range: 0.0 - 0.1 K/UL 0.1     0.0   INR Latest Ref Range: 0.9 - 1.1   1.0        Prothrombin time Latest Ref Range: 9.0 - 11.1 sec 10.3        aPTT Latest Ref Range: 22.1 - 32.0 sec 25.9        Sodium Latest Ref Range: 136 - 145 mmol/L 144     142   Potassium Latest Ref Range: 3.5 - 5.1 mmol/L 3.9     3.8   Chloride Latest Ref Range: 97 - 108 mmol/L 106     110 (H)   CO2 Latest Ref Range: 21 - 32 mmol/L 29     23   Anion gap Latest Ref Range: 5 - 15 mmol/L 9     9   Glucose Latest Ref Range: 65 - 100 mg/dL 95     105 (H)   BUN Latest Ref Range: 6 - 20 MG/DL 25 (H)     24 (H)   Creatinine Latest Ref Range: 0.70 - 1.30 MG/DL 0.91     0.81   BUN/Creatinine ratio Latest Ref Range: 12 - 20   27 (H)     30 (H)   Calcium Latest Ref Range: 8.5 - 10.1 MG/DL 8.9     8.9   GFR est non-AA Latest Ref Range: >60 ml/min/1.73m2 >60     >60   GFR est AA Latest Ref Range: >60 ml/min/1.73m2 >60     >60   Bilirubin, total Latest Ref Range: 0.2 - 1.0 MG/DL 0.6     0.6   Protein, total Latest Ref Range: 6.4 - 8.2 g/dL 7.3     7.0   Albumin Latest Ref Range: 3.5 - 5.0 g/dL 3.9     3.7   Globulin Latest Ref Range: 2.0 - 4.0 g/dL 3.4     3.3   A-G Ratio Latest Ref Range: 1.1 - 2.2   1.1     1.1   ALT (SGPT) Latest Ref Range: 12 - 78 U/L 28     32   AST Latest Ref Range: 15 - 37 U/L 21     22   Alk.  phosphatase Latest Ref Range: 45 - 117 U/L 64     58   Triglyceride Latest Ref Range: <150 MG/DL    100  76   Cholesterol, total Latest Ref Range: <200 MG/DL    209 (H)  186   HDL Cholesterol Latest Units: MG/DL    63  58   CHOL/HDL Ratio Latest Ref Range: 0 - 5.0      3.3  3.2   LDL, calculated Latest Ref Range: 0 - 100 MG/DL    126 (H)  112.8 (H)   VLDL, calculated Latest Units: MG/DL    20  15.2   Hemoglobin A1c, (calculated) Latest Ref Range: 4.2 - 6.3 %  5.2 5.1      Est. average glucose Latest Units: mg/dL  103 100      Cortisol, random Latest Units: ug/dL   0.6      TSH Latest Ref Range: 0.36 - 3.74 uIU/mL  2.61       Prolactin Latest Units: ng/mL    13.3     GROWTH HORMONE Unknown     Rpt    ACTH Unknown   Rpt (A)      TESTOSTERONE, FREE & TOTAL Unknown     Rpt (A)          DISCHARGE MEDICATIONS:   Discharge Medication List as of 2/7/2019  5:22 PM      START taking these medications    Details   hydrocortisone (CORTEF) 10 mg tablet Take 2 tablets po daily every morning and 1 tablet PO daily every night, Print, Disp-21 Tab, R-0         CONTINUE these medications which have NOT CHANGED    Details   SUMAtriptan (IMITREX) 100 mg tablet Take 100 mg by mouth once as needed for Migraine., Historical Med      albuterol (PROVENTIL HFA, VENTOLIN HFA, PROAIR HFA) 90 mcg/actuation inhaler Take 2 Puffs by inhalation every four (4) hours as needed for Wheezing., Normal, Disp-1 Inhaler, R-5      albuterol (PROVENTIL VENTOLIN) 2.5 mg /3 mL (0.083 %) nebulizer solution 3 mL by Nebulization route every four (4) hours as needed for Wheezing., Normal, Disp-25 Each, R-1      albuterol-ipratropium (DUO-NEB) 2.5 mg-0.5 mg/3 ml nebu 3 mL by Nebulization route every six (6) hours as needed., Normal, Disp-30 Nebule, R-1      dicyclomine (BENTYL) 10 mg capsule TAKE 1 CAPSULE 3 TIMES A DAY AS NEEDED FOR ABDOMINAL CRAMPING, Normal, Disp-160 Cap, R-0      rosuvastatin (CRESTOR) 10 mg tablet Take 1 Tab by mouth nightly., Normal, Disp-90 Tab, R-1      montelukast (SINGULAIR) 10 mg tablet TAKE 1 TABLET BY MOUTH EVERY DAY FOR ALLERGIES, Normal, Disp-90 Tab, R-1      omeprazole (PRILOSEC) 20 mg capsule Take 1 Cap by mouth daily. , Normal, Disp-90 Cap, R-1      DULoxetine (CYMBALTA) 30 mg capsule TAKE ONE CAPSULE BY MOUTH EVERY DAY FOR BACK PAIN, Normal, Disp-90 Cap, R-1      SIMETHICONE Take 120 mg by mouth as needed., Historical Med         STOP taking these medications       benzonatate (TESSALON) 200 mg capsule Comments:   Reason for Stopping:         naproxen (NAPROSYN) 250 mg tablet Comments:   Reason for Stopping:         ASPIRIN/CAFFEINE (BC ARTHRITIS PO) Comments:   Reason for Stopping:               Condition at discharge:  Afrebrile  Ambulating  Eating, Drinking, Voiding  Stable    FOLLOW UP APPOINTMENTS:    Follow-up Information     Follow up With Specialties Details Why Contact Info    Connor Damon MD Neurosurgery  surgery Tuesday, February 12, 2019 [address] [facility] [address] [facility]      Marie Doctor, NP Nurse Practitioner   [address] [place] [id number] [phone]             Disposition:  home                 [place], [place]

## 2019-02-07 NOTE — PROCEDURES
[facility]  EEG    Name:  Kwasi Ritter  MR#:  [id number]  :  1965  ACCOUNT #:  [de-identified]  DATE OF SERVICE:  2019        REFERRING PHYSICIAN:  Mark Anthony Michaud NP    CLINICAL HISTORY:  An EEG is requested on this 52year-old  to evaluate for epileptiform abnormalities. MEDICATIONS:  Include Imitrex, Naprosyn, Cymbalta, TriCor,  Prilosec, BC Powder. EEG REPORT:  This tracing is obtained during the awake  state. During the wakefulness, there are brief intermittent  runs of posteriorly dominant and symmetric low to medium amplitude 10  cycle per second activity, which attenuated with eye  opening. Low voltage matched for frequency. Activities  were seen symmetrically over the anterior head regions. Hyperventilation not performed. Intermittent photic stimulation, little little alters the tracing. Sleep is not obtained. INTERPRETATION:  This EEG recorded during the awake state is  normal.  No epileptiform abnormalities are seen.         Rsos Ibarra MD SE/V_OPTMR_T/BC_[facility]  D:  2019 8:26  T:  2019 8:50  JOB #:  [id number]

## 2019-02-07 NOTE — PROGRESS NOTES
afeb vss  Feels better on steroids double vision better  Surgery not an emergency and he is on for early next wek so will discharge home re admit next week  Home on steroid replacement

## 2019-02-07 NOTE — DISCHARGE INSTRUCTIONS
No Aspirin, Aleve, Motrin, ibuprofen, BC powders prior to surgery. If you have a headache, you may uses Tylenol    Surgery is scheduled for Tud 02/12/19. Dr. Jas Nicole office will call tomorrow with pre-op instructions and exact arrival time. You will come to the admissions office on the ground floor of [address] to the main entrance and turn left after you enter and let them know you are scheduled for inpatient surgery with Dr. Brook Sparks. No strenuous activity. Use the eye patch as needed for double vision.

## 2019-02-07 NOTE — PROGRESS NOTES
PT NOTE  2/7/19 PT is on hold until after surgery on 2/8/19. Per yesterday's PT Evaluation, will reassess need for PT after surgery.  
Alie Rice

## 2019-02-08 ENCOUNTER — PATIENT OUTREACH (OUTPATIENT)
Dept: OTHER | Age: 54
End: 2019-02-08

## 2019-02-08 RX ORDER — NAPROXEN SODIUM 220 MG
220 TABLET ORAL 2 TIMES DAILY WITH MEALS
COMMUNITY
End: 2019-02-21

## 2019-02-08 NOTE — PROGRESS NOTES
Patient eligible for [facility] [employer] care management. Received notification of discharge from [facility] on 19 for 19, ED visit with resulting OBS stay. Care Manager contacted the patient, verified  and zip code as identifiers. Provided introduction and explanation of the Nurse Care Manager role. Agreed to CM follow-up and assistance at this time. PMH:   Past Medical History:   Diagnosis Date    Arthritis     HANDS    Back pain     Basal cell carcinoma     3 on left side of neck and 1 on right side of neck and nasal bridge area with first dx starting aprroximately 12 yrs ago    Bronchitis     Family history of skin cancer     Frequent nosebleeds     H/O seasonal allergies     High cholesterol     IBS (irritable bowel syndrome)     Joint pain     Joint swelling     Migraine     Pituitary tumor    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 BORN WITH IT. MAKES HIM HAVE BLISTERS ON HANDS. LIVER OVERPRODUCED RED BLOOD CELLS    Sinus complaint     Skin cancer     Sun-damaged skin     Sunburn, blistering        Social History:   Social History     Socioeconomic History    Marital status:      Spouse name: Not on file    Number of children: Not on file    Years of education: Not on file    Highest education level: Not on file   Social Needs    Financial resource strain: Not on file    Food insecurity - worry: Not on file    Food insecurity - inability: Not on file   [employer] needs - medical: Not on file   [employer] needs - non-medical: Not on file   Occupational History    Not on file   Tobacco Use    Smoking status: Never Smoker    Smokeless tobacco: Never Used   Substance and Sexual Activity    Alcohol use:  Yes     Alcohol/week: 1.8 oz     Types: 3 Cans of beer per week    Drug use: Yes     Comment: occasional marijuana (not weekly), cocaine use in 20's     Sexual activity: Yes     Partners: Female   Other Topics Concern  Not on file   Social History Narrative    Not on file       51 yo male presented to Neurology initial consult for diplopia and headaches x 5 days; Neurology office sent to [facility] ED on 02/05/19 for testing:    MRI Brain:   Suprasellar Cistern/Optic Chiasm:  Lobulated, enhancing mass within the  pituitary gland, extending laterally into the left cavernous sinus and slightly  into the suprasellar cistern. Infundibulum displaced toward the right. Mass  measures 2.5 x 2.0 x 1.9 cm. This is in close proximity to the undersurface of  the optic chiasm, although it does not contact the chiasm or prechiasmatic optic  nerves. The left cavernous carotid flow void is somewhat displaced by the mass,  there remains patent. Following results of MRI, patient transferred for neurosurgery to [facility] on 02/06/19;    Diagnosis:  Pituitary Macroadenoma;  Growth Hormone level:  0.3ng/dl;  ACTH:    2.2pg/dl; low  Prolactin  13.3ng/ml; Cortisol  0.6ug/dl;  TSH   2.61uIU/ml; On transfer, noted patient had taken heavy doses of \"Goody powders\" for ongoing headache pain;     INR 1.0;   PTT 25.9;  Patient discharged to home from [facility], Neuro consult note not available;  Scheduled for surgery on 02/12/19 at [facility];     Neurological Condition Focused Assessment    Description of pain: throbbing pain. Associated symptoms: diplopia x 5 days. Have you recently had any of the following? Any recent changes in mood, thinking or new symptoms no  Any change in speech   no  Difficulty swallowing   no  Dizziness/lightheadedness  yes  Fatigue  yes    Anxiety no  Confusion no   Sleep disturbance no     Visual changes yes  Limb paralysis, or facial drooping no  Weakness no  Trouble with coordinating your movement, or change in gait no  Medication changes? Yes  Any recent changes in activity Restricted to No Strenuous Activity;     New Medications:    Hydrocortisone 10mg  Two tabs po in AM, One tab in PM;      Imitrex; Discontinued Medications: BC arthritis, goody powders, ASA, Tessalon and Naproxen;     Red Flags:  Call your doctor immediately if:  · Your headache worsens or no relief from Imitrex;  · You develop weakness, difficulty with walking or numbness in extremities;  · You start to vomit or develop nausea;  · You become confused or disoriented;  · You develop decreased mental abilities, drowsiness or restlessness;  · You notice changes in your breathing;    Initial Care Plan for Chronic Disease:  1. Acute pain, headache;  · States imitrex is relieving his current headaches fairly well;  · Review of red flags/changes and to notify his physician;  · Verbalized understanding of when to notify of red flags;  · Denies nausea or worsening pain;    2. Altered vision with risk for falls/dizziness;   · States still have the double vision;  · \"It is not affecting my walking much\";  · \"I do get dizzy at times\";  · \"My wife is an RN, and she is watching over me\"; · Reviewed DC instructions and answered questions;    Goals        Post Hospitalization     Demonstrates no signs of increased Intracranial Pressure ICP;         Understands red flags post discharge. Emotional Status/Adjustment to Health State: minimal anxiety expressed; Readiness to Change: []  Pre-contemplative    []  Contemplative  []  Preparation               [x]  Action                  []  Maintenance    Barriers/Challenges to Care:   []  Decline in memory    []  Language barrier  []  Emotional   []  Limited mobility  []  Lack of motivation     [x] Vision, hearing or cognitive impairment  []  Knowledge    [] Financial Barriers     []  Other       Current Outpatient Medications   Medication Sig    dicyclomine (BENTYL) 10 mg capsule TAKE 1 CAPSULE 3 TIMES A DAY AS NEEDED FOR ABDOMINAL CRAMPING (Patient taking differently: TAKE 1 CAPSULE 3 TIMES A DAY AS NEEDED FOR ABDOMINAL CRAMPING.  ONLY TAKES PRN)    rosuvastatin (CRESTOR) 10 mg tablet Take 1 Tab by mouth nightly. (Patient taking differently: Take 10 mg by mouth daily.)    SUMAtriptan (IMITREX) 100 mg tablet Take 100 mg by mouth once as needed for Migraine.  montelukast (SINGULAIR) 10 mg tablet TAKE 1 TABLET BY MOUTH EVERY DAY FOR ALLERGIES    omeprazole (PRILOSEC) 20 mg capsule Take 1 Cap by mouth daily. (Patient taking differently: Take 20 mg by mouth as needed.)    albuterol (PROVENTIL HFA, VENTOLIN HFA, PROAIR HFA) 90 mcg/actuation inhaler Take 2 Puffs by inhalation every four (4) hours as needed for Wheezing. (Patient taking differently: Take 2 Puffs by inhalation every four (4) hours as needed for Wheezing. GETS COUPLE OF BRONCHITIS EVERY YEAR)    SIMETHICONE Take 120 mg by mouth as needed.  naproxen sodium (ALEVE) 220 mg tablet Take 220 mg by mouth two (2) times daily (with meals).  aspirin/salicylamide/caffeine (ARTHRITIS STRENGTH BC POWDER PO) Take  by mouth as needed.  albuterol (PROVENTIL VENTOLIN) 2.5 mg /3 mL (0.083 %) nebulizer solution 3 mL by Nebulization route every four (4) hours as needed for Wheezing. (Patient taking differently: 2.5 mg by Nebulization route every four (4) hours as needed for Wheezing. GETS COUPLE OF BRONCHITIS EVERY YEAR)    albuterol-ipratropium (DUO-NEB) 2.5 mg-0.5 mg/3 ml nebu 3 mL by Nebulization route every six (6) hours as needed. (Patient taking differently: 3 mL by Nebulization route every six (6) hours as needed. GETS COUPLE OF BRONCHITIS EVERY YEAR)     No current facility-administered medications for this visit. Completed a review of medications with patient, who verbalized understanding of how and when to take medications. Barriers / Adherence with medications:   Good adherence, no barriers;    Upcoming appointments:   Brain surgery scheduled on 02/12/19; Patient verbalized understanding of all information discussed. Pt has my contact information for any further questions, concerns, or needs.     Plan for next call:  Will FU postop unless new CM need arises;

## 2019-02-11 LAB
ACHR AB SER-SCNC: <0.03 NMOL/L (ref 0–0.24)
ACHR BLOCK AB SER-ACNC: 19 % (ref 0–25)
ACHR MOD AB/ACHR TOTAL SFR SER: <12 % (ref 0–20)
STRIA MUS AB TITR SER IF: NEGATIVE {TITER}

## 2019-02-12 ENCOUNTER — APPOINTMENT (OUTPATIENT)
Dept: CT IMAGING | Age: 54
DRG: 614 | End: 2019-02-12
Attending: NEUROLOGICAL SURGERY
Payer: COMMERCIAL

## 2019-02-12 ENCOUNTER — ANESTHESIA EVENT (OUTPATIENT)
Dept: SURGERY | Age: 54
DRG: 614 | End: 2019-02-12
Payer: COMMERCIAL

## 2019-02-12 ENCOUNTER — ANESTHESIA (OUTPATIENT)
Dept: SURGERY | Age: 54
DRG: 614 | End: 2019-02-12
Payer: COMMERCIAL

## 2019-02-12 ENCOUNTER — HOSPITAL ENCOUNTER (INPATIENT)
Age: 54
LOS: 3 days | Discharge: HOME | DRG: 614 | End: 2019-02-15
Attending: NEUROLOGICAL SURGERY | Admitting: NEUROLOGICAL SURGERY
Payer: COMMERCIAL

## 2019-02-12 PROBLEM — D49.7 PITUITARY TUMOR: Status: ACTIVE | Noted: 2019-02-12

## 2019-02-12 PROCEDURE — 88313 SPECIAL STAINS GROUP 2: CPT

## 2019-02-12 PROCEDURE — 74011000258 HC RX REV CODE- 258: Performed by: RADIOLOGY

## 2019-02-12 PROCEDURE — 76010000174 HC OR TIME 3.5 TO 4 HR INTENSV-TIER 1: Performed by: NEUROLOGICAL SURGERY

## 2019-02-12 PROCEDURE — 81002 URINALYSIS NONAUTO W/O SCOPE: CPT

## 2019-02-12 PROCEDURE — BN25ZZZ COMPUTERIZED TOMOGRAPHY (CT SCAN) OF FACIAL BONES: ICD-10-PCS | Performed by: NEUROLOGICAL SURGERY

## 2019-02-12 PROCEDURE — 74011250637 HC RX REV CODE- 250/637: Performed by: OTOLARYNGOLOGY

## 2019-02-12 PROCEDURE — 77030008467 HC STPLR SKN COVD -B: Performed by: NEUROLOGICAL SURGERY

## 2019-02-12 PROCEDURE — 77030028147 HC NDL BIOP NEUR DISP BUSA -C: Performed by: NEUROLOGICAL SURGERY

## 2019-02-12 PROCEDURE — 76210000002 HC OR PH I REC 3 TO 3.5 HR: Performed by: NEUROLOGICAL SURGERY

## 2019-02-12 PROCEDURE — 74011250636 HC RX REV CODE- 250/636: Performed by: ANESTHESIOLOGY

## 2019-02-12 PROCEDURE — 74011000272 HC RX REV CODE- 272: Performed by: NEUROLOGICAL SURGERY

## 2019-02-12 PROCEDURE — 74011250636 HC RX REV CODE- 250/636: Performed by: NEUROLOGICAL SURGERY

## 2019-02-12 PROCEDURE — 74011636320 HC RX REV CODE- 636/320: Performed by: RADIOLOGY

## 2019-02-12 PROCEDURE — 77030010813: Performed by: NEUROLOGICAL SURGERY

## 2019-02-12 PROCEDURE — 77030004391 HC BUR FLUT MEDT -C: Performed by: NEUROLOGICAL SURGERY

## 2019-02-12 PROCEDURE — 88342 IMHCHEM/IMCYTCHM 1ST ANTB: CPT

## 2019-02-12 PROCEDURE — 77030013137 HC MRK XR CRAN BUSA -A: Performed by: NEUROLOGICAL SURGERY

## 2019-02-12 PROCEDURE — 77030002888 HC SUT CHRMC J&J -A: Performed by: NEUROLOGICAL SURGERY

## 2019-02-12 PROCEDURE — 74011000250 HC RX REV CODE- 250

## 2019-02-12 PROCEDURE — 77030011640 HC PAD GRND REM COVD -A: Performed by: NEUROLOGICAL SURGERY

## 2019-02-12 PROCEDURE — 74011000250 HC RX REV CODE- 250: Performed by: ANESTHESIOLOGY

## 2019-02-12 PROCEDURE — 77030003029 HC SUT VCRL J&J -B: Performed by: NEUROLOGICAL SURGERY

## 2019-02-12 PROCEDURE — 88331 PATH CONSLTJ SURG 1 BLK 1SPC: CPT

## 2019-02-12 PROCEDURE — 77030038600 HC TU BPLR IRR DISP STRY -B: Performed by: NEUROLOGICAL SURGERY

## 2019-02-12 PROCEDURE — 77030030722 HC PIN SKULL MAYFLD INLC -B: Performed by: NEUROLOGICAL SURGERY

## 2019-02-12 PROCEDURE — 77030029099 HC BN WAX SSPC -A: Performed by: NEUROLOGICAL SURGERY

## 2019-02-12 PROCEDURE — 0GB00ZZ EXCISION OF PITUITARY GLAND, OPEN APPROACH: ICD-10-PCS | Performed by: NEUROLOGICAL SURGERY

## 2019-02-12 PROCEDURE — 74011000258 HC RX REV CODE- 258: Performed by: ANESTHESIOLOGY

## 2019-02-12 PROCEDURE — 77030014647 HC SEAL FBRN TISSL BAXT -D: Performed by: NEUROLOGICAL SURGERY

## 2019-02-12 PROCEDURE — 65610000006 HC RM INTENSIVE CARE

## 2019-02-12 PROCEDURE — 0NUC07Z SUPPLEMENT SPHENOID BONE WITH AUTOLOGOUS TISSUE SUBSTITUTE, OPEN APPROACH: ICD-10-PCS | Performed by: NEUROLOGICAL SURGERY

## 2019-02-12 PROCEDURE — 74011000250 HC RX REV CODE- 250: Performed by: NEUROLOGICAL SURGERY

## 2019-02-12 PROCEDURE — 74011250636 HC RX REV CODE- 250/636

## 2019-02-12 PROCEDURE — 77030014006 HC SPNG HEMSTAT J&J -A: Performed by: NEUROLOGICAL SURGERY

## 2019-02-12 PROCEDURE — 77030010517 HC APPL SEAL FLOSEL BAXT -B: Performed by: NEUROLOGICAL SURGERY

## 2019-02-12 PROCEDURE — 88305 TISSUE EXAM BY PATHOLOGIST: CPT

## 2019-02-12 PROCEDURE — 77030018836 HC SOL IRR NACL ICUM -A: Performed by: NEUROLOGICAL SURGERY

## 2019-02-12 PROCEDURE — 70460 CT HEAD/BRAIN W/DYE: CPT

## 2019-02-12 PROCEDURE — 88341 IMHCHEM/IMCYTCHM EA ADD ANTB: CPT

## 2019-02-12 PROCEDURE — 76060000039 HC ANESTHESIA 4 TO 4.5 HR: Performed by: NEUROLOGICAL SURGERY

## 2019-02-12 PROCEDURE — 77030018673: Performed by: NEUROLOGICAL SURGERY

## 2019-02-12 PROCEDURE — 77030020782 HC GWN BAIR PAWS FLX 3M -B

## 2019-02-12 RX ORDER — ACETAMINOPHEN 325 MG/1
650 TABLET ORAL
Status: DISCONTINUED | OUTPATIENT
Start: 2019-02-12 | End: 2019-02-15 | Stop reason: HOSPADM

## 2019-02-12 RX ORDER — OXYMETAZOLINE HCL 0.05 %
2 SPRAY, NON-AEROSOL (ML) NASAL
Status: DISCONTINUED | OUTPATIENT
Start: 2019-02-12 | End: 2019-02-12 | Stop reason: HOSPADM

## 2019-02-12 RX ORDER — ONDANSETRON 2 MG/ML
INJECTION INTRAMUSCULAR; INTRAVENOUS AS NEEDED
Status: DISCONTINUED | OUTPATIENT
Start: 2019-02-12 | End: 2019-02-12 | Stop reason: HOSPADM

## 2019-02-12 RX ORDER — DEXMEDETOMIDINE HYDROCHLORIDE 4 UG/ML
INJECTION, SOLUTION INTRAVENOUS AS NEEDED
Status: DISCONTINUED | OUTPATIENT
Start: 2019-02-12 | End: 2019-02-12 | Stop reason: HOSPADM

## 2019-02-12 RX ORDER — SODIUM CHLORIDE, SODIUM LACTATE, POTASSIUM CHLORIDE, CALCIUM CHLORIDE 600; 310; 30; 20 MG/100ML; MG/100ML; MG/100ML; MG/100ML
125 INJECTION, SOLUTION INTRAVENOUS CONTINUOUS
Status: DISCONTINUED | OUTPATIENT
Start: 2019-02-12 | End: 2019-02-12 | Stop reason: HOSPADM

## 2019-02-12 RX ORDER — MIDAZOLAM HYDROCHLORIDE 1 MG/ML
1 INJECTION, SOLUTION INTRAMUSCULAR; INTRAVENOUS AS NEEDED
Status: DISCONTINUED | OUTPATIENT
Start: 2019-02-12 | End: 2019-02-12 | Stop reason: HOSPADM

## 2019-02-12 RX ORDER — ONDANSETRON 2 MG/ML
4 INJECTION INTRAMUSCULAR; INTRAVENOUS
Status: DISCONTINUED | OUTPATIENT
Start: 2019-02-12 | End: 2019-02-15 | Stop reason: HOSPADM

## 2019-02-12 RX ORDER — SODIUM CHLORIDE 0.9 % (FLUSH) 0.9 %
5-40 SYRINGE (ML) INJECTION AS NEEDED
Status: DISCONTINUED | OUTPATIENT
Start: 2019-02-12 | End: 2019-02-12 | Stop reason: HOSPADM

## 2019-02-12 RX ORDER — GLYCOPYRROLATE 0.2 MG/ML
INJECTION INTRAMUSCULAR; INTRAVENOUS AS NEEDED
Status: DISCONTINUED | OUTPATIENT
Start: 2019-02-12 | End: 2019-02-12 | Stop reason: HOSPADM

## 2019-02-12 RX ORDER — DIPHENHYDRAMINE HYDROCHLORIDE 50 MG/ML
12.5 INJECTION, SOLUTION INTRAMUSCULAR; INTRAVENOUS AS NEEDED
Status: DISCONTINUED | OUTPATIENT
Start: 2019-02-12 | End: 2019-02-12 | Stop reason: HOSPADM

## 2019-02-12 RX ORDER — SODIUM CHLORIDE 9 MG/ML
25 INJECTION, SOLUTION INTRAVENOUS CONTINUOUS
Status: DISCONTINUED | OUTPATIENT
Start: 2019-02-12 | End: 2019-02-12 | Stop reason: HOSPADM

## 2019-02-12 RX ORDER — MIDAZOLAM HYDROCHLORIDE 1 MG/ML
0.5 INJECTION, SOLUTION INTRAMUSCULAR; INTRAVENOUS
Status: COMPLETED | OUTPATIENT
Start: 2019-02-12 | End: 2019-02-12

## 2019-02-12 RX ORDER — BUPIVACAINE HYDROCHLORIDE AND EPINEPHRINE 5; 5 MG/ML; UG/ML
INJECTION, SOLUTION EPIDURAL; INTRACAUDAL; PERINEURAL AS NEEDED
Status: DISCONTINUED | OUTPATIENT
Start: 2019-02-12 | End: 2019-02-12 | Stop reason: HOSPADM

## 2019-02-12 RX ORDER — SODIUM CHLORIDE 0.9 % (FLUSH) 0.9 %
5-40 SYRINGE (ML) INJECTION EVERY 8 HOURS
Status: DISCONTINUED | OUTPATIENT
Start: 2019-02-12 | End: 2019-02-15 | Stop reason: HOSPADM

## 2019-02-12 RX ORDER — SODIUM CHLORIDE 0.9 % (FLUSH) 0.9 %
10 SYRINGE (ML) INJECTION
Status: COMPLETED | OUTPATIENT
Start: 2019-02-12 | End: 2019-02-12

## 2019-02-12 RX ORDER — SUFENTANIL CITRATE 50 UG/ML
INJECTION EPIDURAL; INTRAVENOUS
Status: DISCONTINUED | OUTPATIENT
Start: 2019-02-12 | End: 2019-02-12 | Stop reason: HOSPADM

## 2019-02-12 RX ORDER — NEOSTIGMINE METHYLSULFATE 1 MG/ML
INJECTION INTRAVENOUS AS NEEDED
Status: DISCONTINUED | OUTPATIENT
Start: 2019-02-12 | End: 2019-02-12 | Stop reason: HOSPADM

## 2019-02-12 RX ORDER — MIDAZOLAM HYDROCHLORIDE 1 MG/ML
INJECTION, SOLUTION INTRAMUSCULAR; INTRAVENOUS AS NEEDED
Status: DISCONTINUED | OUTPATIENT
Start: 2019-02-12 | End: 2019-02-12 | Stop reason: HOSPADM

## 2019-02-12 RX ORDER — CEFAZOLIN SODIUM/WATER 2 G/20 ML
2 SYRINGE (ML) INTRAVENOUS EVERY 8 HOURS
Status: COMPLETED | OUTPATIENT
Start: 2019-02-12 | End: 2019-02-13

## 2019-02-12 RX ORDER — MORPHINE SULFATE 2 MG/ML
2 INJECTION, SOLUTION INTRAMUSCULAR; INTRAVENOUS
Status: DISCONTINUED | OUTPATIENT
Start: 2019-02-12 | End: 2019-02-15 | Stop reason: HOSPADM

## 2019-02-12 RX ORDER — HYDROCORTISONE SODIUM SUCCINATE 100 MG/2ML
INJECTION, POWDER, FOR SOLUTION INTRAMUSCULAR; INTRAVENOUS AS NEEDED
Status: DISCONTINUED | OUTPATIENT
Start: 2019-02-12 | End: 2019-02-12 | Stop reason: HOSPADM

## 2019-02-12 RX ORDER — HYDROCORTISONE SODIUM SUCCINATE 100 MG/2ML
100 INJECTION, POWDER, FOR SOLUTION INTRAMUSCULAR; INTRAVENOUS EVERY 12 HOURS
Status: COMPLETED | OUTPATIENT
Start: 2019-02-13 | End: 2019-02-13

## 2019-02-12 RX ORDER — NALOXONE HYDROCHLORIDE 0.4 MG/ML
0.4 INJECTION, SOLUTION INTRAMUSCULAR; INTRAVENOUS; SUBCUTANEOUS AS NEEDED
Status: DISCONTINUED | OUTPATIENT
Start: 2019-02-12 | End: 2019-02-15 | Stop reason: HOSPADM

## 2019-02-12 RX ORDER — MORPHINE SULFATE 10 MG/ML
2 INJECTION, SOLUTION INTRAMUSCULAR; INTRAVENOUS
Status: DISCONTINUED | OUTPATIENT
Start: 2019-02-12 | End: 2019-02-12 | Stop reason: HOSPADM

## 2019-02-12 RX ORDER — SUCCINYLCHOLINE CHLORIDE 20 MG/ML
INJECTION INTRAMUSCULAR; INTRAVENOUS AS NEEDED
Status: DISCONTINUED | OUTPATIENT
Start: 2019-02-12 | End: 2019-02-12 | Stop reason: HOSPADM

## 2019-02-12 RX ORDER — DOCUSATE SODIUM 100 MG/1
100 CAPSULE, LIQUID FILLED ORAL 2 TIMES DAILY
Status: DISCONTINUED | OUTPATIENT
Start: 2019-02-12 | End: 2019-02-15 | Stop reason: HOSPADM

## 2019-02-12 RX ORDER — SODIUM CHLORIDE 0.9 % (FLUSH) 0.9 %
5-40 SYRINGE (ML) INJECTION EVERY 8 HOURS
Status: DISCONTINUED | OUTPATIENT
Start: 2019-02-12 | End: 2019-02-12 | Stop reason: HOSPADM

## 2019-02-12 RX ORDER — FENTANYL CITRATE 50 UG/ML
25 INJECTION, SOLUTION INTRAMUSCULAR; INTRAVENOUS
Status: COMPLETED | OUTPATIENT
Start: 2019-02-12 | End: 2019-02-12

## 2019-02-12 RX ORDER — OXYCODONE AND ACETAMINOPHEN 5; 325 MG/1; MG/1
1 TABLET ORAL AS NEEDED
Status: DISCONTINUED | OUTPATIENT
Start: 2019-02-12 | End: 2019-02-12 | Stop reason: HOSPADM

## 2019-02-12 RX ORDER — CEFAZOLIN SODIUM/WATER 2 G/20 ML
2 SYRINGE (ML) INTRAVENOUS ONCE
Status: COMPLETED | OUTPATIENT
Start: 2019-02-12 | End: 2019-02-12

## 2019-02-12 RX ORDER — FAMOTIDINE 20 MG/1
20 TABLET, FILM COATED ORAL EVERY 12 HOURS
Status: DISCONTINUED | OUTPATIENT
Start: 2019-02-12 | End: 2019-02-13

## 2019-02-12 RX ORDER — ROCURONIUM BROMIDE 10 MG/ML
INJECTION, SOLUTION INTRAVENOUS AS NEEDED
Status: DISCONTINUED | OUTPATIENT
Start: 2019-02-12 | End: 2019-02-12 | Stop reason: HOSPADM

## 2019-02-12 RX ORDER — OXYCODONE HYDROCHLORIDE 5 MG/1
5 TABLET ORAL
Status: DISCONTINUED | OUTPATIENT
Start: 2019-02-12 | End: 2019-02-15 | Stop reason: HOSPADM

## 2019-02-12 RX ORDER — LORAZEPAM 2 MG/ML
2 INJECTION INTRAMUSCULAR ONCE
Status: COMPLETED | OUTPATIENT
Start: 2019-02-12 | End: 2019-02-12

## 2019-02-12 RX ORDER — PROPOFOL 10 MG/ML
INJECTION, EMULSION INTRAVENOUS AS NEEDED
Status: DISCONTINUED | OUTPATIENT
Start: 2019-02-12 | End: 2019-02-12 | Stop reason: HOSPADM

## 2019-02-12 RX ORDER — FENTANYL CITRATE 50 UG/ML
50 INJECTION, SOLUTION INTRAMUSCULAR; INTRAVENOUS AS NEEDED
Status: DISCONTINUED | OUTPATIENT
Start: 2019-02-12 | End: 2019-02-12 | Stop reason: HOSPADM

## 2019-02-12 RX ORDER — LIDOCAINE HYDROCHLORIDE 10 MG/ML
0.1 INJECTION, SOLUTION EPIDURAL; INFILTRATION; INTRACAUDAL; PERINEURAL AS NEEDED
Status: DISCONTINUED | OUTPATIENT
Start: 2019-02-12 | End: 2019-02-12 | Stop reason: HOSPADM

## 2019-02-12 RX ORDER — SODIUM CHLORIDE, SODIUM LACTATE, POTASSIUM CHLORIDE, CALCIUM CHLORIDE 600; 310; 30; 20 MG/100ML; MG/100ML; MG/100ML; MG/100ML
INJECTION, SOLUTION INTRAVENOUS
Status: DISCONTINUED | OUTPATIENT
Start: 2019-02-12 | End: 2019-02-12 | Stop reason: HOSPADM

## 2019-02-12 RX ORDER — FENTANYL CITRATE 50 UG/ML
INJECTION, SOLUTION INTRAMUSCULAR; INTRAVENOUS AS NEEDED
Status: DISCONTINUED | OUTPATIENT
Start: 2019-02-12 | End: 2019-02-12 | Stop reason: HOSPADM

## 2019-02-12 RX ORDER — MIDAZOLAM HYDROCHLORIDE 1 MG/ML
2 INJECTION, SOLUTION INTRAMUSCULAR; INTRAVENOUS
Status: DISCONTINUED | OUTPATIENT
Start: 2019-02-12 | End: 2019-02-12 | Stop reason: HOSPADM

## 2019-02-12 RX ORDER — SODIUM CHLORIDE 0.9 % (FLUSH) 0.9 %
5-40 SYRINGE (ML) INJECTION AS NEEDED
Status: DISCONTINUED | OUTPATIENT
Start: 2019-02-12 | End: 2019-02-15 | Stop reason: HOSPADM

## 2019-02-12 RX ORDER — ONDANSETRON 2 MG/ML
4 INJECTION INTRAMUSCULAR; INTRAVENOUS AS NEEDED
Status: DISCONTINUED | OUTPATIENT
Start: 2019-02-12 | End: 2019-02-12 | Stop reason: HOSPADM

## 2019-02-12 RX ORDER — DEXMEDETOMIDINE HYDROCHLORIDE 100 UG/ML
20 INJECTION, SOLUTION INTRAVENOUS ONCE
Status: COMPLETED | OUTPATIENT
Start: 2019-02-12 | End: 2019-02-12

## 2019-02-12 RX ORDER — LIDOCAINE HYDROCHLORIDE 20 MG/ML
INJECTION, SOLUTION EPIDURAL; INFILTRATION; INTRACAUDAL; PERINEURAL AS NEEDED
Status: DISCONTINUED | OUTPATIENT
Start: 2019-02-12 | End: 2019-02-12 | Stop reason: HOSPADM

## 2019-02-12 RX ORDER — PROPOFOL 10 MG/ML
INJECTION, EMULSION INTRAVENOUS
Status: DISCONTINUED | OUTPATIENT
Start: 2019-02-12 | End: 2019-02-12 | Stop reason: HOSPADM

## 2019-02-12 RX ORDER — ACETAMINOPHEN 10 MG/ML
INJECTION, SOLUTION INTRAVENOUS AS NEEDED
Status: DISCONTINUED | OUTPATIENT
Start: 2019-02-12 | End: 2019-02-12 | Stop reason: HOSPADM

## 2019-02-12 RX ADMIN — MORPHINE SULFATE 2 MG: 10 INJECTION INTRAVENOUS at 17:30

## 2019-02-12 RX ADMIN — Medication 2 G: at 12:40

## 2019-02-12 RX ADMIN — ROCURONIUM BROMIDE 5 MG: 10 INJECTION, SOLUTION INTRAVENOUS at 12:27

## 2019-02-12 RX ADMIN — ACETAMINOPHEN 1000 MG: 10 INJECTION, SOLUTION INTRAVENOUS at 12:36

## 2019-02-12 RX ADMIN — MIDAZOLAM 0.5 MG: 1 INJECTION INTRAMUSCULAR; INTRAVENOUS at 16:50

## 2019-02-12 RX ADMIN — SODIUM CHLORIDE, SODIUM LACTATE, POTASSIUM CHLORIDE, AND CALCIUM CHLORIDE 125 ML/HR: 600; 310; 30; 20 INJECTION, SOLUTION INTRAVENOUS at 10:20

## 2019-02-12 RX ADMIN — LIDOCAINE HYDROCHLORIDE 100 MG: 20 INJECTION, SOLUTION EPIDURAL; INFILTRATION; INTRACAUDAL; PERINEURAL at 12:27

## 2019-02-12 RX ADMIN — MIDAZOLAM 2 MG: 1 INJECTION INTRAMUSCULAR; INTRAVENOUS at 17:00

## 2019-02-12 RX ADMIN — DEXMEDETOMIDINE HYDROCHLORIDE 8 MCG: 4 INJECTION, SOLUTION INTRAVENOUS at 16:02

## 2019-02-12 RX ADMIN — PROPOFOL 30 MG: 10 INJECTION, EMULSION INTRAVENOUS at 16:17

## 2019-02-12 RX ADMIN — MIDAZOLAM HYDROCHLORIDE 2 MG: 1 INJECTION, SOLUTION INTRAMUSCULAR; INTRAVENOUS at 12:11

## 2019-02-12 RX ADMIN — ROCURONIUM BROMIDE 20 MG: 10 INJECTION, SOLUTION INTRAVENOUS at 14:35

## 2019-02-12 RX ADMIN — DEXMEDETOMIDINE HYDROCHLORIDE 4 MCG: 4 INJECTION, SOLUTION INTRAVENOUS at 16:07

## 2019-02-12 RX ADMIN — ROCURONIUM BROMIDE 20 MG: 10 INJECTION, SOLUTION INTRAVENOUS at 13:29

## 2019-02-12 RX ADMIN — IOPAMIDOL 100 ML: 612 INJECTION, SOLUTION INTRAVENOUS at 10:56

## 2019-02-12 RX ADMIN — OXYMETAZOLINE HCL 2 SPRAY: 0.05 SPRAY NASAL at 11:55

## 2019-02-12 RX ADMIN — LORAZEPAM 2 MG: 2 INJECTION INTRAMUSCULAR; INTRAVENOUS at 18:00

## 2019-02-12 RX ADMIN — PROPOFOL 150 MG: 10 INJECTION, EMULSION INTRAVENOUS at 12:27

## 2019-02-12 RX ADMIN — MIDAZOLAM 0.5 MG: 1 INJECTION INTRAMUSCULAR; INTRAVENOUS at 16:45

## 2019-02-12 RX ADMIN — OXYMETAZOLINE HCL 2 SPRAY: 0.05 SPRAY NASAL at 11:48

## 2019-02-12 RX ADMIN — GLYCOPYRROLATE 0.4 MG: 0.2 INJECTION INTRAMUSCULAR; INTRAVENOUS at 15:25

## 2019-02-12 RX ADMIN — SUFENTANIL CITRATE 0.2 MCG/KG/HR: 50 INJECTION EPIDURAL; INTRAVENOUS at 12:58

## 2019-02-12 RX ADMIN — SODIUM CHLORIDE, SODIUM LACTATE, POTASSIUM CHLORIDE, AND CALCIUM CHLORIDE 125 ML/HR: 600; 310; 30; 20 INJECTION, SOLUTION INTRAVENOUS at 16:54

## 2019-02-12 RX ADMIN — FENTANYL CITRATE 25 MCG: 50 INJECTION INTRAMUSCULAR; INTRAVENOUS at 16:50

## 2019-02-12 RX ADMIN — NEOSTIGMINE METHYLSULFATE 1 MG: 1 INJECTION INTRAVENOUS at 15:26

## 2019-02-12 RX ADMIN — NEOSTIGMINE METHYLSULFATE 1 MG: 1 INJECTION INTRAVENOUS at 15:38

## 2019-02-12 RX ADMIN — ONDANSETRON 4 MG: 2 INJECTION INTRAMUSCULAR; INTRAVENOUS at 13:02

## 2019-02-12 RX ADMIN — SODIUM CHLORIDE, SODIUM LACTATE, POTASSIUM CHLORIDE, CALCIUM CHLORIDE: 600; 310; 30; 20 INJECTION, SOLUTION INTRAVENOUS at 10:23

## 2019-02-12 RX ADMIN — ONDANSETRON 4 MG: 2 INJECTION INTRAMUSCULAR; INTRAVENOUS at 15:19

## 2019-02-12 RX ADMIN — MIDAZOLAM HYDROCHLORIDE 1 MG: 1 INJECTION, SOLUTION INTRAMUSCULAR; INTRAVENOUS at 16:56

## 2019-02-12 RX ADMIN — SODIUM CHLORIDE, SODIUM LACTATE, POTASSIUM CHLORIDE, AND CALCIUM CHLORIDE: 600; 310; 30; 20 INJECTION, SOLUTION INTRAVENOUS at 15:20

## 2019-02-12 RX ADMIN — DIPHENHYDRAMINE HYDROCHLORIDE 12.5 MG: 50 INJECTION, SOLUTION INTRAMUSCULAR; INTRAVENOUS at 18:20

## 2019-02-12 RX ADMIN — HYDROCORTISONE SODIUM SUCCINATE 100 MG: 100 INJECTION, POWDER, FOR SOLUTION INTRAMUSCULAR; INTRAVENOUS at 12:49

## 2019-02-12 RX ADMIN — Medication 10 ML: at 10:56

## 2019-02-12 RX ADMIN — SODIUM CHLORIDE 100 ML: 900 INJECTION, SOLUTION INTRAVENOUS at 10:56

## 2019-02-12 RX ADMIN — PROPOFOL 50 MG: 10 INJECTION, EMULSION INTRAVENOUS at 13:28

## 2019-02-12 RX ADMIN — Medication 2 G: at 21:37

## 2019-02-12 RX ADMIN — OXYMETAZOLINE HCL 2 SPRAY: 0.05 SPRAY NASAL at 11:41

## 2019-02-12 RX ADMIN — SODIUM CHLORIDE 1 MCG/KG/HR: 900 INJECTION, SOLUTION INTRAVENOUS at 18:09

## 2019-02-12 RX ADMIN — NEOSTIGMINE METHYLSULFATE 1 MG: 1 INJECTION INTRAVENOUS at 15:30

## 2019-02-12 RX ADMIN — MIDAZOLAM 0.5 MG: 1 INJECTION INTRAMUSCULAR; INTRAVENOUS at 16:40

## 2019-02-12 RX ADMIN — MIDAZOLAM HYDROCHLORIDE 3 MG: 1 INJECTION, SOLUTION INTRAMUSCULAR; INTRAVENOUS at 12:09

## 2019-02-12 RX ADMIN — GLYCOPYRROLATE 0.2 MG: 0.2 INJECTION INTRAMUSCULAR; INTRAVENOUS at 15:35

## 2019-02-12 RX ADMIN — DEXMEDETOMIDINE HYDROCHLORIDE 8 MCG: 4 INJECTION, SOLUTION INTRAVENOUS at 16:05

## 2019-02-12 RX ADMIN — PROPOFOL 25 MCG/KG/MIN: 10 INJECTION, EMULSION INTRAVENOUS at 12:57

## 2019-02-12 RX ADMIN — MIDAZOLAM HYDROCHLORIDE 2 MG: 1 INJECTION, SOLUTION INTRAMUSCULAR; INTRAVENOUS at 16:08

## 2019-02-12 RX ADMIN — MORPHINE SULFATE 2 MG: 10 INJECTION INTRAVENOUS at 17:35

## 2019-02-12 RX ADMIN — MIDAZOLAM 0.5 MG: 1 INJECTION INTRAMUSCULAR; INTRAVENOUS at 16:55

## 2019-02-12 RX ADMIN — SODIUM CHLORIDE, SODIUM LACTATE, POTASSIUM CHLORIDE, AND CALCIUM CHLORIDE 125 ML/HR: 600; 310; 30; 20 INJECTION, SOLUTION INTRAVENOUS at 10:14

## 2019-02-12 RX ADMIN — FENTANYL CITRATE 25 MCG: 50 INJECTION INTRAMUSCULAR; INTRAVENOUS at 17:10

## 2019-02-12 RX ADMIN — MORPHINE SULFATE 2 MG: 10 INJECTION INTRAVENOUS at 17:25

## 2019-02-12 RX ADMIN — MORPHINE SULFATE 2 MG: 10 INJECTION INTRAVENOUS at 17:20

## 2019-02-12 RX ADMIN — FENTANYL CITRATE 100 MCG: 50 INJECTION, SOLUTION INTRAMUSCULAR; INTRAVENOUS at 12:27

## 2019-02-12 RX ADMIN — Medication 10 ML: at 21:37

## 2019-02-12 RX ADMIN — FENTANYL CITRATE 25 MCG: 50 INJECTION INTRAMUSCULAR; INTRAVENOUS at 16:40

## 2019-02-12 RX ADMIN — DEXMEDETOMIDINE HYDROCHLORIDE 20 MCG: 100 INJECTION, SOLUTION INTRAVENOUS at 18:07

## 2019-02-12 RX ADMIN — ROCURONIUM BROMIDE 25 MG: 10 INJECTION, SOLUTION INTRAVENOUS at 12:47

## 2019-02-12 RX ADMIN — FENTANYL CITRATE 25 MCG: 50 INJECTION INTRAMUSCULAR; INTRAVENOUS at 17:00

## 2019-02-12 RX ADMIN — SUCCINYLCHOLINE CHLORIDE 160 MG: 20 INJECTION INTRAMUSCULAR; INTRAVENOUS at 12:28

## 2019-02-12 RX ADMIN — MORPHINE SULFATE 2 MG: 10 INJECTION INTRAVENOUS at 17:40

## 2019-02-12 NOTE — ANESTHESIA POSTPROCEDURE EVALUATION
Post-Anesthesia Evaluation and Assessment Patient: Alberto Wyman. MRN: [id number]  SSN: [id number] YOB: 1965  Age: 52 y.o. Sex: male I have evaluated the patient and they are stable and ready for discharge from the PACU. Cardiovascular Function/Vital Signs Visit Vitals /89 (BP 1 Location: Left arm, BP Patient Position: At rest) Pulse 70 Temp 36.5 °C (97.7 °F) Resp 16 Ht 5' 8\" (1.727 m) Wt 88.5 kg (195 lb) SpO2 97% BMI 29.65 kg/m² Patient is status post General anesthesia for Procedure(s): 
CT BRAIN LAB GUIDED TRANSSPHENOIDAL AND REMOVAL OF PITUITARY TUMOR. Nausea/Vomiting: None Postoperative hydration reviewed and adequate. Pain: 
Pain Scale 1: Numeric (0 - 10) (02/12/19 1002) Pain Intensity 1: 0 (02/12/19 1002) Managed Neurological Status:  
Neuro (WDL): Within Defined Limits (02/12/19 1010) At baseline Mental Status, Level of Consciousness: Alert and  oriented to person, place, and time Pulmonary Status:  
O2 Device: Room air (02/12/19 1002) Adequate oxygenation and airway patent Complications related to anesthesia: None Post-anesthesia assessment completed. No concerns Signed By: Luci Zapata MD   
 February 12, 2019 Procedure(s): 
CT BRAIN LAB GUIDED TRANSSPHENOIDAL AND REMOVAL OF PITUITARY TUMOR. 
 
<BSHSIANPOST> Visit Vitals /89 (BP 1 Location: Left arm, BP Patient Position: At rest) Pulse 70 Temp 36.5 °C (97.7 °F) Resp 16 Ht 5' 8\" (1.727 m) Wt 88.5 kg (195 lb) SpO2 97% BMI 29.65 kg/m²

## 2019-02-12 NOTE — ANESTHESIA PREPROCEDURE EVALUATION
Anesthetic History No history of anesthetic complications Review of Systems / Medical History Patient summary reviewed, nursing notes reviewed and pertinent labs reviewed Pulmonary Comments: Seasonal allergies Neuro/Psych Headaches (migraines) Cardiovascular Exercise tolerance: >4 METS 
  
GI/Hepatic/Renal 
  
 
 
 
 
 
 Endo/Other Arthritis Other Findings Comments: Porphyria Cutanea Tarda; has skin effects Spinal stenosis & scoliosis Physical Exam 
 
Airway Mallampati: II 
TM Distance: 4 - 6 cm Neck ROM: normal range of motion Mouth opening: Normal 
 
 Cardiovascular Rhythm: regular Rate: normal 
 
 
Pertinent negatives: No murmur Dental 
 
Dentition: Caps/crowns Pulmonary Breath sounds clear to auscultation Abdominal 
GI exam deferred Other Findings Anesthetic Plan ASA: 2 Anesthesia type: general 
 
 
 
 
Induction: Intravenous Anesthetic plan and risks discussed with: Patient

## 2019-02-12 NOTE — PERIOP NOTES
1617 Additional 30mg Propofol given by CRNA d/t patient agitation, shouting, combativeness. Will monitor closely. 1650 Patient awake, agitated, pulling at medical equipment. Shouting \"I have to pee! \", explained sensation of indwelling urinary catheter to patient, shown catheter bag. Draper patent and draining without issue. Continues to thrash in bed and shout. Dr Usman Randoplh in PACU, to bedside, updated on recent medications given, received verbal orders for additional 1mg Versed and restraints. 1704 Patient resting quietly, VSS, O2 98%, airway patent. 1714 Patient awakens agitated, shouting to let him use the bathroom. Threatening staff. Unable to redirect patient. Multiple RN's and surgical techs at bedside attempting to keep patient in bed. 1740 Patient complaining of pain at surgical site. O2 sat >93%, airway patent, medications given see MAR.    [facility] and Anesthesia at bedside. Patient continues to be agitated, swearing, thrashing in bed.     1807 Agitation and combativeness continuing. Anesthesia at bedside. Precedex gtt and bolus ordered. Wife at bedside attempting to console patient. Multiple RN's at bedside. [address] stopped, BP 91/48. Patient resting quietly. Airway patent. Wife remains at bedside. O2 face tent replaced d/t mouth breathing while asleep. [address]. [address] Gerardo Kim, Neurosurgery, paged to clarify SBP parameter, updated on patient's assessment/PACU course. Per MD keep SBP <150, send urine specific gravity if urine output is >300ml/hr [id number]. 1900 Draper emptied. Output [id number] hour. Will notify RN.

## 2019-02-12 NOTE — ROUTINE PROCESS
Patient: Chris Hernandez MRN: [id number]  SSN: [id number]   YOB: 1965  Age: 52 y.o. Sex: male     Patient is status post Procedure(s):  CT BRAIN LAB GUIDED TRANSSPHENOIDAL AND REMOVAL OF PITUITARY TUMOR.     Surgeon(s) and Role:     * Suze Rabago MD - Primary     * Jalil Servin MD - Assisting    Local/Dose/Irrigation:  Marcaine 0.5% with epi 10 ml                  Peripheral IV 02/12/19 Left Wrist (Active)   Site Assessment Clean, dry, & intact 2/12/2019 10:11 AM   Phlebitis Assessment 0 2/12/2019 10:11 AM   Infiltration Assessment 0 2/12/2019 10:11 AM   Dressing Status New 2/12/2019 10:11 AM   Dressing Type Tape;Transparent 2/12/2019 10:11 AM   Hub Color/Line Status Green 2/12/2019 10:11 AM       Peripheral IV 02/12/19 Right Hand (Active)   Site Assessment Clean, dry, & intact 2/12/2019 10:18 AM   Phlebitis Assessment 0 2/12/2019 10:18 AM   Infiltration Assessment 0 2/12/2019 10:18 AM   Dressing Status New 2/12/2019 10:18 AM   Dressing Type Tape;Transparent 2/12/2019 10:18 AM   Hub Color/Line Status Green 2/12/2019 10:18 AM            Airway - Endotracheal Tube 02/12/19 Oral (Active)                   Dressing/Packing:  Wound Nare Left;Right-Dressing Type : 2 x 2;Other (Comment)(merocel sponges with bacitracin ointment) (02/12/19 1524)  Splint/Cast:  ]    Other:  Draper; scds; teds; needs 1 channel pump; humidified face tent

## 2019-02-13 ENCOUNTER — APPOINTMENT (OUTPATIENT)
Dept: CT IMAGING | Age: 54
DRG: 614 | End: 2019-02-13
Attending: NEUROLOGICAL SURGERY
Payer: COMMERCIAL

## 2019-02-13 LAB
ANION GAP SERPL CALC-SCNC: 9 MMOL/L (ref 5–15)
BASOPHILS # BLD: 0 K/UL (ref 0–0.1)
BASOPHILS NFR BLD: 0 % (ref 0–1)
BUN SERPL-MCNC: 11 MG/DL (ref 6–20)
BUN/CREAT SERPL: 13 (ref 12–20)
CALCIUM SERPL-MCNC: 8.8 MG/DL (ref 8.5–10.1)
CHLORIDE SERPL-SCNC: 104 MMOL/L (ref 97–108)
CO2 SERPL-SCNC: 26 MMOL/L (ref 21–32)
CREAT SERPL-MCNC: 0.87 MG/DL (ref 0.7–1.3)
DIFFERENTIAL METHOD BLD: ABNORMAL
EOSINOPHIL # BLD: 0 K/UL (ref 0–0.4)
EOSINOPHIL NFR BLD: 0 % (ref 0–7)
ERYTHROCYTE [DISTWIDTH] IN BLOOD BY AUTOMATED COUNT: 13 % (ref 11.5–14.5)
GLUCOSE SERPL-MCNC: 107 MG/DL (ref 65–100)
HCT VFR BLD AUTO: 37.6 % (ref 36.6–50.3)
HGB BLD-MCNC: 12.7 G/DL (ref 12.1–17)
IMM GRANULOCYTES # BLD AUTO: 0.1 K/UL (ref 0–0.04)
IMM GRANULOCYTES NFR BLD AUTO: 1 % (ref 0–0.5)
LYMPHOCYTES # BLD: 0.7 K/UL (ref 0.8–3.5)
LYMPHOCYTES NFR BLD: 6 % (ref 12–49)
MCH RBC QN AUTO: 30.2 PG (ref 26–34)
MCHC RBC AUTO-ENTMCNC: 33.8 G/DL (ref 30–36.5)
MCV RBC AUTO: 89.3 FL (ref 80–99)
MONOCYTES # BLD: 0.4 K/UL (ref 0–1)
MONOCYTES NFR BLD: 3 % (ref 5–13)
NEUTS SEG # BLD: 10.6 K/UL (ref 1.8–8)
NEUTS SEG NFR BLD: 90 % (ref 32–75)
NRBC # BLD: 0 K/UL (ref 0–0.01)
NRBC BLD-RTO: 0 PER 100 WBC
PLATELET # BLD AUTO: 210 K/UL (ref 150–400)
PMV BLD AUTO: 9.1 FL (ref 8.9–12.9)
POTASSIUM SERPL-SCNC: 3.7 MMOL/L (ref 3.5–5.1)
RBC # BLD AUTO: 4.21 M/UL (ref 4.1–5.7)
RBC MORPH BLD: ABNORMAL
SODIUM SERPL-SCNC: 139 MMOL/L (ref 136–145)
SP GR UR REFRACTOMETRY: 1.01 (ref 1–1.03)
WBC # BLD AUTO: 11.8 K/UL (ref 4.1–11.1)

## 2019-02-13 PROCEDURE — 74011250636 HC RX REV CODE- 250/636: Performed by: NURSE PRACTITIONER

## 2019-02-13 PROCEDURE — 85025 COMPLETE CBC W/AUTO DIFF WBC: CPT

## 2019-02-13 PROCEDURE — 77030018846 HC SOL IRR STRL H20 ICUM -A

## 2019-02-13 PROCEDURE — 74011250637 HC RX REV CODE- 250/637: Performed by: NURSE PRACTITIONER

## 2019-02-13 PROCEDURE — 74011250637 HC RX REV CODE- 250/637: Performed by: NEUROLOGICAL SURGERY

## 2019-02-13 PROCEDURE — 77010033678 HC OXYGEN DAILY

## 2019-02-13 PROCEDURE — 65660000000 HC RM CCU STEPDOWN

## 2019-02-13 PROCEDURE — 36415 COLL VENOUS BLD VENIPUNCTURE: CPT

## 2019-02-13 PROCEDURE — 70450 CT HEAD/BRAIN W/O DYE: CPT

## 2019-02-13 PROCEDURE — 80048 BASIC METABOLIC PNL TOTAL CA: CPT

## 2019-02-13 PROCEDURE — 74011250636 HC RX REV CODE- 250/636: Performed by: NEUROLOGICAL SURGERY

## 2019-02-13 RX ORDER — HYDROCORTISONE SODIUM SUCCINATE 100 MG/2ML
50 INJECTION, POWDER, FOR SOLUTION INTRAMUSCULAR; INTRAVENOUS EVERY 12 HOURS
Status: COMPLETED | OUTPATIENT
Start: 2019-02-14 | End: 2019-02-14

## 2019-02-13 RX ORDER — SIMETHICONE 80 MG
80 TABLET,CHEWABLE ORAL
Status: DISCONTINUED | OUTPATIENT
Start: 2019-02-13 | End: 2019-02-15 | Stop reason: HOSPADM

## 2019-02-13 RX ORDER — MONTELUKAST SODIUM 10 MG/1
10 TABLET ORAL
Status: DISCONTINUED | OUTPATIENT
Start: 2019-02-13 | End: 2019-02-15 | Stop reason: HOSPADM

## 2019-02-13 RX ORDER — HYDROCORTISONE 10 MG/1
20 TABLET ORAL
Status: DISCONTINUED | OUTPATIENT
Start: 2019-02-15 | End: 2019-02-15 | Stop reason: HOSPADM

## 2019-02-13 RX ORDER — POTASSIUM CHLORIDE AND SODIUM CHLORIDE 450; 150 MG/100ML; MG/100ML
INJECTION, SOLUTION INTRAVENOUS CONTINUOUS
Status: DISCONTINUED | OUTPATIENT
Start: 2019-02-13 | End: 2019-02-15

## 2019-02-13 RX ORDER — SUMATRIPTAN 25 MG/1
100 TABLET, FILM COATED ORAL
Status: DISCONTINUED | OUTPATIENT
Start: 2019-02-13 | End: 2019-02-15 | Stop reason: HOSPADM

## 2019-02-13 RX ORDER — ROSUVASTATIN CALCIUM 10 MG/1
10 TABLET, COATED ORAL
Status: DISCONTINUED | OUTPATIENT
Start: 2019-02-13 | End: 2019-02-15 | Stop reason: HOSPADM

## 2019-02-13 RX ORDER — PANTOPRAZOLE SODIUM 40 MG/1
40 TABLET, DELAYED RELEASE ORAL
Status: DISCONTINUED | OUTPATIENT
Start: 2019-02-13 | End: 2019-02-15 | Stop reason: HOSPADM

## 2019-02-13 RX ORDER — HYDROCORTISONE 10 MG/1
10 TABLET ORAL
Status: DISCONTINUED | OUTPATIENT
Start: 2019-02-15 | End: 2019-02-15 | Stop reason: HOSPADM

## 2019-02-13 RX ORDER — BUTALBITAL, ACETAMINOPHEN AND CAFFEINE 50; 325; 40 MG/1; MG/1; MG/1
1 TABLET ORAL
Status: DISCONTINUED | OUTPATIENT
Start: 2019-02-13 | End: 2019-02-15 | Stop reason: HOSPADM

## 2019-02-13 RX ADMIN — BUTALBITAL, ACETAMINOPHEN AND CAFFEINE 1 TABLET: 50; 325; 40 TABLET ORAL at 17:38

## 2019-02-13 RX ADMIN — HYDROCORTISONE SODIUM SUCCINATE 100 MG: 100 INJECTION, POWDER, FOR SOLUTION INTRAMUSCULAR; INTRAVENOUS at 01:11

## 2019-02-13 RX ADMIN — POTASSIUM CHLORIDE AND SODIUM CHLORIDE: 450; 150 INJECTION, SOLUTION INTRAVENOUS at 11:33

## 2019-02-13 RX ADMIN — SUMATRIPTAN SUCCINATE 100 MG: 25 TABLET ORAL at 15:35

## 2019-02-13 RX ADMIN — DOCUSATE SODIUM 100 MG: 100 CAPSULE, LIQUID FILLED ORAL at 08:43

## 2019-02-13 RX ADMIN — POTASSIUM CHLORIDE AND SODIUM CHLORIDE: 450; 150 INJECTION, SOLUTION INTRAVENOUS at 10:00

## 2019-02-13 RX ADMIN — HYDROCORTISONE SODIUM SUCCINATE 100 MG: 100 INJECTION, POWDER, FOR SOLUTION INTRAMUSCULAR; INTRAVENOUS at 11:33

## 2019-02-13 RX ADMIN — FAMOTIDINE 20 MG: 20 TABLET ORAL at 08:43

## 2019-02-13 RX ADMIN — Medication 2 G: at 05:07

## 2019-02-13 RX ADMIN — OXYCODONE HYDROCHLORIDE 5 MG: 5 TABLET ORAL at 20:32

## 2019-02-13 RX ADMIN — PANTOPRAZOLE SODIUM 40 MG: 40 TABLET, DELAYED RELEASE ORAL at 10:33

## 2019-02-13 RX ADMIN — Medication 10 ML: at 20:33

## 2019-02-13 RX ADMIN — DOCUSATE SODIUM 100 MG: 100 CAPSULE, LIQUID FILLED ORAL at 17:36

## 2019-02-13 RX ADMIN — OXYCODONE HYDROCHLORIDE 5 MG: 5 TABLET ORAL at 10:50

## 2019-02-13 RX ADMIN — MORPHINE SULFATE 2 MG: 2 INJECTION, SOLUTION INTRAMUSCULAR; INTRAVENOUS at 10:33

## 2019-02-13 RX ADMIN — SUMATRIPTAN SUCCINATE 100 MG: 25 TABLET ORAL at 14:17

## 2019-02-13 RX ADMIN — MONTELUKAST SODIUM 10 MG: 10 TABLET, FILM COATED ORAL at 20:33

## 2019-02-13 RX ADMIN — MORPHINE SULFATE 2 MG: 2 INJECTION, SOLUTION INTRAMUSCULAR; INTRAVENOUS at 02:03

## 2019-02-13 RX ADMIN — Medication 2 G: at 15:22

## 2019-02-13 RX ADMIN — Medication 10 ML: at 14:00

## 2019-02-13 RX ADMIN — BUTALBITAL, ACETAMINOPHEN AND CAFFEINE 1 TABLET: 50; 325; 40 TABLET ORAL at 21:55

## 2019-02-13 RX ADMIN — ACETAMINOPHEN 650 MG: 325 TABLET ORAL at 08:43

## 2019-02-13 RX ADMIN — ROSUVASTATIN CALCIUM 10 MG: 10 TABLET, FILM COATED ORAL at 20:32

## 2019-02-13 RX ADMIN — Medication 10 ML: at 05:10

## 2019-02-13 NOTE — OP NOTES
[address]   OPERATIVE REPORT    Name:  Cali Hastings  MR#:  [id number]  :  1965  ACCOUNT #:  [de-identified]  DATE OF SERVICE:  2019      PREOPERATIVE DIAGNOSIS:  Pituitary adenoma. POSTOPERATIVE DIAGNOSIS:  Pituitary adenoma. PROCEDURE PERFORMED:  Transsphenoidal endonasal approach and removal of pituitary  adenoma. SURGEON:  Derek Parham MD    CO-SURGEON:  Charmaine Narayan MD    ANESTHESIA:  General.    COMPLICATIONS:  None. SPECIMENS REMOVED:  Tumor. IMPLANTS:  None. ESTIMATED BLOOD LOSS:  Minimal.    PROCEDURE:  The patient was noted to have a super solid mass consistent with  pituitary adenoma and was brought to the operating room electively for resection of  the tumor after discussing risks, benefits, and alternatives of surgery with him. He  had been admitted one week prior with a new diagnosis of pituitary tumor and  diplopia, the diplopia improved on steroids. He was induced under general  endotracheal anesthesia, placed on the operating table in the supine position,  resting on a horseshoe headrest, appropriate antibiotics were administered prior to  making the incision. A sterile scrub, prep, and drape was also performed in the  abdominal region in the event of the need for a fat graft. Dr. Mell Nash will dictate  his portion of the endonasal transsphenoidal approach to the sella. At that point, I  came into the case, brought in the operative field under microscopic guidance. I  enlarged the opening into the sphenoid improving the visualization of the floor of  the sella using navigation guidance to confirm the presence of the sella, which was  quite thinned. The bone was opened with a 1 mm Kerrison and also a chisel and then  this opening was expanded with a 2 mm Kerrison until a fairly sizable exposure of the  dura was visible.   The dura was then cauterized with monopolar electrocautery suction  and opened in a cruciate fashion with a #11 blade scalpel, immediately tumor material  begun to exude from the incision. Several specimens were sent as frozen section,  which returned as classic and typical pituitary adenoma. There was enough tumor also  for permanent section pending. Using variable sizes of curettes, the tumor was  resected in both superior, inferior, and lateral directions. The diaphragma sella  did appear to come down nicely into view. Gentle suction aspiration was used to  suction the remaining portion of the tumor out and no further tumor was visible or  able to be pulled out with curettes from any of the quadrants. It was felt that good  resection of the tumor and decompression of the area had been achieved. There did  not appear to be any spinal fluid leak, so I placed tiny pieces of Gelfoam into the  sella and some Floseal, covered it with a sliver of bone obtained from Dr. Jerson Barbosa as  he did the exposure, and then Dr. Jerson Barbosa came in to complete the closure. The needle,  sponge, and instrument counts were correct at the end of the procedure. There were  no complications.         Marcelle Denise MD      [id number]  D:  02/12/2019 15:51  T:  02/13/2019 3:40  JOB #:  [id number]  CC:  Marian Hernandez, JOHN Weeks MD

## 2019-02-13 NOTE — PERIOP NOTES
TRANSFER - OUT REPORT:    Verbal report given to Jayne HAMLIN(name) on [employer].  being transferred to ICU(unit) for routine post - op       Report consisted of patients Situation, Background, Assessment and   Recommendations(SBAR). Time Pre op antibiotic given:1240  Anesthesia Stop time: 6563  Draper Present on Transfer to floor:yes  Order for Draper on Chart:yes  Discharge Prescriptions with Chart:no    Information from the following report(s) SBAR, Kardex, Procedure Summary, Intake/Output, MAR, Accordion and Recent Results was reviewed with the receiving nurse. Opportunity for questions and clarification was provided. Is the patient on 02? YES       L/Min 15 via nonrebreather    Is the patient on a monitor? YES    Is the nurse transporting with the patient? YES    Surgical Waiting Area notified of patient's transfer from PACU? YES      The following personal items collected during your admission accompanied patient upon transfer:   Dental Appliance: Dental Appliances: None  Vision: Visual Aid: Glasses  Hearing Aid:    Jewelry: Jewelry: None  Clothing: Clothing: Other (comment)(CLOTHING & SHOES TO PACU)  Other Valuables:  Other Valuables: Eyeglasses(GLASSES GIVEN TO PATIENT'S SPOUSE )  Valuables sent to safe:

## 2019-02-13 NOTE — ROUTINE PROCESS
TRANSFER - IN REPORT:    Verbal report received from Jayne(name) on [address] [place] [address].  being received from icu(unit) for routine progression of care      Report consisted of patients Situation, Background, Assessment and   Recommendations(SBAR). Information from the following report(s) SBAR, ED Summary, Procedure Summary, MAR, Recent Results and Cardiac Rhythm nsr was reviewed with the receiving nurse. Opportunity for questions and clarification was provided. Assessment completed upon patients arrival to unit and care assumed.

## 2019-02-13 NOTE — PROGRESS NOTES
Neurosurgery Progress Note  [facility] [place]  [phone]        Admit Date: 2019   LOS: 1 day        Daily Progress Note: 2019    POD:1 Day Post-Op    S/P: Procedure(s):  CT BRAIN LAB GUIDED TRANSSPHENOIDAL AND REMOVAL OF PITUITARY TUMOR    Subjective: The patient underwent a transsphenoidal resection of a pituitary tumor on 19 with Dr. Aaron Leon and Dr. Juliann Mcdonnell. He states his double vision had improved pre-op with the steroids. He just had some blurry vision. He thinks this is improved. He had an episode of delirium and agitation in the PACU. He has not had any further incidents. He states he has a mild headache this morning. Denies numbness, tingling, chest pain, leg pain, nausea, vomiting, difficulty swallowing, and dyspnea. Objective:     Vital signs  Temp (24hrs), Av °F (36.7 °C), Min:97.7 °F (36.5 °C), Max:98.3 °F (36.8 °C)   701 - 1900  In: -   Out: 75 [Urine:75]  1901 -  0700  In: 1857.5 [I.V.:1857.5]  Out: 4110 [Urine:3960]    Visit Vitals  /74   Pulse 71   Temp 97.9 °F (36.6 °C)   Resp 14   Ht 5' 8\" (1.727 m)   Wt 96.2 kg (212 lb 1.3 oz)   SpO2 91%   BMI 32.25 kg/m²    O2 Flow Rate (L/min): 12 l/min O2 Device: Room air     Pain control  Pain Assessment  Pain Scale 1: Numeric (0 - 10)  Pain Intensity 1: 0  Pain Onset 1: post op  Pain Location 1: Nose  Pain Orientation 1: Anterior  Pain Description 1: Aching  Pain Intervention(s) 1: Medication (see MAR)    PT/OT  Gait                 Physical Exam:  Gen:NAD. Neuro: A&Ox3. Follows commands. Speech clear. Affect normal.  PERRL. EOMI. Face symmetric. Palate symmetric. Tongue midline. MANE. Strength 5/5 in UE and LE BL. Negative drift. Gait deferred.     CT head without contrast on 19 shows typical post pituitary resection changes    24 hour results:    Recent Results (from the past 24 hour(s))   SPECIFIC GRAVITY, UR    Collection Time: 19 11:15 PM   Result Value Ref Range Specific gravity 1.014 1.003 - 8.833     METABOLIC PANEL, BASIC    Collection Time: 02/13/19  5:00 AM   Result Value Ref Range    Sodium 139 136 - 145 mmol/L    Potassium 3.7 3.5 - 5.1 mmol/L    Chloride 104 97 - 108 mmol/L    CO2 26 21 - 32 mmol/L    Anion gap 9 5 - 15 mmol/L    Glucose 107 (H) 65 - 100 mg/dL    BUN 11 6 - 20 MG/DL    Creatinine 0.87 0.70 - 1.30 MG/DL    BUN/Creatinine ratio 13 12 - 20      GFR est AA >60 >60 ml/min/1.73m2    GFR est non-AA >60 >60 ml/min/1.73m2    Calcium 8.8 8.5 - 10.1 MG/DL   CBC WITH AUTOMATED DIFF    Collection Time: 02/13/19  5:00 AM   Result Value Ref Range    WBC 11.8 (H) 4.1 - 11.1 K/uL    RBC 4.21 4.10 - 5.70 M/uL    HGB 12.7 12.1 - 17.0 g/dL    HCT 37.6 36.6 - 50.3 %    MCV 89.3 80.0 - 99.0 FL    MCH 30.2 26.0 - 34.0 PG    MCHC 33.8 30.0 - 36.5 g/dL    RDW 13.0 11.5 - 14.5 %    PLATELET 041 608 - 558 K/uL    MPV 9.1 8.9 - 12.9 FL    NRBC 0.0 0  WBC    ABSOLUTE NRBC 0.00 0.00 - 0.01 K/uL    NEUTROPHILS 90 (H) 32 - 75 %    LYMPHOCYTES 6 (L) 12 - 49 %    MONOCYTES 3 (L) 5 - 13 %    EOSINOPHILS 0 0 - 7 %    BASOPHILS 0 0 - 1 %    IMMATURE GRANULOCYTES 1 (H) 0.0 - 0.5 %    ABS. NEUTROPHILS 10.6 (H) 1.8 - 8.0 K/UL    ABS. LYMPHOCYTES 0.7 (L) 0.8 - 3.5 K/UL    ABS. MONOCYTES 0.4 0.0 - 1.0 K/UL    ABS. EOSINOPHILS 0.0 0.0 - 0.4 K/UL    ABS. BASOPHILS 0.0 0.0 - 0.1 K/UL    ABS. IMM. GRANS. 0.1 (H) 0.00 - 0.04 K/UL    DF SMEAR SCANNED      RBC COMMENTS ANISOCYTOSIS  1+              Assessment:     Active Problems:    Pituitary tumor (2/12/2019)        Plan:   1. Pituitary tumor   - s/p TSS   - Cont to monitor I&Os closely   - Normalize and mobilize   - Neuro checks q4h    - Cont Solu-cortef taper   - Ok to transfer to [facility]U  2. Metabolic encephalopathy   - Normalize and mobilize   - Resolved from post-op  3.  Dyslipidemia   - Cont statin from home    Activity: up ad abiodun  DVT ppx: SCDs  Dispo: home    Plan d/w Dr. Raymond Porter, ICU nurse, wife at bedside      Feliberto Humphrey NP

## 2019-02-13 NOTE — OP NOTES
[address]   OPERATIVE REPORT    Name:  Lucio Farfan  MR#:  [id number]  :  1965  ACCOUNT #:  [de-identified]  DATE OF SERVICE:  2019    PREOPERATIVE DIAGNOSIS:  Pituitary tumor. POSTOPERATIVE DIAGNOSIS:  Pituitary tumor. PROCEDURE PERFORMED:  Transsphenoidal removal of pituitary tumor. SURGEONS:  Dr. Miguel Medina, for the tumor removal.  Dr. Sonia Okeefe for the approach  and closure that will be dictated separately. ASSISTANT:none. ANESTHESIA:  General endotracheal.    COMPLICATIONS:  None. SPECIMENS REMOVED:  Sent to Dr. Fred Butler. IMPLANTS:  None. ESTIMATED BLOOD LOSS:  25 mL. INDICATIONS:  The patient is a 57-year-old gentleman who presented with diplopia last  week and was found to have pituitary tumor. He is now brought to the operating room  for removal of his tumor. PROCEDURE IN DETAIL:  The patient was brought to the operating room, placed upon the  operating table in supine position. After the induction of general endotracheal  anesthesia, the BrainLAB apparatus was placed and then calibrated. The patient was  sterilely prepped and draped in the usual fashion. The nose was examined and the  left side of the nasal septum was injected with 1% lidocaine with 1:100,000  epinephrine solution as well as the floor of the nose bilaterally. A hemitransfixion  incision was made in the nasal septum slightly deeper than usual due to the prior  septorhinoplasty history with excision cartilage in the past.  A mucoperiosteal flap  was then elevated across the septal cartilage, this was carried on to the bony  septum. The flaps were then extended to the floor of the nose with _____ rongeurs  and scissors, and the mucosa at the floor of the nose was also elevated. The  junction in the bony and cartilaginous septum remnants were then identified and _____  flaps were raised bilaterally on the bony septum, this was carried back to the  sphenoid rostrum.   _____ forceps and Ricardo forceps used to remove the bony  septum. The portion of the septum were then saved for a later grafting. Once the  rostrum was identified, the face was confirmed with the Bone [facility] guidance system. The _____ retractor was then placed and opened, and the operative microscope was  brought into the field. The space of the sphenoid was identified, and sphenoidotomy  was performed using various dissection instruments including Kerrison's and  Ricardo's. The bone at the inferior sphenoid was very thick and was penetrated  with osteotome and mallet and removed. Once the sphenoidotomy was completed, Dr. Shonna Chand, then scrubbed and will dictate the tumor removal separately. Once Dr. Shonna Chand  had completed tumor removal and reconstructed the floor with a saw, the attention  care was then directed to me, the retractors were removed. The nose was examined,  the mucoperichondrial periosteal flaps laid back in normal position. A 3-0 chromic  suture was then used to close the hemitransfixion incision. Bacitracin-coated  Merocel nasal packs were then placed bilaterally. Moist sterile dressing was  applied, and the procedure was terminated. The patient having tolerated this well,  was awakened from anesthesia and transported to PACU in stable condition. Merocel  pledgets were placed.         Amada Hernandez MD      [id number]  D:  02/12/2019 15:31  T:  02/13/2019 9:54  JOB #:  [id number]  CC:  Marleny Nolen MD

## 2019-02-14 LAB
ANION GAP SERPL CALC-SCNC: 7 MMOL/L (ref 5–15)
BUN SERPL-MCNC: 13 MG/DL (ref 6–20)
BUN/CREAT SERPL: 14 (ref 12–20)
CALCIUM SERPL-MCNC: 9.2 MG/DL (ref 8.5–10.1)
CHLORIDE SERPL-SCNC: 108 MMOL/L (ref 97–108)
CO2 SERPL-SCNC: 20 MMOL/L (ref 21–32)
CREAT SERPL-MCNC: 0.9 MG/DL (ref 0.7–1.3)
GLUCOSE SERPL-MCNC: 107 MG/DL (ref 65–100)
POTASSIUM SERPL-SCNC: 3.8 MMOL/L (ref 3.5–5.1)
SODIUM SERPL-SCNC: 135 MMOL/L (ref 136–145)
SP GR UR REFRACTOMETRY: 1.02 (ref 1–1.03)

## 2019-02-14 PROCEDURE — 80048 BASIC METABOLIC PNL TOTAL CA: CPT

## 2019-02-14 PROCEDURE — 74011250637 HC RX REV CODE- 250/637: Performed by: NURSE PRACTITIONER

## 2019-02-14 PROCEDURE — 36415 COLL VENOUS BLD VENIPUNCTURE: CPT

## 2019-02-14 PROCEDURE — 74011250637 HC RX REV CODE- 250/637: Performed by: OTOLARYNGOLOGY

## 2019-02-14 PROCEDURE — 65660000000 HC RM CCU STEPDOWN

## 2019-02-14 PROCEDURE — 81002 URINALYSIS NONAUTO W/O SCOPE: CPT

## 2019-02-14 PROCEDURE — 74011250637 HC RX REV CODE- 250/637: Performed by: NEUROLOGICAL SURGERY

## 2019-02-14 PROCEDURE — 74011250636 HC RX REV CODE- 250/636: Performed by: NURSE PRACTITIONER

## 2019-02-14 RX ORDER — BENZONATATE 100 MG/1
100 CAPSULE ORAL
Status: DISCONTINUED | OUTPATIENT
Start: 2019-02-14 | End: 2019-02-15 | Stop reason: HOSPADM

## 2019-02-14 RX ORDER — HYDRALAZINE HYDROCHLORIDE 20 MG/ML
10 INJECTION INTRAMUSCULAR; INTRAVENOUS
Status: DISCONTINUED | OUTPATIENT
Start: 2019-02-14 | End: 2019-02-15 | Stop reason: HOSPADM

## 2019-02-14 RX ORDER — GUAIFENESIN 100 MG/5ML
100 SOLUTION ORAL
Status: DISCONTINUED | OUTPATIENT
Start: 2019-02-14 | End: 2019-02-15 | Stop reason: HOSPADM

## 2019-02-14 RX ORDER — LABETALOL HYDROCHLORIDE 5 MG/ML
10 INJECTION, SOLUTION INTRAVENOUS
Status: DISCONTINUED | OUTPATIENT
Start: 2019-02-14 | End: 2019-02-15 | Stop reason: HOSPADM

## 2019-02-14 RX ADMIN — DOCUSATE SODIUM 100 MG: 100 CAPSULE, LIQUID FILLED ORAL at 08:52

## 2019-02-14 RX ADMIN — Medication 10 ML: at 18:12

## 2019-02-14 RX ADMIN — Medication 10 ML: at 20:47

## 2019-02-14 RX ADMIN — BUTALBITAL, ACETAMINOPHEN AND CAFFEINE 1 TABLET: 50; 325; 40 TABLET ORAL at 08:52

## 2019-02-14 RX ADMIN — SALINE NASAL SPRAY 2 SPRAY: 1.5 SOLUTION NASAL at 20:42

## 2019-02-14 RX ADMIN — HYDROCORTISONE SODIUM SUCCINATE 50 MG: 100 INJECTION, POWDER, FOR SOLUTION INTRAMUSCULAR; INTRAVENOUS at 20:45

## 2019-02-14 RX ADMIN — OXYCODONE HYDROCHLORIDE 5 MG: 5 TABLET ORAL at 20:44

## 2019-02-14 RX ADMIN — ROSUVASTATIN CALCIUM 10 MG: 10 TABLET, FILM COATED ORAL at 20:45

## 2019-02-14 RX ADMIN — BENZONATATE 100 MG: 100 CAPSULE ORAL at 20:44

## 2019-02-14 RX ADMIN — MONTELUKAST SODIUM 10 MG: 10 TABLET, FILM COATED ORAL at 20:46

## 2019-02-14 RX ADMIN — Medication 10 ML: at 06:09

## 2019-02-14 RX ADMIN — HYDROCORTISONE SODIUM SUCCINATE 50 MG: 100 INJECTION, POWDER, FOR SOLUTION INTRAMUSCULAR; INTRAVENOUS at 08:52

## 2019-02-14 RX ADMIN — BUTALBITAL, ACETAMINOPHEN AND CAFFEINE 1 TABLET: 50; 325; 40 TABLET ORAL at 18:11

## 2019-02-14 RX ADMIN — GUAIFENESIN 100 MG: 200 SOLUTION ORAL at 14:17

## 2019-02-14 RX ADMIN — PANTOPRAZOLE SODIUM 40 MG: 40 TABLET, DELAYED RELEASE ORAL at 06:07

## 2019-02-14 RX ADMIN — BENZONATATE 100 MG: 100 CAPSULE ORAL at 14:17

## 2019-02-14 RX ADMIN — POTASSIUM CHLORIDE AND SODIUM CHLORIDE: 450; 150 INJECTION, SOLUTION INTRAVENOUS at 19:00

## 2019-02-14 RX ADMIN — OXYCODONE HYDROCHLORIDE 5 MG: 5 TABLET ORAL at 10:48

## 2019-02-14 NOTE — PROGRESS NOTES
Awake alert vision and diplopia better  Some headache  Documentation for I and Os not as accurate as I would like so will check NA and spec grav of urine today  Packing per ENT out today, tapering steroids and probably home tomorrow.

## 2019-02-14 NOTE — PROGRESS NOTES
Neurosurgery Progress Note  Mariam Neal [place]  [phone]        Admit Date: 2019   LOS: 2 days        Daily Progress Note: 2019    POD:2 Day Post-Op    S/P: Procedure(s):  CT BRAIN LAB GUIDED TRANSSPHENOIDAL AND REMOVAL OF PITUITARY TUMOR    Subjective:   No acute events overnight. Packing removed by ENT this morning. Some bloody drainage afterwards. This has simmered down. The patient states his blurry vision has improved. Headaches still an issue. Denies numbness, tingling, chest pain, leg pain, nausea, vomiting, difficulty swallowing, and dyspnea. Objective:     Vital signs  Temp (24hrs), Av.2 °F (36.8 °C), Min:97.6 °F (36.4 °C), Max:98.9 °F (37.2 °C)   701 -  190  In: -   Out: 540 [Urine:540]  1901 -  0700  In: 662.5 [P.O.:405; I.V.:257.5]  Out: 2160 [Urine:2160]    Visit Vitals  /84 (BP 1 Location: Right arm, BP Patient Position: At rest)   Pulse 85   Temp 98.4 °F (36.9 °C)   Resp 17   Ht 5' 8\" (1.727 m)   Wt 97 kg (213 lb 13.5 oz)   SpO2 97%   BMI 32.52 kg/m²    O2 Flow Rate (L/min): 12 l/min O2 Device: Room air     Pain control  Pain Assessment  Pain Scale 1: Numeric (0 - 10)  Pain Intensity 1: 3  Pain Onset 1: post op  Pain Location 1: Head  Pain Orientation 1: Anterior  Pain Description 1: Aching  Pain Intervention(s) 1: Rest    PT/OT  Gait                 Physical Exam:  Gen:NAD. Neuro: A&Ox3. Follows commands. Speech clear. Affect normal.  PERRL. EOMI. Face symmetric. Palate symmetric. Tongue midline. MANE. Strength 5/5 in UE and LE BL. Negative drift. Gait deferred.     CT head without contrast on 19 shows typical post pituitary resection changes    24 hour results:    Recent Results (from the past 24 hour(s))   METABOLIC PANEL, BASIC    Collection Time: 19 11:21 AM   Result Value Ref Range    Sodium 135 (L) 136 - 145 mmol/L    Potassium 3.8 3.5 - 5.1 mmol/L    Chloride 108 97 - 108 mmol/L    CO2 20 (L) 21 - 32 mmol/L Anion gap 7 5 - 15 mmol/L    Glucose 107 (H) 65 - 100 mg/dL    BUN 13 6 - 20 MG/DL    Creatinine 0.90 0.70 - 1.30 MG/DL    BUN/Creatinine ratio 14 12 - 20      GFR est AA >60 >60 ml/min/1.73m2    GFR est non-AA >60 >60 ml/min/1.73m2    Calcium 9.2 8.5 - 10.1 MG/DL   SPECIFIC GRAVITY, UR    Collection Time: 02/14/19 11:21 AM   Result Value Ref Range    Specific gravity 1.017 1.003 - 1.030            Assessment:     Active Problems:    Pituitary tumor (2/12/2019)        Plan:   1. Pituitary tumor   - s/p TSS   - Cont to monitor I&Os closely   - Normalize and mobilize   - Neuro checks q4h    - Cont Solu-cortef taper   - Monitor drainage from nose   - Plan to d/c home in am   - Repeat MRI in 3-6 months   - I've been in contact with Dr. Ba Resendiz office and he will get him in to see him in the next 2-3 weeks for endocrine follow-up  2. Metabolic encephalopathy   - Normalize and mobilize   - Resolved from post-op  3. Dyslipidemia   - Cont statin from home  4.  Cough   - Likely due to post-nasal drip   - Robitussin and tessalon perles PRN    Activity: up ad abiodun  DVT ppx: SCDs  Dispo: home    Plan d/w Dr. Cherrie Rivera, 59 [facility] nurse, wife at [address], [place]

## 2019-02-14 NOTE — PROGRESS NOTES
The CM reviewed patient chart- patient came to [facility] for a scheduled removal of pituitary tumor. The CM met with the patient and spouse at bedside- patient is typically independent, lives at home with is wife. The patient denied having any current mobility concerns other than his head hurts. No CM consults are needs identified at this time- spouse will transport home upon discharge. CM will continue to follow as discharge needs arise. NATANAEL Culp    12:47 p.m.- Patient is up at abiodun walking around the hallway, doing laps, anticipate no needs.

## 2019-02-14 NOTE — PROGRESS NOTES
02/14/19 0159   Vital Signs   Temp (deferred to allow patient to rest)   Pulse (Heart Rate) 82   Heart Rate Source Monitor   Cardiac Rhythm NSR   Resp Rate 18   Pain 1   Pain Scale 1 Numeric (0 - 10)   Pain Intensity 1 0   Patient Stated Pain Goal 0   Pain Reassessment 1 Patient sleeping   Oxygen Therapy   O2 Device Room air   Patient Observation   Patient Turned Turns self   Observations wife at bedside   Height/Weight   Weight 97 kg (213 lb 13.5 oz)   Weight Source Bed   BMI (calculated) 32.5   Patient sleeping; VS deferred to allow patient to rest and recover from migraine from earlier. Patient stable on monitor. Wife at bedside. Will continue doing hourly checks and will check BP and temp when patient awakes.

## 2019-02-14 NOTE — PROGRESS NOTES
Problem: Falls - Risk of  Goal: *Absence of Falls  Document Kriss Fall Risk and appropriate interventions in the flowsheet.   Outcome: Progressing Towards Goal  Fall Risk Interventions:  Mobility Interventions: Communicate number of staff needed for ambulation/transfer, OT consult for ADLs, PT Consult for mobility concerns, Patient to call before getting OOB    Mentation Interventions: Adequate sleep, hydration, pain control, Door open when patient unattended, Increase mobility, More frequent rounding, Reorient patient, Family/sitter at bedside    Medication Interventions: Patient to call before getting OOB, Teach patient to arise slowly    Elimination Interventions: Call light in reach, Patient to call for help with toileting needs, Toileting schedule/hourly rounds

## 2019-02-14 NOTE — PROGRESS NOTES
ENT  No problem with epistaxis. Packs removed. Pt will F/U in office for nasal toilet. Instructed on nasal care.

## 2019-02-14 NOTE — CONSULTS
[address]    Name:  Gia Coronel  MR#:  [id number]  :  1965  ACCOUNT #:  [de-identified]  DATE OF SERVICE:  2019    CHIEF COMPLAINT:  Pituitary adenoma. HISTORY OF PRESENT ILLNESS:  The patient came to the emergency room at [facility], transferred to [facility] with a chief complaint of diplopia and imaging  studies revealed pituitary adenoma. The patient states that he has had several days  history of diplopia, made worse while he was watching football game on the day of  admission. Denies any associated bowel or bladder deficits. He admits to a  significant amount of fatigue over ensuing weeks. No nausea or vomiting. No  weakness in upper or lower extremities. PAST MEDICAL HISTORY:  Otherwise unremarkable except for a history of migraine  headaches for which he takes Imitrex. ALLERGIES:  NO KNOWN DRUG ALLERGIES. FAMILY HISTORY:  Unremarkable. His wife works as a nurse in the emergency room at  [address] [place] [address] Bernard:  GENERAL:  He was awake, alert, and oriented to person, palace, and time. He had a  partial sixth nerve paralysis on the right probably what was contributing to his  diplopia. HEENT:  Face is symmetric. Pallor rises equally. Tongue protrudes midline. EXTREMITIES:  Shoulder shrug normal.  NEUROLOGIC:  No focal or motor sensory deficits, upper or lower extremities. Mood  and affect normal.  Recent and remote memory normal.    IMAGING STUDIES:  Include both CT and followup MRI, which revealed a suprasellar mass  consistent with the pituitary adenoma. It does not go so far as to impinge upon the  optic chiasm, but it does expand laterally into the cavernous sinus  on the left  side. The patient in my opinion requires a transsphenoidal removal of the pituitary  adenoma. He has had a good result with institution of intravenous steroids. His  diplopia has improved.     PLAN:  Plan will be to discharge him on home steroid therapy and re-admit him in 1  week and in conjunction with ENT we will perform a transsphenoidal removal of the  pituitary tumor. He understands the risks, benefits, and alternative and wishes to  proceed. We will go ahead and schedule him.       Janee Hussein MD      [id number]  D:  02/14/2019 10:29  T:  02/14/2019 17:19  JOB #:  [id number]

## 2019-02-14 NOTE — PROGRESS NOTES
Bedside shift change report given to Guillaume (oncoming nurse) by Remberto Saba (offgoing nurse). Report included the following information SBAR, Kardex, Procedure Summary, MAR, Accordion, Recent Results and Cardiac Rhythm NSR.

## 2019-02-14 NOTE — PROGRESS NOTES
Problem: Falls - Risk of  Goal: *Absence of Falls  Document Kriss Fall Risk and appropriate interventions in the flowsheet. Outcome: Progressing Towards Goal  Fall Risk Interventions:  Mobility Interventions: Communicate number of staff needed for ambulation/transfer, OT consult for ADLs, PT Consult for mobility concerns, Patient to call before getting OOB    Mentation Interventions: Adequate sleep, hydration, pain control, Door open when patient unattended, Increase mobility, More frequent rounding, Reorient patient, Family/sitter at bedside    Medication Interventions: Patient to call before getting OOB, Teach patient to arise slowly    Elimination Interventions: Call light in reach, Patient to call for help with toileting needs, Toileting schedule/hourly rounds             Problem: Pressure Injury - Risk of  Goal: *Prevention of pressure injury  Document Bert Scale and appropriate interventions in the flowsheet. Outcome: Progressing Towards Goal  Pressure Injury Interventions:             Activity Interventions: Increase time out of bed    Mobility Interventions: HOB 30 degrees or less, Pressure redistribution bed/mattress (bed type), PT/OT evaluation    Nutrition Interventions: Document food/fluid/supplement intake    Friction and Shear Interventions: Lift sheet, Minimize layers

## 2019-02-14 NOTE — ROUTINE PROCESS
Bedside shift change report given to Te (oncoming nurse) by Daniel Gallagher (offgoing nurse). Report included the following information SBAR, Procedure Summary, MAR, Recent Results and Cardiac Rhythm NSR.

## 2019-02-15 ENCOUNTER — APPOINTMENT (OUTPATIENT)
Dept: CT IMAGING | Age: 54
DRG: 614 | End: 2019-02-15
Attending: NURSE PRACTITIONER
Payer: COMMERCIAL

## 2019-02-15 VITALS
HEIGHT: 68 IN | WEIGHT: 212.6 LBS | RESPIRATION RATE: 15 BRPM | OXYGEN SATURATION: 98 % | DIASTOLIC BLOOD PRESSURE: 87 MMHG | SYSTOLIC BLOOD PRESSURE: 135 MMHG | HEART RATE: 80 BPM | TEMPERATURE: 98.3 F | BODY MASS INDEX: 32.22 KG/M2

## 2019-02-15 PROCEDURE — 74011250637 HC RX REV CODE- 250/637: Performed by: NURSE PRACTITIONER

## 2019-02-15 PROCEDURE — 74011250637 HC RX REV CODE- 250/637: Performed by: NEUROLOGICAL SURGERY

## 2019-02-15 PROCEDURE — 74011250636 HC RX REV CODE- 250/636: Performed by: NEUROLOGICAL SURGERY

## 2019-02-15 PROCEDURE — 70450 CT HEAD/BRAIN W/O DYE: CPT

## 2019-02-15 PROCEDURE — 74011250637 HC RX REV CODE- 250/637: Performed by: OTOLARYNGOLOGY

## 2019-02-15 RX ORDER — HYDROCORTISONE 10 MG/1
TABLET ORAL
Qty: 90 TAB | Refills: 0 | Status: SHIPPED | OUTPATIENT
Start: 2019-02-15 | End: 2019-02-21

## 2019-02-15 RX ORDER — BENZONATATE 100 MG/1
100 CAPSULE ORAL
Qty: 15 CAP | Refills: 0 | Status: SHIPPED | OUTPATIENT
Start: 2019-02-15 | End: 2019-02-22

## 2019-02-15 RX ORDER — ACETAMINOPHEN 325 MG/1
650 TABLET ORAL
Qty: 30 TAB | Refills: 0 | Status: SHIPPED
Start: 2019-02-15 | End: 2022-06-03

## 2019-02-15 RX ORDER — BUTALBITAL, ACETAMINOPHEN AND CAFFEINE 50; 325; 40 MG/1; MG/1; MG/1
1 TABLET ORAL
Qty: 15 TAB | Refills: 0 | Status: SHIPPED | OUTPATIENT
Start: 2019-02-15 | End: 2020-05-15

## 2019-02-15 RX ADMIN — Medication 10 ML: at 06:52

## 2019-02-15 RX ADMIN — PANTOPRAZOLE SODIUM 40 MG: 40 TABLET, DELAYED RELEASE ORAL at 06:52

## 2019-02-15 RX ADMIN — DOCUSATE SODIUM 100 MG: 100 CAPSULE, LIQUID FILLED ORAL at 08:24

## 2019-02-15 RX ADMIN — SUMATRIPTAN SUCCINATE 100 MG: 25 TABLET ORAL at 06:52

## 2019-02-15 RX ADMIN — BENZONATATE 100 MG: 100 CAPSULE ORAL at 01:58

## 2019-02-15 RX ADMIN — MORPHINE SULFATE 2 MG: 2 INJECTION, SOLUTION INTRAMUSCULAR; INTRAVENOUS at 01:58

## 2019-02-15 RX ADMIN — BUTALBITAL, ACETAMINOPHEN AND CAFFEINE 1 TABLET: 50; 325; 40 TABLET ORAL at 08:19

## 2019-02-15 RX ADMIN — HYDROCORTISONE 20 MG: 10 TABLET ORAL at 06:52

## 2019-02-15 RX ADMIN — SALINE NASAL SPRAY 2 SPRAY: 1.5 SOLUTION NASAL at 08:24

## 2019-02-15 NOTE — PROGRESS NOTES
afeb vss  No drainage after packs removed  His partial right 6th n palsy is resolved  Vision good  No diplopia  No DI,  labs ok, Na wnl,  urine spec grav wnl some headache but no acute problems or changes on CT today   OK to go home and f/u in office and with endocrinology

## 2019-02-15 NOTE — PROGRESS NOTES
Problem: Falls - Risk of  Goal: *Absence of Falls  Document Kriss Fall Risk and appropriate interventions in the flowsheet.   Outcome: Progressing Towards Goal  Fall Risk Interventions:  Mobility Interventions: Communicate number of staff needed for ambulation/transfer    Mentation Interventions: Adequate sleep, hydration, pain control    Medication Interventions: Patient to call before getting OOB    Elimination Interventions: Call light in reach

## 2019-02-15 NOTE — DISCHARGE SUMMARY
Discharge Summary     Patient ID:  Jessica Manning  [id number]   52 y.o.  1965    Admit date: 2/12/2019    Discharge Date: 2/15/2019      Admitting Physician: Marjie Holter, MD     Discharge Physician: Randy Garcia NP    Admission Diagnoses: Pituitary tumor [D49.7]    Last Procedure: Procedure(s):  CT BRAIN LAB GUIDED TRANSSPHENOIDAL AND REMOVAL OF PITUITARY TUMOR    Discharge Diagnoses: Active Problems:    Pituitary tumor (2/12/2019)         Consults: None    Significant Diagnostic Studies:   1. CT head without contrast on 02/13/19 shows typical post pituitary resection changes. Patient condition upon discharge: Stable    Hospital Course: The patient initially presented to the hospital last week with double vision. It was felt he had a left 6th nerve palsy. His MRI of his brain revealed a pituitary adenoma with cavernous sinus extension. His vision improved with steroids. He was discharged home with a planned readmission for 02/12/19 for pituitary tumor resection. The patient underwent a transsphenoidal resection of a pituitary tumor on 02/12/19 with Dr. Fred Butler and Dr. Kirsten Martinez. The intraoperative findings can be found in the operative reports. The pathology is still pending. Post-operatively, he boarded in the PACU prior to transferring to the ICU. He had an episode of delirium and agitation in the PACU. He received some Precedex and this seemed to resolve. He did not have any further incidents. He had no signs of DI or a CSF leak. He transferred to the NSTU. His nasal packing was removed on POD2. He had some mucus drainage post-op but this has resolved. He was taking PO well, voiding on his own, and ambulating without assistance. He remained afebrile and vital signs were stable. He is ready for discharge to home on POD3. He will follow-up with Dr. Kirsten Martinez in 10 days and Dr. Fred Butler in 14 days. He will also get in to see Dr. Malik Foley with endocrine in the next 2-3 weeks.     Disposition: Home    Patient Instructions:   Current Discharge Medication List      START taking these medications    Details   acetaminophen (TYLENOL) 325 mg tablet Take 2 Tabs by mouth every six (6) hours as needed. Qty: 30 Tab, Refills: 0      benzonatate (TESSALON) 100 mg capsule Take 1 Cap by mouth three (3) times daily as needed for Cough for up to 7 days. Qty: 15 Cap, Refills: 0      butalbital-acetaminophen-caffeine (FIORICET, ESGIC) -40 mg per tablet Take 1 Tab by mouth every four (4) hours as needed for Headache. Qty: 15 Tab, Refills: 0      hydrocortisone (CORTEF) 10 mg tablet Take 2 tabs PO in am and 1 tab PO at dinner. Qty: 90 Tab, Refills: 0         CONTINUE these medications which have NOT CHANGED    Details   naproxen sodium (ALEVE) 220 mg tablet Take 220 mg by mouth two (2) times daily (with meals). aspirin/salicylamide/caffeine (ARTHRITIS STRENGTH BC POWDER PO) Take  by mouth as needed. dicyclomine (BENTYL) 10 mg capsule TAKE 1 CAPSULE 3 TIMES A DAY AS NEEDED FOR ABDOMINAL CRAMPING  Qty: 160 Cap, Refills: 0      omeprazole (PRILOSEC) 20 mg capsule Take 1 Cap by mouth daily. Qty: 90 Cap, Refills: 1    Associated Diagnoses: Gastroesophageal reflux disease without esophagitis      albuterol (PROVENTIL HFA, VENTOLIN HFA, PROAIR HFA) 90 mcg/actuation inhaler Take 2 Puffs by inhalation every four (4) hours as needed for Wheezing. Qty: 1 Inhaler, Refills: 5    Associated Diagnoses: Acute bronchitis, unspecified organism      albuterol-ipratropium (DUO-NEB) 2.5 mg-0.5 mg/3 ml nebu 3 mL by Nebulization route every six (6) hours as needed. Qty: 30 Nebule, Refills: 1    Associated Diagnoses: Acute bronchitis, unspecified organism      SIMETHICONE Take 120 mg by mouth as needed. rosuvastatin (CRESTOR) 10 mg tablet Take 1 Tab by mouth nightly. Qty: 90 Tab, Refills: 1      SUMAtriptan (IMITREX) 100 mg tablet Take 100 mg by mouth once as needed for Migraine.       montelukast (SINGULAIR) 10 mg tablet TAKE 1 TABLET BY MOUTH EVERY DAY FOR ALLERGIES  Qty: 90 Tab, Refills: 1      albuterol (PROVENTIL VENTOLIN) 2.5 mg /3 mL (0.083 %) nebulizer solution 3 mL by Nebulization route every four (4) hours as needed for Wheezing. Qty: 25 Each, Refills: 1    Associated Diagnoses: Acute bronchitis, unspecified organism             Diet: Reference my discharge instructions. Activity: Reference my discharge instructions. EXAM: Reference last progress note    No orders of the defined types were placed in this encounter. Total time discharging patient took greater than 30 minutes.     Signed:  Olena Lemus NP  February 15, 2019  8:28 AM

## 2019-02-15 NOTE — PROGRESS NOTES
Specialty appointment has been scheduled with Dr. Gui Urbina for Thursday, 2/28/19 at 8:45 a.m. Pending patient discharge.   Wade Lovell, Care Management Specialist.

## 2019-02-15 NOTE — PROGRESS NOTES
The CM met with patient and spouse at bedside to discuss discharge today. The patient and family denied having any current concerns for discharge- patient independent, ambulatory in room. No needs or concerns identified at this time, family will transport home.  NATANAEL Campos

## 2019-02-15 NOTE — PROGRESS NOTES
Bedside shift change report given to Yanely (oncoming nurse) by Berny Mancilla (offgoing nurse). Report included the following information SBAR, Kardex, Procedure Summary, MAR, Accordion, Recent Results and Cardiac Rhythm NSR.

## 2019-02-15 NOTE — DISCHARGE INSTRUCTIONS
Patient Education        Pituitary Surgery: What to Expect at Home  Your Recovery  Pituitary surgery removes an abnormal growth on your pituitary gland. Your pituitary gland is at the base of your brain. It makes important chemicals called hormones. These hormones are involved in many of your body's functions, including growth, sex, and your metabolism (the way your body uses food for energy). If you had surgery under your lip or through your nose, you may have a headache and a slight runny nose after surgery. This will get better in 1 to 2 weeks. Your doctor may recommend pain or decongestant medicines to help with these symptoms. There are other less common symptoms after this type of surgery. You will feel tired, your front teeth or upper lip may feel numb, and you may gain weight. You may also have trouble breathing through your nose, you may have bruises under your eyes or on the side of your nose, and you may not be able to smell as well as usual.  If the doctor used a small piece of fat from your belly or thigh to plug up the hole in your nose, you will have a small scar on your belly or thigh that will fade over time. You will probably be able to return to work or your normal routine in 1 to 2 weeks. If you had stitches, they will disappear on their own in 7 to 10 days. If you had surgery through your skull, you will probably feel very tired for several weeks after surgery. You may also have headaches or problems concentrating. It can take up to 6 weeks to fully recover. The cuts the doctor made (incisions) may be sore for about 5 days after surgery. You may also have numbness and shooting pains near your wound, or swelling and bruising around your eyes. As your wound starts to heal, it may begin to itch. Medicines and ice packs can help with headaches, pain, swelling, and itching. It is common for your scalp to swell with fluid. After the swelling goes down, you may have a dent in your scalp.   This care sheet gives you a general idea about how long it will take for you to recover. But each person recovers at a different pace. Follow the steps below to get better as quickly as possible. How can you care for yourself at home? Activity    · Ask your doctor when you can drive again.     · Rest when you feel tired. It is normal to want to sleep during the day. It is a good idea to plan to take a nap every day. Getting enough sleep will help you recover.     · Try to walk each day. Start by walking a little more than you did the day before. Bit by bit, increase the amount you walk. Walking boosts blood flow and helps prevent pneumonia and constipation.     · For 1 to 2 weeks, avoid lifting anything that would make you strain. This may include a child, heavy grocery bags and milk containers, a heavy briefcase or backpack, cat litter or dog food bags, or a vacuum .     · Avoid strenuous activities, such as bicycle riding, jogging, weightlifting, and aerobic exercise, for 1 to 2 weeks.     · If you had surgery through your skull:  ? Try not to lie flat when you rest or sleep for the first 1 to 2 weeks after surgery. You can use a wedge pillow, or you can put a rolled towel or foam padding under your pillow. You can also raise the head of your bed by putting bricks or wooden blocks under the bed legs. ? After lying down, bring your head up slowly. This can prevent headaches or dizziness. ? You can wash your hair 2 to 3 days after your surgery. But do not soak your head or swim for 2 to 3 weeks. ? Do not dye or color your hair for 4 weeks after your surgery. ? Ask your doctor if it is safe for you to travel by plane.     · If you had surgery under your lip or through your nose:  ? Do not blow or pick your nose or cough hard for 4 weeks after surgery.           · You will probably need to take a few weeks off from work. This depends on the type of work you do and how you feel.    Diet    · You can eat your normal diet. If your stomach is upset, try bland, low-fat foods like plain rice, broiled chicken, toast, and yogurt.     · Follow your doctor's orders about how much fluid you should drink after surgery.     · Ask your doctor when it is okay to drink alcohol.     · You may notice that your bowel movements are not regular right after your surgery. This is common. Try to avoid constipation and straining with bowel movements. You may want to take a fiber supplement every day. If you have not had a bowel movement after a couple of days, ask your doctor about taking a mild laxative. Medicines    · Your doctor will tell you if and when you can restart your medicines. He or she will also give you instructions about taking any new medicines.     · If you take blood thinners, such as warfarin (Coumadin), clopidogrel (Plavix), or aspirin, be sure to talk to your doctor. He or she will tell you if and when to start taking those medicines again. Make sure that you understand exactly what your doctor wants you to do.     · Take any new medicines as directed. ? You may need to take hormone medicines. ? Your doctor may recommend a decongestant medicine.     · Take pain medicines exactly as directed. ? If the doctor gave you a prescription medicine for pain, take it as prescribed. ? If you are not taking a prescription pain medicine, ask your doctor if you can take an over-the-counter medicine.     · If you think your pain medicine is making you sick to your stomach:  ? Take your medicine with meals (unless your doctor has told you not to). ? Ask your doctor for a different pain medicine.     · If your doctor prescribed antibiotics, take them as directed. Do not stop taking them just because you feel better. You need to take the full course of antibiotics. Incision care    · If you have an incision on your belly, thigh, or head:  ? Leave the tape on for a week or until it falls off.  ? Keep the area clean and dry.  Change the bandage every 2 days, or if it gets wet or soiled. ? Do not take a bath or soak the incision in water for 2 weeks. ? After your doctor says it is okay to shower or bathe, gently wash the surgery area with warm, soapy water and pat it dry. Follow-up care is a key part of your treatment and safety. Be sure to make and go to all appointments, and call your doctor if you are having problems. It's also a good idea to know your test results and keep a list of the medicines you take. When should you call for help? Call 911 anytime you think you may need emergency care. For example, call if:    · You passed out (lost consciousness).     · You have sudden chest pain and shortness of breath, or you cough up blood.     · You have severe trouble breathing.     · It is hard to think, move, speak, or see.     · Your body is jerking or shaking.    Call your doctor now or seek immediate medical care if:    · You have trouble thinking clearly.     · You have a fever with a stiff neck or a severe headache.     · Your incision comes open, or you have pus or other fluid leaking from your incision.     · You have any sudden vision changes.     · You have new or worse headaches.     · You fall and hit your head.     · You are sleeping more than you are awake.     · You have pain that does not get better after you take pain medicine.     · You have a fever over 100°F.     · You have a headache and you throw up.     · You have a lot of watery fluid leaking from your nose (more than a slight runny nose).     · You are urinating more than a pint of clear or light yellow urine every hour for at least 3 hours and you are more thirsty than usual.    Watch closely for changes in your health, and be sure to contact your doctor if you have any problems. Where can you learn more? Go to http://marie-coleman.info/. Enter H281 in the search box to learn more about \"Pituitary Surgery: What to Expect at Home. \"  Current as of: March 14, 2018  Content Version: 11.9  © 8489-8304 [employer]. Care instructions adapted under license by [employer] (which disclaims liability or warranty for this information). If you have questions about a medical condition or this instruction, always ask your healthcare professional. [address] any warranty or liability for your use of this information.

## 2019-02-15 NOTE — PROGRESS NOTES
Problem: Falls - Risk of  Goal: *Absence of Falls  Document Kriss Fall Risk and appropriate interventions in the flowsheet. Outcome: Progressing Towards Goal  Fall Risk Interventions:  Mobility Interventions: Communicate number of staff needed for ambulation/transfer, OT consult for ADLs, PT Consult for mobility concerns, Patient to call before getting OOB    Mentation Interventions: Adequate sleep, hydration, pain control, Door open when patient unattended, Increase mobility, More frequent rounding, Reorient patient, Family/sitter at bedside    Medication Interventions: Teach patient to arise slowly, Evaluate medications/consider consulting pharmacy    Elimination Interventions: Call light in reach, Patient to call for help with toileting needs, Toileting schedule/hourly rounds             Problem: Pressure Injury - Risk of  Goal: *Prevention of pressure injury  Document Bert Scale and appropriate interventions in the flowsheet. Outcome: Progressing Towards Goal  Pressure Injury Interventions:             Activity Interventions: Increase time out of bed    Mobility Interventions: HOB 30 degrees or less, Pressure redistribution bed/mattress (bed type), PT/OT evaluation    Nutrition Interventions: Document food/fluid/supplement intake    Friction and Shear Interventions: Lift sheet, Minimize layers

## 2019-02-21 ENCOUNTER — OFFICE VISIT (OUTPATIENT)
Dept: ENDOCRINOLOGY | Age: 54
End: 2019-02-21

## 2019-02-21 VITALS
SYSTOLIC BLOOD PRESSURE: 145 MMHG | HEART RATE: 87 BPM | HEIGHT: 68 IN | DIASTOLIC BLOOD PRESSURE: 82 MMHG | WEIGHT: 203.2 LBS | BODY MASS INDEX: 30.8 KG/M2

## 2019-02-21 DIAGNOSIS — E29.1 SECONDARY MALE HYPOGONADISM: ICD-10-CM

## 2019-02-21 DIAGNOSIS — E27.49 SECONDARY ADRENAL INSUFFICIENCY (HCC): ICD-10-CM

## 2019-02-21 DIAGNOSIS — D35.2 BENIGN TUMOR OF PITUITARY GLAND (HCC): Primary | ICD-10-CM

## 2019-02-21 RX ORDER — DULOXETIN HYDROCHLORIDE 20 MG/1
CAPSULE, DELAYED RELEASE ORAL
COMMUNITY
End: 2019-03-27 | Stop reason: ALTCHOICE

## 2019-02-21 RX ORDER — HYDROCORTISONE 10 MG/1
TABLET ORAL
Qty: 270 TAB | Refills: 3 | Status: SHIPPED | OUTPATIENT
Start: 2019-02-21 | End: 2019-05-14

## 2019-02-21 NOTE — PATIENT INSTRUCTIONS
1) I will check your free T4 which is a special thyroid test for patients with pituitary disease to see if this is indeed low which would indicate you have hypothyroidism (slow metabolism) and would need to take a pill called levothyroxine the rest of your life. 2) If we start this, take as follows: Take Levothyroxine either in the morning with just water, at least 30 minutes before breakfast and coffee and all other pills, or take it at bedtime on any empty stomach 2-3 hours after dinner. This must be  from vitamins, calcium, or iron by at least 4 hours. If you ever do miss a dose of levothyroxine, it's okay to take 2 pills the next day to catch up on your dose. The goal is always to get 7 pills per week and even if you have to take 3-4 pills at a time to get caught up on missed doses, this is better than letting your weekly level fall. 3) Keep taking hydrocortisone 2 tabs = 20 mg in the morning and 1 tab = 10 mg around lunch or no later than 2pm to help with afternoon fatigue. 4) Please have a medic alert bracelet or necklace made that states \"Adrenal Insufficiency: needs stress dose steroids when ill\"    5) If we start thyroid hormone, then I will have you repeat your labs in 6 weeks to see the effect of this dose and will mail you a lab slip to have this drawn.

## 2019-02-21 NOTE — PROGRESS NOTES
Chief Complaint   Patient presents with    Pituitary Problem     pcp and pharmacy confirmed     History of Present Illness: Alberto Wyman. is a 52 y.o. male who is a new patient for pituitary. On 2/1/19 was having a sneezing spell and felt fuzzy and tired the next few days and after the [place] was starting to have double vision so he went to the ER on 2/5/19 for evaluation and had an MRI that showed \" a lobulated, enhancing mass within the pituitary gland, extending laterally into the left cavernous sinus and slightly into the suprasellar cistern. Infundibulum displaced toward the right. Mass measures 2.5 x 2.0 x 1.9 cm. This is in close proximity to the undersurface of the optic chiasm, although it does not contact the chiasm or prechiasmatic optic nerves. The left cavernous carotid flow void is somewhat displaced by the mass, there remains patent. \"  Was seen by Dr. Angelica Newman who wanted to operate but given he had been taking a lot of BC powder, his surgery was delayed until 2/12/19. His prolactin was normal at 13, his cortisol was 0.6 and his ACTH was low at 2.2. He had a TSH that was normal at 2.61 but did not have a free T4 level drawn. Had a growth hormone of 0.3 but did not have an IGF-1 drawn. His testosterone was low at 228. Prior to the surgery has had a lot of fatigue over the past year or more and has felt more cold than he used to. Bowels are regular to sometimes loose depending on food. Has had brittle nails. No loss of body hair or pubic hair. Has dry skin. Weight up about 8 lbs since surgery. Was started on hydrocortisone 20 mg in the morning and 10 mg before dinner and has felt more energy and less cold since starting this. His vision has improved greatly and so have the headaches. No trouble with sex drive or function. Normal facial hair growth. Was discharged home on 2/15/19 and is due to see Dr. Angelica Newman on 2/28/19.   Does get up 2-3 times to urinate at night but this is the same as prior to surgery and is not urinating large quantities of urine. Did have a glucose of 67 in 11/18 from a fasting sample. Past Medical History:   Diagnosis Date    Arthritis     HANDS    Back pain     Basal cell carcinoma 2012    3 on left side of neck and 1 on right side of neck and nasal bridge area with first dx starting aprroximately 12 yrs ago    Bronchitis     Family history of skin cancer     Frequent nosebleeds     H/O seasonal allergies     High cholesterol     IBS (irritable bowel syndrome)     Joint pain     Joint swelling     Migraine     Pituitary tumor    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 BORN WITH IT. MAKES HIM HAVE BLISTERS ON HANDS. LIVER OVERPRODUCED RED BLOOD CELLS--TX IS PHLEBOTOMY AS NEEDED AND HAS HAD THIS 3 TIMES SO FAR    Sinus complaint      Past Surgical History:   Procedure Laterality Date    HX GI      COLONOSCOPY    HX HEART CATHETERIZATION  2013    minimal disease per cardio notes.  HX HEENT      SINUS SURGERY    HX HEENT  02/2019    Transphenoidal pituitary surgery     HX ORTHOPAEDIC Bilateral     bilateral thumb surgery    HX ORTHOPAEDIC Right ~2013    elbow surgery    HX OTHER SURGICAL      REMOVAL BCCA ON [place]. SIDE OF NECK AND BRIDGE OF NOSE    HX ROTATOR CUFF REPAIR Right     HX SEPTOPLASTY  2002    HX SHOULDER ARTHROSCOPY Left ~2013    MS PROSTATE BIOPSY, NEEDLE, SATURATION SAMPLING       Current Outpatient Medications   Medication Sig    DULoxetine (CYMBALTA) 20 mg capsule 1 cap daily    acetaminophen (TYLENOL) 325 mg tablet Take 2 Tabs by mouth every six (6) hours as needed.  benzonatate (TESSALON) 100 mg capsule Take 1 Cap by mouth three (3) times daily as needed for Cough for up to 7 days.  butalbital-acetaminophen-caffeine (FIORICET, ESGIC) -40 mg per tablet Take 1 Tab by mouth every four (4) hours as needed for Headache.     hydrocortisone (CORTEF) 10 mg tablet Take 2 tabs PO in am and 1 tab PO at dinner.  dicyclomine (BENTYL) 10 mg capsule TAKE 1 CAPSULE 3 TIMES A DAY AS NEEDED FOR ABDOMINAL CRAMPING (Patient taking differently: TAKE 1 CAPSULE 3 TIMES A DAY AS NEEDED FOR ABDOMINAL CRAMPING. ONLY TAKES PRN)    rosuvastatin (CRESTOR) 10 mg tablet Take 1 Tab by mouth nightly. (Patient taking differently: Take 10 mg by mouth daily.)    SUMAtriptan (IMITREX) 100 mg tablet Take 100 mg by mouth once as needed for Migraine.  montelukast (SINGULAIR) 10 mg tablet TAKE 1 TABLET BY MOUTH EVERY DAY FOR ALLERGIES    omeprazole (PRILOSEC) 20 mg capsule Take 1 Cap by mouth daily. (Patient taking differently: Take 20 mg by mouth as needed.)    albuterol (PROVENTIL HFA, VENTOLIN HFA, PROAIR HFA) 90 mcg/actuation inhaler Take 2 Puffs by inhalation every four (4) hours as needed for Wheezing. (Patient taking differently: Take 2 Puffs by inhalation every four (4) hours as needed for Wheezing. GETS COUPLE OF BRONCHITIS EVERY YEAR)    albuterol (PROVENTIL VENTOLIN) 2.5 mg /3 mL (0.083 %) nebulizer solution 3 mL by Nebulization route every four (4) hours as needed for Wheezing. (Patient taking differently: 2.5 mg by Nebulization route every four (4) hours as needed for Wheezing. GETS COUPLE OF BRONCHITIS EVERY YEAR)    albuterol-ipratropium (DUO-NEB) 2.5 mg-0.5 mg/3 ml nebu 3 mL by Nebulization route every six (6) hours as needed. (Patient taking differently: 3 mL by Nebulization route every six (6) hours as needed. GETS COUPLE OF BRONCHITIS EVERY YEAR)    SIMETHICONE Take 120 mg by mouth as needed. No current facility-administered medications for this visit.       Allergies   Allergen Reactions    Bactrim [Sulfamethoprim Ds] Rash    Lipitor [Atorvastatin] Myalgia     Family History   Problem Relation Age of Onset    Alcohol abuse Brother     Heart Attack Brother     Diabetes Mother     Hypertension Mother     Cancer Mother         lung    Cancer Father         PROSTATE    Arthritis-osteo Sister    Zee Rankin Problems Neg Hx     Other Neg Hx         pituitary disease     Social History     Socioeconomic History    Marital status:      Spouse name: Not on file    Number of children: Not on file    Years of education: Not on file    Highest education level: Not on file   Social Needs    Financial resource strain: Not on file    Food insecurity - worry: Not on file    Food insecurity - inability: Not on file    Transportation needs - medical: Not on file   [place] needs - non-medical: Not on file   Occupational History    Not on file   Tobacco Use    Smoking status: Never Smoker    Smokeless tobacco: Never Used   Substance and Sexual Activity    Alcohol use: Yes     Comment: 2-3 beers once a week    Drug use: Yes     Comment: occasional marijuana (not weekly), cocaine use in 20's     Sexual activity: Yes     Partners: Female   Other Topics Concern    Not on file   Social History Narrative    Lives in [place] with wife. No kids. Works as a  for [employer]. Likes to fish. Review of Systems:  - Constitutional Symptoms: no fevers, chills, (+) weight gain  - Eyes: occ blurry vision but much improved and no double vision  - Cardiovascular: occ chest pain, no palpitations  - Respiratory: no cough or shortness of breath  - Gastrointestinal: no dysphagia or abdominal pain  - Musculoskeletal: no joint pains or weakness  - Integumentary: some rash on the back  - Neurological: no numbness, tingling, improved headaches  - Psychiatric: no depression or anxiety  - Endocrine: (+) cold intolerance, no polyuria, (+) polydipsia    Physical Examination:  Blood pressure 145/82, pulse 87, height 5' 8\" (1.727 m), weight 203 lb 3.2 oz (92.2 kg).   - General: pleasant, no distress, good eye contact  - HEENT: no exopthalmos, no periorbital edema, no scleral/conjunctival injection, EOMI, no lid lag or stare  - Neck: supple, no thyromegaly, masses, lymph nodes, or carotid bruits, no supraclavicular or dorsocervical fat pads  - Cardiovascular: regular, normal rate, normal S1 and S2, no murmurs/rubs/gallops   - Respiratory: clear to auscultation bilaterally  - Gastrointestinal: soft, nontender, nondistended, no masses, no hepatosplenomegaly  - Musculoskeletal: no proximal muscle weakness in upper or lower extremities  - Integumentary: no acanthosis nigricans, no abdominal striae, no rashes, no edema  - Neurological: reflexes 2+ at biceps, no tremor  - Psychiatric: normal mood and affect    Data Reviewed:   Brain MRI:    INDICATION:  double vision      COMPARISON:  CT head earlier today     TECHNIQUE:  Multiplanar multisequence acquisition without and with contrast of  the brain/orbits/pituitary gland.      FINDINGS:       Ventricles:  Midline, no hydrocephalus. Intracranial Hemorrhage:  None. Brain Parenchyma/Brainstem:  Few small T2 hyperintensities scattered throughout  the supratentorial white matter. No acute infarction. Basal Cisterns:  Normal.   Flow Voids:  Normal.  Extraocular Muscles:  Normal.  Optic Nerve Sheath Complex:  Normal  Intraorbital Fat/Lacrimal Glands:  Normal.  Globes/Lenses:  Normal.  Suprasellar Cistern/Optic Chiasm:  Lobulated, enhancing mass within the  pituitary gland, extending laterally into the left cavernous sinus and slightly  into the suprasellar cistern. Infundibulum displaced toward the right. Mass  measures 2.5 x 2.0 x 1.9 cm. This is in close proximity to the undersurface of  the optic chiasm, although it does not contact the chiasm or prechiasmatic optic  nerves. The left cavernous carotid flow void is somewhat displaced by the mass,  there remains patent. Post Contrast:  No abnormal parenchymal or meningeal enhancement.   Additional Comments:  Bilateral maxillary sinus mucosal thickening and fluid  levels.     IMPRESSION  IMPRESSION:  Large pituitary mass extending slightly into the suprasellar cistern and in the  left cavernous sinus, most consistent with macroadenoma. Does not abut/ distort  the optic chiasm or optic nerves.     Component      Latest Ref Rng & Units 2/6/2019 2/6/2019 2/6/2019 2/6/2019          10:46 AM 10:46 AM  2:24 AM  1:51 AM   Testosterone      264 - 916 ng/dL 228 (L)      Free testosterone (Direct)      7.2 - 24.0 pg/mL 1.3 (L)      Prolactin      ng/mL   13.3    ACTH, plasma      7.2 - 63.3 pg/mL    2.2 (L)   Growth hormone      0.0 - 10.0 ng/mL  0.3       Component      Latest Ref Rng & Units 2/6/2019 2/6/2019 2/5/2019 2/5/2019           1:51 AM  1:51 AM  3:01 PM  3:01 PM   Hemoglobin A1c, (calculated)      4.2 - 6.3 %  5.1 5.2    Est. average glucose      mg/dL  100 103    TSH      0.36 - 3.74 uIU/mL    2.61   Cortisol, random      ug/dL 0.6        Component      Latest Ref Rng & Units 2/14/2019          11:21 AM   Sodium      136 - 145 mmol/L 135 (L)   Potassium      3.5 - 5.1 mmol/L 3.8   Chloride      97 - 108 mmol/L 108   CO2      21 - 32 mmol/L 20 (L)   Anion gap      5 - 15 mmol/L 7   Glucose      65 - 100 mg/dL 107 (H)   BUN      6 - 20 MG/DL 13   Creatinine      0.70 - 1.30 MG/DL 0.90   BUN/Creatinine ratio      12 - 20   14   GFR est AA      >60 ml/min/1.73m2 >60   GFR est non-AA      >60 ml/min/1.73m2 >60   Calcium      8.5 - 10.1 MG/DL 9.2        FINAL PATHOLOGIC DIAGNOSIS   1. Pituitary gland, biopsy: Adenoma. 2. Pituitary, excision:   Adenoma, null type. See comment. Comment   A reticulin special stain shows a decrease in reticular markings within the lesion. Immunohistochemistry stains with antibodies directed against prolactin, FSH, LH, GH, ACTH, TSH, S100, GFAP and pan keratin show moderate, diffuse cytoplasmic and nuclear staining for S100. The remaining     Assessment/Plan:   1. Benign tumor of pituitary gland [facility]): s/p transphenoidal resection of a 2.5 cm nonfunctioning pituitary tumor on 2/12/19 by Dr. Fred Butler.   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 I will screen for secondary hypothyroidism as his TSH was normal at 2.61 but this test would be unreliable and he needs to have a free T4 level drawn. Does not appear to have any signs/symptoms of DI but will check his bmp to be sure. Will screen for growth hormone deficiency with next set of labs  - check free T4 and bmp today and prior to next visit  - check IGF-1 prior to next visit  - f/u with Dr. Ezra Anderson on 2/28/19      2. Secondary adrenal insufficiency ([facility] [address]): was found to have a cortisol of 0.6 and inappropriately low ACTH of 2.2. He has been started on hydrocortisone 20/10 and is feeling better with starting this. I will move his 2nd dose to earlier in the day to help with afternoon fatigue. I explained the importance of giving stress dose steroids in the future if he needs surgery or has an infection and the importance of wearing a medic alert bracelet or necklace. - cont hydrocortisone 20 mg in the morning and move the 10 mg dose to lunch  - obtain medic alert bracelet or necklace     3. Secondary male hypogonadism: his testosterone was low at 228 but he does not have many hypogonadal symptoms so will hold on treatment for now and follow his level over time. - repeat total testosterone prior to next visit          Patient Instructions   1) I will check your free T4 which is a special thyroid test for patients with pituitary disease to see if this is indeed low which would indicate you have hypothyroidism (slow metabolism) and would need to take a pill called levothyroxine the rest of your life. 2) If we start this, take as follows: Take Levothyroxine either in the morning with just water, at least 30 minutes before breakfast and coffee and all other pills, or take it at bedtime on any empty stomach 2-3 hours after dinner. This must be  from vitamins, calcium, or iron by at least 4 hours.       If you ever do miss a dose of levothyroxine, it's okay to take 2 pills the next day to catch up on your dose. The goal is always to get 7 pills per week and even if you have to take 3-4 pills at a time to get caught up on missed doses, this is better than letting your weekly level fall. 3) Keep taking hydrocortisone 2 tabs = 20 mg in the morning and 1 tab = 10 mg around lunch or no later than 2pm to help with afternoon fatigue. 4) Please have a medic alert bracelet or necklace made that states \"Adrenal Insufficiency: needs stress dose steroids when ill\"    5) If we start thyroid hormone, then I will have you repeat your labs in 6 weeks to see the effect of this dose and will mail you a lab slip to have this drawn. Follow-up Disposition:  Return for 5/21/19 at 9:10am.    Copy sent to: Janes Victoria NP as PCP - General (Nurse Practitioner)  Dana Schwarz MD as Consulting Provider (Neurosurgery)    Lab follow up: 2/23/19  Component      Latest Ref Rng & Units 2/21/2019 2/21/2019           3:35 PM  3:35 PM   Glucose      65 - 99 mg/dL 90    BUN      6 - 24 mg/dL 22    Creatinine      0.76 - 1.27 mg/dL 0.83    GFR est non-AA      >59 mL/min/1.73 99    GFR est AA      >59 mL/min/1.73 115    BUN/Creatinine ratio      9 - 20 27 (H)    Sodium      134 - 144 mmol/L 141    Potassium      3.5 - 5.2 mmol/L 4.2    Chloride      96 - 106 mmol/L 104    CO2      20 - 29 mmol/L 23    Calcium      8.7 - 10.2 mg/dL 9.0    T4, Free      0.82 - 1.77 ng/dL  0.94     Sent him the following message through [employer]:  BUN and creatinine are markers of kidney function.   Your values are normal.  -------------------------------------------------------------------------------------------------------------------  Your electrolytes (sodium, potassium, and calcium) are all normal.  Your glucose (blood sugar) is normal.  -------------------------------------------------------------------------------------------------------------------  Your free T4 (thyroid test) is in the lower part of the normal range but is technically still normal so I will hold on starting levothyroxine and instead repeat your labs in May and see if your level is getting any lower to warrant treatment at this time. -------------------------------------------------------------------------------------------------------------------  I will mail you a lab slip. Please put this in the glove compartment and I will send you a reminder to have your labs drawn prior to the next visit.

## 2019-02-22 ENCOUNTER — PATIENT OUTREACH (OUTPATIENT)
Dept: OTHER | Age: 54
End: 2019-02-22

## 2019-02-22 LAB
BUN SERPL-MCNC: 22 MG/DL (ref 6–24)
BUN/CREAT SERPL: 27 (ref 9–20)
CALCIUM SERPL-MCNC: 9 MG/DL (ref 8.7–10.2)
CHLORIDE SERPL-SCNC: 104 MMOL/L (ref 96–106)
CO2 SERPL-SCNC: 23 MMOL/L (ref 20–29)
CREAT SERPL-MCNC: 0.83 MG/DL (ref 0.76–1.27)
GLUCOSE SERPL-MCNC: 90 MG/DL (ref 65–99)
POTASSIUM SERPL-SCNC: 4.2 MMOL/L (ref 3.5–5.2)
SODIUM SERPL-SCNC: 141 MMOL/L (ref 134–144)
T4 FREE SERPL-MCNC: 0.94 NG/DL (ref 0.82–1.77)

## 2019-02-22 NOTE — PROGRESS NOTES
Care Manager contacted the patient by telephone in follow up. Verified  and zip code with patient as identifiers. 53 yo male with diagnosis of pituitary macroadenoma one week postop CT guided Transphenoidal removal of pituitary tumor;    Preop:  GH level:   0.3ng/dl;  ACTH:             2.2pg/dl; low  Prolactin        13.3ng/ml; Cortisol          0.6ug/dl;  TSH                2.61uIU/ml;    Assessment of clinical changes and knowledge demonstrated since last call:     Ongoing Plan of Care:   1. Acute pain, headache;  ? SHA much improved postop;  ? Review of red flags/changes and to notify his physician;  ? Verbalized understanding of when to notify of red flags;  ? Denies nausea or worsening pain;     2.  Altered vision with risk for falls/dizziness;   ? States double vision has resolved postop;    ? States some blurry vision, intermittently;  ? Does not interfere with his mobility;  ? \"My wife is an RN, and she is watching over me\";  ? Reviewed DC instructions and answered questions;    3.  Risk for infection  ? Incision site, transphenoidal, intact and no external drainage;  ? States no signs of redness, swelling or increased pain at incision site;    ? States meds have controlled mucous production postop pretty well;  ? States occasional cough;  ? Continues on inhalers and tessalon as prescribed;    Goals        Post Hospitalization     Demonstrates no signs of increased Intracranial Pressure ICP;         Understands red flags post discharge. Any recurrence Red Flags or continued symptoms: None    Medication Regimen Change:  Tylenol, Tessalon, Hydrocortisone, Fiorcet; Completed a review of medications with patient, who verbalized understanding of how and when to take medications. Barriers / Adherence with medications:  No problems identified;    Upcoming Appointments:   Dr. Delaney Jones daniel on 19; Completed initial surgical postop FU this week;        Completed initial Endocrinology appt yesterday;     Patient asked questions appropriately and denied any additional needs at this time. Patient verbalized understanding of all information discussed. Patient has my name and contact information for any follow up needs or questions.      Plan next call:  Continue to assess for red flags, support with education on new medication as needed;

## 2019-02-23 ENCOUNTER — TELEPHONE (OUTPATIENT)
Dept: ENDOCRINOLOGY | Age: 54
End: 2019-02-23

## 2019-02-23 NOTE — TELEPHONE ENCOUNTER
Please call Dr. Pat Rankin office to confirm receipt of my office note that was electronically faxed. If not received, please manually fax.

## 2019-02-26 NOTE — TELEPHONE ENCOUNTER
Spoke to Kristina Lopez in Dr. Jas Nicole office. She confirmed receipt of the office note that was faxed.

## 2019-03-04 RX ORDER — DICYCLOMINE HYDROCHLORIDE 10 MG/1
CAPSULE ORAL
Qty: 160 CAP | Refills: 0 | Status: SHIPPED | OUTPATIENT
Start: 2019-03-04 | End: 2019-04-08 | Stop reason: SDUPTHER

## 2019-03-05 ENCOUNTER — PATIENT OUTREACH (OUTPATIENT)
Dept: OTHER | Age: 54
End: 2019-03-05

## 2019-03-05 NOTE — PROGRESS NOTES
Care Manager contacted the patient by telephone in follow up. Verified  and zip code with patient as identifiers. 51 yo male almost one month postop Transphenoidal removal of pituitary  macroadenoma; States some of the tumor remnants left behind, like nbeside a main artery and another behind the gland which was difficult to see;  States surgeon feels remnant's blood supply should be cutoff and die off; However, will continue to monitor ongoing to ensure; Assessment of clinical changes and knowledge demonstrated since last call:  Ongoing Plan of Care: 1.   Acute pain, headache;  ? States occasional HA, but not like it was preop;  ? Still difficult to lay down at night;  ? States he continues to sleep sitting up;  ? States neurosurgeon said may be another 1-2 mos resolve;     2.  Altered vision with risk for falls/dizziness;   ? States double vision improved 95%;    ? States distance vision with occasional blurriness, but rare;     3.  Risk for infection  ? Incision site, transphenoidal, intact, no drainage;  ? States surgeon said It healed perfectly;      Goals    Goal Met    Demonstrates no signs of increased Intracranial Pressure ICP; Understands red flags post discharge. Any recurrence Red Flags or continued symptoms: None;    Medication Regimen Change:   No change; Thyroid levels remain low normal, did not start Levothyroxine;    Continues with daily Hydrocortisone; Completed a review of medications with patient, who verbalized understanding of how and when to take medications. Barriers / Adherence with medications:  None;    Upcoming Appointments:     Completed surgical and endocrine initial FU appts; Next Endocrinology FU appt 19;    Patient asked questions appropriately and denied any additional needs at this time. Patient verbalized understanding of all information discussed. Patient has my name and contact information for any follow up needs or questions.  Denies FU questions at this time;    Plan next call:    If no new CM needs identified, will resolve episode on next scheduled FU as goals have been met;

## 2019-03-20 ENCOUNTER — PATIENT OUTREACH (OUTPATIENT)
Dept: OTHER | Age: 54
End: 2019-03-20

## 2019-03-20 NOTE — PROGRESS NOTES
Care Manager contacted the patient by telephone in follow up. Verified  and zip code with patient as identifiers. 51 yo male almost 6 weeks postop Transphenoidal removal of pituitary  macroadenoma;      Assessment of clinical changes and knowledge demonstrated since last call:  Ongoing Plan of Care: 1.   Acute pain, headache;  ? States headaches much improved;  ? When has HA, controlled with Tylenol;   ? Demonstrates no signs of ICP during postop period;     2.  Altered vision with risk for falls/dizziness;   ? States improvement in vision, rare blurred vision at times;     3.  Risk for infection  ? Incision site well healed;  ? No signs infection;     Goal:  Goal met:  Understands red flags post discharge, no recurrence; Any recurrence Red Flags or continued symptoms:   None;     Medication Regimen Change:   No change;                     Completed a review of medications with patient, who verbalized understanding of how and when to take medications.       Barriers / Adherence with medications:   None;     Upcoming Appointments:  Completed surgical and endocrine initial FU appts; Next Endocrinology FU appt 19;     Patient asked questions appropriately and denied any additional needs at this time. Patient verbalized understanding of all information discussed. Patient has my name and contact information for any follow up needs or questions.  Denies FU questions at this time;     Plan next call:   Ready for graduation, goals met;

## 2019-03-27 DIAGNOSIS — K21.9 GASTROESOPHAGEAL REFLUX DISEASE WITHOUT ESOPHAGITIS: ICD-10-CM

## 2019-03-27 DIAGNOSIS — R52 PAIN: ICD-10-CM

## 2019-03-27 RX ORDER — DULOXETIN HYDROCHLORIDE 30 MG/1
CAPSULE, DELAYED RELEASE ORAL
Qty: 90 CAP | Refills: 1 | Status: SHIPPED | OUTPATIENT
Start: 2019-03-27 | End: 2019-08-31 | Stop reason: SDUPTHER

## 2019-03-27 RX ORDER — OMEPRAZOLE 20 MG/1
CAPSULE, DELAYED RELEASE ORAL
Qty: 90 CAP | Refills: 1 | Status: SHIPPED | OUTPATIENT
Start: 2019-03-27 | End: 2019-08-31 | Stop reason: SDUPTHER

## 2019-03-27 RX ORDER — MONTELUKAST SODIUM 10 MG/1
TABLET ORAL
Qty: 90 TAB | Refills: 1 | Status: SHIPPED | OUTPATIENT
Start: 2019-03-27 | End: 2019-10-28 | Stop reason: SDUPTHER

## 2019-04-04 ENCOUNTER — PATIENT OUTREACH (OUTPATIENT)
Dept: OTHER | Age: 54
End: 2019-04-04

## 2019-04-04 NOTE — PROGRESS NOTES
Care Manager contacted the patient by telephone in follow up. Verified  and zip code with patient as identifiers. Goals met at last call, states no recurrence of red flags since last call; Resolving current episode of case management. Patient has met patient stated and/or medical goals. Patient consistenly demonstrates understanding of the medical action plan through execution of plan. Appointments with key providers are scheduled and attended. Plan of care is modified and updated to address new challenges and barriers with minimal support from the CM team(proactively uses physicians and community resources) The support system remains current and has been modified as needed. Patient continues to acquire needed resources from the current support system established. Teach back demonstrates that patient understands education for self management of chronic conditions. Patient consistenly communicates understanding of signs,symptoms and complications for all major diagnoses. Patient modifies his/her lifestyle toreduce or avoid risk factors to his/her health. ECM will follow as needed and patient has ECM contact information for future needs.

## 2019-04-08 RX ORDER — DICYCLOMINE HYDROCHLORIDE 10 MG/1
CAPSULE ORAL
Qty: 160 CAP | Refills: 0 | Status: SHIPPED | OUTPATIENT
Start: 2019-04-08 | End: 2019-10-02 | Stop reason: SDUPTHER

## 2019-05-04 RX ORDER — ROSUVASTATIN CALCIUM 10 MG/1
TABLET, COATED ORAL
Qty: 90 TAB | Refills: 1 | Status: SHIPPED | OUTPATIENT
Start: 2019-05-04 | End: 2019-10-28 | Stop reason: SDUPTHER

## 2019-05-10 LAB
BUN SERPL-MCNC: 27 MG/DL (ref 6–24)
BUN/CREAT SERPL: 26 (ref 9–20)
CALCIUM SERPL-MCNC: 9.5 MG/DL (ref 8.7–10.2)
CHLORIDE SERPL-SCNC: 104 MMOL/L (ref 96–106)
CO2 SERPL-SCNC: 23 MMOL/L (ref 20–29)
CREAT SERPL-MCNC: 1.04 MG/DL (ref 0.76–1.27)
GLUCOSE SERPL-MCNC: 92 MG/DL (ref 65–99)
IGF-I SERPL-MCNC: 209 NG/ML (ref 61–200)
POTASSIUM SERPL-SCNC: 3.9 MMOL/L (ref 3.5–5.2)
SODIUM SERPL-SCNC: 144 MMOL/L (ref 134–144)
T4 FREE SERPL-MCNC: 0.98 NG/DL (ref 0.82–1.77)
TESTOST SERPL-MCNC: 104 NG/DL (ref 264–916)

## 2019-05-14 ENCOUNTER — TELEPHONE (OUTPATIENT)
Dept: ENDOCRINOLOGY | Age: 54
End: 2019-05-14

## 2019-05-14 RX ORDER — HYDROCORTISONE 10 MG/1
TABLET ORAL
Qty: 450 TAB | Refills: 3 | Status: SHIPPED | OUTPATIENT
Start: 2019-05-14 | End: 2019-05-21

## 2019-05-14 NOTE — TELEPHONE ENCOUNTER
Sent him the following message through [employer]:    I'll be honest that 50 mg per day is a large dose of hydrocortisone but I'll write for this so he doesn't run out but we'll work on a plan to slowly taper him down over time. I sent this to the pharmacy now. Take care.

## 2019-05-14 NOTE — TELEPHONE ENCOUNTER
Regarding: RE: Prescription Question  Contact: [phone]  ----- Message from [address] [place] [address] [address], Generic sent at 5/14/2019  9:32 AM EDT -----    He is doing 30 mg in am and 20 mg in early afternoon. He says that works for him and honestly he is acting like a new person, which is wonderful. Thank you Wilmer Ojeda. Katja Jose Elias   ----- Message -----  From: Sam Scott MD  Sent: 5/13/2019  1:40 PM EDT  To: Shefali Smith. Subject: RE: Prescription Question  So what dose is he currently doing so I can write the correct amount for the pharmacy? ?    ----- Message -----     From: Shefali Smith. Sent: 5/13/2019 12:06 PM EDT       To: Sam Scott MD  Subject: Prescription Question    Dr. Wilmer Suarez, this is Scott Ellison writing for Harry Garcia. We needed to see if we could get another Rx for the Hydrocortisone sent. I had messaged you about adjusting the dosage before. He did the 3 in am and he did 2 in the afternoon, even though I told him 1.5, for a week. He went back down for about 3 days before he went back up because he said it made him too tired. So now he only has 3 days worth left with 2 weeks before his refill is due. He does have an appt with you on the 21st and his labs are done. Thank you.   Scott Ellison

## 2019-05-21 ENCOUNTER — OFFICE VISIT (OUTPATIENT)
Dept: ENDOCRINOLOGY | Age: 54
End: 2019-05-21

## 2019-05-21 VITALS
HEART RATE: 102 BPM | DIASTOLIC BLOOD PRESSURE: 86 MMHG | WEIGHT: 216.2 LBS | HEIGHT: 68 IN | BODY MASS INDEX: 32.77 KG/M2 | SYSTOLIC BLOOD PRESSURE: 151 MMHG

## 2019-05-21 DIAGNOSIS — D35.2 BENIGN TUMOR OF PITUITARY GLAND (HCC): Primary | ICD-10-CM

## 2019-05-21 DIAGNOSIS — E27.49 SECONDARY ADRENAL INSUFFICIENCY (HCC): ICD-10-CM

## 2019-05-21 DIAGNOSIS — E29.1 SECONDARY MALE HYPOGONADISM: ICD-10-CM

## 2019-05-21 PROBLEM — R03.0 ELEVATED BLOOD PRESSURE READING WITHOUT DIAGNOSIS OF HYPERTENSION: Status: ACTIVE | Noted: 2019-05-21

## 2019-05-21 RX ORDER — HYDROCORTISONE 10 MG/1
TABLET ORAL
Qty: 450 TAB | Refills: 3
Start: 2019-05-21 | End: 2019-11-21

## 2019-05-21 NOTE — LETTER
10/30/2019 9:08 AM 
 
[address] [phone] Please go to [facility] and have your labs repeated sometime in the 1-2 weeks prior to your upcoming visit with me. Chinmayos Patience to allow at least 2 days at the minimum to ensure I get the results in time for your visit. Gen Mecosta order is already in their system and be sure to ask to have labs drawn that are under Dr. Diana Conley name. Santos Gar you have the actual lab order that I may have given you (check your glove compartment or other safe spot where you may keep your medical papers), please bring this to the lab just to be safe in case [facility]'s computer system is down. Corazon Tang will review the results at your follow up visit. Please fast for your labs.  Don't eat anything after midnight.   
 
 
 
 
Sincerely, 
 
 
Kvng Arana MD

## 2019-05-21 NOTE — PROGRESS NOTES
Chief Complaint   Patient presents with    Other     pituitary f/u     History of Present Illness: Kim Seip. is a 52 y.o. male here for follow up of pituitary. Weight up 13 lbs since last visit in 2/19. His wife and I were in touch in March when he develop the flu and I recommended increasing his dose to 40 mg in the morning and 20 mg in the afternoon and then he decreased his dose back to 20 mg in the morning and 10 mg in the afternoon but started feeling more tired on this dose and subsequently went up to 30 mg in the morning and 20 mg in the afternoon if he takes his dose closer to noon. If later in the afternoon may take only 10 mg or sometimes 5 mg if closer to 3pm to avoid insomnia. On this higher dose has been able to have more energy to get through the day. No muscle pains or weakness. No increase in thinning of skin or bruising. Has had increased hunger and thirst on the higher dose. Did have a headache yesterday that he attributed to not eating but overall headaches have been much better since the surgery. Vision has been doing well aside from up close. Some trouble with balance and did have a fall out of his boat recently but didn't hurt himself. No loss of facial or body hair. No nipple tenderness or chest wall pain. No decrease in sex drive or function. Overall energy has been better on the higher dose of hydrocortisone. Did have a medic alert bracelet made and is wearing it today. Has been working 2 weeks of nights and then 2 weeks of nights. Due for repeat MRI on 6/3/19 and f/u with Dr. Jacobo Yoder later in June.     Current Outpatient Medications   Medication Sig    hydrocortisone (CORTEF) 10 mg tablet Take 3 tabs before breakfast and 2 tabs at lunch--New higher dose replaces prior script on file    rosuvastatin (CRESTOR) 10 mg tablet TAKE 1 TABLET BY MOUTH EVERY DAY AT NIGHT    dicyclomine (BENTYL) 10 mg capsule TAKE 1 CAPSULE 3 TIMES A DAY AS NEEDED FOR ABDOMINAL CRAMPING  montelukast (SINGULAIR) 10 mg tablet TAKE 1 TABLET BY MOUTH EVERY DAY FOR ALLERGIES    DULoxetine (CYMBALTA) 30 mg capsule TAKE ONE CAPSULE BY MOUTH EVERY DAY FOR BACK PAIN    omeprazole (PRILOSEC) 20 mg capsule TAKE 1 CAPSULE BY MOUTH EVERY DAY    acetaminophen (TYLENOL) 325 mg tablet Take 2 Tabs by mouth every six (6) hours as needed.  butalbital-acetaminophen-caffeine (FIORICET, ESGIC) -40 mg per tablet Take 1 Tab by mouth every four (4) hours as needed for Headache.  SUMAtriptan (IMITREX) 100 mg tablet Take 100 mg by mouth once as needed for Migraine.  albuterol (PROVENTIL HFA, VENTOLIN HFA, PROAIR HFA) 90 mcg/actuation inhaler Take 2 Puffs by inhalation every four (4) hours as needed for Wheezing. (Patient taking differently: Take 2 Puffs by inhalation every four (4) hours as needed for Wheezing. GETS COUPLE OF BRONCHITIS EVERY YEAR)    albuterol (PROVENTIL VENTOLIN) 2.5 mg /3 mL (0.083 %) nebulizer solution 3 mL by Nebulization route every four (4) hours as needed for Wheezing. (Patient taking differently: 2.5 mg by Nebulization route every four (4) hours as needed for Wheezing. GETS COUPLE OF BRONCHITIS EVERY YEAR)    albuterol-ipratropium (DUO-NEB) 2.5 mg-0.5 mg/3 ml nebu 3 mL by Nebulization route every six (6) hours as needed. (Patient taking differently: 3 mL by Nebulization route every six (6) hours as needed. GETS COUPLE OF BRONCHITIS EVERY YEAR)    SIMETHICONE Take 120 mg by mouth as needed. No current facility-administered medications for this visit. Allergies   Allergen Reactions    Bactrim [Sulfamethoprim Ds] Rash    Lipitor [Atorvastatin] Myalgia     Review of Systems:  - Cardiovascular: no chest pain  - Neurological: no tremors  - Integumentary: skin is normal    Physical Examination:  Blood pressure 151/86, pulse (!) 102, height 5' 8\" (1.727 m), weight 216 lb 3.2 oz (98.1 kg).   - General: pleasant, no distress, good eye contact   - Neck: no carotid bruits  - Cardiovascular: regular, normal rate, nl s1 and s2, no m/r/g   - Respiratory: clear to auscultation bilaterally   - Integumentary: skin is normal, no edema  - Neurological: reflexes 2+ at biceps, no tremors  - Psychiatric: normal mood and affect    Data Reviewed:   Component      Latest Ref Rng & Units 5/9/2019 5/9/2019 5/9/2019 5/9/2019           8:54 AM  8:54 AM  8:54 AM  8:54 AM   Glucose      65 - 99 mg/dL    92   BUN      6 - 24 mg/dL    27 (H)   Creatinine      0.76 - 1.27 mg/dL    1.04   GFR est non-AA      >59 mL/min/1.73    80   GFR est AA      >59 mL/min/1.73    93   BUN/Creatinine ratio      9 - 20    26 (H)   Sodium      134 - 144 mmol/L    144   Potassium      3.5 - 5.2 mmol/L    3.9   Chloride      96 - 106 mmol/L    104   CO2      20 - 29 mmol/L    23   Calcium      8.7 - 10.2 mg/dL    9.5   Testosterone      264 - 916 ng/dL  104 (L)     T4, Free      0.82 - 1.77 ng/dL   0.98    Insulin-Like Growth Factor I      61 - 200 ng/mL 209 (H)          Assessment/Plan:     1. Benign tumor of pituitary gland [facility]): s/p transphenoidal resection of a 2.5 cm nonfunctioning pituitary tumor on 2/12/19 by Dr. Warner Bautista. 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 He is due for f/u MRI on 6/3/19. His free T4 was 0.94 in 2/19 and 0.98 in 5/19 so doesn't appear to have secondary hypothyroidism. His IGF-1 was 209 in 5/19 and I was checking to see if this would be low so I'm not too concerned and will follow for now. Doesn't have evidence of DI and Na is normal.   - check free T4 and bmp and IGF-1 prior to next visit  - repeat MRI on 6/3/19  - f/u with Dr. Warner Bautista in June 2. Secondary adrenal insufficiency ([facility] [address]): was found to have a cortisol of 0.6 and inappropriately low ACTH of 2.2.   He was started on hydrocortisone 20/10 and was feeling better with starting this but got the flu in 3/19 and needed an increase to 40/20 and then when I tapered back to 20/10, he felt worse and has been taking 30/20. This is more than I want him to be on and gave him a very slow taper by 5 mg every 3 weeks as below to get back to 15/10/5 as a maintenance dose. I previously explained the importance of giving stress dose steroids in the future if he needs surgery or has an infection and is wearing a medic alert bracelet at this time. - taper hydrocortisone as below back to 15/10/5  - wear medic alert bracelet or necklace     3. Secondary male hypogonadism: his testosterone was low at 228 in 2/19 and lower at 104 in 5/19 but he does not have many hypogonadal symptoms so will hold on treatment for now and follow his level over time. - repeat total testosterone prior to next visit        4. Elevated BP without diagnosis of Hypertension: higher dose of HC may be causing this. - monitor home blood pressure readings  - cut back on [facility]    We spent 40 minutes of face to face time together and > 50% of the time was spent in counseling regarding management of all the conditions above. Patient Instructions   1) Taper down your hydrocortisone slowly as follows:  - take 3 tabs in the morning, 1 tab at lunch, and 1/2 tab at dinner = 45 mg for 3 weeks until June 11th, then  - take 2.5 tabs in the morning, 1 tab at lunch and 1/2 tab at dinner = 40 mg for 3 weeks until July 2nd, then  - take 2 tabs in the morning, 1 tab at lunch and 1/2 tab at dinner = 35 mg for 3 weeks until July 23rd, then  - take 1.5 tabs in the morning, 1 tab at lunch and 1/2 tab dinner = 30 mg as a maintenance dose. If you have more fatigue or dizziness or nausea or abdominal pain, then go back to the dose you were on before for 2 more weeks and then try to taper again. 2) Your testosterone was lower but it's possible this went down due to 13 lb wt gain and hopefully with less hydrocortisone your weight will improve as you are less hungry.   Also try to cut back on the regular [facility] to no more than 2 regular sodas per week. 3) Your free T4 (thyroid test) remains normal and your sugar is normal and your sodium and potassium are normal.    4) Your IGF-1 (growth hormone) was slightly high but I was checking to see if this would be low so we'll follow this level. 5) Start monitoring blood pressure about 2-3 times per week at alternating times either in the morning or evening after resting for 5 minutes and sitting upright in a chair with your arm at heart level. Please let me know if you are having readings over 140 on the top number or 90 on the bottom number after a month of starting to taper the hydrocortisone. Follow-up and Dispositions    · Return in about 6 months (around 11/21/2019). Copy sent to:   Joanna Dorantes NP as PCP - General (Nurse Practitioner)  Noel Cedillo MD as Consulting Provider (Neurosurgery)

## 2019-05-21 NOTE — PATIENT INSTRUCTIONS
1) Taper down your hydrocortisone slowly as follows:  - take 3 tabs in the morning, 1 tab at lunch, and 1/2 tab at dinner = 45 mg for 3 weeks until June 11th, then  - take 2.5 tabs in the morning, 1 tab at lunch and 1/2 tab at dinner = 40 mg for 3 weeks until July 2nd, then  - take 2 tabs in the morning, 1 tab at lunch and 1/2 tab at dinner = 35 mg for 3 weeks until July 23rd, then  - take 1.5 tabs in the morning, 1 tab at lunch and 1/2 tab dinner = 30 mg as a maintenance dose. If you have more fatigue or dizziness or nausea or abdominal pain, then go back to the dose you were on before for 2 more weeks and then try to taper again. 2) Your testosterone was lower but it's possible this went down due to 13 lb wt gain and hopefully with less hydrocortisone your weight will improve as you are less hungry. Also try to cut back on the regular [facility] to no more than 2 regular sodas per week. 3) Your free T4 (thyroid test) remains normal and your sugar is normal and your sodium and potassium are normal.    4) Your IGF-1 (growth hormone) was slightly high but I was checking to see if this would be low so we'll follow this level. 5) Start monitoring blood pressure about 2-3 times per week at alternating times either in the morning or evening after resting for 5 minutes and sitting upright in a chair with your arm at heart level. Please let me know if you are having readings over 140 on the top number or 90 on the bottom number after a month of starting to taper the hydrocortisone.

## 2019-06-03 ENCOUNTER — HOSPITAL ENCOUNTER (OUTPATIENT)
Dept: MRI IMAGING | Age: 54
Discharge: HOME | End: 2019-06-03
Attending: NEUROLOGICAL SURGERY
Payer: COMMERCIAL

## 2019-06-03 DIAGNOSIS — D49.7 PITUITARY TUMOR: ICD-10-CM

## 2019-06-03 PROCEDURE — 74011250636 HC RX REV CODE- 250/636: Performed by: NEUROLOGICAL SURGERY

## 2019-06-03 PROCEDURE — 70553 MRI BRAIN STEM W/O & W/DYE: CPT

## 2019-06-03 PROCEDURE — A9575 INJ GADOTERATE MEGLUMI 0.1ML: HCPCS | Performed by: NEUROLOGICAL SURGERY

## 2019-06-03 RX ORDER — GADOTERATE MEGLUMINE 376.9 MG/ML
20 INJECTION INTRAVENOUS
Status: COMPLETED | OUTPATIENT
Start: 2019-06-03 | End: 2019-06-03

## 2019-06-03 RX ADMIN — GADOTERATE MEGLUMINE 20 ML: 376.9 INJECTION INTRAVENOUS at 09:06

## 2019-06-24 ENCOUNTER — HOSPITAL ENCOUNTER (OUTPATIENT)
Dept: MRI IMAGING | Age: 54
Discharge: HOME | End: 2019-06-24
Attending: ORTHOPAEDIC SURGERY
Payer: COMMERCIAL

## 2019-06-24 DIAGNOSIS — M75.121 COMPLETE TEAR OF RIGHT ROTATOR CUFF: ICD-10-CM

## 2019-06-24 PROCEDURE — 73221 MRI JOINT UPR EXTREM W/O DYE: CPT

## 2019-07-29 ENCOUNTER — OFFICE VISIT (OUTPATIENT)
Dept: FAMILY MEDICINE CLINIC | Age: 54
End: 2019-07-29

## 2019-07-29 VITALS
BODY MASS INDEX: 31.76 KG/M2 | OXYGEN SATURATION: 96 % | RESPIRATION RATE: 20 BRPM | WEIGHT: 209.56 LBS | DIASTOLIC BLOOD PRESSURE: 81 MMHG | HEART RATE: 87 BPM | HEIGHT: 68 IN | TEMPERATURE: 98.2 F | SYSTOLIC BLOOD PRESSURE: 132 MMHG

## 2019-07-29 DIAGNOSIS — D35.2 PITUITARY ADENOMA (HCC): ICD-10-CM

## 2019-07-29 DIAGNOSIS — M54.50 CHRONIC BILATERAL LOW BACK PAIN WITHOUT SCIATICA: ICD-10-CM

## 2019-07-29 DIAGNOSIS — L30.9 DERMATITIS: Primary | ICD-10-CM

## 2019-07-29 DIAGNOSIS — G89.29 CHRONIC BILATERAL LOW BACK PAIN WITHOUT SCIATICA: ICD-10-CM

## 2019-07-29 DIAGNOSIS — E27.49 SECONDARY ADRENAL INSUFFICIENCY (HCC): ICD-10-CM

## 2019-07-29 RX ORDER — CYCLOBENZAPRINE HCL 10 MG
10 TABLET ORAL
Qty: 30 TAB | Refills: 2 | Status: SHIPPED | OUTPATIENT
Start: 2019-07-29 | End: 2019-12-12 | Stop reason: SDUPTHER

## 2019-07-29 RX ORDER — CEPHALEXIN 500 MG/1
1000 CAPSULE ORAL 2 TIMES DAILY
Qty: 40 CAP | Refills: 0 | Status: SHIPPED | OUTPATIENT
Start: 2019-07-29 | End: 2019-08-08

## 2019-07-29 NOTE — PROGRESS NOTES
1. Have you been to the ER, urgent care clinic since your last visit? Hospitalized since your last visit? No.     2. Have you seen or consulted any other health care providers outside of the [address] [place] [address] since your last visit? Include any pap smears or colon screening.  No   Chief Complaint   Patient presents with    Lesion     all over    Medication Refill     muscle relaxer

## 2019-07-29 NOTE — PATIENT INSTRUCTIONS

## 2019-08-11 NOTE — PROGRESS NOTES
iLz Hollins. is a 52 y.o. male who presents with the following complaints:  Chief Complaint   Patient presents with    Lesion     all over    Medication Refill     muscle relaxer       Subjective:    HPI:   Presents for evaluation of rash, and follow up of low back pain, pituitary adenoma and adrenal insufficiency. C/o reddened rash on arms, legs, chest, and abdomen. Has had similar symptoms in the past which have responded to antibiotics. He has scratched and picked at the lesions. Rash present for a few weeks. C/o chronic low back pain with intermittent spasms. Requesting refill on cyclobenzaprine. Has appt to establish care at [facility] specialty clinic at [facility] later this week. 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 BORN WITH IT. MAKES HIM HAVE BLISTERS ON HANDS. LIVER OVERPRODUCED RED BLOOD CELLS--TX IS PHLEBOTOMY AS NEEDED AND HAS HAD THIS 3 TIMES SO FAR    Secondary adrenal insufficiency ([address] 2/21/2019     Past Surgical History:   Procedure Laterality Date    HX GI      COLONOSCOPY    HX HEART CATHETERIZATION  2013    minimal disease per cardio notes.  HX HEART CATHETERIZATION Left     minimal luminal narrowing; no obstructive disease    HX HEENT      SINUS SURGERY    HX HEENT  02/2019    Transphenoidal pituitary surgery     HX ORTHOPAEDIC Bilateral     bilateral thumb surgery    HX ORTHOPAEDIC Right ~2013    elbow surgery    HX OTHER SURGICAL      REMOVAL BCCA ON [address].  SIDE OF NECK AND BRIDGE OF NOSE    HX ROTATOR CUFF REPAIR Right     HX SEPTOPLASTY  2002    HX SHOULDER ARTHROSCOPY Left ~2013    ID PROSTATE BIOPSY, NEEDLE, SATURATION SAMPLING       Family History   Problem Relation Age of Onset    Alcohol abuse Brother     Heart Attack Brother     Diabetes Mother     Hypertension Mother     Cancer Mother         lung    Cancer Father         PROSTATE    Arthritis-osteo Sister     Anesth Problems Neg Hx     Other Neg Hx         pituitary disease     Social History     Socioeconomic History    Marital status:      Spouse name: Not on file    Number of children: Not on file    Years of education: Not on file    Highest education level: Not on file   Tobacco Use    Smoking status: Never Smoker    Smokeless tobacco: Never Used   Substance and Sexual Activity    Alcohol use: Yes     Comment: 2-3 beers once a week    Drug use: Yes     Comment: occasional marijuana (not weekly), cocaine use in 20's     Sexual activity: Yes     Partners: Female   Other Topics Concern   Social History Narrative    Lives in [place] with wife. No kids. Works as a  for [employer]. Likes to fish. Advanced Directives: N      Patient Active Problem List    Diagnosis    Elevated blood pressure reading without diagnosis of hypertension    Secondary adrenal insufficiency (HCC)    Pituitary tumor    [facility]uitary adenoma ([facility] [address]    Complete tear of right rotator cuff    Sinusitis    Hypercholesterolemia without hypertriglyceridemia    H/O cardiac catheterization     3/2013 with EF 60%, no significant CAD noted.       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[address]    Actinic keratosis due to exposure to sunlight    IBS (irritable bowel syndrome)    Statin intolerance    High triglycerides    ACS (acute coronary syndrome) ([address]    Hyperlipidemia       Nurse notes were reviewed and are correct  Review of Systems - negative except as listed above in the HPI    Objective:     Vitals:    07/29/19 1608   BP: 132/81 Pulse: 87   Resp: 20   Temp: 98.2 °F (36.8 °C)   TempSrc: Oral   SpO2: 96%   Weight: 209 lb 9 oz (95.1 kg)   Height: 5' 8\" (1.727 m)     Physical Examination: General appearance - alert, well appearing, and in no distress, oriented to person, place, and time and well hydrated  Mental status - normal mood, behavior, speech, dress, motor activity, and thought processes  Eyes - sclera anicteric  Neck - supple, no significant adenopathy  Chest - clear to auscultation, no wheezes, rales or rhonchi, symmetric air entry  Heart - normal rate, regular rhythm, normal S1, S2, no murmurs, rubs, clicks or gallops, normal bilateral carotid upstroke without bruits, no JVD  Abdomen - soft, nontender, nondistended, no masses or organomegaly  bowel sounds normal  Neurological - alert, oriented, normal speech, no focal findings or movement disorder noted  Extremities - no pedal edema noted  Skin - normal coloration and turgor  Erythematous maculopapular rash, widespread on upper and lower extremities, and hemorrhagic and michelle crusting of some lesions    Assessment/ Plan:   Diagnoses and all orders for this visit:    1. Dermatitis  Add rx  Hydrocortisone ream prn  -     cephALEXin (KEFLEX) 500 mg capsule; Take 2 Caps by mouth two (2) times a day for 10 days. 2. Pituitary adenoma ([address]  3. Secondary adrenal insufficiency ([address]  stable  F/u with specialist as planned    4. Chronic bilateral low back pain without sciatica  Chronic problem  Refill rx  Recommend core-strengthening exercises  -     cyclobenzaprine (FLEXERIL) 10 mg tablet; Take 1 Tab by mouth nightly as needed for Muscle Spasm(s). I have discussed the diagnosis with the patient and the intended plan as seen in the above orders. The patient has received an after-visit summary and questions were answered concerning future plans. The patient verbalizes understanding.     Medication Side Effects and Warnings were discussed with patient: yes  Patient Labs were reviewed and or requested: yes  Patient Past Records were reviewed and or requested: yes    Patient Instructions          Dermatitis: Care Instructions  Your Care Instructions  Dermatitis is the general name used for any rash or inflammation of the skin. Different kinds of dermatitis cause different kinds of rashes. Common causes of a rash include new medicines, plants (such as poison oak or poison ivy), heat, and stress. Certain illnesses can also cause a rash. An allergic reaction to something that touches your skin, such as latex, nickel, or poison ivy, is called contact dermatitis. Contact dermatitis may also be caused by something that irritates the skin, such as bleach, a chemical, or soap. These types of rashes cannot be spread from person to person. How long your rash will last depends on what caused it. Rashes may last a few days or months. Follow-up care is a key part of your treatment and safety. Be sure to make and go to all appointments, and call your doctor if you are having problems. It's also a good idea to know your test results and keep a list of the medicines you take. How can you care for yourself at home? · Do not scratch the rash. Cut your nails short, and file them smooth. Or wear gloves if this helps keep you from scratching. · Wash the area with water only. Pat dry. · Put cold, wet cloths on the rash to reduce itching. · Keep cool, and stay out of the sun. · Leave the rash open to the air as much as possible. · If the rash itches, use hydrocortisone cream. Follow the directions on the label. Calamine lotion may help for plant rashes. · Take an over-the-counter antihistamine, such as diphenhydramine (Benadryl) or loratadine (Claritin), to help calm the itching. Read and follow all instructions on the label. · If your doctor prescribed a cream, use it as directed. If your doctor prescribed medicine, take it exactly as directed. When should you call for help?   Call your doctor now or seek immediate medical care if:    · You have symptoms of infection, such as:  ? Increased pain, swelling, warmth, or redness. ? Red streaks leading from the area. ? Pus draining from the area. ? A fever.     · You have joint pain along with the rash.    Watch closely for changes in your health, and be sure to contact your doctor if:    · Your rash is changing or getting worse.     · You are not getting better as expected. Where can you learn more? Go to http://marie-coleman.info/. Enter (54) 0618 1211 in the search box to learn more about \"Dermatitis: Care Instructions. \"  Current as of: April 1, 2019  Content Version: 12.1  © 3582-6031 [employer]. Care instructions adapted under license by [employer] (which disclaims liability or warranty for this information). If you have questions about a medical condition or this instruction, always ask your healthcare professional. [address] any warranty or liability for your use of this information.             Eva Keenan [facility]

## 2019-08-31 DIAGNOSIS — K21.9 GASTROESOPHAGEAL REFLUX DISEASE WITHOUT ESOPHAGITIS: ICD-10-CM

## 2019-08-31 DIAGNOSIS — R52 PAIN: ICD-10-CM

## 2019-09-02 RX ORDER — DULOXETIN HYDROCHLORIDE 30 MG/1
CAPSULE, DELAYED RELEASE ORAL
Qty: 90 CAP | Refills: 0 | Status: SHIPPED | OUTPATIENT
Start: 2019-09-02 | End: 2019-11-27 | Stop reason: SDUPTHER

## 2019-09-02 RX ORDER — OMEPRAZOLE 20 MG/1
CAPSULE, DELAYED RELEASE ORAL
Qty: 90 CAP | Refills: 0 | Status: SHIPPED | OUTPATIENT
Start: 2019-09-02 | End: 2019-11-30 | Stop reason: SDUPTHER

## 2019-10-04 RX ORDER — DICYCLOMINE HYDROCHLORIDE 10 MG/1
CAPSULE ORAL
Qty: 160 CAP | Refills: 0 | Status: SHIPPED | OUTPATIENT
Start: 2019-10-04 | End: 2019-10-30 | Stop reason: SDUPTHER

## 2019-10-29 RX ORDER — ROSUVASTATIN CALCIUM 10 MG/1
TABLET, COATED ORAL
Qty: 90 TAB | Refills: 0 | Status: SHIPPED | OUTPATIENT
Start: 2019-10-29 | End: 2020-11-03 | Stop reason: SDUPTHER

## 2019-10-29 RX ORDER — MONTELUKAST SODIUM 10 MG/1
TABLET ORAL
Qty: 90 TAB | Refills: 0 | Status: SHIPPED | OUTPATIENT
Start: 2019-10-29 | End: 2020-11-03 | Stop reason: SDUPTHER

## 2019-10-31 RX ORDER — DICYCLOMINE HYDROCHLORIDE 10 MG/1
CAPSULE ORAL
Qty: 160 CAP | Refills: 0 | Status: SHIPPED | OUTPATIENT
Start: 2019-10-31 | End: 2019-12-02 | Stop reason: SDUPTHER

## 2019-11-11 ENCOUNTER — PATIENT OUTREACH (OUTPATIENT)
Dept: OTHER | Age: 54
End: 2019-11-11

## 2019-11-12 ENCOUNTER — PATIENT OUTREACH (OUTPATIENT)
Dept: OTHER | Age: 54
End: 2019-11-12

## 2019-11-12 NOTE — PROGRESS NOTES
Patient eligible for [facility] Employee care management. Received notification of hospital stay for Endoscopic endonasal redo transsphenoidal resection of pituitary adenoma at [facility] [place] with DC to home on 19;   Return to Urgent care on 19 for new onset symptoms of dysuria and low grade fever;  Diagnosed with +UTI, reports no urine culture performed but was discharged home on Cefdinir;     Care Manager contacted the patient, verified  and zip code as identifiers. Provided introduction and explanation of the Nurse Care Manager role. Agreed to CM follow-up and assistance at this time. Care management discharge assessment completed. PMH:   Past Medical History:   Diagnosis Date    Arthritis     HANDS    Basal cell carcinoma     3 on left side of neck and 1 on right side of neck and nasal bridge area with first dx starting aprroximately 12 yrs ago    Frequent nosebleeds     H/O seasonal allergies     IBS (irritable bowel syndrome)     Migraine     Pituitary tumor    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[facility] [address]     followed by Derm. BORN WITH IT. MAKES HIM HAVE BLISTERS ON HANDS. LIVER OVERPRODUCED RED BLOOD CELLS--TX IS PHLEBOTOMY AS NEEDED AND HAS HAD THIS 3 TIMES SO FAR    Secondary adrenal insufficiency ([address] 2019       52 y.o. male who was found to have a pituitary mass on evaluation for diplopia and underwent tumor debulking surgery in 2019. Postoperatively, his diplopia improved, however, Imaging revealed residual tumor within his sella and possible invasion of the left cavernous sinus. On 19 patient had Endoscopic endonasal redo transsphenoidal resection of pituitary adenoma. 19 UC visit for dysuria, +UTI, DC home on Cefdinir;     Surgical/Wound Condition Focused Assessment    Skin- wounds or incisions? yes  Description and location of wound-    Endoscopic endonasal incision site, open to air;     In the last 24 hour have you experienced; Fever no    Low body temperature no    Chills or shaking no    Sweating no    Fast heart rate no    Fast breathing no    Dizziness/lightheadedness no    Confusion or unusual change in mental status no    Diarrhea no    Nausea no    Vomiting no    Shortness of breath or difficulty breathing no    Less urine output no    Cold, clammy, and pale skin no     Skin rash or skin color changes no  New or worsening pain? no  If yes, pain rated 0-10:  Report pain is more of HA, 4/10, dull achy;   New or worsening numbness or tingling? no  If yes, location of numbness and tingling: NA    Activity level- moving several times a day, or as recommended? yes  Activity restrictions postop:  No bending, no stooping, no lifting; Bathing and showering instructions? yes  Nutrition- prescribed diet? Resume preop diet; Tolerating food? yes    Does patient have incentive spirometer? no  If yes, how frequently is patient using incentive spirometer? NA     Postop Red Flags  Call 911 if you develop or current symptoms worsen:   Sudden numbness, paralysis, or weakness in your face, arm, or leg;   Sudden vision changes.  Sudden trouble speaking.  Sudden confusion or trouble understanding simple statements.  Sudden problems with walking or balance.  A sudden, severe headache that is different or gets much worse. Initial Plan of Care as discussed and agreed with patient:    Impaired Skin Integrity    Goal:  Incision site heals free of signs of infection and without complications;   Reviewed red flags of infection with patient;  - Teach back successful, able ot name red flags to monitor;    Discussed importance of monitoring and reporting red flags postop;  - Verbalized importance of notifying surgeon's office;    Reviewed medications with patient to ensure understanding;     Impaired Urinary Elimination, UTI   Goal:  Demonstrates no further signs of flank pain or dysuria in 21 days;   Reports some improvement in buring with urination;   Denies seeing any blood in urine;   Discussed importance of increased fluid intake;  · Encouraged patient not to delay urination urge;  · Void every 2-3 hours to prevent accumulation;   Reviewed and discuss red flags of recurrent urinary infection;  · Verbalized importance of reporting if symptoms do not improve or worsen;   Encourage to complete the full duration of the antibiotics;  · Reviewed list of bladder irritants:  · Coffee, sodas, tea, alcohol;    Review and discussion of initial plan of care with patient, who has provided input to plan, verbalized understanding and agrees with current goals. Potential Barriers to Self-Management or Access:   []  Decline in memory    []  Language barrier  []  Emotional   []  Limited mobility  []  Lack of motivation     [] Vision, hearing or cognitive impairment  []  Knowledge    [] Financial Barriers    []  Pain    [x]  Other     Discharge Instructions:  Reviewed discharge instructions with patient. Patient verbalizes understanding of discharge instructions and importance of follow-up care. Current Outpatient Medications   Medication Sig    dicyclomine (BENTYL) 10 mg capsule TAKE 1 CAPSULE 3 TIMES A DAY AS NEEDED FOR ABDOMINAL CRAMPING    rosuvastatin (CRESTOR) 10 mg tablet TAKE 1 TABLET BY MOUTH EVERY DAY AT NIGHT    montelukast (SINGULAIR) 10 mg tablet TAKE 1 TABLET BY MOUTH EVERY DAY FOR ALLERGIES    DULoxetine (CYMBALTA) 30 mg capsule TAKE ONE CAPSULE BY MOUTH EVERY DAY FOR BACK PAIN    omeprazole (PRILOSEC) 20 mg capsule TAKE 1 CAPSULE BY MOUTH EVERY DAY    cyclobenzaprine (FLEXERIL) 10 mg tablet Take 1 Tab by mouth nightly as needed for Muscle Spasm(s).     hydrocortisone (CORTEF) 10 mg tablet Take 3 tabs before breakfast and 1 tab at lunch and 1/2 tab at dinner and taper as directed--Dose change 5/21/19--updated med list--did not send prescription to the pharmacy    acetaminophen (TYLENOL) 325 mg tablet Take 2 Tabs by mouth every six (6) hours as needed.  butalbital-acetaminophen-caffeine (FIORICET, ESGIC) -40 mg per tablet Take 1 Tab by mouth every four (4) hours as needed for Headache.  SUMAtriptan (IMITREX) 100 mg tablet Take 100 mg by mouth once as needed for Migraine.  albuterol (PROVENTIL HFA, VENTOLIN HFA, PROAIR HFA) 90 mcg/actuation inhaler Take 2 Puffs by inhalation every four (4) hours as needed for Wheezing. (Patient taking differently: Take 2 Puffs by inhalation every four (4) hours as needed for Wheezing. GETS COUPLE OF BRONCHITIS EVERY YEAR)    albuterol (PROVENTIL VENTOLIN) 2.5 mg /3 mL (0.083 %) nebulizer solution 3 mL by Nebulization route every four (4) hours as needed for Wheezing. (Patient taking differently: 2.5 mg by Nebulization route every four (4) hours as needed for Wheezing. GETS COUPLE OF BRONCHITIS EVERY YEAR)    albuterol-ipratropium (DUO-NEB) 2.5 mg-0.5 mg/3 ml nebu 3 mL by Nebulization route every six (6) hours as needed. (Patient taking differently: 3 mL by Nebulization route every six (6) hours as needed. GETS COUPLE OF BRONCHITIS EVERY YEAR)    SIMETHICONE Take 120 mg by mouth as needed. No current facility-administered medications for this visit. Completed a review of medications with patient, who verbalized understanding of how and when to take medications. Barriers to Medication Adherence:  None    Future Appointments   Date Time Provider Tara Rodriguez   11/21/2019  8:50 AM Romana Goldsmith, MD [facility] [address]       Patient verbalized understanding of all information discussed. Pt has my contact information for any further questions, concerns, or needs. Plan for next call:  FU in one week to assess for red flags, improvement in dysuria, ensure FU appt kept; This note will not be viewable in [address]

## 2019-11-20 ENCOUNTER — PATIENT OUTREACH (OUTPATIENT)
Dept: OTHER | Age: 54
End: 2019-11-20

## 2019-11-20 LAB
BUN SERPL-MCNC: 15 MG/DL (ref 6–24)
BUN/CREAT SERPL: 16 (ref 9–20)
CALCIUM SERPL-MCNC: 9.8 MG/DL (ref 8.7–10.2)
CHLORIDE SERPL-SCNC: 104 MMOL/L (ref 96–106)
CO2 SERPL-SCNC: 25 MMOL/L (ref 20–29)
CREAT SERPL-MCNC: 0.94 MG/DL (ref 0.76–1.27)
GLUCOSE SERPL-MCNC: 89 MG/DL (ref 65–99)
IGF-I SERPL-MCNC: 317 NG/ML (ref 54–194)
POTASSIUM SERPL-SCNC: 4.4 MMOL/L (ref 3.5–5.2)
SODIUM SERPL-SCNC: 143 MMOL/L (ref 134–144)
T4 FREE SERPL-MCNC: 1.2 NG/DL (ref 0.82–1.77)
TESTOST SERPL-MCNC: 281 NG/DL (ref 264–916)

## 2019-11-20 NOTE — PROGRESS NOTES
Care Manager contacted the patient by telephone in follow up. Verified  and zip code with patient as identifiers. 52 y.o. male who is 2 weeks postop Endoscopic endonasal \"redo\" transsphenoidal resection of pituitary adenoma; Was secondarily diagnosed with +UTI, but completed his course of Cefdinir;   PMH:   2019 pituitary mass debulking surgery (diplopi) but months later diplopia returned and imaging revealed residual tumor within his sella with invasion of the L cavernous sinus. Assessment of clinical changes and knowledge demonstrated since last call:   Ongoing Plan of Care:     Impaired Skin Integrity                       Goal:  Incision site heals free of signs of infection and without complications;  · Denies red flags;    · Endoscopic endonasal incision remains open to air, without signs of redness or drainage;   · HA pain rated 0-10:  2/10, dull achy, taking nothing for it, mild; Impaired Urinary Elimination, UTI   Goal:  Demonstrates no further signs of flank pain or dysuria in 21 days;  · Completed antibiotic course;  · Denies seeing any blood in urine;  · Denies any recurrence or new symptoms of dysuria; Review and discussion of plan of care with patient, who has provided input to plan, verbalized understanding and agrees with current goals. Any recurrence Red Flags or continued symptoms: None    Medication Regimen Change:  Completed Cefdnir  Completed a review of medications with patient, who verbalized understanding of how and when to take medications. Barriers / Adherence with medications:  No problems;    Upcoming Appointments:     FU MRI 2020 scheduled; Future Appointments   Date Time Provider Tara Rodriguez   2019  8:50 AM Ilan Zuñiga MD [facility] [address]       Patient asked questions appropriately and denied any additional needs at this time. Patient verbalized understanding of all information discussed.   Patient has my name and contact information for any follow up needs or questions. Plan next call: Will continue to follow up after next Neuro and Imaging visit; This note will not be viewable in [address]

## 2019-11-21 ENCOUNTER — OFFICE VISIT (OUTPATIENT)
Dept: ENDOCRINOLOGY | Age: 54
End: 2019-11-21

## 2019-11-21 VITALS
OXYGEN SATURATION: 96 % | RESPIRATION RATE: 16 BRPM | WEIGHT: 210 LBS | HEIGHT: 68 IN | HEART RATE: 96 BPM | DIASTOLIC BLOOD PRESSURE: 67 MMHG | SYSTOLIC BLOOD PRESSURE: 126 MMHG | BODY MASS INDEX: 31.83 KG/M2

## 2019-11-21 DIAGNOSIS — E27.49 SECONDARY ADRENAL INSUFFICIENCY (HCC): ICD-10-CM

## 2019-11-21 DIAGNOSIS — D35.2 BENIGN TUMOR OF PITUITARY GLAND (HCC): Primary | ICD-10-CM

## 2019-11-21 DIAGNOSIS — R03.0 ELEVATED BLOOD PRESSURE READING WITHOUT DIAGNOSIS OF HYPERTENSION: ICD-10-CM

## 2019-11-21 DIAGNOSIS — E29.1 SECONDARY MALE HYPOGONADISM: ICD-10-CM

## 2019-11-21 RX ORDER — HYDROCORTISONE 10 MG/1
TABLET ORAL
Qty: 450 TAB | Refills: 3
Start: 2019-11-21 | End: 2020-05-15

## 2019-11-21 NOTE — PROGRESS NOTES
Lab Results   Component Value Date/Time    Testosterone 281 11/19/2019 10:41 AM     Lab Results   Component Value Date/Time    TSH 2.61 02/05/2019 03:01 PM    T4, Free 1.20 11/19/2019 10:41 AM

## 2019-11-21 NOTE — PROGRESS NOTES
Chief Complaint   Patient presents with    Adrenal Problem     History of Present Illness: Bobie Felty. is a 62 y.o. male here for follow up of pituitary disease. Weight down 6 lbs since last visit in 5/19. Ended up going to [facility] on 7/31/19 for a 2nd opinion of his pituitary tumor and decision was made to have redo pituitary surgery on 11/7/19 with Dr. Abbie Rosado. After the surgery, no new meds were added and currently is taking HC 2 tabs before breakfast and 1 tab at lunch and may take a 1/2 tab at dinner if having more fatigue and doing a lot of activity  Developed a UTI after the surgery and completed a course of Abx for this and temporarily increased his HC to 3 tabs in the am and 2 tabs in the afternoon but stopped this after 2 days and went back to current dose. The plan is to pursue gamma knife radiation this winter. Did notice worsening of his vision over the past few months prior to surgery and since the surgery has started improving. Still having some headaches but are slowly improving after the surgery. Some dizziness with standing. No abd pain or n/v. Does have some joint pains in the hands and his wedding ring doesn't fit any more. Does have some swelling in his ankles and feet but hasn't needed a larger shoe size. 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       Current Outpatient Medications   Medication Sig    hydrocortisone (CORTEF) 10 mg tablet Take 2 tabs before breakfast and 1 tab at lunch and 1/2 tab at dinner--Dose change 11/21/19--updated med list--did not send prescription to the pharmacy    dicyclomine (BENTYL) 10 mg capsule TAKE 1 CAPSULE 3 TIMES A DAY AS NEEDED FOR ABDOMINAL CRAMPING    rosuvastatin (CRESTOR) 10 mg tablet TAKE 1 TABLET BY MOUTH EVERY DAY AT NIGHT    montelukast (SINGULAIR) 10 mg tablet TAKE 1 TABLET BY MOUTH EVERY DAY FOR ALLERGIES    DULoxetine (CYMBALTA) 30 mg capsule TAKE ONE CAPSULE BY MOUTH EVERY DAY FOR BACK PAIN    omeprazole (PRILOSEC) 20 mg capsule TAKE 1 CAPSULE BY MOUTH EVERY DAY    cyclobenzaprine (FLEXERIL) 10 mg tablet Take 1 Tab by mouth nightly as needed for Muscle Spasm(s).  acetaminophen (TYLENOL) 325 mg tablet Take 2 Tabs by mouth every six (6) hours as needed.  butalbital-acetaminophen-caffeine (FIORICET, ESGIC) -40 mg per tablet Take 1 Tab by mouth every four (4) hours as needed for Headache.  SUMAtriptan (IMITREX) 100 mg tablet Take 100 mg by mouth once as needed for Migraine.  albuterol (PROVENTIL HFA, VENTOLIN HFA, PROAIR HFA) 90 mcg/actuation inhaler Take 2 Puffs by inhalation every four (4) hours as needed for Wheezing. (Patient taking differently: Take 2 Puffs by inhalation every four (4) hours as needed for Wheezing. GETS COUPLE OF BRONCHITIS EVERY YEAR)    albuterol (PROVENTIL VENTOLIN) 2.5 mg /3 mL (0.083 %) nebulizer solution 3 mL by Nebulization route every four (4) hours as needed for Wheezing. (Patient taking differently: 2.5 mg by Nebulization route every four (4) hours as needed for Wheezing. GETS COUPLE OF BRONCHITIS EVERY YEAR)    albuterol-ipratropium (DUO-NEB) 2.5 mg-0.5 mg/3 ml nebu 3 mL by Nebulization route every six (6) hours as needed. (Patient taking differently: 3 mL by Nebulization route every six (6) hours as needed. GETS COUPLE OF BRONCHITIS EVERY YEAR)    SIMETHICONE Take 120 mg by mouth as needed. No current facility-administered medications for this visit. Allergies   Allergen Reactions    Bactrim [Sulfamethoprim Ds] Rash    Lipitor [Atorvastatin] Myalgia    Sulfamethoxazole-Trimethoprim Rash     Review of Systems:  - Cardiovascular: no chest pain  - Neurological: no tremors  - Integumentary: skin is normal    Physical Examination:  Blood pressure 126/67, pulse 96, resp. rate 16, height 5' 8\" (1.727 m), weight 210 lb (95.3 kg), SpO2 96 %.   - General: pleasant, no distress, good eye contact   - Neck: no carotid bruits  - Cardiovascular: regular, normal rate, nl s1 and s2, no m/r/g   - Respiratory: clear to auscultation bilaterally   - Integumentary: skin is normal, no edema  - Neurological: reflexes 2+ at biceps, no tremors  - Psychiatric: normal mood and affect    Data Reviewed:   INDICATION:  Pituitary tumor status post transsphenoidal decompression February 2019     EXAMINATION:  MRI BRAIN, PITUITARY, W/WO CONTRAST: June 2019     COMPARISON:  February 5, 2019     TECHNIQUE:  Multiplanar multisequence acquisition without and with contrast of  the brain/pituitary gland.     FINDINGS:       Ventricles:  Midline, no hydrocephalus. Intracranial Hemorrhage:  None. Brain Parenchyma/Brainstem:  Mild chronic supratentorial white matter disease. No acute infarction. Basal Cisterns:  Normal.   Pituitary Gland:  Interval transsphenoidal decompression of previous large left  pituitary adenoma extending into the suprasellar cistern. Mass has decreased, no  longer extending into the suprasellar cistern, previously measuring 2.5 x 2.0 x  1.9 cm, currently measuring 1.7 x 1.4 x 1.9 cm. Infundibulum:  Near midline, significantly improved in previous rightward  deviation by the large left pituitary mass. Suprasellar Cistern/Optic Chiasm:  Now normal.  Cavernous Sinuses: No significant abnormality. Flow Voids:  Patent, persistent slight deviation of the left supraclinoid  segment due to the pituitary mass. Post Contrast:  No abnormal parenchymal or meningeal enhancement. Additional Comments:  N/A.     IMPRESSION  IMPRESSION:     1. Status post interval transsphenoidal decompression of large left pituitary  adenoma, with resolved extension into the suprasellar cistern and overall  decrease in size of tumor. Significant improvement in infundibular deviation. 2. Mild chronic white matter disease in the supratentorial brain.  No acute  process.   -------------------------------------------------------------------------------------------------------------------  Component      Latest Ref Rng & Units 11/19/2019 11/19/2019 11/19/2019 11/19/2019          10:41 AM 10:41 AM 10:41 AM 10:41 AM   Glucose      65 - 99 mg/dL  89     BUN      6 - 24 mg/dL  15     Creatinine      0.76 - 1.27 mg/dL  0.94     GFR est non-AA      >59 mL/min/1.73  90     GFR est AA      >59 mL/min/1.73  104     BUN/Creatinine ratio      9 - 20  16     Sodium      134 - 144 mmol/L  143     Potassium      3.5 - 5.2 mmol/L  4.4     Chloride      96 - 106 mmol/L  104     CO2      20 - 29 mmol/L  25     Calcium      8.7 - 10.2 mg/dL  9.8     Testosterone      264 - 916 ng/dL    281   T4, Free      0.82 - 1.77 ng/dL   1.20    Insulin-Like Growth Factor I      54 - 194 ng/mL 317 (H)          Assessment/Plan:     1. Benign tumor of pituitary gland [facility]): s/p transphenoidal resection of a 2.5 cm nonfunctioning pituitary tumor on 2/12/19 by Dr. Ya Vincent. 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 His repeat MRI on 6/3/19 showed decrease in size to 1.9 cm. Ended up going to 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 His free T4 was 0.94 in 2/19 and 0.98 in 5/19 and 1.20 in 11/19 so doesn't appear to have secondary hypothyroidism. His IGF-1 was 209 in 5/19 and I was checking to see if this would be low and is up to 317 in 11/19 after surgery so will follow for now. Doesn't have evidence of DI and Na is normal.  Plan is for gamma knife radiation at [facility] this winter.  - check bmp and IGF-1 prior to next visit  - f/u with Dr. Domi Bowman and Dr. Sanjeev Warner at [facility]      2.  Secondary adrenal insufficiency ([facility] [address]): was found to have a cortisol of 0.6 and inappropriately low ACTH of 2.2. He was started on hydrocortisone 20/10 and was feeling better with starting this but got the flu in 3/19 and needed an increase to 40/20 and then when I tapered back to 20/10, he felt worse and had been taking 30/20. This is more than I want him to be on and gave him a very slow taper by 5 mg every 3 weeks as below to get back to 20/10 as a maintenance dose and currently is there but may take 5 mg in the afternoon if more fatigue. [facility] plans on testings his adrenal axis this winter. I previously explained the importance of giving stress dose steroids in the future if he needs surgery or has an infection and is wearing a medic alert bracelet at this time. - cont hydrocortisone 20/10  - check cortisol prior to next visit after holding afternoon dose  - wear medic alert bracelet or necklace     3. Secondary male hypogonadism: his testosterone was low at 228 in 2/19 and lower at 104 in 5/19 but he did not have many hypogonadal symptoms so held on treatment and up to 281 in 11/19 with wt loss. - repeat total testosterone in the future if symptoms arise        4. Elevated BP without diagnosis of Hypertension: higher dose of HC may have caused this as back to normal today  - monitor home blood pressure readings    We spent 40 minutes of face to face time together and > 50% of the time was spent in counseling regarding management of all the conditions above. Patient Instructions   1) Please call [phone] to reset your [employer] password. 2) Your free T4 (thyroid test) is normal.    3) Your electrolytes (sodium, potassium, and calcium) are all normal.  Your glucose (blood sugar) is normal.    4) Your testosterone is back to normal.    5) Your IGF-1 (growth hormone) was higher but this may have been from recent pituitary surgery so we'll follow this over time.     6) Let me know what your cortisol level shows after your evaluation at [facility].    7) Plan on holding your afternoon dose the day prior to lab draw and hold your morning dose the morning of the lab draw and then have your labs drawn and resume your normal dose after the labs are drawn. 8) Your blood pressure looks good today. Follow-up and Dispositions    · Return in about 6 months (around 5/21/2020). Copy sent to:   Aracelis Olmos NP as PCP - General (Nurse Practitioner)  Dayan Shelby MD (Internal Medicine)  Dr. Mitesh Lazo

## 2019-11-21 NOTE — PATIENT INSTRUCTIONS
1) Please call [phone] to reset your [employer] password. 2) Your free T4 (thyroid test) is normal.    3) Your electrolytes (sodium, potassium, and calcium) are all normal.  Your glucose (blood sugar) is normal.    4) Your testosterone is back to normal.    5) Your IGF-1 (growth hormone) was higher but this may have been from recent pituitary surgery so we'll follow this over time. 6) Let me know what your cortisol level shows after your evaluation at [facility]. 7) Plan on holding your afternoon dose the day prior to lab draw and hold your morning dose the morning of the lab draw and then have your labs drawn and resume your normal dose after the labs are drawn. 8) Your blood pressure looks good today.

## 2019-11-27 DIAGNOSIS — R52 PAIN: ICD-10-CM

## 2019-11-29 RX ORDER — DULOXETIN HYDROCHLORIDE 30 MG/1
CAPSULE, DELAYED RELEASE ORAL
Qty: 90 CAP | Refills: 0 | Status: SHIPPED | OUTPATIENT
Start: 2019-11-29 | End: 2020-11-03 | Stop reason: SDUPTHER

## 2019-11-30 DIAGNOSIS — K21.9 GASTROESOPHAGEAL REFLUX DISEASE WITHOUT ESOPHAGITIS: ICD-10-CM

## 2019-12-03 RX ORDER — OMEPRAZOLE 20 MG/1
CAPSULE, DELAYED RELEASE ORAL
Qty: 90 CAP | Refills: 0 | Status: SHIPPED | OUTPATIENT
Start: 2019-12-03 | End: 2020-11-03 | Stop reason: SDUPTHER

## 2019-12-03 RX ORDER — DICYCLOMINE HYDROCHLORIDE 10 MG/1
CAPSULE ORAL
Qty: 160 CAP | Refills: 0 | Status: SHIPPED | OUTPATIENT
Start: 2019-12-03 | End: 2020-01-09

## 2019-12-12 ENCOUNTER — PATIENT OUTREACH (OUTPATIENT)
Dept: OTHER | Age: 54
End: 2019-12-12

## 2019-12-12 DIAGNOSIS — G89.29 CHRONIC BILATERAL LOW BACK PAIN WITHOUT SCIATICA: ICD-10-CM

## 2019-12-12 DIAGNOSIS — M54.50 CHRONIC BILATERAL LOW BACK PAIN WITHOUT SCIATICA: ICD-10-CM

## 2019-12-12 RX ORDER — CYCLOBENZAPRINE HCL 10 MG
TABLET ORAL
Qty: 30 TAB | Refills: 2 | Status: SHIPPED | OUTPATIENT
Start: 2019-12-12 | End: 2020-01-02 | Stop reason: ALTCHOICE

## 2019-12-12 NOTE — PROGRESS NOTES
HENNA Follow Up. Covering for CM Rudi Esparza, BOUBACAR                                                                                          Introduced myself as covering for Rudi Esparza [facility] RN. Telephone attempt to contact patient for transitions of care. Left discreet message on voicemail with this CC contact information. Will inform CM was unable to reach.

## 2019-12-13 ENCOUNTER — PATIENT OUTREACH (OUTPATIENT)
Dept: OTHER | Age: 54
End: 2019-12-13

## 2019-12-13 NOTE — PROGRESS NOTES
Care Manager contacted the patient by telephone in follow up. Verified  and zip code with patient as identifiers. 52 y.o. male who is one month postop Endoscopic endonasal transsphenoidal resection of pituitary adenoma; Reports feeling better with return to prior level of activity;    Assessment of clinical changes and knowledge demonstrated since last call:   Ongoing Plan of Care:     Impaired Skin Integrity                       Goal met:  Incision site heals free of signs of infection and without complications;  · Denies red flags;  · Reports incision sites are well healed;        Impaired Urinary Elimination, UTI   Goal met:  Demonstrates no further signs of flank pain or dysuria in 21 days;  · Completed antibiotic course, denies recurrence or new symptoms; Review and discussion of plan of care with patient, who has provided input to plan, verbalized understanding and agrees with current goals. Any recurrence Red Flags or continued symptoms:    None, including no return of urinary symptoms; Medication Regimen Change:  Completed antibiotic;  Completed a review of medications with patient, who verbalized understanding of how and when to take medications. Barriers / Adherence with medications:  None;    Upcoming Appointments:    Completed both postop surgery appointments; Future Appointments   Date Time Provider Tara Rodriguez   2020  9:10 AM Renuka Mantilla MD [facility] [address]       Patient asked questions appropriately and denied any additional needs at this time. Patient verbalized understanding of all information discussed. Patient has my name and contact information for any follow up needs or questions. Plan next call:   Ready to grad if no new CM needs; This note will not be viewable in [address]

## 2020-01-02 ENCOUNTER — OFFICE VISIT (OUTPATIENT)
Dept: PRIMARY CARE CLINIC | Age: 55
End: 2020-01-02

## 2020-01-02 VITALS
TEMPERATURE: 96.4 F | OXYGEN SATURATION: 95 % | SYSTOLIC BLOOD PRESSURE: 139 MMHG | WEIGHT: 222.4 LBS | HEIGHT: 67 IN | DIASTOLIC BLOOD PRESSURE: 88 MMHG | HEART RATE: 105 BPM | RESPIRATION RATE: 15 BRPM | BODY MASS INDEX: 34.91 KG/M2

## 2020-01-02 DIAGNOSIS — R05.9 COUGH: Primary | ICD-10-CM

## 2020-01-02 DIAGNOSIS — J40 BRONCHITIS AFTER SURGERY: ICD-10-CM

## 2020-01-02 DIAGNOSIS — J95.89 BRONCHITIS AFTER SURGERY: ICD-10-CM

## 2020-01-02 RX ORDER — METHYLPREDNISOLONE 4 MG/1
TABLET ORAL
COMMUNITY
End: 2020-01-02 | Stop reason: ALTCHOICE

## 2020-01-02 RX ORDER — BENZONATATE 100 MG/1
CAPSULE ORAL
COMMUNITY
End: 2020-01-02 | Stop reason: DRUGHIGH

## 2020-01-02 RX ORDER — DEXAMETHASONE SODIUM PHOSPHATE 100 MG/10ML
INJECTION INTRAMUSCULAR; INTRAVENOUS
COMMUNITY
End: 2020-01-02 | Stop reason: ALTCHOICE

## 2020-01-02 RX ORDER — PROMETHAZINE HYDROCHLORIDE AND DEXTROMETHORPHAN HYDROBROMIDE 6.25; 15 MG/5ML; MG/5ML
5 SYRUP ORAL
COMMUNITY
End: 2020-01-02 | Stop reason: ALTCHOICE

## 2020-01-02 RX ORDER — DICYCLOMINE HYDROCHLORIDE 10 MG/1
10 CAPSULE ORAL
COMMUNITY
End: 2020-01-02 | Stop reason: SDUPTHER

## 2020-01-02 RX ORDER — SUMATRIPTAN 100 MG/1
TABLET, FILM COATED ORAL
COMMUNITY
Start: 2017-05-30 | End: 2020-01-02 | Stop reason: SDUPTHER

## 2020-01-02 RX ORDER — CEPHALEXIN 500 MG/1
CAPSULE ORAL
COMMUNITY
End: 2020-01-02 | Stop reason: ALTCHOICE

## 2020-01-02 RX ORDER — HYDROCORTISONE 10 MG/1
TABLET ORAL
COMMUNITY
End: 2020-01-02 | Stop reason: SDUPTHER

## 2020-01-02 RX ORDER — CEFDINIR 300 MG/1
CAPSULE ORAL
COMMUNITY
End: 2020-01-02 | Stop reason: ALTCHOICE

## 2020-01-02 RX ORDER — ALBUTEROL SULFATE 90 UG/1
AEROSOL, METERED RESPIRATORY (INHALATION)
COMMUNITY
End: 2020-01-02 | Stop reason: SDUPTHER

## 2020-01-02 RX ORDER — AZITHROMYCIN 250 MG/1
TABLET, FILM COATED ORAL
COMMUNITY
End: 2020-01-02 | Stop reason: ALTCHOICE

## 2020-01-02 RX ORDER — DOCUSATE SODIUM 100 MG/1
100 CAPSULE, LIQUID FILLED ORAL
COMMUNITY
Start: 2019-11-09 | End: 2020-01-02 | Stop reason: ALTCHOICE

## 2020-01-02 RX ORDER — BENZONATATE 200 MG/1
200 CAPSULE ORAL
Qty: 45 CAP | Refills: 1 | Status: SHIPPED | OUTPATIENT
Start: 2020-01-02 | End: 2020-01-09

## 2020-01-02 RX ORDER — OMEPRAZOLE 20 MG/1
20 CAPSULE, DELAYED RELEASE ORAL
COMMUNITY
End: 2020-01-02 | Stop reason: SDUPTHER

## 2020-01-02 RX ORDER — BUTALBITAL, ACETAMINOPHEN AND CAFFEINE 300; 40; 50 MG/1; MG/1; MG/1
CAPSULE ORAL
COMMUNITY
End: 2020-01-02 | Stop reason: SDUPTHER

## 2020-01-02 RX ORDER — ALBUTEROL SULFATE 0.83 MG/ML
SOLUTION RESPIRATORY (INHALATION)
COMMUNITY
End: 2020-01-02 | Stop reason: SDUPTHER

## 2020-01-02 RX ORDER — CODEINE PHOSPHATE AND GUAIFENESIN 10; 100 MG/5ML; MG/5ML
5 SOLUTION ORAL
Qty: 118 ML | Refills: 0 | Status: SHIPPED | OUTPATIENT
Start: 2020-01-02 | End: 2020-01-09

## 2020-01-02 RX ORDER — DOXYCYCLINE HYCLATE 100 MG
TABLET ORAL
COMMUNITY
End: 2020-01-02 | Stop reason: ALTCHOICE

## 2020-01-02 RX ORDER — DESMOPRESSIN ACETATE 0.1 MG/1
100 TABLET ORAL
COMMUNITY
Start: 2019-11-09 | End: 2020-01-02 | Stop reason: ALTCHOICE

## 2020-01-02 RX ORDER — ROSUVASTATIN CALCIUM 10 MG/1
10 TABLET, COATED ORAL
COMMUNITY
End: 2020-01-02 | Stop reason: SDUPTHER

## 2020-01-02 RX ORDER — MONTELUKAST SODIUM 10 MG/1
10 TABLET ORAL
COMMUNITY
Start: 2017-04-28 | End: 2020-01-02 | Stop reason: SDUPTHER

## 2020-01-02 RX ORDER — HYDROCORTISONE 5 MG/1
TABLET ORAL
COMMUNITY
Start: 2019-11-09 | End: 2020-01-02 | Stop reason: SDUPTHER

## 2020-01-02 RX ORDER — BUTALBITAL, ACETAMINOPHEN AND CAFFEINE 50; 325; 40 MG/1; MG/1; MG/1
1 TABLET ORAL
COMMUNITY
Start: 2019-12-19 | End: 2020-01-02 | Stop reason: SDUPTHER

## 2020-01-02 RX ORDER — ACETAMINOPHEN 325 MG/1
650 TABLET ORAL
COMMUNITY
Start: 2019-11-09 | End: 2020-01-02 | Stop reason: SDUPTHER

## 2020-01-02 RX ORDER — DULOXETIN HYDROCHLORIDE 30 MG/1
30 CAPSULE, DELAYED RELEASE ORAL
COMMUNITY
Start: 2017-04-28 | End: 2020-01-02 | Stop reason: SDUPTHER

## 2020-01-02 RX ORDER — OSELTAMIVIR PHOSPHATE 75 MG/1
CAPSULE ORAL
COMMUNITY
End: 2020-01-02 | Stop reason: ALTCHOICE

## 2020-01-02 RX ORDER — IPRATROPIUM BROMIDE AND ALBUTEROL SULFATE 2.5; .5 MG/3ML; MG/3ML
SOLUTION RESPIRATORY (INHALATION)
COMMUNITY
End: 2020-01-02 | Stop reason: SDUPTHER

## 2020-01-02 RX ORDER — NAPROXEN SODIUM 220 MG
220 TABLET ORAL AS NEEDED
COMMUNITY

## 2020-01-02 RX ORDER — AZITHROMYCIN 250 MG/1
TABLET, FILM COATED ORAL
Qty: 6 TAB | Refills: 0 | Status: SHIPPED | OUTPATIENT
Start: 2020-01-02 | End: 2020-05-15

## 2020-01-02 RX ORDER — CHOLECALCIFEROL (VITAMIN D3) 125 MCG
1 CAPSULE ORAL
COMMUNITY
End: 2022-09-14

## 2020-01-02 NOTE — PROGRESS NOTES
Carline Crawford. is a 62 y.o. male    Room:6    Chief Complaint   Patient presents with    Cold Symptoms     Pt States \" im still fighting bronchitis cait been taking antibotics for x7 days and its not working and also having lots of drainage\". started on the 23rd of december\". Visit Vitals  /88 (BP 1 Location: Left arm, BP Patient Position: Sitting)   Pulse (!) 105   Temp 96.4 °F (35.8 °C) (Oral)   Resp 15   Ht 5' 7\" (1.702 m)   Wt 222 lb 6.4 oz (100.9 kg)   SpO2 95%   BMI 34.83 kg/m²       Pain Scale: 0 - No pain/10    1. Have you been to the ER, urgent care clinic since your last visit? Hospitalized since your last visit? Yes went to [facility] on the 26th 2. Have you seen or consulted any other health care providers outside of the [address] [place] [address] since your last visit? Include any pap smears or colon screening.  No

## 2020-01-03 NOTE — PATIENT INSTRUCTIONS
Learning About Chronic Bronchitis  What is chronic bronchitis? Chronic bronchitis is long-term swelling and the buildup of mucus in the airways of your lungs. The airways (bronchial tubes) get inflamed and make a lot of mucus. This can narrow or block the airways, making it hard for you to breathe. It is a form of COPD (chronic obstructive pulmonary disease). Chronic bronchitis is usually caused by smoking. But chemical fumes, dust, or air pollution also can cause it over time. What can you expect when you have chronic bronchitis? Chronic bronchitis gets worse over time. You cannot undo the damage to your lungs. Over time, you may find that:  · You get short of breath even when you do simple things like get dressed or fix a meal.  · It is hard to eat or exercise. · You lose weight and feel weaker. Over many years, the swelling and mucus from chronic bronchitis make it more likely that you will get lung infections. But there are things you can do to prevent more damage and feel better. What are the symptoms? The main symptoms of chronic bronchitis are:  · A cough that will not go away. · Mucus that comes up when you cough. · Shortness of breath that gets worse when you exercise. At times, your symptoms may suddenly flare up and get much worse. This is a called an exacerbation (say \"egg-ZAZORAIDA-er-BAY-denise\"). When this happens, your usual symptoms quickly get worse and stay bad. This can be dangerous. You may have to go to the hospital.  How can you keep chronic bronchitis from getting worse? Don't smoke. That is the best way to keep chronic bronchitis from getting worse. If you already smoke, it is never too late to stop. If you need help quitting, talk to your doctor about stop-smoking programs and medicines. These can increase your chances of quitting for good. You can do other things to keep chronic bronchitis from getting worse:  · Avoid bad air.  Air pollution, chemical fumes, and dust also can make chronic bronchitis worse. · Get a flu shot every year. A shot may keep the flu from turning into something more serious, like pneumonia. A flu shot also may lower your chances of having a flare-up. · Get a pneumococcal shot. A shot can prevent some of the serious complications of pneumonia. Ask your doctor how often you should get this shot. How is chronic bronchitis treated? Chronic bronchitis is treated with medicines and oxygen. You also can take steps at home to stay healthy and keep your condition from getting worse. Medicines and oxygen therapy  · You may be taking medicines such as:  ? Bronchodilators. These help open your airways and make breathing easier. Bronchodilators are either short-acting (work for 6 to 9 hours) or long-acting (work for 24 hours). You inhale most bronchodilators, so they start to act quickly. Always carry your quick-relief inhaler with you in case you need it while you are away from home. ? Corticosteroids. These reduce airway inflammation. They come in pill or inhaled form. You must take these medicines every day for them to work well. ? Antibiotics. These medicines are used when you have a bacterial lung infection. · Take your medicines exactly as prescribed. Call your doctor if you think you are having a problem with your medicine. · Oxygen therapy boosts the amount of oxygen in your blood and helps you breathe easier. Use the flow rate your doctor has recommended, and do not change it without talking to your doctor first.  Other care at home  · If your doctor recommends it, get more exercise. Walking is a good choice. Bit by bit, increase the amount you walk every day. Try for at least 30 minutes on most days of the week. · Learn breathing methods--such as breathing through pursed lips--to help you become less short of breath. · If your doctor has not set you up with a pulmonary rehabilitation program, talk to him or her about whether rehab is right for you.  Rehab includes exercise programs, education about your disease and how to manage it, help with diet and other changes, and emotional support. · Eat regular, healthy meals. Use bronchodilators about 1 hour before you eat to make it easier to eat. Eat several small meals instead of three large ones. Drink beverages at the end of the meal. Avoid foods that are hard to chew. Follow-up care is a key part of your treatment and safety. Be sure to make and go to all appointments, and call your doctor if you are having problems. It's also a good idea to know your test results and keep a list of the medicines you take. Where can you learn more? Go to http://marie-coleman.info/. Enter R714 in the search box to learn more about \"Learning About Chronic Bronchitis. \"  Current as of: June 9, 2019  Content Version: 12.2  © 2236-8690 [employer]. Care instructions adapted under license by [employer] (which disclaims liability or warranty for this information). If you have questions about a medical condition or this instruction, always ask your healthcare professional. [address] any warranty or liability for your use of this information.

## 2020-01-06 ENCOUNTER — TELEPHONE (OUTPATIENT)
Dept: ENDOCRINOLOGY | Age: 55
End: 2020-01-06

## 2020-01-06 NOTE — TELEPHONE ENCOUNTER
----- Message from Anand Drew sent at 1/6/2020  2:33 PM EST -----  Regarding: Dr. Todd Bell first and last name: Lynn Sullivan with [facility] endocrinology. Reason for call: Joanna Jordan would like the pt's assessment plan from 11/21/19 . Callback required yes/no and why: yes. Best contact number(s): fax [phone].  phone- [phone]      Details to clarify the request:

## 2020-01-07 ENCOUNTER — PATIENT OUTREACH (OUTPATIENT)
Dept: OTHER | Age: 55
End: 2020-01-07

## 2020-01-07 NOTE — PROGRESS NOTES
Resolving current episode (Transitions of care complete). Patient goals met. No further ED/UC or hospital admissions within 30 days post discharge. Patient attended follow-up appointments as directed. No outreach from patient to [address] [place] [address].

## 2020-01-07 NOTE — TELEPHONE ENCOUNTER
Faxed most recent office note to Cordell Mathias at [facility]. Confirmed receipt of the note with Karen Abad today.

## 2020-01-07 NOTE — PROGRESS NOTES
Subjective:   Catalina Smith is a 62 y.o. male who complains of congestion, dry cough, headache and bilateral sinus pain for 10 days, gradually worsening since that time. He denies a history of shortness of breath and wheezing. He had brain surgery 11/07/2019 for pituitary tumor  Evaluation to date: seen previously and thought to have a viral URI. Treatment to date: OTC products. Patient does not smoke cigarettes. Relevant PMH: Sinusitis and recent brain surgery. 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 XXXA    IBS (irritable bowel syndrome) K58.9    Statin intolerance Z78.9    High triglycerides E78.1    Hypercholesterolemia without hypertriglyceridemia E78.00    Sinusitis J32.9    Complete tear of right rotator cuff M75.121    Pituitary adenoma ([facility]) D35.2    Pituitary tumor D49.7    Secondary adrenal insufficiency ([facility]) E27.49    Elevated blood pressure reading without diagnosis of hypertension R03.0     Patient Active Problem List    Diagnosis Date Noted    Elevated blood pressure reading without diagnosis of hypertension 05/21/2019    Secondary adrenal insufficiency ([facility] 02/21/2019    Pituitary tumor 02/12/2019    Pituitary adenoma ([facility] 02/05/2019    Complete tear of right rotator cuff 08/31/2017    Sinusitis 01/06/2016    Hypercholesterolemia without hypertriglyceridemia 05/06/2015    H/O cardiac catheterization 05/05/2015    Migraines 05/05/2015    GERD (gastroesophageal reflux disease) 05/05/2015    Gastritis 05/05/2015    Allergic rhinitis 05/05/2015   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[facility] 05/05/2015    Actinic keratosis due to exposure to sunlight 05/05/2015    IBS (irritable bowel syndrome) 05/05/2015    Statin intolerance 05/05/2015    High triglycerides 05/05/2015    ACS (acute coronary syndrome) ([facility]) 03/21/2013    Hyperlipidemia 03/21/2013     Current Outpatient Medications   Medication Sig Dispense Refill    CALCIUM CARBONATE PO Take 1,000 mg by mouth.  cholecalciferol, vitamin D3, 2,000 unit tab Take 1 Tab by mouth.  naproxen sodium (NAPROSYN) 220 mg tablet Take 220 mg by mouth.  azithromycin (ZITHROMAX) 250 mg tablet Take by mouth, take two tablets today then one tablet daily for 4 more days. 6 Tab 0    benzonatate (TESSALON) 200 mg capsule Take 1 Cap by mouth three (3) times daily as needed for Cough for up to 7 days. 45 Cap 1    guaiFENesin-codeine (GUAIATUSSIN AC) 100-10 mg/5 mL solution Take 5 mL by mouth three (3) times daily as needed for Cough for up to 7 days. Max Daily Amount: 15 mL. Take 5-10 ml at night for cough 118 mL 0    omeprazole (PRILOSEC) 20 mg capsule TAKE 1 CAPSULE BY MOUTH EVERY DAY 90 Cap 0    dicyclomine (BENTYL) 10 mg capsule TAKE 1 CAPSULE 3 TIMES A DAY AS NEEDED FOR ABDOMINAL CRAMPING 160 Cap 0    DULoxetine (CYMBALTA) 30 mg capsule TAKE ONE CAPSULE BY MOUTH EVERY DAY FOR BACK PAIN 90 Cap 0    hydrocortisone (CORTEF) 10 mg tablet Take 2 tabs before breakfast and 1 tab at lunch and 1/2 tab at dinner--Dose change 11/21/19--updated med list--did not send prescription to the pharmacy 450 Tab 3    rosuvastatin (CRESTOR) 10 mg tablet TAKE 1 TABLET BY MOUTH EVERY DAY AT NIGHT 90 Tab 0    montelukast (SINGULAIR) 10 mg tablet TAKE 1 TABLET BY MOUTH EVERY DAY FOR ALLERGIES 90 Tab 0    acetaminophen (TYLENOL) 325 mg tablet Take 2 Tabs by mouth every six (6) hours as needed. 30 Tab 0    butalbital-acetaminophen-caffeine (FIORICET, ESGIC) -40 mg per tablet Take 1 Tab by mouth every four (4) hours as needed for Headache.  15 Tab 0    SUMAtriptan (IMITREX) 100 mg tablet Take 100 mg by mouth once as needed for Migraine.  albuterol (PROVENTIL HFA, VENTOLIN HFA, PROAIR HFA) 90 mcg/actuation inhaler Take 2 Puffs by inhalation every four (4) hours as needed for Wheezing. (Patient taking differently: Take 2 Puffs by inhalation every four (4) hours as needed for Wheezing. GETS COUPLE OF BRONCHITIS EVERY YEAR) 1 Inhaler 5    albuterol (PROVENTIL VENTOLIN) 2.5 mg /3 mL (0.083 %) nebulizer solution 3 mL by Nebulization route every four (4) hours as needed for Wheezing. (Patient taking differently: 2.5 mg by Nebulization route every four (4) hours as needed for Wheezing. GETS COUPLE OF BRONCHITIS EVERY YEAR) 25 Each 1    SIMETHICONE Take 120 mg by mouth as needed. Allergies   Allergen Reactions    Bactrim [Sulfamethoprim Ds] Rash    Lipitor [Atorvastatin] Myalgia    Sulfamethoxazole-Trimethoprim Rash     Past Medical History:   Diagnosis Date    Arthritis     HANDS    Basal cell carcinoma 2012    3 on left side of neck and 1 on right side of neck and nasal bridge area with first dx starting aprroximately 12 yrs ago    Frequent nosebleeds     H/O seasonal allergies     IBS (irritable bowel syndrome)     Migraine     Pituitary tumor    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 BORN WITH IT. MAKES HIM HAVE BLISTERS ON HANDS. LIVER OVERPRODUCED RED BLOOD CELLS--TX IS PHLEBOTOMY AS NEEDED AND HAS HAD THIS 3 TIMES SO FAR    Secondary adrenal insufficiency ([address] 2/21/2019     Past Surgical History:   Procedure Laterality Date    HX GI      COLONOSCOPY    HX HEART CATHETERIZATION  2013    minimal disease per cardio notes.  HX HEART CATHETERIZATION Left     minimal luminal narrowing; no obstructive disease    HX HEENT      SINUS SURGERY    HX HEENT  02/2019 and 11/19    Transphenoidal pituitary surgery     HX ORTHOPAEDIC Bilateral     bilateral thumb surgery    HX ORTHOPAEDIC Right ~2013    elbow surgery    HX OTHER SURGICAL      REMOVAL BCCA ON [place].  SIDE OF NECK AND BRIDGE OF NOSE    HX ROTATOR CUFF REPAIR Right     HX SEPTOPLASTY  2002    HX SHOULDER ARTHROSCOPY Left ~2013    NE ANALYZ NEUROSTIM BRAIN, EACH ADD 27 MIN      NE PROSTATE BIOPSY, NEEDLE, SATURATION SAMPLING       Family History   Problem Relation Age of Onset    Alcohol abuse Brother     Heart Attack Brother     Diabetes Mother     Hypertension Mother     Cancer Mother         lung    Cancer Father         PROSTATE    Arthritis-osteo Sister     Anesth Problems Neg Hx     Other Neg Hx         pituitary disease     Social History     Tobacco Use    Smoking status: Never Smoker    Smokeless tobacco: Never Used   Substance Use Topics    Alcohol use: Yes     Comment: 2-3 beers once a week        Review of Systems  Pertinent items are noted in HPI. Objective:     Visit Vitals  /88 (BP 1 Location: Left arm, BP Patient Position: Sitting)   Pulse (!) 105   Temp 96.4 °F (35.8 °C) (Oral)   Resp 15   Ht 5' 7\" (1.702 m)   Wt 222 lb 6.4 oz (100.9 kg)   SpO2 95%   BMI 34.83 kg/m²     General:  alert, cooperative, no distress   Eyes: negative   Ears: normal TM's and external ear canals AU   Sinuses: Normal paranasal sinuses without tenderness   Mouth:  Lips, mucosa, and tongue normal. Teeth and gums normal and abnormal findings: moderate oropharyngeal erythema   Neck: supple, symmetrical, trachea midline and no adenopathy. Heart: S1 and S2 normal, no murmurs noted. Lungs: wheezes R apex, L apex, L base, continuous hacking cough   Abdomen: soft, non-tender. Bowel sounds normal. No masses,  no organomegaly        Assessment/Plan:   bronchitis  Suggested symptomatic OTC remedies. RTC prn. Discussed diagnosis and treatment of viral URIs. Discussed the importance of avoiding unnecessary antibiotic therapy. ICD-10-CM ICD-9-CM    1.  Cough R05 786.2 XR CHEST PA LAT      azithromycin (ZITHROMAX) 250 mg tablet      benzonatate (TESSALON) 200 mg capsule      guaiFENesin-codeine (GUAIATUSSIN AC) 100-10 mg/5 mL solution   2. Bronchitis after surgery J40 490 XR CHEST PA LAT      azithromycin (ZITHROMAX) 250 mg tablet      benzonatate (TESSALON) 200 mg capsule      guaiFENesin-codeine (GUAIATUSSIN AC) 100-10 mg/5 mL solution   .

## 2020-01-09 RX ORDER — DICYCLOMINE HYDROCHLORIDE 10 MG/1
CAPSULE ORAL
Qty: 160 CAP | Refills: 0 | Status: SHIPPED | OUTPATIENT
Start: 2020-01-09 | End: 2020-02-10

## 2020-02-25 NOTE — LETTER
2/26/2020 4:32 PM 
 
[address] [id number] Dear Mr. Keysha Gallegos: 
 
Shawna Vuong missed you! Please call our office at [phone] and schedule a follow up appointment for your continued care and further medication refills. Unfortunately, we won't be able to refill any more medication until seen in for office visit.  
 
 
 
Sincerely, 
 
 
Yanira Berrios NP

## 2020-02-26 RX ORDER — DICYCLOMINE HYDROCHLORIDE 10 MG/1
CAPSULE ORAL
Qty: 270 CAP | Refills: 0 | OUTPATIENT
Start: 2020-02-26

## 2020-02-26 NOTE — TELEPHONE ENCOUNTER
Advised via letter at the beginning of the month that appt is required prior to rx for long term supply, hasn't been seen since July 2019.

## 2020-05-14 LAB
BUN SERPL-MCNC: 23 MG/DL (ref 6–24)
BUN/CREAT SERPL: 24 (ref 9–20)
CALCIUM SERPL-MCNC: 9.3 MG/DL (ref 8.7–10.2)
CHLORIDE SERPL-SCNC: 107 MMOL/L (ref 96–106)
CO2 SERPL-SCNC: 22 MMOL/L (ref 20–29)
CORTIS AM PEAK SERPL-MCNC: 4.6 UG/DL (ref 6.2–19.4)
CREAT SERPL-MCNC: 0.97 MG/DL (ref 0.76–1.27)
GLUCOSE SERPL-MCNC: 83 MG/DL (ref 65–99)
IGF-I SERPL-MCNC: 96 NG/ML (ref 54–194)
POTASSIUM SERPL-SCNC: 4.1 MMOL/L (ref 3.5–5.2)
SODIUM SERPL-SCNC: 143 MMOL/L (ref 134–144)

## 2020-05-15 ENCOUNTER — VIRTUAL VISIT (OUTPATIENT)
Dept: ENDOCRINOLOGY | Age: 55
End: 2020-05-15

## 2020-05-15 DIAGNOSIS — E27.49 SECONDARY ADRENAL INSUFFICIENCY (HCC): ICD-10-CM

## 2020-05-15 DIAGNOSIS — D35.2 BENIGN TUMOR OF PITUITARY GLAND (HCC): Primary | ICD-10-CM

## 2020-05-15 DIAGNOSIS — E29.1 SECONDARY MALE HYPOGONADISM: ICD-10-CM

## 2020-05-15 DIAGNOSIS — R03.0 ELEVATED BLOOD PRESSURE READING WITHOUT DIAGNOSIS OF HYPERTENSION: ICD-10-CM

## 2020-05-15 RX ORDER — HYDROCORTISONE 10 MG/1
TABLET ORAL
Qty: 450 TAB | Refills: 3
Start: 2020-05-15 | End: 2020-10-23

## 2020-05-15 NOTE — PROGRESS NOTES
Chief Complaint   Patient presents with    Pituitary Problem     pcp and pharmacy confirmed       **THIS IS A VIRTUAL VISIT VIA A VIDEO SYNCHRONOUS DISCUSSION. PATIENT AGREED TO HAVE THEIR CARE DELIVERED OVER A MYCHART VIDEO VISIT IN PLACE OF THEIR REGULARLY SCHEDULED OFFICE VISIT**    History of Present Illness: Negrita Flowers is a 62 y.o. male here for follow up of pituitary disease. Saw [facility] in 1/20 and their repeat MRI showed resolution of the area that was involving the cavernous sinus so did not need Gamma knife and his f/u is in July 2020. Around 3/20 when COVID started acting up he changed his HC dose from 2/1/0.5 to 3 in the morning and 1 at lunch and not taking any at dinner just to keep his immune system up. Not currently having any dizziness or headaches or n/v. No abd pain. No blurry vision. His weight is currently 210 which is stable from last visit in 11/19. Is currently working night shift and took his hydrocortisone at midnight of 3 tabs and then took his labs at 8 am that morning. Current Outpatient Medications   Medication Sig    calcium carbonate (TUMS PO) Take 2 Tabs by mouth daily.  dicyclomine (BENTYL) 10 mg capsule TAKE 1 CAPSULE 3 TIMES A DAY AS NEEDED FOR ABDOMINAL CRAMPING    cholecalciferol, vitamin D3, 2,000 unit tab Take 1 Tab by mouth.  naproxen sodium (NAPROSYN) 220 mg tablet Take 220 mg by mouth.     omeprazole (PRILOSEC) 20 mg capsule TAKE 1 CAPSULE BY MOUTH EVERY DAY    DULoxetine (CYMBALTA) 30 mg capsule TAKE ONE CAPSULE BY MOUTH EVERY DAY FOR BACK PAIN    hydrocortisone (CORTEF) 10 mg tablet Take 2 tabs before breakfast and 1 tab at lunch and 1/2 tab at dinner--Dose change 11/21/19--updated med list--did not send prescription to the pharmacy    rosuvastatin (CRESTOR) 10 mg tablet TAKE 1 TABLET BY MOUTH EVERY DAY AT NIGHT    montelukast (SINGULAIR) 10 mg tablet TAKE 1 TABLET BY MOUTH EVERY DAY FOR ALLERGIES    acetaminophen (TYLENOL) 325 mg tablet Take 2 Tabs by mouth every six (6) hours as needed.  SUMAtriptan (IMITREX) 100 mg tablet Take 100 mg by mouth once as needed for Migraine.  albuterol (PROVENTIL HFA, VENTOLIN HFA, PROAIR HFA) 90 mcg/actuation inhaler Take 2 Puffs by inhalation every four (4) hours as needed for Wheezing. (Patient taking differently: Take 2 Puffs by inhalation every four (4) hours as needed for Wheezing. GETS COUPLE OF BRONCHITIS EVERY YEAR)    albuterol (PROVENTIL VENTOLIN) 2.5 mg /3 mL (0.083 %) nebulizer solution 3 mL by Nebulization route every four (4) hours as needed for Wheezing. (Patient taking differently: 2.5 mg by Nebulization route every four (4) hours as needed for Wheezing. GETS COUPLE OF BRONCHITIS EVERY YEAR)    SIMETHICONE Take 120 mg by mouth as needed. No current facility-administered medications for this visit.       Allergies   Allergen Reactions    Bactrim [Sulfamethoprim Ds] Rash    Lipitor [Atorvastatin] Myalgia    Sulfamethoxazole-Trimethoprim Rash     Review of Systems: PER HPI    Physical Examination:  - GENERAL: NCAT, Appears well nourished   - EYES: EOMI, non-icteric, no proptosis   - Ear/Nose/Throat: NCAT, no visible inflammation or masses   - CARDIOVASCULAR: no cyanosis, no visible JVD   - RESPIRATORY: respiratory effort normal without any distress or labored breathing   - MUSCULOSKELETAL: Normal ROM of neck and upper extremities observed   - SKIN: No rash on face  - NEUROLOGIC:  No facial asymmetry (Cranial nerve 7 motor function), No gaze palsy   - PSYCHIATRIC: Normal affect, Normal insight and judgement       Data Reviewed:   Component      Latest Ref Rng & Units 5/13/2020 5/13/2020 5/13/2020           8:07 AM  8:07 AM  8:07 AM   Glucose      65 - 99 mg/dL   83   BUN      6 - 24 mg/dL   23   Creatinine      0.76 - 1.27 mg/dL   0.97   GFR est non-AA      >59 mL/min/1.73   87   GFR est AA      >59 mL/min/1.73   100   BUN/Creatinine ratio      9 - 20   24 (H)   Sodium      134 - 144 mmol/L   143 Potassium      3.5 - 5.2 mmol/L   4.1   Chloride      96 - 106 mmol/L   107 (H)   CO2      20 - 29 mmol/L   22   Calcium      8.7 - 10.2 mg/dL   9.3   Insulin-Like Growth Factor I      54 - 194 ng/mL  96    Cortisol, a.m.      6.2 - 19.4 ug/dL 4.6 (L)         Assessment/Plan:     1. Benign tumor of pituitary gland [facility]): s/p transphenoidal resection of a 2.5 cm nonfunctioning pituitary tumor on 2/12/19 by Dr. Brendon Palmer. 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 His repeat MRI on 6/3/19 showed decrease in size to 1.9 cm. Ended up going to 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 His free T4 was 0.94 in 2/19 and 0.98 in 5/19 and 1.20 in 11/19 so doesn't appear to have secondary hypothyroidism. His IGF-1 was 209 in 5/19 and I was checking to see if this would be low and was up to 317 in 11/19 after surgery. Doesn't have evidence of DI and Na is normal.  Repeat MRI at [facility] in 1/20 showed resolution of carvernous sinus disease so did not need gamma knife. IGF-1 back to normal at 96 in 5/20  - check bmp and IGF-1 prior to next visit  - f/u with Dr. Alden Amaya and Dr. Elvira Abarca at [facility] in July 2020      2. Secondary adrenal insufficiency ([address]): was found to have a cortisol of 0.6 and inappropriately low ACTH of 2.2. He was started on hydrocortisone 20/10 and was feeling better with starting this but got the flu in 3/19 and needed an increase to 40/20 and then when I tapered back to 20/10, he felt worse and had been taking 30/20.   This is more than I want him to be on and gave him a very slow taper by 5 mg every 3 weeks to get back to 20/10 as a maintenance dose and was there in 11/19 but increased to 30/10 during COVID so I will have him try to taper back to 20 and 10 as his cortisol is still low at 4.2 in 5/20 showing continued suppression of his adrenal gland. I previously explained the importance of giving stress dose steroids in the future if he needs surgery or has an infection and is wearing a medic alert bracelet at this time. - taper hydrocortisone back to 20/10 as below  - check cortisol prior to next visit after holding afternoon dose  - wear medic alert bracelet or necklace     3. Secondary male hypogonadism: his testosterone was low at 228 in 2/19 and lower at 104 in 5/19 but he did not have many hypogonadal symptoms so held on treatment and up to 281 in 11/19 with wt loss. - repeat total testosterone in the future if symptoms arise        4. Elevated BP without diagnosis of Hypertension: higher dose of HC may have caused this as back to normal today  - monitor home blood pressure readings    Patient Instructions   1) Your morning cortisol is still low at 4.2 so your adrenal gland is suppressed and we need to taper your dose of hydrocortisone back down to see if you have any chance of recovery in the future. 2) For the next month take 2.5 tabs = 25 mg in the morning and 1 tab = 10 mg at lunch and then go back to 2 tabs in the mornign and 1 tab at lunch. 3) On the day before your lab draw, take your morning dose of hydrocortisone but do not take the afternoon dose of hydrocortisone. On the morning of your lab draw, do not take your hydrocortisone until you have your labs drawn before 9am that morning and then go ahead and take it after your labs are drawn and resume your afternoon dose too until you come to see me and we'll see if we can taper your dose in the future. 4) Your IGF-1 (growth hormone) was back to normal.    5) Your electrolytes (sodium, potassium, and calcium) are all normal.  Your glucose (blood sugar) is normal.    6) BUN and creatinine are markers of kidney function.   Your values are normal.    7) Please come for a follow up visit on 11/20/20 at 4:10pm in our [place] office. 8) I will mail you a lab slip. Please put this in the glove compartment or other safe spot where you keep your medical papers and I will send you a reminder to have your labs drawn prior to next visit. Follow-up and Dispositions    · Return for 11/20/20 at 4:10pm.               Copy sent to:   Ruben Jimenez NP as PCP - General (Nurse Practitioner)  Beatriz Palomares MD (Internal Medicine)

## 2020-05-15 NOTE — PATIENT INSTRUCTIONS
1) Your morning cortisol is still low at 4.2 so your adrenal gland is suppressed and we need to taper your dose of hydrocortisone back down to see if you have any chance of recovery in the future. 2) For the next month take 2.5 tabs = 25 mg in the morning and 1 tab = 10 mg at lunch and then go back to 2 tabs in the mornign and 1 tab at lunch. 3) On the day before your lab draw, take your morning dose of hydrocortisone but do not take the afternoon dose of hydrocortisone. On the morning of your lab draw, do not take your hydrocortisone until you have your labs drawn before 9am that morning and then go ahead and take it after your labs are drawn and resume your afternoon dose too until you come to see me and we'll see if we can taper your dose in the future. 4) Your IGF-1 (growth hormone) was back to normal. 
 
5) Your electrolytes (sodium, potassium, and calcium) are all normal.  Your glucose (blood sugar) is normal. 
 
6) BUN and creatinine are markers of kidney function. Your values are normal. 
 
7) Please come for a follow up visit on 11/20/20 at 4:10pm in our [place] office. 8) I will mail you a lab slip. Please put this in the glove compartment or other safe spot where you keep your medical papers and I will send you a reminder to have your labs drawn prior to next visit.

## 2020-05-15 NOTE — LETTER
5/30/2020 6:28 AM 
 
Mr. Arely Pichardo. [address] [facility] [id number] Since you haven't read the message I sent you on 5/15/20 through [address], I wanted to send you a letter with the message: 
 
1) Your morning cortisol is still low at 4.2 so your adrenal gland is suppressed and we need to taper your dose of hydrocortisone back down to see if you have any chance of recovery in the future. 2) For the next month take 2.5 tabs = 25 mg in the morning and 1 tab = 10 mg at lunch and then go back to 2 tabs in the mornign and 1 tab at lunch. 3) On the day before your lab draw, take your morning dose of hydrocortisone but do not take the afternoon dose of hydrocortisone. On the morning of your lab draw, do not take your hydrocortisone until you have your labs drawn before 9am that morning and then go ahead and take it after your labs are drawn and resume your afternoon dose too until you come to see me and we'll see if we can taper your dose in the future. 4) Your IGF-1 (growth hormone) was back to normal.  
 
5) Your electrolytes (sodium, potassium, and calcium) are all normal.  Your glucose (blood sugar) is normal.  
 
6) BUN and creatinine are markers of kidney function.  Your values are normal.  
 
7) Please come for a follow up visit on 11/20/20 at 4:10pm in our [place] office. 8) I will mail you a lab slip (already done).  Please put this in the glove compartment or other safe spot where you keep your medical papers and I will send you a reminder to have your labs drawn prior to next visit.   
 
 
 
 
 
Sincerely, 
 
 
Lakeshia Gabriel MD

## 2020-10-23 RX ORDER — HYDROCORTISONE 10 MG/1
TABLET ORAL
Qty: 90 TAB | Refills: 11 | Status: SHIPPED | OUTPATIENT
Start: 2020-10-23 | End: 2020-11-20

## 2020-11-03 ENCOUNTER — OFFICE VISIT (OUTPATIENT)
Dept: FAMILY MEDICINE CLINIC | Age: 55
End: 2020-11-03
Payer: COMMERCIAL

## 2020-11-03 DIAGNOSIS — E78.00 HYPERCHOLESTEROLEMIA: Primary | ICD-10-CM

## 2020-11-03 DIAGNOSIS — J45.20 MILD INTERMITTENT REACTIVE AIRWAY DISEASE WITH WHEEZING WITHOUT COMPLICATION: ICD-10-CM

## 2020-11-03 DIAGNOSIS — D35.2 PITUITARY ADENOMA (HCC): ICD-10-CM

## 2020-11-03 DIAGNOSIS — R52 PAIN: ICD-10-CM

## 2020-11-03 DIAGNOSIS — E27.49 SECONDARY ADRENAL INSUFFICIENCY (HCC): ICD-10-CM

## 2020-11-03 DIAGNOSIS — K58.9 IRRITABLE BOWEL SYNDROME, UNSPECIFIED TYPE: ICD-10-CM

## 2020-11-03 DIAGNOSIS — R03.0 ELEVATED BLOOD PRESSURE READING WITHOUT DIAGNOSIS OF HYPERTENSION: ICD-10-CM

## 2020-11-03 DIAGNOSIS — K21.9 GASTROESOPHAGEAL REFLUX DISEASE WITHOUT ESOPHAGITIS: ICD-10-CM

## 2020-11-03 PROCEDURE — 99214 OFFICE O/P EST MOD 30 MIN: CPT | Performed by: NURSE PRACTITIONER

## 2020-11-03 RX ORDER — ALBUTEROL SULFATE 90 UG/1
2 AEROSOL, METERED RESPIRATORY (INHALATION)
Qty: 3 INHALER | Refills: 1 | Status: SHIPPED | OUTPATIENT
Start: 2020-11-03 | End: 2020-11-22 | Stop reason: SDUPTHER

## 2020-11-03 RX ORDER — MONTELUKAST SODIUM 10 MG/1
10 TABLET ORAL DAILY
Qty: 90 TAB | Refills: 3 | Status: SHIPPED | OUTPATIENT
Start: 2020-11-03 | End: 2020-11-20

## 2020-11-03 RX ORDER — ROSUVASTATIN CALCIUM 10 MG/1
10 TABLET, COATED ORAL DAILY
Qty: 90 TAB | Refills: 3 | Status: SHIPPED | OUTPATIENT
Start: 2020-11-03 | End: 2020-11-22 | Stop reason: SDUPTHER

## 2020-11-03 RX ORDER — OMEPRAZOLE 20 MG/1
20 CAPSULE, DELAYED RELEASE ORAL DAILY
Qty: 90 CAP | Refills: 3 | Status: SHIPPED | OUTPATIENT
Start: 2020-11-03 | End: 2020-11-22 | Stop reason: SDUPTHER

## 2020-11-03 RX ORDER — DULOXETIN HYDROCHLORIDE 30 MG/1
30 CAPSULE, DELAYED RELEASE ORAL DAILY
Qty: 90 CAP | Refills: 3 | Status: SHIPPED | OUTPATIENT
Start: 2020-11-03 | End: 2020-11-22 | Stop reason: SDUPTHER

## 2020-11-03 RX ORDER — DICYCLOMINE HYDROCHLORIDE 10 MG/1
10 CAPSULE ORAL 3 TIMES DAILY
Qty: 270 CAP | Refills: 3 | Status: SHIPPED | OUTPATIENT
Start: 2020-11-03 | End: 2020-11-22 | Stop reason: SDUPTHER

## 2020-11-03 NOTE — PATIENT INSTRUCTIONS
Secondary Adrenal Insufficiency: Care Instructions Your Care Instructions Your adrenal glands sit on top of your kidneys. They make hormones that affect almost every organ in your body. Secondary adrenal insufficiency means that your adrenal glands don't make enough of a hormone called cortisol. Cortisol helps maintain blood pressure. It helps break down sugar and fat for energy. It also helps manage stress. The problem starts with the pituitary gland. It's located at the base of your brain. Normally it sends a signal to the adrenal glands to make more cortisol. The signal is a hormone that the pituitary gland makes, called ACTH. When the pituitary gland doesn't make enough ACTH, the adrenal glands won't make enough cortisol. This can happen if the pituitary gland is damaged by things like a tumor or surgery. Treatment involves replacing the hormones that your body needs. You might get some of these hormones in the hospital. Some people will take hormones at home for the rest of their lives. Hormones may be pills or injections (shots). If possible, your doctor will treat the condition that damaged the pituitary gland. Some people may need urgent care because they have what is called an adrenal crisis. It can be caused by severe infection or stress. Symptoms may include: · Severe vomiting and diarrhea. · Extreme weakness. · A high fever. Follow-up care is a key part of your treatment and safety. Be sure to make and go to all appointments, and call your doctor if you are having problems. It's also a good idea to know your test results and keep a list of the medicines you take. How can you care for yourself at home? · Take your medicines exactly as prescribed. Call your doctor if you think you are having a problem with your medicine. · Wear medical alert jewelry. This lets others know that you have adrenal insufficiency. · Have a shot of emergency medicine with you at all times.  Know when and how to give yourself the shot. Have instructions written out. Teach someone else how to give you the shot in case you can't give it to yourself. · Keep track of your blood pressure. Let your doctor know if it's high or too low. Also let your doctor know if you have swelling or you feel lightheaded. You may need to adjust your dose of medicine. · Work with your doctor to create a plan for what to do when you're sick or when your body is under stress. You may need to change your dose of medicine during this time. · Weigh yourself regularly, especially if you haven't felt like eating or you have been vomiting. Weigh yourself at the same time each day and wear the same clothes each time you weigh yourself. Let your doctor know if you're losing weight or vomiting often. When should you call for help? Call 911 anytime you think you may need emergency care. For example, call if: 
  · You passed out (lost consciousness).  
  · You have symptoms of an adrenal crisis. These may include: 
? Severe vomiting and diarrhea. ? Feeling extremely weak or like you're going to faint. ? Sudden pain in your belly, lower back, and legs. ? Strange behavior, such as feeling confused or fearful. ? A high fever. ? A pale face, and blue lips and earlobes. Call your doctor now or seek immediate medical care if: 
  · You have trouble taking medicines by mouth.  
  · You have a fever. Watch closely for changes in your health, and be sure to contact your doctor if: 
  · You do not get better as expected. Where can you learn more? Go to http://www.gray.com/ Enter F406 in the search box to learn more about \"Secondary Adrenal Insufficiency: Care Instructions. \" Current as of: March 31, 2020               Content Version: 12.6 © 5396-2388 [employer]. Care instructions adapted under license by [employer] (which disclaims liability or warranty for this information).  If you have questions about a medical condition or this instruction, always ask your healthcare professional. [place] [address] any warranty or liability for your use of this information.

## 2020-11-03 NOTE — PROGRESS NOTES
Chief Complaint   Patient presents with    Medication Refill     he is a 62y.o. year old male who presents for follow up of RAD, hyperlipidemia, IBS, GERD, pituitary adenoma, and adrenal insufficiency. Seen with his wife today. Cardiovascular risk analysis - LDL goal is under 100  hyperlipidemia. Compliance with treatment thus far has been good. Cardiovascular ROS: taking medications as instructed, no medication side effects noted, no TIA's, no chest pain on exertion, no dyspnea on exertion, no swelling of ankles, no orthostatic dizziness or lightheadedness, no orthopnea or paroxysmal nocturnal dyspnea, no palpitations, no intermittent claudication symptoms. IBS stable, uses bentyl TID prn when he has symptoms. No blood or mucus noted in stool. GERD stable on omeprazole, good compliance, no apparent side effects. Notes return of symptoms with late or missed doses. Following recommended diet plan. Requesting refill of albuterol for RAD- tends to get exacerbations during the winter with viral respiratory infections. Being followed at [facility] pituitary clinic following resection of adenoma. Dr. Anant Richey is managing the adrenal insufficiency. F/u is scheduled later this month. He is taking hydrocortisone as prescribed and feeling well. Reviewed PmHx, RxHx, FmHx, SocHx, AllgHx and updated and dated in the chart. Patient Active Problem List    Diagnosis    Elevated blood pressure reading without diagnosis of hypertension    Secondary adrenal insufficiency (HCC)    Pituitary tumor    Pituitary adenoma ([address]    Complete tear of right rotator cuff    Sinusitis    Hypercholesterolemia without hypertriglyceridemia    H/O cardiac catheterization     3/2013 with EF 60%, no significant CAD noted.       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2 02/07/2019 02:32 AM    Triglyceride 76 02/07/2019 02:32 AM    CHOL/HDL Ratio 3.2 02/07/2019 02:32 AM     Lab Results   Component Value Date/Time    Sodium 143 05/13/2020 08:07 AM    Potassium 4.1 05/13/2020 08:07 AM    Chloride 107 (H) 05/13/2020 08:07 AM    CO2 22 05/13/2020 08:07 AM    Anion gap 7 02/14/2019 11:21 AM    Glucose 83 05/13/2020 08:07 AM    BUN 23 05/13/2020 08:07 AM    Creatinine 0.97 05/13/2020 08:07 AM    BUN/Creatinine ratio 24 (H) 05/13/2020 08:07 AM    GFR est  05/13/2020 08:07 AM    GFR est non-AA 87 05/13/2020 08:07 AM    Calcium 9.3 05/13/2020 08:07 AM    Bilirubin, total 0.6 02/07/2019 02:32 AM    Alk. phosphatase 58 02/07/2019 02:32 AM    Protein, total 7.0 02/07/2019 02:32 AM    Albumin 3.7 02/07/2019 02:32 AM    Globulin 3.3 02/07/2019 02:32 AM    A-G Ratio 1.1 02/07/2019 02:32 AM    ALT (SGPT) 32 02/07/2019 02:32 AM    AST (SGOT) 22 02/07/2019 02:32 AM         Objective: There were no vitals filed for this visit.   Physical Examination: General appearance - alert, well appearing, and in no distress, oriented to person, place, and time and well hydrated  Mental status - normal mood, behavior, speech, dress, motor activity, and thought processes  Eyes - sclera anicteric  Neck - supple, no significant adenopathy, thyroid exam: thyroid is normal in size without nodules or tenderness  Chest - clear to auscultation, no wheezes, rales or rhonchi, symmetric air entry  Heart - normal rate, regular rhythm, normal S1, S2, no murmurs, rubs, clicks or gallops, normal bilateral carotid upstroke without bruits, no JVD  Abdomen - soft, nontender, nondistended, no masses or organomegaly  bowel sounds normal  Neurological - alert, oriented, normal speech, no focal findings or movement disorder noted  Extremities - no pedal edema noted, intact peripheral pulses  Skin - normal coloration and turgor, no rashes, no suspicious skin lesions noted    Assessment/ Plan:   Diagnoses and all orders for this visit:    1. Hypercholesterolemia  Above goal at last check, overdue for repeat  Continue rosuvastatin at current dose pending results  Heart healthy diet recommended  -     LIPID PANEL  -     METABOLIC PANEL, COMPREHENSIVE  -     rosuvastatin (CRESTOR) 10 mg tablet; Take 1 Tab by mouth daily. 2. Gastroesophageal reflux disease without esophagitis  Stable   Continue rx  GERD diet  -     omeprazole (PRILOSEC) 20 mg capsule; Take 1 Cap by mouth daily. 3. Secondary adrenal insufficiency (HCC)  Management per endocrine    4. Pituitary adenoma ([facility]  Continue f/u with [facility] pituitary clinc    5. Elevated blood pressure reading without diagnosis of hypertension  Low sodium diet  Weight los recommended  labs  -     CBC W/O DIFF  -     TSH 3RD GENERATION    6. Pain  Chronic  Stable, continue rx  -     DULoxetine (CYMBALTA) 30 mg capsule; Take 1 Cap by mouth daily. 7. Irritable bowel syndrome, unspecified type  Stable  Continue rx  -     dicyclomine (BENTYL) 10 mg capsule; Take 1 Cap by mouth three (3) times daily. 8. Mild intermittent reactive airway disease with wheezing without complication  Continue montelukast  Albuterol prn  -     montelukast (SINGULAIR) 10 mg tablet; Take 1 Tab by mouth daily. -     albuterol (PROVENTIL HFA, VENTOLIN HFA, PROAIR HFA) 90 mcg/actuation inhaler; Take 2 Puffs by inhalation every four (4) hours as needed for Wheezing.        Follow-up and Dispositions    · Return in about 6 months (around 5/3/2021). I have discussed the diagnosis with the patient and the intended plan as seen in the above orders. The patient has received an after-visit summary and questions were answered concerning future plans. Medication Side Effects and Warnings were discussed with patient: yes  Patient Labs were reviewed and or requested: yes  Patient Past Records were reviewed and or requested: yes    Ata Cuevas NP  11/03/20    Sony Kevin., who was evaluated through a synchronous (real-time) audio-video encounter, and/or his healthcare decision maker, is aware that it is a billable service, with coverage as determined by his insurance carrier. He provided verbal consent to proceed: Yes, and patient identification was verified. It was conducted pursuant to the emergency declaration under the [address] [place] [address] authority and the [employer] Act. A caregiver was present when appropriate. Ability to conduct physical exam was limited. I was at home. The patient was at home. Patient Instructions          Secondary Adrenal Insufficiency: Care Instructions  Your Care Instructions  Your adrenal glands sit on top of your kidneys. They make hormones that affect almost every organ in your body. Secondary adrenal insufficiency means that your adrenal glands don't make enough of a hormone called cortisol. Cortisol helps maintain blood pressure. It helps break down sugar and fat for energy. It also helps manage stress. The problem starts with the pituitary gland. It's located at the base of your brain. Normally it sends a signal to the adrenal glands to make more cortisol. The signal is a hormone that the pituitary gland makes, called ACTH. When the pituitary gland doesn't make enough ACTH, the adrenal glands won't make enough cortisol.   This can happen if the pituitary gland is damaged by things like a tumor or surgery. Treatment involves replacing the hormones that your body needs. You might get some of these hormones in the hospital. Some people will take hormones at home for the rest of their lives. Hormones may be pills or injections (shots). If possible, your doctor will treat the condition that damaged the pituitary gland. Some people may need urgent care because they have what is called an adrenal crisis. It can be caused by severe infection or stress. Symptoms may include:  · Severe vomiting and diarrhea. · Extreme weakness. · A high fever. Follow-up care is a key part of your treatment and safety. Be sure to make and go to all appointments, and call your doctor if you are having problems. It's also a good idea to know your test results and keep a list of the medicines you take. How can you care for yourself at home? · Take your medicines exactly as prescribed. Call your doctor if you think you are having a problem with your medicine. · Wear medical alert jewelry. This lets others know that you have adrenal insufficiency. · Have a shot of emergency medicine with you at all times. Know when and how to give yourself the shot. Have instructions written out. Teach someone else how to give you the shot in case you can't give it to yourself. · Keep track of your blood pressure. Let your doctor know if it's high or too low. Also let your doctor know if you have swelling or you feel lightheaded. You may need to adjust your dose of medicine. · Work with your doctor to create a plan for what to do when you're sick or when your body is under stress. You may need to change your dose of medicine during this time. · Weigh yourself regularly, especially if you haven't felt like eating or you have been vomiting. Weigh yourself at the same time each day and wear the same clothes each time you weigh yourself. Let your doctor know if you're losing weight or vomiting often. When should you call for help?    Call [phone] anytime you think you may need emergency care. For example, call if:    · You passed out (lost consciousness).     · You have symptoms of an adrenal crisis. These may include:  ? Severe vomiting and diarrhea. ? Feeling extremely weak or like you're going to faint. ? Sudden pain in your belly, lower back, and legs. ? Strange behavior, such as feeling confused or fearful. ? A high fever. ? A pale face, and blue lips and earlobes. Call your doctor now or seek immediate medical care if:    · You have trouble taking medicines by mouth.     · You have a fever. Watch closely for changes in your health, and be sure to contact your doctor if:    · You do not get better as expected. Where can you learn more? Go to http://www.gray.com/  Enter F406 in the search box to learn more about \"Secondary Adrenal Insufficiency: Care Instructions. \"  Current as of: March 31, 2020               Content Version: 12.6  © 3282-7276 [employer]. Care instructions adapted under license by [employer] (which disclaims liability or warranty for this information). If you have questions about a medical condition or this instruction, always ask your healthcare professional. [address] any warranty or liability for your use of this information.

## 2020-11-04 LAB
BUN SERPL-MCNC: 17 MG/DL (ref 6–24)
BUN/CREAT SERPL: 21 (ref 9–20)
CALCIUM SERPL-MCNC: 9.4 MG/DL (ref 8.7–10.2)
CHLORIDE SERPL-SCNC: 105 MMOL/L (ref 96–106)
CO2 SERPL-SCNC: 27 MMOL/L (ref 20–29)
CORTIS AM PEAK SERPL-MCNC: 7 UG/DL (ref 6.2–19.4)
CREAT SERPL-MCNC: 0.8 MG/DL (ref 0.76–1.27)
GLUCOSE SERPL-MCNC: 95 MG/DL (ref 65–99)
IGF-I SERPL-MCNC: 207 NG/ML (ref 68–247)
POTASSIUM SERPL-SCNC: 4.5 MMOL/L (ref 3.5–5.2)
SODIUM SERPL-SCNC: 143 MMOL/L (ref 134–144)

## 2020-11-06 DIAGNOSIS — E29.1 SECONDARY MALE HYPOGONADISM: ICD-10-CM

## 2020-11-06 DIAGNOSIS — R03.0 ELEVATED BLOOD PRESSURE READING WITHOUT DIAGNOSIS OF HYPERTENSION: ICD-10-CM

## 2020-11-06 DIAGNOSIS — E27.49 SECONDARY ADRENAL INSUFFICIENCY (HCC): ICD-10-CM

## 2020-11-06 DIAGNOSIS — D35.2 BENIGN TUMOR OF PITUITARY GLAND (HCC): ICD-10-CM

## 2020-11-20 ENCOUNTER — VIRTUAL VISIT (OUTPATIENT)
Dept: ENDOCRINOLOGY | Age: 55
End: 2020-11-20
Payer: COMMERCIAL

## 2020-11-20 ENCOUNTER — PATIENT MESSAGE (OUTPATIENT)
Dept: FAMILY MEDICINE CLINIC | Age: 55
End: 2020-11-20

## 2020-11-20 DIAGNOSIS — E78.00 HYPERCHOLESTEROLEMIA: ICD-10-CM

## 2020-11-20 DIAGNOSIS — J45.20 MILD INTERMITTENT REACTIVE AIRWAY DISEASE WITH WHEEZING WITHOUT COMPLICATION: ICD-10-CM

## 2020-11-20 DIAGNOSIS — K21.9 GASTROESOPHAGEAL REFLUX DISEASE WITHOUT ESOPHAGITIS: ICD-10-CM

## 2020-11-20 DIAGNOSIS — R03.0 ELEVATED BLOOD PRESSURE READING WITHOUT DIAGNOSIS OF HYPERTENSION: ICD-10-CM

## 2020-11-20 DIAGNOSIS — E27.49 SECONDARY ADRENAL INSUFFICIENCY (HCC): ICD-10-CM

## 2020-11-20 DIAGNOSIS — R52 PAIN: ICD-10-CM

## 2020-11-20 DIAGNOSIS — E29.1 SECONDARY MALE HYPOGONADISM: ICD-10-CM

## 2020-11-20 DIAGNOSIS — K58.9 IRRITABLE BOWEL SYNDROME, UNSPECIFIED TYPE: ICD-10-CM

## 2020-11-20 DIAGNOSIS — D35.2 BENIGN TUMOR OF PITUITARY GLAND (HCC): Primary | ICD-10-CM

## 2020-11-20 PROCEDURE — 99214 OFFICE O/P EST MOD 30 MIN: CPT | Performed by: INTERNAL MEDICINE

## 2020-11-20 RX ORDER — HYDROCORTISONE 10 MG/1
TABLET ORAL
Qty: 90 TAB | Refills: 11
Start: 2020-11-20 | End: 2020-11-20 | Stop reason: SDUPTHER

## 2020-11-20 RX ORDER — HYDROCORTISONE 10 MG/1
TABLET ORAL
Qty: 225 TAB | Refills: 3 | Status: SHIPPED | OUTPATIENT
Start: 2020-11-20 | End: 2020-11-20 | Stop reason: SDUPTHER

## 2020-11-20 RX ORDER — HYDROCORTISONE 10 MG/1
TABLET ORAL
Qty: 225 TAB | Refills: 3 | Status: SHIPPED | OUTPATIENT
Start: 2020-11-20 | End: 2021-02-01 | Stop reason: DRUGHIGH

## 2020-11-20 NOTE — PATIENT INSTRUCTIONS
1) Your morning cortisol is normal at 7 but is not at goal of 10 or more. Your ACTH (test of the pituitary gland's ability to stimulate the adrenal gland to make cortisol) was still low at 3 in 7/20 at [facility] so we'll continue to taper your steroids slowly but I don't know for sure if you'll be able to come off these altogether in the future. 2) For now, please decrease your morning dose to 15 mg = 1.5 tabs and continue 10 mg = 1 tab but take this at lunch, not in the afternoon, so you don't miss doses when flipping to night shift. Do this for 3 months and then decrease to 15 mg in the morning and 5 mg = 1.2 tab at lunch until you come back in May. 3) BUN and creatinine are markers of kidney function. Your values are normal. 
 
4) Your IGF-1 (growth hormone) was normal. 
 
5) Please come for a follow up visit on 5/21/21 at 4:10pm in our [place] office. 6) I put an order directly into the [employer] system to repeat your labs in the 1-2 weeks prior to your next visit so just ask for the order under my name and you will receive a courtesy reminder through [employer] to have these drawn.

## 2020-11-20 NOTE — PROGRESS NOTES
Chief Complaint   Patient presents with    Adrenal Problem     pcpand pharmacy confirmed    Other     Mychart       **THIS IS A VIRTUAL VISIT VIA A VIDEO SYNCHRONOUS DISCUSSION. PATIENT AGREED TO HAVE THEIR CARE DELIVERED OVER A MYCHART VIDEO VISIT IN PLACE OF THEIR REGULARLY SCHEDULED OFFICE VISIT**    History of Present Illness: Lois Gong is a 60 y.o. male here for follow up of pituitary disease. He was seen by Dr. Lawrence Jacobs at [facility] in July 2020 and had a repeat MRI that did not show any evidence of residual tumor. Dr. Madhu Ortiz note states patient was taking 10 mg of HC in the morning and afternoon but patient tells me he has been taking 20 mg in the morning and 10 mg in the afternoon. There is a telephone note in the [employer] system that states to decrease his dose to 10 mg in the morning and 5 mg in the afternoon but again he is still taking 20 mg in the morning and 10 mg in the afternoon. He has been very busy at work and frequently has been flipping from day shift to night shift. When this happens, he tends to not take his afternoon dose of HC and sometimes can feel more dizzy at work but has not passed out. Has also noticed if he misses his afternoon dose, tends to have more itching. Has not had nausea/vomiting or abd pain when missing a dose. His labs at [facility] showed his ACTH was low at 3 in 7/20. He is willing to try and keep tapering his HC at this time. Current Outpatient Medications   Medication Sig    dicyclomine (BENTYL) 10 mg capsule Take 1 Cap by mouth three (3) times daily. (Patient taking differently: Take 10 mg by mouth as needed.)    omeprazole (PRILOSEC) 20 mg capsule Take 1 Cap by mouth daily.  DULoxetine (CYMBALTA) 30 mg capsule Take 1 Cap by mouth daily.  albuterol (PROVENTIL HFA, VENTOLIN HFA, PROAIR HFA) 90 mcg/actuation inhaler Take 2 Puffs by inhalation every four (4) hours as needed for Wheezing.     hydrocortisone (CORTEF) 10 mg tablet Take 2 tabs before breakfast and 1 tab at lunch    calcium carbonate (TUMS PO) Take 2 Tabs by mouth daily.  cholecalciferol, vitamin D3, 2,000 unit tab Take 1 Tab by mouth.  naproxen sodium (NAPROSYN) 220 mg tablet Take 220 mg by mouth daily.  acetaminophen (TYLENOL) 325 mg tablet Take 2 Tabs by mouth every six (6) hours as needed.  albuterol (PROVENTIL VENTOLIN) 2.5 mg /3 mL (0.083 %) nebulizer solution 3 mL by Nebulization route every four (4) hours as needed for Wheezing. (Patient taking differently: 2.5 mg by Nebulization route every four (4) hours as needed for Wheezing. GETS COUPLE OF BRONCHITIS EVERY YEAR)    SIMETHICONE Take 120 mg by mouth as needed.  rosuvastatin (CRESTOR) 10 mg tablet Take 1 Tab by mouth daily. No current facility-administered medications for this visit.       Allergies   Allergen Reactions    Bactrim [Sulfamethoprim Ds] Rash    Lipitor [Atorvastatin] Myalgia    Sulfamethoxazole-Trimethoprim Rash     Review of Systems: PER HPI    Physical Examination:  - GENERAL: NCAT, Appears well nourished   - EYES: EOMI, non-icteric, no proptosis   - Ear/Nose/Throat: NCAT, no visible inflammation or masses   - CARDIOVASCULAR: no cyanosis, no visible JVD   - RESPIRATORY: respiratory effort normal without any distress or labored breathing   - MUSCULOSKELETAL: Normal ROM of neck and upper extremities observed   - SKIN: No rash on face  - NEUROLOGIC:  No facial asymmetry (Cranial nerve 7 motor function), No gaze palsy   - PSYCHIATRIC: Normal affect, Normal insight and judgement       Data Reviewed:   Component      Latest Ref Rng & Units 11/3/2020 11/3/2020 11/3/2020           8:41 AM  8:41 AM  8:41 AM   Glucose      65 - 99 mg/dL  95    BUN      6 - 24 mg/dL  17    Creatinine      0.76 - 1.27 mg/dL  0.80    GFR est non-AA      >59 mL/min/1.73  100    GFR est AA      >59 mL/min/1.73  115    BUN/Creatinine ratio      9 - 20  21 (H)    Sodium      134 - 144 mmol/L  143    Potassium      3.5 - 5.2 mmol/L 4.5    Chloride      96 - 106 mmol/L  105    CO2      20 - 29 mmol/L  27    Calcium      8.7 - 10.2 mg/dL  9.4    Insulin-Like Growth Factor I      68 - 247 ng/mL   207   Cortisol, a.m.      6.2 - 19.4 ug/dL 7.0         Assessment/Plan:     1. Benign tumor of pituitary gland [facility]): s/p transphenoidal resection of a 2.5 cm nonfunctioning pituitary tumor on 2/12/19 by Dr. Susan Crump. 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 His repeat MRI on 6/3/19 showed decrease in size to 1.9 cm. Ended up going to 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 His free T4 was 0.94 in 2/19 and 0.98 in 5/19 and 1.20 in 11/19 and 0.8 in 7/20 so doesn't appear to have secondary hypothyroidism. His IGF-1 was 209 in 5/19 and I was checking to see if this would be low and was up to 317 in 11/19 after surgery. Doesn't have evidence of DI and Na is normal.  Repeat MRI at [facility] [place] in 1/20 showed resolution of carvernous sinus disease so did not need gamma knife. IGF-1 back to normal at 96 in 5/20 and 207 in 11/20. His repeat MRI in 7/20 does not show any evidence of tumor.  - check bmp and IGF-1 prior to next visit  - f/u with Dr. Salma Bashir and Dr. Kim Clark at [facility] in July 2020      2. Secondary adrenal insufficiency ([address]): was found to have a cortisol of 0.6 and inappropriately low ACTH of 2.2. He was started on hydrocortisone 20/10 and was feeling better with starting this but got the flu in 3/19 and needed an increase to 40/20 and then when I tapered back to 20/10, he felt worse and had been taking 30/20.   This is more than I want him to be on and gave him a very slow taper by 5 mg every 3 weeks to get back to 20/10 as a maintenance dose and was there in 11/19 but increased to 30/10 during COVID so I had him try to taper back to 20 and 10 as his cortisol was still low at 4.2 in 5/20 showing continued suppression of his adrenal gland. His cortisol was 7.8 and ACTH low at 3 in 7/20 at [facility] but had taken his [facility] [employer]. the day of the lab draw. They thought he was on 10/10 and wanted him to taper to 10/5 but he remains on 20/10. Cortisol up to 7.0 in 11/20 so will try to taper to 15/10 for 3 months and then 15/5. I previously explained the importance of giving stress dose steroids in the future if he needs surgery or has an infection and is wearing a medic alert bracelet at this time. - taper hydrocortisone down to 15/5 as below  - check cortisol prior to next visit after holding afternoon dose  - wear medic alert bracelet or necklace     3. Secondary male hypogonadism: his testosterone was low at 228 in 2/19 and lower at 104 in 5/19 but he did not have many hypogonadal symptoms so held on treatment and up to 281 in 11/19 with wt loss and 373 in 1/20 and 360 in 7/20  - repeat total testosterone in the future if symptoms arise        4. Elevated BP without diagnosis of Hypertension: higher dose of HC may have caused this as back to normal today  - monitor home blood pressure readings    Patient Instructions   1) Your morning cortisol is normal at 7 but is not at goal of 10 or more. Your ACTH (test of the pituitary gland's ability to stimulate the adrenal gland to make cortisol) was still low at 3 in 7/20 at [facility] so we'll continue to taper your steroids slowly but I don't know for sure if you'll be able to come off these altogether in the future. 2) For now, please decrease your morning dose to 15 mg = 1.5 tabs and continue 10 mg = 1 tab but take this at lunch, not in the afternoon, so you don't miss doses when flipping to night shift. Do this for 3 months and then decrease to 15 mg in the morning and 5 mg = 1.2 tab at lunch until you come back in May.     3) BUN and creatinine are markers of kidney function. Your values are normal.    4) Your IGF-1 (growth hormone) was normal.    5) Please come for a follow up visit on 5/21/21 at 4:10pm in our [place] office. 6) I put an order directly into the [facility] system to repeat your labs in the 1-2 weeks prior to your next visit so just ask for the order under my name and you will receive a courtesy reminder through [employer] to have these drawn. Follow-up and Dispositions    · Return 5/21/21 at 4:10pm.               Copy sent to:   Ivon Pelaez NP as PCP - General (Nurse Practitioner)  Osiel Thompson MD (Internal Medicine)

## 2020-11-22 RX ORDER — DULOXETIN HYDROCHLORIDE 30 MG/1
30 CAPSULE, DELAYED RELEASE ORAL DAILY
Qty: 90 CAP | Refills: 3 | Status: SHIPPED | OUTPATIENT
Start: 2020-11-22 | End: 2022-06-14 | Stop reason: SDUPTHER

## 2020-11-22 RX ORDER — DICYCLOMINE HYDROCHLORIDE 10 MG/1
10 CAPSULE ORAL 3 TIMES DAILY
Qty: 270 CAP | Refills: 3 | Status: SHIPPED | OUTPATIENT
Start: 2020-11-22 | End: 2022-06-03

## 2020-11-22 RX ORDER — OMEPRAZOLE 20 MG/1
20 CAPSULE, DELAYED RELEASE ORAL DAILY
Qty: 90 CAP | Refills: 3 | Status: SHIPPED | OUTPATIENT
Start: 2020-11-22 | End: 2022-06-14 | Stop reason: SDUPTHER

## 2020-11-22 RX ORDER — ALBUTEROL SULFATE 90 UG/1
2 AEROSOL, METERED RESPIRATORY (INHALATION)
Qty: 3 INHALER | Refills: 1 | Status: SHIPPED | OUTPATIENT
Start: 2020-11-22

## 2020-11-22 RX ORDER — MONTELUKAST SODIUM 10 MG/1
10 TABLET ORAL DAILY
Qty: 90 TAB | Refills: 3 | Status: SHIPPED | OUTPATIENT
Start: 2020-11-22 | End: 2022-06-14 | Stop reason: SDUPTHER

## 2020-11-22 RX ORDER — ROSUVASTATIN CALCIUM 10 MG/1
10 TABLET, COATED ORAL DAILY
Qty: 90 TAB | Refills: 3 | Status: SHIPPED | OUTPATIENT
Start: 2020-11-22 | End: 2022-06-14 | Stop reason: SDUPTHER

## 2020-11-22 NOTE — TELEPHONE ENCOUNTER
From: Corwin Snell. To: Joyce Suh NP  Sent: 11/20/2020 4:26 PM EST  Subject: Prescription Question    Wiley Poser, I just had a appt with Dr. Kinza Urbina and he said the meds sent to [address] [place] [address] failed to go through. Could you please resend them.   Thank you  Amadeo Mackenzie

## 2020-11-24 RX ORDER — HYDROCORTISONE 10 MG/1
TABLET ORAL
Qty: 75 TAB | Refills: 11 | Status: SHIPPED | OUTPATIENT
Start: 2020-11-24 | End: 2021-02-01 | Stop reason: DRUGHIGH

## 2021-02-01 RX ORDER — HYDROCORTISONE 10 MG/1
TABLET ORAL
Qty: 270 TAB | Refills: 3 | Status: SHIPPED | OUTPATIENT
Start: 2021-02-01 | End: 2021-05-21

## 2021-04-26 ENCOUNTER — TRANSCRIBE ORDER (OUTPATIENT)
Dept: SCHEDULING | Age: 56
End: 2021-04-26

## 2021-04-26 DIAGNOSIS — S46.012A TRAUMATIC COMPLETE TEAR OF LEFT ROTATOR CUFF, INITIAL ENCOUNTER: Primary | ICD-10-CM

## 2021-05-07 ENCOUNTER — HOSPITAL ENCOUNTER (OUTPATIENT)
Dept: MRI IMAGING | Age: 56
Discharge: HOME | End: 2021-05-07
Attending: ORTHOPAEDIC SURGERY
Payer: COMMERCIAL

## 2021-05-07 DIAGNOSIS — S46.012A TRAUMATIC COMPLETE TEAR OF LEFT ROTATOR CUFF, INITIAL ENCOUNTER: ICD-10-CM

## 2021-05-07 PROCEDURE — 73221 MRI JOINT UPR EXTREM W/O DYE: CPT

## 2021-05-20 LAB
ALBUMIN SERPL-MCNC: 4.3 G/DL (ref 3.8–4.9)
ALBUMIN/GLOB SERPL: 1.5 {RATIO} (ref 1.2–2.2)
ALP SERPL-CCNC: 82 IU/L (ref 48–121)
ALT SERPL-CCNC: 31 IU/L (ref 0–44)
AST SERPL-CCNC: 25 IU/L (ref 0–40)
BILIRUB SERPL-MCNC: 0.3 MG/DL (ref 0–1.2)
BUN SERPL-MCNC: 29 MG/DL (ref 6–24)
BUN/CREAT SERPL: 28 (ref 9–20)
CALCIUM SERPL-MCNC: 9.1 MG/DL (ref 8.7–10.2)
CHLORIDE SERPL-SCNC: 103 MMOL/L (ref 96–106)
CHOLEST SERPL-MCNC: 172 MG/DL (ref 100–199)
CO2 SERPL-SCNC: 18 MMOL/L (ref 20–29)
CREAT SERPL-MCNC: 1.05 MG/DL (ref 0.76–1.27)
ERYTHROCYTE [DISTWIDTH] IN BLOOD BY AUTOMATED COUNT: 13.7 % (ref 11.6–15.4)
GLOBULIN SER CALC-MCNC: 2.9 G/DL (ref 1.5–4.5)
GLUCOSE SERPL-MCNC: 110 MG/DL (ref 65–99)
HCT VFR BLD AUTO: 42.2 % (ref 37.5–51)
HDLC SERPL-MCNC: 57 MG/DL
HGB BLD-MCNC: 13.8 G/DL (ref 13–17.7)
IMP & REVIEW OF LAB RESULTS: NORMAL
LDLC SERPL CALC-MCNC: 74 MG/DL (ref 0–99)
MCH RBC QN AUTO: 29.4 PG (ref 26.6–33)
MCHC RBC AUTO-ENTMCNC: 32.7 G/DL (ref 31.5–35.7)
MCV RBC AUTO: 90 FL (ref 79–97)
PLATELET # BLD AUTO: 275 X10E3/UL (ref 150–450)
POTASSIUM SERPL-SCNC: 4.3 MMOL/L (ref 3.5–5.2)
PROT SERPL-MCNC: 7.2 G/DL (ref 6–8.5)
RBC # BLD AUTO: 4.69 X10E6/UL (ref 4.14–5.8)
SODIUM SERPL-SCNC: 139 MMOL/L (ref 134–144)
TRIGL SERPL-MCNC: 256 MG/DL (ref 0–149)
TSH SERPL DL<=0.005 MIU/L-ACNC: 1.4 UIU/ML (ref 0.45–4.5)
VLDLC SERPL CALC-MCNC: 41 MG/DL (ref 5–40)
WBC # BLD AUTO: 9.2 X10E3/UL (ref 3.4–10.8)

## 2021-05-21 ENCOUNTER — VIRTUAL VISIT (OUTPATIENT)
Dept: ENDOCRINOLOGY | Age: 56
End: 2021-05-21
Payer: COMMERCIAL

## 2021-05-21 DIAGNOSIS — R03.0 ELEVATED BLOOD PRESSURE READING WITHOUT DIAGNOSIS OF HYPERTENSION: ICD-10-CM

## 2021-05-21 DIAGNOSIS — D35.2 BENIGN TUMOR OF PITUITARY GLAND (HCC): Primary | ICD-10-CM

## 2021-05-21 DIAGNOSIS — E29.1 SECONDARY MALE HYPOGONADISM: ICD-10-CM

## 2021-05-21 DIAGNOSIS — E27.49 SECONDARY ADRENAL INSUFFICIENCY (HCC): ICD-10-CM

## 2021-05-21 PROCEDURE — 99214 OFFICE O/P EST MOD 30 MIN: CPT | Performed by: INTERNAL MEDICINE

## 2021-05-21 RX ORDER — HYDROCORTISONE 5 MG/1
TABLET ORAL
Qty: 500 TABLET | Refills: 3 | Status: SHIPPED | OUTPATIENT
Start: 2021-05-21 | End: 2021-12-03

## 2021-05-21 NOTE — PROGRESS NOTES
Chief Complaint   Patient presents with    Follow-up     Pituitary f/u / dinorah- Alexandra Louis  [phone]     Other     pcp and pharmacy confirmed       **THIS IS A VIRTUAL VISIT VIA A VIDEO SYNCHRONOUS DISCUSSION. PATIENT AGREED TO HAVE THEIR CARE DELIVERED OVER A DOXY. ME VIDEO VISIT IN PLACE OF THEIR REGULARLY SCHEDULED OFFICE VISIT**    History of Present Illness: Melissa Dailey is a 60 y.o. male here for follow up of pituitary. After last visit he tried to taper HC to 15/10 but felt much more tired and weak so he went back to 20/10 and has stayed on this dose. Not having much dizziness on his current dose. Tried to get labs for me but I forgot to order the blood so we agreed to hold on labs until next time and will try to taper more slowly using 5 mg tabs. Current Outpatient Medications   Medication Sig    hydrocortisone (CORTEF) 10 mg tablet Take 2 tabs before breakfast and 1 tab at lunch    dicyclomine (BENTYL) 10 mg capsule Take 1 Cap by mouth three (3) times daily. (Patient taking differently: Take 10 mg by mouth as needed.)    DULoxetine (CYMBALTA) 30 mg capsule Take 1 Cap by mouth daily. (Patient taking differently: Take 30 mg by mouth as needed.)    omeprazole (PRILOSEC) 20 mg capsule Take 1 Cap by mouth daily. (Patient taking differently: Take 20 mg by mouth as needed.)    rosuvastatin (CRESTOR) 10 mg tablet Take 1 Tab by mouth daily.  albuterol (PROVENTIL HFA, VENTOLIN HFA, PROAIR HFA) 90 mcg/actuation inhaler Take 2 Puffs by inhalation every four (4) hours as needed for Wheezing.  montelukast (SINGULAIR) 10 mg tablet Take 1 Tab by mouth daily.  calcium carbonate (TUMS PO) Take 2 Tabs by mouth daily.  cholecalciferol, vitamin D3, 2,000 unit tab Take 1 Tab by mouth.  naproxen sodium (NAPROSYN) 220 mg tablet Take 220 mg by mouth as needed.  acetaminophen (TYLENOL) 325 mg tablet Take 2 Tabs by mouth every six (6) hours as needed.     albuterol (PROVENTIL VENTOLIN) 2.5 mg /3 mL Denies dyspnea on cpap ps 10. Some cough and abdominal discomfort noted.   Vent settings:Vent Information  $Ventilation: $Subsequent Day  Skin Assessment: Clean, dry, & intact  Equipment ID: [id number]  Equipment Changed: (S) Humidification  Vent Type: 840  Vent Mode: PS  Vt Ordered: 480 mL  Pressure Ordered: (S) 12  Rate Set: 12 bmp  Peak Flow: 60 L/min  Pressure Support: 1 cmH20  FiO2 : 40 %  SpO2: 98 %  SpO2/FiO2 ratio: 245  PaO2/FiO2 ratio: 169  Sensitivity: 3  PEEP/CPAP: 5  I Time/ I Time %: 0 s  Humidification Source: Heated wire  Humidification Temp: 37  Humidification Temp Measured: 36.9  Circuit Condensation: Drained  Mask Type: Tracheostomy  Mask Size: Large  Additional Respiratory  Assessments  Pulse: 87  Resp: 18  SpO2: 98 %  Position: Semi-Lockwood's  Humidification Source: Heated wire  Humidification Temp: 37  Circuit Condensation: Drained  Oral Care Completed?: Yes  Oral Care: Teeth brushed, Suction toothette, Mouth suctioned, Mouth moisturizer, Mouth swabbed  Subglottic Suction Done?: Yes  Airway Type: Trachial  Airway Size: 8  Cuff Pressure (cm H2O): 29 cm H2O  Skin barrier applied: Yes      Current Facility-Administered Medications:     linezolid (ZYVOX) 100 MG/5ML suspension 600 mg, 600 mg, Per G Tube, 2 times per day, KATHERINE Romeo CNP, 600 mg at 12/01/20 3092    lansoprazole suspension SUSP 30 mg, 30 mg, Per G Tube, [facility], 30 mg at 12/01/20 0646    fluconazole (DIFLUCAN) tablet 200 mg, 200 mg, Oral, Daily, KATHERINE Romeo CNP, 200 mg at 12/01/20 0021    nystatin (MYCOSTATIN) 062235 UNIT/ML suspension 500,000 Units, 5 mL, Oral, 4x Daily, KATHERINE Romeo CNP, 500,000 Units at 12/01/20 0936    vitamin B-1 (THIAMINE) tablet 100 mg, 100 mg, PEG Tube, Daily, Gamal Wu MD, 100 mg at 12/01/20 0937    mineral oil-hydrophilic petrolatum (HYDROPHOR) ointment, , Topical, BID PRN, Enoch Wu MD    0.9 % sodium chloride infusion, , Intravenous, Continuous, (0.083 %) nebulizer solution 3 mL by Nebulization route every four (4) hours as needed for Wheezing. (Patient taking differently: 2.5 mg by Nebulization route every four (4) hours as needed for Wheezing. GETS COUPLE OF BRONCHITIS EVERY YEAR)    SIMETHICONE Take 120 mg by mouth as needed. No current facility-administered medications for this visit. Allergies   Allergen Reactions    Bactrim [Sulfamethoprim Ds] Rash    Lipitor [Atorvastatin] Myalgia    Sulfamethoxazole-Trimethoprim Rash     Review of Systems: PER HPI    Physical Examination:  - GENERAL: NCAT, Appears well nourished   - EYES: EOMI, non-icteric, no proptosis   - Ear/Nose/Throat: NCAT, no visible inflammation or masses   - CARDIOVASCULAR: no cyanosis, no visible JVD   - RESPIRATORY: respiratory effort normal without any distress or labored breathing   - MUSCULOSKELETAL: Normal ROM of neck and upper extremities observed   - SKIN: No rash on face  - NEUROLOGIC:  No facial asymmetry (Cranial nerve 7 motor function), No gaze palsy   - PSYCHIATRIC: Normal affect, Normal insight and judgement       Data Reviewed:   Component      Latest Ref Rng & Units 5/19/2021 5/19/2021 5/19/2021 5/19/2021           8:48 AM  8:48 AM  8:48 AM  8:48 AM   Glucose      65 - 99 mg/dL   110 (H)    BUN      6 - 24 mg/dL   29 (H)    Creatinine      0.76 - 1.27 mg/dL   1.05    GFR est non-AA      >59 mL/min/1.73   78    GFR est AA      >59 mL/min/1.73   91    BUN/Creatinine ratio      9 - 20   28 (H)    Sodium      134 - 144 mmol/L   139    Potassium      3.5 - 5.2 mmol/L   4.3    Chloride      96 - 106 mmol/L   103    CO2      20 - 29 mmol/L   18 (L)    Calcium      8.7 - 10.2 mg/dL   9.1    Protein, total      6.0 - 8.5 g/dL   7.2    Albumin      3.8 - 4.9 g/dL   4.3    GLOBULIN, TOTAL      1.5 - 4.5 g/dL   2.9    A-G Ratio      1.2 - 2.2   1.5    Bilirubin, total      0.0 - 1.2 mg/dL   0.3    Alk.  phosphatase      48 - 121 IU/L   82    AST      0 - 40 IU/L   25    ALT Stephani Cheung MD, Stopped at 11/23/20 1638    carvedilol (COREG) tablet 6.25 mg, 6.25 mg, Oral, BID WC, Deepak Tracy MD, 6.25 mg at 12/01/20 0937    hydrALAZINE (APRESOLINE) tablet 25 mg, 25 mg, PEG Tube, 3 times per day, Deepak Tracy MD, 25 mg at 12/01/20 0646    HYDROcodone-acetaminophen (NORCO) 5-325 MG per tablet 1 tablet, 1 tablet, Oral, Q6H PRN, Deepak Tracy MD, 1 tablet at 12/01/20 0935    labetalol (NORMODYNE;TRANDATE) injection 10 mg, 10 mg, Intravenous, Q4H PRN, Deepak Tracy MD, 10 mg at 11/21/20 0413    trimethobenzamide (TIGAN) injection 200 mg, 200 mg, Intramuscular, Q6H PRN, Deepak Tracy MD, 200 mg at 11/16/20 2049    midazolam (VERSED) injection 2 mg, 2 mg, Intravenous, Q4H PRN, Morgan Tejeday., DO, 2 mg at 11/28/20 2152    divalproex (DEPAKOTE SPRINKLE) capsule 250 mg, 250 mg, Oral, 3 times per day, Cra Moreland., DO, 250 mg at 12/01/20 0330    sodium chloride flush 0.9 % injection 10 mL, 10 mL, Intravenous, PRN, Cra Moreland., DO, 10 mL at 11/25/20 1213    heparin flush 100 UNIT/ML injection 300 Units, 3 mL, Intravenous, 2 times per day, Chevylanda Moreland., DO, 300 Units at 12/01/20 9082    heparin flush 100 UNIT/ML injection 300 Units, 3 mL, Intracatheter, PRN, Chevylanda Moreland., DO, 300 Units at 11/25/20 1213    senna (SENOKOT) tablet 17.2 mg, 2 tablet, Oral, Nightly, Cra Moreland., DO, 17.2 mg at 11/30/20 2046    polyethylene glycol (GLYCOLAX) packet 17 g, 17 g, Oral, Daily PRN, Cra Moreland., DO    perflutren lipid microspheres (DEFINITY) injection 1.65 mg, 1.5 mL, Intravenous, ONCE PRN, Morgan Tejeday., DO    acetaminophen (TYLENOL) 160 MG/5ML solution 650 mg, 650 mg, Oral, Q6H PRN, Morgan Moreland., DO, 650 mg at 28/37/70 2254    folic acid injection 1 mg, 1 mg, Intravenous, Daily, Morgan Moreland., DO, 1 mg at 12/01/20 0939    polyvinyl alcohol (LIQUIFILM TEARS) 1.4 % ophthalmic solution 1 drop, 1 drop, Both Eyes, 0 - 44 IU/L   31    WBC      3.4 - 10.8 x10E3/uL  9.2     RBC      4.14 - 5.80 x10E6/uL  4.69     HGB      13.0 - 17.7 g/dL  13.8     HCT      37.5 - 51.0 %  42.2     MCV      79 - 97 fL  90     MCH      26.6 - 33.0 pg  29.4     MCHC      31.5 - 35.7 g/dL  32.7     RDW      11.6 - 15.4 %  13.7     PLATELET      751 - 639 x10E3/uL  275     Cholesterol, total      100 - 199 mg/dL    172   Triglyceride      0 - 149 mg/dL    256 (H)   HDL Cholesterol      >39 mg/dL    57   VLDL, calculated      5 - 40 mg/dL    41 (H)   LDL, calculated      0 - 99 mg/dL    74   TSH      0.450 - 4.500 uIU/mL 1.400          Assessment/Plan:     1. Benign tumor of pituitary gland [facility]): s/p transphenoidal resection of a 2.5 cm nonfunctioning pituitary tumor on 2/12/19 by Dr. Nora Karimi. 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 His repeat MRI on 6/3/19 showed decrease in size to 1.9 cm. 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 His free T4 was 0.94 in 2/19 and 0.98 in 5/19 and 1.20 in 11/19 and 0.8 in 7/20 so doesn't appear to have secondary hypothyroidism. His IGF-1 was 209 in 5/19 and I was checking to see if this would be low and was up to 317 in 11/19 after surgery. Doesn't have evidence of DI and Na is normal.  Repeat MRI at [facility] in 1/20 showed resolution of carvernous sinus disease so did not need gamma knife. IGF-1 back to normal at 96 in 5/20 and 207 in 11/20. His repeat MRI in 7/20 does not show any evidence of tumor.  - check bmp and IGF-1 prior to next visit  - f/u with Dr. Santos Zhou and Dr. Aleksandr Walker at [facility] in July 2021      2.  Secondary adrenal insufficiency ([facility] [address]): was found to have a cortisol of 0.6 and inappropriately low ACTH Q4H PRN, Terressa Gruber., DO, 1 drop at 11/02/20 1526    sodium chloride (Inhalant) 3 % nebulizer solution 2 mL, 2 mL, Nebulization, Q4H, Dhruv Britton., DO, 2 mL at 12/01/20 1153    hydrALAZINE (APRESOLINE) injection 10 mg, 10 mg, Intravenous, Q4H PRN, Terressa Gruber., DO, 10 mg at 11/25/20 1651    heparin (porcine) injection 7,500 Units, 7,500 Units, Subcutaneous, Q8H, Terressa Gruber., DO, 7,500 Units at 12/01/20 2822    sodium chloride flush 0.9 % injection 10 mL, 10 mL, Intravenous, 2 times per day, Terressa Gruber., DO, 10 mL at 12/01/20 6793    sodium chloride flush 0.9 % injection 10 mL, 10 mL, Intravenous, PRN, Dhruv Marvin Jr., DO, 10 mL at 11/26/20 1442    [DISCONTINUED] acetaminophen (TYLENOL) tablet 650 mg, 650 mg, Oral, Q6H PRN, 650 mg at 11/08/20 1703 **OR** acetaminophen (TYLENOL) suppository 650 mg, 650 mg, Rectal, Q6H PRN, Terressa Gruber., DO    Arformoterol Tartrate [facility]) nebulizer solution 15 mcg, 15 mcg, Nebulization, BID, Dhruv Osborne Nest., DO, 15 mcg at 12/01/20 0741    chlorhexidine (PERIDEX) 0.12 % solution 15 mL, 15 mL, Mouth/Throat, BID, Terressa Gruber., DO, 15 mL at 12/01/20 7845    amLODIPine (NORVASC) tablet 10 mg, 10 mg, Oral, Daily, Terressa Gruber., DO, 10 mg at 12/01/20 3770    [Held by provider] aspirin chewable tablet 81 mg, 81 mg, Oral, Daily, Terressa Gruber., DO, 81 mg at 11/16/20 9070    colchicine (COLCRYS) tablet 0.6 mg, 0.6 mg, Oral, BID PRN, Terressa Gruber., DO, 0.6 mg at 11/23/20 0900    levothyroxine (SYNTHROID) tablet 50 mcg, 50 mcg, Oral, Daily, Terressa Gruber., DO, 50 mcg at 12/01/20 5803    budesonide (PULMICORT) nebulizer suspension 500 mcg, 0.5 mg, Nebulization, BID, Terressa Gruber., DO, 500 mcg at 12/01/20 0741    ipratropium-albuterol (DUONEB) nebulizer solution 1 ampule, 1 ampule, Inhalation, Q4H WA, Terressa Gruber., DO, 1 ampule at 12/01/20 1153    glucose (GLUTOSE) 40 % of 2.2. He was started on hydrocortisone 20/10 and was feeling better with starting this but got the flu in 3/19 and needed an increase to 40/20 and then when I tapered back to 20/10, he felt worse and had been taking 30/20. This is more than I want him to be on and gave him a very slow taper by 5 mg every 3 weeks to get back to 20/10 as a maintenance dose and was there in 11/19 but increased to 30/10 during COVID so I had him try to taper back to 20 and 10 as his cortisol was still low at 4.2 in 5/20 showing continued suppression of his adrenal gland. His cortisol was 7.8 and ACTH low at 3 in 7/20 at [facility] but had taken his [facility] [employer]. the day of the lab draw. They thought he was on 10/10 and wanted him to taper to 10/5 but he remains on 20/10. Cortisol up to 7.0 in 11/20 so tried to taper to 15/10 but couldn't tolerate this so went back to 20/10 so in 5/21 tried to taper to 17.5/10. I previously explained the importance of giving stress dose steroids in the future if he needs surgery or has an infection and is wearing a medic alert bracelet at this time. - decrease hydrocortisone down to 17.5/10   - check cortisol prior to next visit after holding afternoon dose  - wear medic alert bracelet or necklace     3. Secondary male hypogonadism: his testosterone was low at 228 in 2/19 and lower at 104 in 5/19 but he did not have many hypogonadal symptoms so held on treatment and up to 281 in 11/19 with wt loss and 373 in 1/20 and 360 in 7/20  - repeat total testosterone in the future if symptoms arise        4. Elevated BP without diagnosis of Hypertension: higher dose of HC may have caused this as back to normal today  - monitor home blood pressure readings    Patient Instructions   1) I will send 5 mg hydrocortisone tabs to the pharmacy and have you take 3.5 tabs = 17.5 mg in the morning and stay on 10 mg = 2 tabs in the afternoon.   Feel free to use 10 mg tabs that you have to also get part of the dose if you'd    2) I oral gel 15 g, 15 g, Oral, PRN, Joshua Hernandez., DO    dextrose 50 % IV solution, 12.5 g, Intravenous, PRN, Forrestine Geri Christie., DO, 25 g at 11/26/20 0607    glucagon (rDNA) injection 1 mg, 1 mg, Intramuscular, PRN, Joshua Hernandez., DO    dextrose 5 % solution, 100 mL/hr, Intravenous, PRN, Forliztine Geri Marvin Jr.,   BP (!) 164/68   Pulse 87   Temp 99 °F (37.2 °C) (Temporal)   Resp 18   Ht 5' 8\" (1.727 m)   Wt (!) 335 lb 1.6 oz (152 kg)   SpO2 98%   BMI 50.95 kg/m²     General:  Awake and alert, no distress  Neck: Trach midline  Chest: Symmetric, no accessory use  Heart: RRR  Lungs: Diminished but clear  Abdomen: Obese, soft, nt  Extremities: No edema    Intake/Output Summary (Last 24 hours) at 12/1/2020 1315  Last data filed at 12/1/2020 0647  Gross per 24 hour   Intake 1150 ml   Output 1550 ml   Net -400 ml     CBC with Differential:    Lab Results   Component Value Date    WBC 11.4 12/01/2020    RBC 2.98 12/01/2020    HGB 7.9 12/01/2020    HCT 23.8 12/01/2020     12/01/2020    MCV 79.9 12/01/2020    MCH 26.5 12/01/2020    MCHC 33.2 12/01/2020    RDW 19.8 12/01/2020    NRBC 0.9 11/16/2020    SEGSPCT 73 01/26/2014    METASPCT 1.8 11/15/2020    LYMPHOPCT 32.1 12/01/2020    MONOPCT 10.0 12/01/2020    MYELOPCT 0.9 11/15/2020    BASOPCT 0.4 12/01/2020    MONOSABS 1.14 12/01/2020    LYMPHSABS 3.66 12/01/2020    EOSABS 0.66 12/01/2020    BASOSABS 0.05 12/01/2020     CMP:    Lab Results   Component Value Date     12/01/2020    K 4.7 12/01/2020    K 4.0 10/27/2020    CL 99 12/01/2020    CO2 27 12/01/2020    BUN 15 12/01/2020    CREATININE 1.1 12/01/2020    GFRAA >60 12/01/2020    LABGLOM >60 12/01/2020    GLUCOSE 141 12/01/2020    PROT 7.5 12/01/2020    LABALBU 3.5 12/01/2020    CALCIUM 9.6 12/01/2020    BILITOT <0.2 12/01/2020    ALKPHOS 98 12/01/2020    AST 16 12/01/2020    ALT 28 12/01/2020   AARON reports normal lv function and no vegetations    IMPRESSION:   Patient Active Problem List Diagnosis    Hyperlipidemia    Type 2 diabetes mellitus without complication, without long-term current use of insulin (HCC)    Atypical chest pain    Essential hypertension    Chronic acquired lymphedema    Aortic valve disease    Morbid obesity due to excess calories (HCC)    Abdominal pain    Acute respiratory failure with hypoxia (HCC)    Acute pulmonary edema (HCC)    Congestive heart failure with LV diastolic dysfunction, NYHA class 3 (HCC)    Obstructive sleep apnea    Acquired hypothyroidism    Chronic diastolic heart failure (HCC)    Nonrheumatic aortic valve stenosis    ACE-inhibitor cough    Pneumonia due to organism    Acute gout of right knee   Tolerating PS today well. Mildly anemic but better. Okay to ECF and will try to complete wean at [facility].     Alisia Pascual  12/1/2020  1:15 PM apologize for not ordering the labs but given your dose of hydrocortisone is the same, we'll draw blood before the next visit. 3) Please come for a follow up visit on 12/3/21 at 4:10pm in our [place] office. 4) I put an order directly into the [employer] system to repeat your labs in the 1-2 weeks prior to your next visit so just ask for the order under my name and you will receive a courtesy reminder through [employer] to have these drawn. Follow-up and Dispositions    · Return 12/3/21 at 4:10pm.               Copy sent to:   Margo De La Rosa NP as PCP - General (Nurse Practitioner)  Valeria Lockhart MD (Internal Medicine)

## 2021-05-21 NOTE — PATIENT INSTRUCTIONS
1) I will send 5 mg hydrocortisone tabs to the pharmacy and have you take 3.5 tabs = 17.5 mg in the morning and stay on 10 mg = 2 tabs in the afternoon. Feel free to use 10 mg tabs that you have to also get part of the dose if you'd 2) I apologize for not ordering the labs but given your dose of hydrocortisone is the same, we'll draw blood before the next visit. 3) Please come for a follow up visit on 12/3/21 at 4:10pm in our [place] office. 4) I put an order directly into the [employer] system to repeat your labs in the 1-2 weeks prior to your next visit so just ask for the order under my name and you will receive a courtesy reminder through [employer] to have these drawn.

## 2021-05-23 NOTE — PROGRESS NOTES
Triglycerides are significantly elevated- was this a fasting sample? Recommend decreasing intake of alcohol, sweets, and simple carbs, repeating fasting lipids in 3 months. LDL is at goal, continue rosuvastatin. Glucose slightly elevated- recommend repeating this test and doing an a1c with next labs in 3 months to monitor for prediabetes and diabetes.    Blood counts normal.  Thyroid level is normal.

## 2021-07-09 ENCOUNTER — DOCUMENTATION ONLY (OUTPATIENT)
Dept: FAMILY MEDICINE CLINIC | Age: 56
End: 2021-07-09

## 2021-10-28 RX ORDER — CETIRIZINE HYDROCHLORIDE 10 MG/1
CAPSULE, LIQUID FILLED ORAL
COMMUNITY

## 2021-10-28 NOTE — PERIOP NOTES
[facility]  Ambulatory Surgery Unit  Pre-operative Instructions    Surgery/Procedure Date  November 4, 2021            Tentative Arrival Time TBD      1. On the day of your surgery/procedure, please report to the Ambulatory Surgery Unit Registration Desk and sign in at your designated time. The Ambulatory Surgery Unit is located in [facility] on the [facility] side of the [facility] across from the [address] [place] [address]. Please have all of your health insurance cards and a photo ID. 2. You must have someone with you to drive you home, as you should not drive a car for 24 hours following anesthesia. Please make arrangements for a responsible adult friend or family member to stay with you for at least the first 24 hours after your surgery. 3. Do not have anything to eat or drink (including water, gum, mints, coffee, juice) after 11:59 PM, November 3rd. This may not apply to medications prescribed by your physician. (Please note below the special instructions with medications to take the morning of surgery, if applicable.)    4. We recommend you do not drink any alcoholic beverages for 24 hours before and after your surgery. 5. Contact your surgeons office for instructions on the following medications: non-steroidal anti-inflammatory drugs (i.e. Advil, Aleve), vitamins, and supplements. (Some surgeons will want you to stop these medications prior to surgery and others may allow you to take them)   **If you are currently taking Plavix, Coumadin, Aspirin and/or other blood-thinning agents, contact your surgeon for instructions. ** Your surgeon will partner with the physician prescribing these medications to determine if it is safe to stop or if you need to continue taking. Please do not stop taking these medications without instructions from your surgeon.     6. In an effort to help prevent surgical site infection, we ask that you shower with an anti-bacterial soap (i.e. Dial/Safeguard, or the soap provided to you at your preadmission testing appointment) for 3 days prior to and on the morning of surgery, using a fresh towel after each shower. (Please begin this process with fresh bed linens.) Do not apply any lotions, powders, or deodorants after the shower on the day of your procedure. If applicable, please do not shave the operative site for 48 hours prior to surgery. 7. Wear comfortable clothes. Wear glasses instead of contacts. Do not bring any jewelry or money (other than copays or fees as instructed). Do not wear make-up, particularly mascara, the morning of your surgery. Do not wear nail polish, particularly if you are having foot /hand surgery. Wear your hair loose or down, no ponytails, buns, patrick pins or clips. All body piercings must be removed. 8. You should understand that if you do not follow these instructions your surgery may be cancelled. If your physical condition changes (i.e. fever, cold or flu) please contact your surgeon as soon as possible. 9. It is important that you be on time. If a situation occurs where you may be late, or if you have any questions or problems, please call [phone].    10. Your surgery time may be subject to change. You will receive a phone call the day prior to surgery to confirm your arrival time. 11. Pediatric patients: please bring a change of clothes, diapers, bottle/sippy cup, pacifier, etc.      Special Instructions: Take all medications and inhalers, as prescribed, on the morning of surgery with a sip of water. I understand a pre-operative phone call will be made to verify my surgery time. In the event that I am not available, I give permission for a message to be left on my answering service and/or with another person?    yes    Preop instructions reviewed  Pt verbalized understanding.      ___________________      ___________________      ________________  (Signature of Patient)          (Witness) (Date and Time)

## 2021-10-28 NOTE — PERIOP NOTES
Hibiclens/Chlorhexidine    Preventing Infections Before and After - Your Surgery    IMPORTANT INSTRUCTIONS    Please read and follow these instructions carefully. If you are unable to comply with the below instructions your procedure will be cancelled. Every Night for Three (3) nights before your surgery:  1. Shower with an antibacterial soap, such as Dial, or the soap provided at your preassessment appointment. A shower is better than a bath for cleaning your skin. 2. If needed, ask someone to help you reach all areas of your body. Dont forget to clean your belly button with every shower. The night before your surgery: If you lose your Hibiclens/chlorhexidine please contact surgery center or you can purchase it at a local pharmacy  1. On the night before your surgery, shower with an antibacterial soap, such as Dial, or the soap provided at your preassessment appointment. 2. With one packet of Hibiclens/Chlorhexidine in hand, turn water off.  3. Apply Hibiclens antiseptic skin cleanser with a clean, freshly washed washcloth. ? Gently apply to your body from chin to toes (except the genital area) and especially the area(s) where your incision(s) will be. ? Leave Hibiclens/Chlorhexidine on your skin for at least 20 seconds. CAUTION: If needed, Hibiclens/chlorhexidine may be used to clean the folds of skin of the legs (such as in the area of the groin) and on your buttocks and hips. However, do not use Hibiclens/Chlorhexidine above the neck or in the genital area (your bottom) or put inside any area of your body. 4. Turn the water back on and rinse. 5. Dry gently with a clean, freshly washed towel. 6. After your shower, do not use any powder, deodorant, perfumes or lotion. 7. Use clean, freshly washed towels and washcloths every time you shower. 8. Wear clean, freshly washed pajamas to bed the night before surgery. 9. Sleep on clean, freshly washed sheets.   10. Do not allow pets to sleep in your bed with you. The Morning of your surgery:  1. Shower again thoroughly with an antibacterial soap, such as Dial or the soap provided at your preassessment appointment. If needed, ask someone for help to reach all areas of your body. Dont forget to clean your belly button! Rinse. 2. Dry gently with a clean, freshly washed towel. 3. After your shower, do not use any powder, deodorant, perfumes or lotion prior to surgery. 4. Put on clean, freshly washed clothing. Tips to help prevent infections after your surgery:  1. Protect your surgical wound from germs:  ? Hand washing is the most important thing you and your caregivers can do to prevent infections. ? Keep your bandage clean and dry! ? Do not touch your surgical wound. 2. Use clean, freshly washed towels and washcloths every time you shower; do not share bath linens with others. 3. Until your surgical wound is healed, wear clothing and sleep on bed linens each day that are clean and freshly washed. 4. Do not allow pets to sleep in your bed with you or touch your surgical wound. 5. Do not smoke - smoking delays wound healing. This may be a good time to stop smoking. 6. If you have diabetes, it is important for you to manage your blood sugar levels properly before your surgery as well as after your surgery. Poorly managed blood sugar levels slow down wound healing and prevent you from healing completely. If you lose your Hibiclens/chlorhexidine, please call the [facility], or it is available for purchase at your pharmacy.                ___________________      ___________________      ________________  (Signature of Patient)          (Witness)                   (Date and Time)

## 2021-10-28 NOTE — PERIOP NOTES
Pt wife, Harrie Schaumann, calling Dr. Liz Ca, Endocrinology, to see if any recommendations for stress dosing Cortef. Called Dr. Melvi Lerner office to make aware and history of MRSA. Reviewed Endocrinology note that pt is to take 7 tabs of steroid instead of usual 3.5 tabs day of surgery. Made pt aware.

## 2021-11-01 ENCOUNTER — HOSPITAL ENCOUNTER (OUTPATIENT)
Dept: PREADMISSION TESTING | Age: 56
Discharge: HOME | End: 2021-11-01
Payer: COMMERCIAL

## 2021-11-01 PROBLEM — M75.122 COMPLETE TEAR OF LEFT ROTATOR CUFF: Status: ACTIVE | Noted: 2021-11-01

## 2021-11-01 PROCEDURE — U0005 INFEC AGEN DETEC AMPLI PROBE: HCPCS

## 2021-11-01 NOTE — H&P
PRE- OP History and Physical                             Subjective:     Patient is a 60 y.o. male presented with a history of L shoulder pain ongoing 5 months after traction injury 7 months ago. He states he was taking down bucket weighing 5 gallons from overhead height and it came down while he was holding it. He has previous hx of rotator cuff repair on his R shoulder, and indicates his L shoulder pain felt similar to his R shoulder when it was torn. He notes that his pain has recently gotten worse. He locates her pain on her proximal bicep. He rates his pain a 10/10 on the pain scale at its worst.    .  The patient's condition has been resistant to non-operative treatment and is being admitted for surgical management of this condition.      Patient Active Problem List    Diagnosis Date Noted    Complete tear of left rotator cuff 11/01/2021    Elevated blood pressure reading without diagnosis of hypertension 05/21/2019    Secondary adrenal insufficiency ([facility] [address] 02/21/2019    Pituitary tumor 02/12/2019    Pituitary adenoma ([facility] [address] 02/05/2019    Complete tear of right rotator cuff 08/31/2017    Sinusitis 01/06/2016    Hypercholesterolemia without hypertriglyceridemia 05/06/2015    H/O cardiac catheterization 05/05/2015    Migraines 05/05/2015    GERD (gastroesophageal reflux disease) 05/05/2015    Gastritis 05/05/2015    Allergic rhinitis 05/05/2015   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 Frequent nosebleeds     GERD (gastroesophageal reflux disease)     H/O seasonal allergies     IBS (irritable bowel syndrome)     Migraine     MRSA (methicillin resistant staph aureus) culture positive 2007    hand    Panhypopituitarism ([facility] [address]     Pituitary tumor    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 BORN WITH IT. MAKES HIM HAVE BLISTERS ON HANDS. LIVER OVERPRODUCED RED BLOOD CELLS--TX IS PHLEBOTOMY AS NEEDED AND HAS HAD THIS 3 TIMES SO FAR    Secondary adrenal insufficiency ([address] 2/21/2019      Past Surgical History:   Procedure Laterality Date    HX GI      COLONOSCOPY    HX HEART CATHETERIZATION  2013    minimal disease per cardio notes.  HX HEENT      SINUS SURGERY    HX HEENT  02/2019 and 11/19, 2020    Transphenoidal pituitary surgery [facility], 2020 at [facility]    HX ORTHOPAEDIC Bilateral     bilateral thumb surgery    HX ORTHOPAEDIC Right ~2013    elbow surgery    HX OTHER SURGICAL      REMOVAL BCCA ON [place]. SIDE OF NECK AND BRIDGE OF NOSE    HX ROTATOR CUFF REPAIR Right     HX SEPTOPLASTY  2002    HX SHOULDER ARTHROSCOPY Left ~2013    VT ANALYZ NEUROSTIM BRAIN, EACH ADD 30 MIN      on 10/28/21 pt wife denies    VT PROSTATE BIOPSY, NEEDLE, SATURATION SAMPLING        Prior to Admission medications    Medication Sig Start Date End Date Taking? Authorizing Provider   Cetirizine (ZyrTEC) 10 mg cap Take  by mouth. Yes Provider, Historical   aspirin/salicylamide/caffeine (ARTHRITIS STRENGTH BC POWDER PO) Take  by mouth. Yes Provider, Historical   hydrocortisone (CORTEF) 5 mg tablet Take 3.5 tabs before breakfast and 2 tabs at lunch--delete 10 mg tabs from profile 5/21/21  Yes Sheba Lazaro MD   dicyclomine (BENTYL) 10 mg capsule Take 1 Cap by mouth three (3) times daily. Patient taking differently: Take 10 mg by mouth as needed. 11/22/20  Yes Bob Hoff Q, NP   omeprazole (PRILOSEC) 20 mg capsule Take 1 Cap by mouth daily.   Patient taking differently: Take 20 mg by mouth as needed. 11/22/20  Yes Bob Hoff NP   rosuvastatin (CRESTOR) 10 mg tablet Take 1 Tab by mouth daily. 11/22/20  Yes Bob Hoff NP   montelukast (SINGULAIR) 10 mg tablet Take 1 Tab by mouth daily. 11/22/20  Yes Bob Hoff, NP   calcium carbonate (TUMS PO) Take 2 Tabs by mouth daily. Yes Provider, Historical   cholecalciferol, vitamin D3, 2,000 unit tab Take 1 Tab by mouth. Yes Provider, Historical   naproxen sodium (NAPROSYN) 220 mg tablet Take 220 mg by mouth as needed. Yes Provider, Historical   acetaminophen (TYLENOL) 325 mg tablet Take 2 Tabs by mouth every six (6) hours as needed. 2/15/19  Yes Samir Pierce NP   DULoxetine (CYMBALTA) 30 mg capsule Take 1 Cap by mouth daily. Patient taking differently: Take 30 mg by mouth as needed. 11/22/20   Masoud Hoff NP   albuterol (PROVENTIL HFA, VENTOLIN HFA, PROAIR HFA) 90 mcg/actuation inhaler Take 2 Puffs by inhalation every four (4) hours as needed for Wheezing. Patient not taking: Reported on 10/28/2021 11/22/20   Johnny Rothman NP   albuterol (PROVENTIL VENTOLIN) 2.5 mg /3 mL (0.083 %) nebulizer solution 3 mL by Nebulization route every four (4) hours as needed for Wheezing. Patient not taking: Reported on 10/28/2021 10/25/17   Johnny Rothman NP   SIMETHICONE Take 120 mg by mouth as needed. Other, MD Erick     Allergies   Allergen Reactions    Bactrim [Sulfamethoprim Ds] Rash    Lipitor [Atorvastatin] Myalgia    Sulfamethoxazole-Trimethoprim Rash      Social History     Tobacco Use    Smoking status: Never Smoker    Smokeless tobacco: Never Used   Substance Use Topics    Alcohol use:  Yes     Alcohol/week: 14.0 standard drinks     Types: 14 Cans of beer per week      Family History   Problem Relation Age of Onset    Alcohol abuse Brother     Heart Attack Brother     Diabetes Mother     Hypertension Mother     Cancer Mother         lung    Cancer Father         PROSTATE    Arthritis-osteo Sister  Anesth Problems Neg Hx     Other Neg Hx         pituitary disease      Review of Systems  A comprehensive review of systems was negative except for that written in the HPI. Objective:     No data found. Visit Vitals  Ht 5' 8\" (1.727 m)   Wt 97.5 kg (215 lb)   BMI 32.69 kg/m²     General:  Alert, cooperative, no distress, appears stated age. Head:  Normocephalic, without obvious abnormality, atraumatic. Eyes:  Conjunctivae/corneas clear. PERRL, EOMs intact. Ears:  Normal TMs and external ear canals both ears. Nose: Nares normal. Septum midline. Mucosa normal. No drainage or sinus tenderness. Throat: Lips, mucosa, and tongue normal. Teeth and gums normal.   Neck: Supple, symmetrical, trachea midline, no adenopathy, thyroid: no enlargement/tenderness/nodules, no carotid bruit and no JVD. Back:   Symmetric, no curvature. ROM normal. No CVA tenderness. Lungs:   Clear to auscultation bilaterally. Chest wall:  No tenderness or deformity. Heart:  Regular rate and rhythm, S1, S2, no murmur, click, rub or gallop. Abdomen:   Soft, non-tender. Bowel sounds normal. No masses,  No organomegaly. Extremities: Extremities normal except Physical exam of L shoulder shows , negative Hornblower, external rotation 70, good strength external rotation, good strength flexion, no Rodney sign, normal belly press, positive Swann, positive Speeds, positive drop arm, normal skin, normal pulse. , atraumatic, no cyanosis or edema. Pulses: 2+ and symmetric all extremities. Skin: Skin color, texture, turgor normal. No rashes or lesions   Lymph nodes: Cervical, supraclavicular, and axillary nodes normal.   Neurologic: CNII-XII intact. Neurovascular exam intact in distal extremities        Imaging Review  I have personally and independently reviewed MRI and the report of L shoulder in May, 2021, which reveals full thickness subscapulars tear with biceps instability/medial bicep dislocation.  My MRI report differs from the radiologist's. Assessment:     Active Problems:    Complete tear of left rotator cuff (11/1/2021)        Plan:   Elijah Rowe has rotator cuff tear with biceps instability on the L shoulder. I discussed the natural history and natural progression of diagnoses. I reviewed the patient's medical record, previous office notes, and  discussed findings, imaging, impression, treatment options, and plan with the patient. Treatment options discussed to include no intervention, prescription strength oral anti-inflammatories, cortisone injections, oral steroids, therapy, MRI, and surgery.      This is an ongoing problem. He has previous hx of rotator cuff repair on his R shoulder. Surgical intervention was discussed and includes arthroscopic rotator cuff repair with biceps tenodesis. Pros, cons, and recovery time of surgery was discussed.      After discussion and answering questions, it was elected to proceed with surgical intervention. Received L SA injection to relieve pain until surgery. Operative and non-operative treatments have been discussed with the patient including risks and benefits of each. After consideration of risks, benefits limitations to the consented procedures and alternative options for treatment, the patient has consented to surgical interventions. Questions were answered and Pre-op teaching was completed.       HONG Kruger

## 2021-11-02 LAB
SARS-COV-2, XPLCVT: NOT DETECTED
SOURCE, COVRS: NORMAL

## 2021-11-03 ENCOUNTER — ANESTHESIA EVENT (OUTPATIENT)
Dept: SURGERY | Age: 56
End: 2021-11-03
Payer: COMMERCIAL

## 2021-11-04 ENCOUNTER — HOSPITAL ENCOUNTER (OUTPATIENT)
Age: 56
Setting detail: OUTPATIENT SURGERY
Discharge: HOME | End: 2021-11-04
Attending: ORTHOPAEDIC SURGERY | Admitting: ORTHOPAEDIC SURGERY
Payer: COMMERCIAL

## 2021-11-04 ENCOUNTER — ANESTHESIA (OUTPATIENT)
Dept: SURGERY | Age: 56
End: 2021-11-04
Payer: COMMERCIAL

## 2021-11-04 VITALS
TEMPERATURE: 97.6 F | WEIGHT: 209 LBS | SYSTOLIC BLOOD PRESSURE: 132 MMHG | RESPIRATION RATE: 16 BRPM | HEIGHT: 68 IN | OXYGEN SATURATION: 96 % | HEART RATE: 79 BPM | BODY MASS INDEX: 31.67 KG/M2 | DIASTOLIC BLOOD PRESSURE: 81 MMHG

## 2021-11-04 DIAGNOSIS — S46.012D TRAUMATIC COMPLETE TEAR OF LEFT ROTATOR CUFF, SUBSEQUENT ENCOUNTER: Primary | ICD-10-CM

## 2021-11-04 PROCEDURE — 74011250636 HC RX REV CODE- 250/636: Performed by: NURSE ANESTHETIST, CERTIFIED REGISTERED

## 2021-11-04 PROCEDURE — 76942 ECHO GUIDE FOR BIOPSY: CPT

## 2021-11-04 PROCEDURE — 64415 NJX AA&/STRD BRCH PLXS IMG: CPT | Performed by: ANESTHESIOLOGY

## 2021-11-04 PROCEDURE — 77030002922 HC SUT FBRWRE ARTH -B: Performed by: ORTHOPAEDIC SURGERY

## 2021-11-04 PROCEDURE — 76030000001 HC AMB SURG OR TIME 1 TO 1.5: Performed by: ORTHOPAEDIC SURGERY

## 2021-11-04 PROCEDURE — 77030034038 HC BLD SHV DYON ACRMNZR S&N -B: Performed by: ORTHOPAEDIC SURGERY

## 2021-11-04 PROCEDURE — 74011250636 HC RX REV CODE- 250/636

## 2021-11-04 PROCEDURE — 2709999900 HC NON-CHARGEABLE SUPPLY: Performed by: ORTHOPAEDIC SURGERY

## 2021-11-04 PROCEDURE — 76210000034 HC AMBSU PH I REC 0.5 TO 1 HR: Performed by: ORTHOPAEDIC SURGERY

## 2021-11-04 PROCEDURE — 77030008684 HC TU ET CUF COVD -B: Performed by: ANESTHESIOLOGY

## 2021-11-04 PROCEDURE — 77030008496 HC TBNG ARTHSC IRR S&N -B: Performed by: ORTHOPAEDIC SURGERY

## 2021-11-04 PROCEDURE — 77030031139 HC SUT VCRL2 J&J -A: Performed by: ORTHOPAEDIC SURGERY

## 2021-11-04 PROCEDURE — 76210000050 HC AMBSU PH II REC 0.5 TO 1 HR: Performed by: ORTHOPAEDIC SURGERY

## 2021-11-04 PROCEDURE — 77030012711 HC WND ARTHRO ABLT S&N -D: Performed by: ORTHOPAEDIC SURGERY

## 2021-11-04 PROCEDURE — 74011250637 HC RX REV CODE- 250/637: Performed by: ORTHOPAEDIC SURGERY

## 2021-11-04 PROCEDURE — 74011000250 HC RX REV CODE- 250: Performed by: ORTHOPAEDIC SURGERY

## 2021-11-04 PROCEDURE — 77030021352 HC CBL LD SYS DISP COVD -B: Performed by: ORTHOPAEDIC SURGERY

## 2021-11-04 PROCEDURE — 77030026438 HC STYL ET INTUB CARD -A: Performed by: ANESTHESIOLOGY

## 2021-11-04 PROCEDURE — 77030002916 HC SUT ETHLN J&J -A: Performed by: ORTHOPAEDIC SURGERY

## 2021-11-04 PROCEDURE — 74011250636 HC RX REV CODE- 250/636: Performed by: ORTHOPAEDIC SURGERY

## 2021-11-04 PROCEDURE — 74011250636 HC RX REV CODE- 250/636: Performed by: ANESTHESIOLOGY

## 2021-11-04 PROCEDURE — 77030004451 HC BUR SHV S&N -B: Performed by: ORTHOPAEDIC SURGERY

## 2021-11-04 PROCEDURE — 77030037837: Performed by: ORTHOPAEDIC SURGERY

## 2021-11-04 PROCEDURE — 77030003598 HC NDL MULT/FIRE ARTH -C: Performed by: ORTHOPAEDIC SURGERY

## 2021-11-04 PROCEDURE — 76060000062 HC AMB SURG ANES 1 TO 1.5 HR: Performed by: ORTHOPAEDIC SURGERY

## 2021-11-04 PROCEDURE — 77030018834: Performed by: ORTHOPAEDIC SURGERY

## 2021-11-04 PROCEDURE — 74011000250 HC RX REV CODE- 250: Performed by: NURSE ANESTHETIST, CERTIFIED REGISTERED

## 2021-11-04 RX ORDER — ONDANSETRON 2 MG/ML
4 INJECTION INTRAMUSCULAR; INTRAVENOUS AS NEEDED
Status: DISCONTINUED | OUTPATIENT
Start: 2021-11-04 | End: 2021-11-04 | Stop reason: HOSPADM

## 2021-11-04 RX ORDER — VANCOMYCIN/0.9 % SOD CHLORIDE 1.5G/250ML
1500 PLASTIC BAG, INJECTION (ML) INTRAVENOUS ONCE
Status: COMPLETED | OUTPATIENT
Start: 2021-11-04 | End: 2021-11-04

## 2021-11-04 RX ORDER — MORPHINE SULFATE 10 MG/ML
2 INJECTION, SOLUTION INTRAMUSCULAR; INTRAVENOUS
Status: DISCONTINUED | OUTPATIENT
Start: 2021-11-04 | End: 2021-11-04 | Stop reason: HOSPADM

## 2021-11-04 RX ORDER — VANCOMYCIN/0.9 % SOD CHLORIDE 1.5G/250ML
PLASTIC BAG, INJECTION (ML) INTRAVENOUS
Status: COMPLETED
Start: 2021-11-04 | End: 2021-11-04

## 2021-11-04 RX ORDER — FENTANYL CITRATE 50 UG/ML
25 INJECTION, SOLUTION INTRAMUSCULAR; INTRAVENOUS
Status: DISCONTINUED | OUTPATIENT
Start: 2021-11-04 | End: 2021-11-04 | Stop reason: HOSPADM

## 2021-11-04 RX ORDER — MIDAZOLAM HYDROCHLORIDE 1 MG/ML
INJECTION, SOLUTION INTRAMUSCULAR; INTRAVENOUS
Status: COMPLETED
Start: 2021-11-04 | End: 2021-11-04

## 2021-11-04 RX ORDER — MIDAZOLAM HYDROCHLORIDE 1 MG/ML
INJECTION, SOLUTION INTRAMUSCULAR; INTRAVENOUS AS NEEDED
Status: DISCONTINUED | OUTPATIENT
Start: 2021-11-04 | End: 2021-11-04 | Stop reason: HOSPADM

## 2021-11-04 RX ORDER — SODIUM CHLORIDE 0.9 % (FLUSH) 0.9 %
5-40 SYRINGE (ML) INJECTION EVERY 8 HOURS
Status: DISCONTINUED | OUTPATIENT
Start: 2021-11-04 | End: 2021-11-04 | Stop reason: HOSPADM

## 2021-11-04 RX ORDER — SODIUM CHLORIDE, SODIUM LACTATE, POTASSIUM CHLORIDE, CALCIUM CHLORIDE 600; 310; 30; 20 MG/100ML; MG/100ML; MG/100ML; MG/100ML
25 INJECTION, SOLUTION INTRAVENOUS CONTINUOUS
Status: DISCONTINUED | OUTPATIENT
Start: 2021-11-04 | End: 2021-11-04 | Stop reason: HOSPADM

## 2021-11-04 RX ORDER — SUCCINYLCHOLINE CHLORIDE 20 MG/ML
INJECTION INTRAMUSCULAR; INTRAVENOUS AS NEEDED
Status: DISCONTINUED | OUTPATIENT
Start: 2021-11-04 | End: 2021-11-04 | Stop reason: HOSPADM

## 2021-11-04 RX ORDER — ONDANSETRON 4 MG/1
4 TABLET, FILM COATED ORAL
Qty: 10 TABLET | Refills: 1 | Status: SHIPPED
Start: 2021-11-04

## 2021-11-04 RX ORDER — OXYCODONE HYDROCHLORIDE 5 MG/1
5 TABLET ORAL
Qty: 30 TABLET | Refills: 0 | Status: SHIPPED
Start: 2021-11-04 | End: 2021-11-09

## 2021-11-04 RX ORDER — SODIUM CHLORIDE 0.9 % (FLUSH) 0.9 %
5-40 SYRINGE (ML) INJECTION AS NEEDED
Status: DISCONTINUED | OUTPATIENT
Start: 2021-11-04 | End: 2021-11-04 | Stop reason: HOSPADM

## 2021-11-04 RX ORDER — DIPHENHYDRAMINE HYDROCHLORIDE 50 MG/ML
12.5 INJECTION, SOLUTION INTRAMUSCULAR; INTRAVENOUS AS NEEDED
Status: DISCONTINUED | OUTPATIENT
Start: 2021-11-04 | End: 2021-11-04 | Stop reason: HOSPADM

## 2021-11-04 RX ORDER — ROPIVACAINE HYDROCHLORIDE 5 MG/ML
INJECTION, SOLUTION EPIDURAL; INFILTRATION; PERINEURAL
Status: COMPLETED
Start: 2021-11-04 | End: 2021-11-04

## 2021-11-04 RX ORDER — FENTANYL CITRATE 50 UG/ML
INJECTION, SOLUTION INTRAMUSCULAR; INTRAVENOUS AS NEEDED
Status: DISCONTINUED | OUTPATIENT
Start: 2021-11-04 | End: 2021-11-04 | Stop reason: HOSPADM

## 2021-11-04 RX ORDER — ROPIVACAINE HYDROCHLORIDE 5 MG/ML
INJECTION, SOLUTION EPIDURAL; INFILTRATION; PERINEURAL
Status: COMPLETED | OUTPATIENT
Start: 2021-11-04 | End: 2021-11-04

## 2021-11-04 RX ORDER — HYDROMORPHONE HYDROCHLORIDE 1 MG/ML
.2-.5 INJECTION, SOLUTION INTRAMUSCULAR; INTRAVENOUS; SUBCUTANEOUS
Status: DISCONTINUED | OUTPATIENT
Start: 2021-11-04 | End: 2021-11-04 | Stop reason: HOSPADM

## 2021-11-04 RX ORDER — PHENYLEPHRINE HCL IN 0.9% NACL 0.4MG/10ML
SYRINGE (ML) INTRAVENOUS AS NEEDED
Status: DISCONTINUED | OUTPATIENT
Start: 2021-11-04 | End: 2021-11-04 | Stop reason: HOSPADM

## 2021-11-04 RX ORDER — PROPOFOL 10 MG/ML
INJECTION, EMULSION INTRAVENOUS AS NEEDED
Status: DISCONTINUED | OUTPATIENT
Start: 2021-11-04 | End: 2021-11-04 | Stop reason: HOSPADM

## 2021-11-04 RX ORDER — LIDOCAINE HYDROCHLORIDE 10 MG/ML
0.1 INJECTION, SOLUTION EPIDURAL; INFILTRATION; INTRACAUDAL; PERINEURAL AS NEEDED
Status: DISCONTINUED | OUTPATIENT
Start: 2021-11-04 | End: 2021-11-04 | Stop reason: HOSPADM

## 2021-11-04 RX ORDER — EPHEDRINE SULFATE/0.9% NACL/PF 50 MG/5 ML
SYRINGE (ML) INTRAVENOUS AS NEEDED
Status: DISCONTINUED | OUTPATIENT
Start: 2021-11-04 | End: 2021-11-04 | Stop reason: HOSPADM

## 2021-11-04 RX ORDER — GABAPENTIN 300 MG/1
300 CAPSULE ORAL
Qty: 3 CAPSULE | Refills: 0 | Status: SHIPPED
Start: 2021-11-04 | End: 2021-12-03

## 2021-11-04 RX ORDER — ONDANSETRON 2 MG/ML
INJECTION INTRAMUSCULAR; INTRAVENOUS AS NEEDED
Status: DISCONTINUED | OUTPATIENT
Start: 2021-11-04 | End: 2021-11-04 | Stop reason: HOSPADM

## 2021-11-04 RX ORDER — DEXAMETHASONE SODIUM PHOSPHATE 4 MG/ML
INJECTION, SOLUTION INTRA-ARTICULAR; INTRALESIONAL; INTRAMUSCULAR; INTRAVENOUS; SOFT TISSUE AS NEEDED
Status: DISCONTINUED | OUTPATIENT
Start: 2021-11-04 | End: 2021-11-04 | Stop reason: HOSPADM

## 2021-11-04 RX ORDER — LIDOCAINE HYDROCHLORIDE 20 MG/ML
INJECTION, SOLUTION EPIDURAL; INFILTRATION; INTRACAUDAL; PERINEURAL AS NEEDED
Status: DISCONTINUED | OUTPATIENT
Start: 2021-11-04 | End: 2021-11-04 | Stop reason: HOSPADM

## 2021-11-04 RX ORDER — KETOROLAC TROMETHAMINE 10 MG/1
10 TABLET, FILM COATED ORAL EVERY 6 HOURS
Qty: 12 TABLET | Refills: 0 | Status: SHIPPED
Start: 2021-11-04 | End: 2021-11-07

## 2021-11-04 RX ADMIN — LIDOCAINE HYDROCHLORIDE 100 MG: 20 INJECTION, SOLUTION INTRAVENOUS at 13:48

## 2021-11-04 RX ADMIN — ROPIVACAINE HYDROCHLORIDE 20 ML: 5 INJECTION, SOLUTION EPIDURAL; INFILTRATION; PERINEURAL at 12:35

## 2021-11-04 RX ADMIN — VANCOMYCIN HYDROCHLORIDE 1500 MG: 10 INJECTION, POWDER, LYOPHILIZED, FOR SOLUTION INTRAVENOUS at 14:05

## 2021-11-04 RX ADMIN — Medication 5 MG: at 14:11

## 2021-11-04 RX ADMIN — VANCOMYCIN HYDROCHLORIDE 1500 MG: 10 INJECTION, POWDER, LYOPHILIZED, FOR SOLUTION INTRAVENOUS at 12:52

## 2021-11-04 RX ADMIN — SODIUM CHLORIDE 8 MCG: 900 INJECTION, SOLUTION INTRAVENOUS at 14:05

## 2021-11-04 RX ADMIN — Medication 120 MCG: at 14:17

## 2021-11-04 RX ADMIN — WATER 2 G: 1 INJECTION INTRAMUSCULAR; INTRAVENOUS; SUBCUTANEOUS at 14:05

## 2021-11-04 RX ADMIN — MIDAZOLAM HYDROCHLORIDE 2 MG: 1 INJECTION, SOLUTION INTRAMUSCULAR; INTRAVENOUS at 13:43

## 2021-11-04 RX ADMIN — DEXAMETHASONE SODIUM PHOSPHATE 10 MG: 4 INJECTION, SOLUTION INTRAMUSCULAR; INTRAVENOUS at 14:02

## 2021-11-04 RX ADMIN — ONDANSETRON HYDROCHLORIDE 4 MG: 2 INJECTION, SOLUTION INTRAMUSCULAR; INTRAVENOUS at 14:45

## 2021-11-04 RX ADMIN — Medication 3 AMPULE: at 12:57

## 2021-11-04 RX ADMIN — FENTANYL CITRATE 50 MCG: 50 INJECTION, SOLUTION INTRAMUSCULAR; INTRAVENOUS at 14:07

## 2021-11-04 RX ADMIN — SODIUM CHLORIDE, POTASSIUM CHLORIDE, SODIUM LACTATE AND CALCIUM CHLORIDE 25 ML/HR: 600; 310; 30; 20 INJECTION, SOLUTION INTRAVENOUS at 12:56

## 2021-11-04 RX ADMIN — Medication 10 MG: at 14:14

## 2021-11-04 RX ADMIN — FENTANYL CITRATE 50 MCG: 50 INJECTION, SOLUTION INTRAMUSCULAR; INTRAVENOUS at 13:43

## 2021-11-04 RX ADMIN — MIDAZOLAM HYDROCHLORIDE 2 MG: 1 INJECTION, SOLUTION INTRAMUSCULAR; INTRAVENOUS at 12:28

## 2021-11-04 RX ADMIN — Medication 80 MCG: at 14:20

## 2021-11-04 RX ADMIN — Medication 10 MG: at 14:17

## 2021-11-04 RX ADMIN — SUCCINYLCHOLINE CHLORIDE 160 MG: 20 INJECTION, SOLUTION INTRAMUSCULAR; INTRAVENOUS at 13:48

## 2021-11-04 RX ADMIN — PROPOFOL 150 MG: 10 INJECTION, EMULSION INTRAVENOUS at 13:48

## 2021-11-04 NOTE — ANESTHESIA PREPROCEDURE EVALUATION
Relevant Problems   NEUROLOGY   (+) Migraines      GASTROINTESTINAL   (+) GERD (gastroesophageal reflux disease)   (+) Porphyria cutanea tarda (HCC)       Anesthetic History   No history of anesthetic complications            Review of Systems / Medical History  Patient summary reviewed, nursing notes reviewed and pertinent labs reviewed    Pulmonary  Within defined limits                 Neuro/Psych   Within defined limits           Cardiovascular              CAD    Exercise tolerance: >4 METS     GI/Hepatic/Renal     GERD      Liver disease     Endo/Other        Arthritis     Other Findings   Comments: Porphyria cutanea tarda   Sinusitis  History of pituitary adenoma, resected  Adrenal insufficency           Physical Exam    Airway  Mallampati: II  TM Distance: 4 - 6 cm  Neck ROM: normal range of motion   Mouth opening: Normal     Cardiovascular  Regular rate and rhythm,  S1 and S2 normal,  no murmur, click, rub, or gallop  Rhythm: regular  Rate: normal         Dental  No notable dental hx       Pulmonary  Breath sounds clear to auscultation               Abdominal  GI exam deferred       Other Findings            Anesthetic Plan    ASA: 3  Anesthesia type: general and regional - interscalene block    Monitoring Plan: BIS      Induction: Intravenous  Anesthetic plan and risks discussed with: Patient

## 2021-11-04 NOTE — ANESTHESIA POSTPROCEDURE EVALUATION
Procedure(s):  LEFT SHOULDER ARTHROSCOPY/BICEPS TENODESIS/EXTENSIVE DEBRIDEMENT. general, regional    Anesthesia Post Evaluation      Multimodal analgesia: multimodal analgesia used between 6 hours prior to anesthesia start to PACU discharge  Patient location during evaluation: PACU  Level of consciousness: sleepy but conscious and responsive to verbal stimuli  Pain score: 2  Pain management: adequate  Airway patency: patent  Anesthetic complications: no  Cardiovascular status: acceptable  Respiratory status: acceptable  Hydration status: acceptable  Comments: +Post-Anesthesia Evaluation and Assessment    Patient: Estella Lo MRN: [id number]  SSN: [id number]   YOB: 1965  Age: 60 y.o. Sex: male      Cardiovascular Function/Vital Signs    /85   Pulse 89   Temp 36.4 °C (97.5 °F)   Resp 18   Ht 5' 8\" (1.727 m)   Wt 94.8 kg (209 lb)   SpO2 100%   BMI 31.78 kg/m²     Patient is status post Procedure(s):  LEFT SHOULDER ARTHROSCOPY/BICEPS TENODESIS/EXTENSIVE DEBRIDEMENT. Nausea/Vomiting: Controlled. Postoperative hydration reviewed and adequate. Pain:  Pain Scale 1: Numeric (0 - 10) (11/04/21 1218)  Pain Intensity 1: 0 (11/04/21 1218)   Managed. Neurological Status:   Neuro (WDL): Within Defined Limits (11/04/21 1243)   At baseline. Mental Status and Level of Consciousness: Arousable. Pulmonary Status:   O2 Device: Nasal cannula (11/04/21 1501)   Adequate oxygenation and airway patent. Complications related to anesthesia: None    Post-anesthesia assessment completed. No concerns.     Signed By: Davy Frazier MD    11/4/2021  Post anesthesia nausea and vomiting:  controlled  Final Post Anesthesia Temperature Assessment:  Normothermia (36.0-37.5 degrees C)      INITIAL Post-op Vital signs:   Vitals Value Taken Time   /85 11/04/21 1501   Temp 36.4 °C (97.5 °F) 11/04/21 1501   Pulse 89 11/04/21 1501   Resp 18 11/04/21 1501   SpO2 100 % 11/04/21 1501

## 2021-11-04 NOTE — OP NOTES
Name: Sim Eisenmenger. Epic MRN: [id number]    : 1965    Surgery Date: 2021     Date of Dictation: 2021    Admitting DX: LEFT SHOULDER ROTATOR CUFF TEAR    Pre-OP DX:  Partial RCT subscap and supraspinatus, biceps tear    Post OP DX: same with Type 1 SLAP    Procedure: Right shld scope, biceps tenodesis, ext debridement labrum, bursa, partial cuff tears    Surgeon: Cinthia Wick M.D. Asst: none    Complications: none    Blood Loss: Minimal    Implants:     Antibiotics: Weight appropriate IV Ancef    Specimens: none    Anesthesia: GET with Interscalene Block    Indication: This patient has a symptomatic rotator cuff tear versus symptoms from biceps tear,  that comes in for repair. Patient has failed nonsurgical treatment. Procedure:  Patient is brought into the operative room theater and rolled over into the right side up position, beanbag X of plated, downside axillary roll placed, downside peroneal nerve padded, neck placed in neutral extension position the shoulders then prepped and draped in put in 10 lb of traction and 30 degrees of abduction and 20 degrees of forward flexion. After time out anterior and posterior arthroscopic portals were established in the glenohumeral joint was viewed in its entirety. The findings were as follows. Articular cartilage nl. Labrum type 1 SLAP extending anteriorly, photographed then debrided through anterior portal until smooth. . Biceps torn 70% at pulley with associated partial tear rolled edge subscapularis 30% . Jamia Morgan Undersurface Rotator Cuff 20% tear undersurface supra, 1 cm wide, 30% partial tear subscap, both debrided from below, biceps tenotomized and superior labrum smoothed. We then left the glenohumeral space and entered the subacromial space. The tp of the supra is abraded but not obviously in need of repair, it was debrided. No outlet impingement needed.   The biceps was retrieved with traction suture and luggage tag suture using 2 fibrewire placed with  Scorpion through passport and biceps tenodesed to transverse bicipital ligament and tied using knot tying technique arthroscopically. Excess biceps stump debrided. .    Photographs were taken, the joint was copiously irrigated, portals were closed and a sterile dressing applied, patient taken to the recovery room in a sling in stable condition.   End dictation

## 2021-11-04 NOTE — PERIOP NOTES
Negrita Dee.  1965  [id number]    Situation:  Verbal report given from: isaac santos and jassi gonzalez  Procedure: Procedure(s):  LEFT SHOULDER ARTHROSCOPY/BICEPS TENODESIS/EXTENSIVE DEBRIDEMENT    Background:    Preoperative diagnosis: LEFT SHOULDER ROTATOR CUFF TEAR    Postoperative diagnosis: LEFT SHOULDER ROTATOR CUFF TEAR    :  Dr. Eladio Cerda    Assistant(s): Circ-1: Kusum Wells  Scrub Tech-1: En Hardin  Surg Asst-1: Chyrl Bud    Specimens: * No specimens in log *    Assessment:  Intra-procedure medications         Anesthesia gave intra-procedure sedation and medications, see anesthesia flow sheet     Intravenous fluids: LR@ KVO     Vital signs stable       Recommendation:    Permission to share finding with wife, leo : yes

## 2021-11-04 NOTE — ROUTINE PROCESS
Air Warming blanket placed on pt; turned on for comfort    Permission received to review discharge instructions and discuss private health information with WIFE  and will have someone with them after discharge       Dr. Florentin Hussein  performed nerve block in preop using ultrasound machine to LUE. Pt on CM x3, 02 by NC at 3L, patient responsive when spoken to. Able to answer questions appropriately. Pt did receive 2 mg  given by Dr. Florentin Hussein  for sedation. Pt tolerated procedure well.  VSS and will continue to monitor       T.o.  1128

## 2021-11-04 NOTE — PERIOP NOTES
1500 Discharge instructions updated per Dr. Alix Portillo. He has called wife and left a detailed VM. 1515 awakens easily, denies complaints of pain or discomfort. [phone] awakens easily to voice. Denies complaints. Declined pt fluids. Pt states that he did not sleep last night, as he was working. His only complaint is feeling sleepy. 1543 discharge instructions reviewed by phone with wife. Verbalized understanding. 1600 IV d/c'd without incident. Assisted pt dressing, sling applied and to side of stretcher. Remains A+x4  Denies pain or discomfort. Dressing remains clean dry and intact. [phone] discharged to car via wheelchair, all belongings with pt and wife. Wife has discharge instructions. Pt has phone in jacket pocket. Home with wife.

## 2021-11-04 NOTE — BRIEF OP NOTE
Brief Postoperative Note    Patient: Hi Goldstein.   YOB: 1965  MRN: [id number]    Date of Procedure: 11/4/2021     Pre-Op Diagnosis: LEFT SHOULDER ROTATOR CUFF TEAR    Post-Op Diagnosis: biceps tenodesis and extensive debridement partial rotator cuff tears      Procedure(s):  LEFT SHOULDER ARTHROSCOPY/BICEPS TENODESIS    Surgeon(s):  Lady Missy MD    Surgical Assistant: Surg Asst-1: John Keenan    Anesthesia: General     Estimated Blood Loss (mL): Minimal    Complications: None    Specimens: * No specimens in log *     Implants: * No implants in log *    Drains: * No LDAs found *    Findings: in op note    Electronically Signed by Kan Gallo MD on 11/4/2021 at 2:48 PM

## 2021-11-04 NOTE — DISCHARGE INSTRUCTIONS
Gerry Lopez M.D. Ayesha Pereyra PA-C  Knee & Shoulder Surgery  Sports Medicine  [phone]     Rotator Cuff Repair Post-Operative Instructions      Activity Restrictions: You may remove your arm from the sling and start lifting it as soon as you like (after block wears off). Just NO curling or twisting. __X_ You had a long head biceps tendon repair. Avoid supination activities such as screwdrivers, door knobs, or using a can opener for the next 6 weeks. Pain Control: We manage post-operative pain with a combination of tools including the nerve block, ice, Tylenol, Toradol, Gabapentin and pain medicine. We encourage you to use Tylenol, 1000 mg every 6 hours, only for the first 5 days. After that use only 500 mg every 6 hours. You will use Toradol 10mg every 6 hours for the first 3 days only. Gabapentin 300mg is used only at night for the first 3 nights. We prescribe pain medicine, typically oxycodone unless you have a known adverse reaction and you will use this as needed, mostly the first 3 days and generally no more than 1 week. Sling: You will use your sling for a couple of days. Dressing: Your dressing will be left in place for 48 hours from your surgery time. You may notice bloody drainage on the dressing. That is normal.  You will remove the dressing 2 days after surgery and cover the incisions with circular band-aids. Shower with band-aids on, then remove and replace the band-aids after showers. Sutures will be removed at your first post-op appointment about 1 week after surgery. Swelling:  Swelling of the biceps, forearm, and hand is very common after surgery. You may even notice some bruising or discoloration of skin. This is normal.      Sleeping:   Patients are generally more comfortable sleeping in a reclining chair or in bed with pillows propped behind the shoulder.   You can wear your sling when sleeping, or you may remove the sling and just slide a bed pillow underneath your arm and sleep like that. Some difficulty sleeping is common for 2-3 weeks after surgery. You should resume your daily medication for other medical conditions the day after surgery. You will go home with a pain medication prescription, typically Oxycodone unless you have an allergy to this medicine. If you have an adverse reaction to the pain medicine call [phone] or after business hours [phone]. DO NOT wait until you are in a lot of pain before taking the medication. It takes the medication 30-45 minutes to take effect. Use Tylenol or acetaminophen 1000mg. NO more than 4000 mg daily. The use of narcotics can lead to constipation. A high fiber diet and lots of fluids can prevent this occurring. Over the counter laxatives can be used as directed on the label. We recommend taking both Miralax and Pericolace to help with constipation. If a refill of medication is needed, please call the office during regular business hours, Monday through Friday 8:00 a.m. to 5:00 p.m. Dr. Ciraa Gabriel may not readily available to sign a prescription so it is important to call ahead. Refills will not be made after hours, so please plan ahead. We also cannot guarantee a same day prescription. Severe Pain Dosage Schedule: If you are experiencing severe pain, you may follow this dosage schedule. Example for Severe Pain  12:00 pm Take 2 tablets of narcotic pain medicine, also take Tylenol 1000mg Toradol 10mg   1:00 pm  Take 1 more pain tablet only if in severe pain   2:00 pm  Take 1 more pain tablet only if in severe pain   4:00 pm  Repeat pain medicine as prescribed   6:00 pm  Tylenol 1000mg   8:00 pm  Repeat pain medicine as prescribed, Take Gabapentin 300mg    Severe pain medicine dosing ONLY for the FIRST 24-48 hours    Itching:  Itching/rash is a common post op complaint.   It can be caused from many different perioperative causes including but not limited to pain medicine, adhesive tapes, and surgical skin preparations. If you experience moderate to severe itching we recommend using over-the-counter oral Zyrtec (cetrizine) and/or Benadryl as directed on the package. Driving: DO NOT drive with narcotics in your system. You may begin driving 1 week after surgery. It is OK to drive in your sling, just follow the same rules. No lifting your elbow away from your side. Return to school/work: This depends on your job requirements and level of activity. If you work at a desk job or in a supervisory role, you may return as early as 3-4 days after surgery. Average return to physical labor is 4-6 months post-op. Follow Up: You will be given an appointment card for your follow up appointment at our [facility]. At that time your sutures will be removed and physical therapy will be arranged if necessary. IF PROBLEMS ARISE, PLEASE CALL THE OFFICE AT [phone]       TAKE NARCOTIC PAIN MEDICATIONS WITH FOOD! For the night of surgery, while block is still in effect, start with 1 pain pill at bedtime    Narcotics tend to be constipating and we recommend taking a stool softener such as Colace or Miralax (follow package instructions). If you were given prescriptions, please review the written information on the prescribed medications. DO NOT DRIVE WHILE TAKING NARCOTIC PAIN MEDICATIONS. CPAP PATIENTS BE SURE TO WEAR MACHINE WHENEVER NAPPING OR SLEEPING DAY/NIGHT OF SURGERY! DISCHARGE SUMMARY from Nurse    The following personal items collected during your admission are returned to you:   Dental Appliance: Dental Appliances: None  Vision: Visual Aid: Glasses  Hearing Aid:    Jewelry: Jewelry: None  Clothing: Clothing: With patient  Other Valuables:  Other Valuables: Eyeglasses (IN PACU)  Valuables sent to safe:        PATIENT INSTRUCTIONS:    After General Anesthesia or Intravenous Sedation, for 24 hours or while taking prescription Narcotics:  · Someone should be with you for the next 24 hours. · For your own safety, a responsible adult must drive you home. · Limit your activities  · Recommended activity: Rest today, up with assistance today. Do not climb stairs or shower unattended for the next 24 hours. · Start with a soft bland diet and advance as tolerated (no nausea) to regular diet. · If you have a sore throat some things that may help are: fluids, warm salt water gargle, or throat lozenges. If this does not improve after several days please follow up with your family physician. · Do not drive and operate hazardous machinery  · Do not make important personal or business decisions  · Do  not drink alcoholic beverages  · If you have not urinated within 8 hours after discharge, please contact your surgeon on call. Report the following to your surgeon:  · Excessive pain, swelling, redness or odor of or around the surgical area  · Temperature over 100.5  · Nausea and vomiting lasting longer than 4 hours or if unable to take medications  · Any signs of decreased circulation or nerve impairment to extremity: change in color, persistent  numbness, tingling, coldness or increase pain    · If you received an upper extremity nerve block, please wear your sling until the block has worn off, then refer to your surgeons post-operative instructions. If you have had a shoulder block or a block near your collar bone, you may have  symptoms such as:          1. Mild shortness of breath        2. A hoarse voice        3. Blurry vision        4. Unequal pupils        5. Drooping of your face on the same side as the nerve block. These symptoms will disappear as the nerve block wears off. · You will receive a Post Operative Call from one of the Recovery Room Nurses on the day after your surgery to check on you.  It is very important for us to know how you are recovering after your surgery. If you have an issue please call your surgeon, do not wait for the post operative call. · You may receive an e-mail or letter in the mail from [employer] regarding your experience with us in the Ambulatory Surgery Unit. Your feedback is valuable to us and we appreciate your participation in the survey. ·   · If the above instructions are not adequate or you are having problems after your surgery, call your physician at their office number. ·   · We wish youre a speedy recovery ? What to do at Home:    *  Please give a list of your current medications to your Primary Care Provider. *  Please update this list whenever your medications are discontinued, doses are      changed, or new medications (including over-the-counter products) are added. *  Please carry medication information at all times in case of emergency situations. If you have not had your influenza or pneumococcal vaccines, please follow up with your primary care physician. The discharge information has been reviewed with the patient and caregiver. The patient and caregiver verbalized understanding.

## 2021-11-04 NOTE — ANESTHESIA PROCEDURE NOTES
Peripheral Block    Start time: 11/4/2021 12:28 PM  End time: 11/4/2021 12:40 PM  Performed by: Montez Perry MD  Authorized by: Montez Perry MD       Pre-procedure: Indications: at surgeon's request and post-op pain management    Preanesthetic Checklist: patient identified, risks and benefits discussed, site marked, timeout performed, anesthesia consent given and patient being monitored    Timeout Time: 12:28 EDT          Block Type:   Block Type:   Interscalene  Laterality:  Left  Monitoring:  Standard ASA monitoring, continuous pulse ox, frequent vital sign checks, heart rate, responsive to questions and oxygen  Injection Technique:  Single shot  Procedures: ultrasound guided    Patient Position: supine  Prep: DuraPrep    Location:  Interscalene  Needle Type:  Stimuplex  Needle Gauge:  22 G  Needle Localization:  Ultrasound guidance and nerve stimulator  Motor Response comment:   Motor Response: minimal motor response >0.4 mA   Medication Injected:  Ropivacaine (PF) (NAROPIN)(0.5%) 5 mg/mL injection, 20 mL  Med Admin Time: 11/4/2021 12:35 PM    Assessment:  Number of attempts:  1  Injection Assessment:  Incremental injection every 5 mL, local visualized surrounding nerve on ultrasound, negative aspiration for blood, no intravascular symptoms, no paresthesia and ultrasound image on chart  Patient tolerance:  Patient tolerated the procedure well with no immediate complications

## 2021-11-19 DIAGNOSIS — E27.49 SECONDARY ADRENAL INSUFFICIENCY (HCC): ICD-10-CM

## 2021-11-19 DIAGNOSIS — D35.2 BENIGN TUMOR OF PITUITARY GLAND (HCC): ICD-10-CM

## 2021-11-19 DIAGNOSIS — R03.0 ELEVATED BLOOD PRESSURE READING WITHOUT DIAGNOSIS OF HYPERTENSION: ICD-10-CM

## 2021-11-19 DIAGNOSIS — E29.1 SECONDARY MALE HYPOGONADISM: ICD-10-CM

## 2021-12-03 ENCOUNTER — OFFICE VISIT (OUTPATIENT)
Dept: ENDOCRINOLOGY | Age: 56
End: 2021-12-03
Payer: COMMERCIAL

## 2021-12-03 VITALS
HEART RATE: 110 BPM | HEIGHT: 68 IN | WEIGHT: 212.8 LBS | BODY MASS INDEX: 32.25 KG/M2 | DIASTOLIC BLOOD PRESSURE: 94 MMHG | SYSTOLIC BLOOD PRESSURE: 154 MMHG

## 2021-12-03 DIAGNOSIS — D35.2 BENIGN TUMOR OF PITUITARY GLAND (HCC): Primary | ICD-10-CM

## 2021-12-03 DIAGNOSIS — E29.1 SECONDARY MALE HYPOGONADISM: ICD-10-CM

## 2021-12-03 DIAGNOSIS — E27.49 SECONDARY ADRENAL INSUFFICIENCY (HCC): ICD-10-CM

## 2021-12-03 DIAGNOSIS — R03.0 ELEVATED BLOOD PRESSURE READING WITHOUT DIAGNOSIS OF HYPERTENSION: ICD-10-CM

## 2021-12-03 PROCEDURE — 99214 OFFICE O/P EST MOD 30 MIN: CPT | Performed by: INTERNAL MEDICINE

## 2021-12-03 RX ORDER — HYDROCORTISONE 5 MG/1
TABLET ORAL
Qty: 500 TABLET | Refills: 3
Start: 2021-12-03 | End: 2022-06-03

## 2021-12-03 NOTE — PATIENT INSTRUCTIONS
1) Have Lilian Davis send me a mychart message clarifying exactly what dose of hydrocortisone you have take recently so I can interpret the labs. 2) Your blood pressure was elevated today but let me know if you are on anything for blood pressure as we may need to add something if you are not to keep your readings under 140/90. 3) Go have your labs drawn in the morning before 9 am but don't take your hydrocortisone until after the labs are drawn.

## 2021-12-03 NOTE — PROGRESS NOTES
Chief Complaint   Patient presents with    Pituitary Problem     pcp and pharmacy confimred     History of Present Illness: Aurora Walden is a 60 y.o. male here for follow up of pituitary. Had left shoulder surgery in 11/21 and had his biceps tendon and muscle repaired. Also has been busy with getting 2 chocolate labs and one yellow lab. Was taking 17.5 mg of HC in the morning and 10 mg at lunch prior to the surgery and since the surgery has just been taking one dose around 11am to noon and thinks he is getting 20 mg so will have his wife clarify. No abd pain or n/v or dizziness. May get some nausea if he doesn't eat. Did miss 3-4 days after his surgery and was started to feel more sick and weak. Did have a visit with Dr. Yovany Ghosh at [facility] in July 2021 and he is monitoring the small 4 mm area seen on MRI scan and was the one who advised him to take 20 mg in the morning. Unsure if he is taking anything for blood pressure and will clarify with his wife. Current Outpatient Medications   Medication Sig    hydrocortisone (CORTEF) 5 mg tablet Take 4 tabs before breakfast --Dose change 12/3/21--updated med list--did not send prescription to the pharmacy    ondansetron hcl (Zofran) 4 mg tablet Take 1 Tablet by mouth every eight (8) hours as needed for Nausea.  Cetirizine (ZyrTEC) 10 mg cap Take  by mouth.  aspirin/salicylamide/caffeine (ARTHRITIS STRENGTH BC POWDER PO) Take  by mouth.  dicyclomine (BENTYL) 10 mg capsule Take 1 Cap by mouth three (3) times daily. (Patient taking differently: Take 10 mg by mouth as needed.)    DULoxetine (CYMBALTA) 30 mg capsule Take 1 Cap by mouth daily. (Patient taking differently: Take 30 mg by mouth as needed.)    omeprazole (PRILOSEC) 20 mg capsule Take 1 Cap by mouth daily. (Patient taking differently: Take 20 mg by mouth as needed.)    rosuvastatin (CRESTOR) 10 mg tablet Take 1 Tab by mouth daily.     albuterol (PROVENTIL HFA, VENTOLIN HFA, PROAIR HFA) 90 mcg/actuation inhaler Take 2 Puffs by inhalation every four (4) hours as needed for Wheezing.  montelukast (SINGULAIR) 10 mg tablet Take 1 Tab by mouth daily.  cholecalciferol, vitamin D3, 2,000 unit tab Take 1 Tab by mouth.  naproxen sodium (NAPROSYN) 220 mg tablet Take 220 mg by mouth as needed.  acetaminophen (TYLENOL) 325 mg tablet Take 2 Tabs by mouth every six (6) hours as needed.  albuterol (PROVENTIL VENTOLIN) 2.5 mg /3 mL (0.083 %) nebulizer solution 3 mL by Nebulization route every four (4) hours as needed for Wheezing.  SIMETHICONE Take 120 mg by mouth as needed. No current facility-administered medications for this visit. Allergies   Allergen Reactions    Bactrim [Sulfamethoprim Ds] Rash    Lipitor [Atorvastatin] Myalgia    Sulfamethoxazole-Trimethoprim Rash     Review of Systems: PER HPI    Physical Examination:  Blood pressure (!) 154/94, pulse (!) 110, height 5' 8\" (1.727 m), weight 212 lb 12.8 oz (96.5 kg). - General: pleasant, no distress, good eye contact   - Neck: no carotid bruits  - Cardiovascular: regular, normal rate, nl s1 and s2, no m/r/g   - Respiratory: clear to auscultation bilaterally   - Integumentary: skin is normal, no edema  - Neurological: reflexes 2+ at biceps, no tremors  - Psychiatric: normal mood and affect    Data Reviewed:   Clinical Information:  Pituitary tumor. Technique:  MRI brain/pituitary without and with IV contrast. 20 ml IV Clariscan administered. Comparison:  July 1, 2020 MRI. Findings:     Status post transphenoidal surgery for pituitary adenoma resection. There is a cavity in the left aspect of the sella consistent with the surgical cavity. There is enhancing tissue in the right aspect of the sella consistent with native pituitary gland. In the posterior aspect of the left cavernous sinus, there is a 4.4 mm soft tissue nodule suspicious for residual tumor. See key image.      No additional intracranial mass lesion visualized. Scattered foci of cerebral white matter T2 prolongation suggestive of chronic small vessel ischemic disease. No hydrocephalus. Patent basilar cisterns. Osseous structures and extracranial soft tissues reveal no significant abnormality. IMPRESSION:     Mild interval enlargement of a 4.4 mm enhancing nodule in the posterior left cavernous sinus suspicious for residual/recurrent tumor. Mary Villeda M.D. Attending Physician, Radiology    Office Visit on 07/07/2021   Component Date Value Ref Range Status    Free T4 07/07/2021 0.8 0.7 - 1.5 ng/dL Final    Cortisol 07/07/2021 3.4 ug/dL Final    Testosterone 07/07/2021 347 250-1,000 ng/dL Final    Sex Hormone Binding 07/07/2021 39 11 - 78 nmol/L Final    Free Androgen Index 07/07/2021 30.8 19.2 - 123.2 Final    Testosterone Free 07/07/2021 62.7 47.0 - 244.0 pg/mL Final    Prolactin 07/07/2021 9.3 2.0 - 18.0 ng/mL Final    Luteinizing Hormone 07/07/2021 4.57 mIU/mL Final    [facility] 07/07/2021 13.67 mIU/mL Final           Assessment/Plan:     1. Benign tumor of pituitary gland [facility]): s/p transphenoidal resection of a 2.5 cm nonfunctioning pituitary tumor on 2/12/19 by Dr. Lazarus Clara. 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 His repeat MRI on 6/3/19 showed decrease in size to 1.9 cm. 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 His free T4 was 0.94 in 2/19 and 0.98 in 5/19 and 1.20 in 11/19 and 0.8 in 7/20 so doesn't appear to have secondary hypothyroidism. His IGF-1 was 209 in 5/19 and I was checking to see if this would be low and was up to 317 in 11/19 after surgery.   Doesn't have evidence of DI and Na is normal.  Repeat MRI at [facility] in 1/20 showed resolution of carvernous sinus disease so did not need gamma knife. IGF-1 back to normal at 96 in 5/20 and 207 in 11/20. His repeat MRI in 7/20 does not show any evidence of tumor. MRI in 7/21 showed mild interval enlargement of a 4.4 mm enhancing nodule in the posterior left cavernous sinus suspicious for residual/recurrent tumor and Dr. Mary Castro at [facility] is following this. - check bmp and IGF-1 now  - f/u with Dr. Mary Castro at [facility] in July 2022        2. Secondary adrenal insufficiency ([address]): was found to have a cortisol of 0.6 and inappropriately low ACTH of 2.2. He was started on hydrocortisone 20/10 and was feeling better with starting this but got the flu in 3/19 and needed an increase to 40/20 and then when I tapered back to 20/10, he felt worse and had been taking 30/20. This is more than I want him to be on and gave him a very slow taper by 5 mg every 3 weeks to get back to 20/10 as a maintenance dose and was there in 11/19 but increased to 30/10 during COVID so I had him try to taper back to 20 and 10 as his cortisol was still low at 4.2 in 5/20 showing continued suppression of his adrenal gland. His cortisol was 7.8 and ACTH low at 3 in 7/20 at [facility] but [facility]. the day of the lab draw. They thought he was on 10/10 and wanted him to taper to 10/5 but he remains on 20/10. Cortisol up to 7.0 in 11/20 so tried to taper to 15/10 but couldn't tolerate this so went back to 20/10 so in 5/21 tried to taper to 17.5/10. He states he has been taking 20 mg in the morning only since his shoulder surgery in 11/21 so will interpret his labs accordingly. I previously explained the importance of giving stress dose steroids in the future if he needs surgery or has an infection and is wearing a medic alert bracelet at this time. - cont hydrocortisone 20 mg in the morning only   - check cortisol now and prior to next visit   - wear medic alert bracelet or necklace       3.  Secondary male hypogonadism: his testosterone was low at 228 in 2/19 and lower at 104 in 5/19 but he did not have many hypogonadal symptoms so held on treatment and up to 281 in 11/19 with wt loss and 373 in 1/20 and 360 in 7/20 and 347 in 7/21.  - repeat total testosterone in the future if symptoms arise        4. Elevated BP without diagnosis of Hypertension: will check some home BP readings  - monitor home blood pressure readings    Patient Instructions   1) Have BODØ send me a "[employer]t message clarifying exactly what dose of hydrocortisone you have take recently so I can interpret the labs. 2) Your blood pressure was elevated today but let me know if you are on anything for blood pressure as we may need to add something if you are not to keep your readings under 140/90. 3) Go have your labs drawn in the morning before 9 am but don't take your hydrocortisone until after the labs are drawn. Follow-up and Dispositions    · Return in about 6 months (around 6/3/2022). Copy sent to: Osvaldo Harrell NP as PCP - General (Nurse Practitioner)  Conchis Martel MD (Internal Medicine)    Addendum: 12/04/21    We had the following "[employer]t exchange:    [id number] Brunilda Stoll for clarifying. Is he taking 4 of the 5 mg tabs or 2 of the 10 mg tabs? He told me that you give him his pills and he is not taking any hydrocortisone in the afternoon. I recommend beginning amlodipine 5 mg at bedtime for blood pressure. This will be ready for  at the pharmacy today. Start monitoring blood pressure about 2-3 times per week at alternating times either in the morning or evening after resting for 5 minutes and sitting upright in a chair with your arm at heart level. Please let me know if you are having readings over 140 on the top number or 90 on the bottom number after 2 weeks of starting the med.   My understanding of the MRI is that this very small area that was seen on MRI in July is not of concern at this point and can just be monitored but you are welcome to call Dr. Charles Banda at the [facility] pituitary clinic to clarify this.    ===View-only below this line===      ----- Message -----       From:Robert Marinelli. 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 No, he is not taking anything for his HTN. It has been running borderline high for a while now but he doesnt want to go see the PCP about it, he could use something for it. I am going to have to call Dr Charlene Marrero office because they told us there was no tumor left on his last visit and they rescheduled is next MRI for a year. We need to know.   Thank you,  Silvia Patel

## 2021-12-04 RX ORDER — AMLODIPINE BESYLATE 5 MG/1
5 TABLET ORAL
Qty: 90 TABLET | Refills: 3 | Status: SHIPPED | OUTPATIENT
Start: 2021-12-04 | End: 2022-01-11 | Stop reason: CLARIF

## 2022-01-11 ENCOUNTER — TELEPHONE (OUTPATIENT)
Dept: ENDOCRINOLOGY | Age: 57
End: 2022-01-11

## 2022-01-11 RX ORDER — FELODIPINE 5 MG/1
5 TABLET, EXTENDED RELEASE ORAL DAILY
Qty: 90 TABLET | Refills: 3 | Status: SHIPPED | OUTPATIENT
Start: 2022-01-11 | End: 2022-01-15 | Stop reason: CLARIF

## 2022-01-12 NOTE — TELEPHONE ENCOUNTER
Sent him the following message through [employer]:    I received a fax that your amlodipine is no longer covered but felodipine is covered. I sent the same dose of 5 mg tabs to your local [facility] to take 1 tab daily.

## 2022-01-15 RX ORDER — AMLODIPINE BESYLATE 5 MG/1
5 TABLET ORAL
Qty: 90 TABLET | Refills: 3 | Status: SHIPPED | OUTPATIENT
Start: 2022-01-15 | End: 2022-06-03

## 2022-01-16 NOTE — TELEPHONE ENCOUNTER
I then received a fax after this stating that felodipine is not covered and amlodipine is covered so I resent amlodipine to [facility].

## 2022-03-18 PROBLEM — M75.121 COMPLETE TEAR OF RIGHT ROTATOR CUFF: Status: ACTIVE | Noted: 2017-08-31

## 2022-03-19 PROBLEM — M75.122 COMPLETE TEAR OF LEFT ROTATOR CUFF: Status: ACTIVE | Noted: 2021-11-01

## 2022-03-19 PROBLEM — E27.49 SECONDARY ADRENAL INSUFFICIENCY (HCC): Status: ACTIVE | Noted: 2019-02-21

## 2022-03-19 PROBLEM — D35.2 PITUITARY ADENOMA (HCC): Status: ACTIVE | Noted: 2019-02-05

## 2022-03-19 PROBLEM — R03.0 ELEVATED BLOOD PRESSURE READING WITHOUT DIAGNOSIS OF HYPERTENSION: Status: ACTIVE | Noted: 2019-05-21

## 2022-03-20 PROBLEM — D49.7 PITUITARY TUMOR: Status: ACTIVE | Noted: 2019-02-12

## 2022-04-07 ENCOUNTER — APPOINTMENT (OUTPATIENT)
Dept: CT IMAGING | Age: 57
End: 2022-04-07
Attending: EMERGENCY MEDICINE
Payer: COMMERCIAL

## 2022-04-07 ENCOUNTER — HOSPITAL ENCOUNTER (EMERGENCY)
Age: 57
Discharge: HOME | End: 2022-04-08
Attending: EMERGENCY MEDICINE
Payer: COMMERCIAL

## 2022-04-07 DIAGNOSIS — K57.92 DIVERTICULITIS: Primary | ICD-10-CM

## 2022-04-07 PROCEDURE — 96372 THER/PROPH/DIAG INJ SC/IM: CPT

## 2022-04-07 PROCEDURE — 96374 THER/PROPH/DIAG INJ IV PUSH: CPT

## 2022-04-07 PROCEDURE — 99284 EMERGENCY DEPT VISIT MOD MDM: CPT

## 2022-04-07 PROCEDURE — 74011250636 HC RX REV CODE- 250/636: Performed by: EMERGENCY MEDICINE

## 2022-04-07 PROCEDURE — 74176 CT ABD & PELVIS W/O CONTRAST: CPT

## 2022-04-07 RX ORDER — HYDROMORPHONE HYDROCHLORIDE 1 MG/ML
1 INJECTION, SOLUTION INTRAMUSCULAR; INTRAVENOUS; SUBCUTANEOUS
Status: COMPLETED | OUTPATIENT
Start: 2022-04-07 | End: 2022-04-07

## 2022-04-07 RX ORDER — ONDANSETRON 2 MG/ML
4 INJECTION INTRAMUSCULAR; INTRAVENOUS
Status: COMPLETED | OUTPATIENT
Start: 2022-04-07 | End: 2022-04-07

## 2022-04-07 RX ADMIN — HYDROMORPHONE HYDROCHLORIDE 1 MG: 1 INJECTION, SOLUTION INTRAMUSCULAR; INTRAVENOUS; SUBCUTANEOUS at 23:38

## 2022-04-07 RX ADMIN — ONDANSETRON 4 MG: 2 INJECTION INTRAMUSCULAR; INTRAVENOUS at 23:38

## 2022-04-08 VITALS
SYSTOLIC BLOOD PRESSURE: 161 MMHG | TEMPERATURE: 97.9 F | RESPIRATION RATE: 18 BRPM | HEART RATE: 78 BPM | DIASTOLIC BLOOD PRESSURE: 87 MMHG | OXYGEN SATURATION: 96 %

## 2022-04-08 LAB
ALBUMIN SERPL-MCNC: 4.3 G/DL (ref 3.5–5)
ALBUMIN/GLOB SERPL: 1.2 {RATIO} (ref 1.1–2.2)
ALP SERPL-CCNC: 78 U/L (ref 45–117)
ALT SERPL-CCNC: 46 U/L (ref 12–78)
ANION GAP SERPL CALC-SCNC: 14 MMOL/L (ref 5–15)
AST SERPL W P-5'-P-CCNC: 42 U/L (ref 15–37)
BASOPHILS # BLD: 0 K/UL (ref 0–0.2)
BASOPHILS NFR BLD: 1 % (ref 0–2.5)
BILIRUB SERPL-MCNC: 0.7 MG/DL (ref 0.2–1)
BUN SERPL-MCNC: 16 MG/DL (ref 6–20)
BUN/CREAT SERPL: 22 (ref 12–20)
CA-I BLD-MCNC: 8.4 MG/DL (ref 8.5–10.1)
CHLORIDE SERPL-SCNC: 102 MMOL/L (ref 97–108)
CO2 SERPL-SCNC: 26 MMOL/L (ref 21–32)
CREAT SERPL-MCNC: 0.72 MG/DL (ref 0.7–1.3)
EOSINOPHIL # BLD: 0.2 K/UL (ref 0–0.7)
EOSINOPHIL NFR BLD: 2 % (ref 0.9–2.9)
ERYTHROCYTE [DISTWIDTH] IN BLOOD BY AUTOMATED COUNT: 15.3 % (ref 11.5–14.5)
GLOBULIN SER CALC-MCNC: 3.5 G/DL (ref 2–4)
GLUCOSE SERPL-MCNC: 81 MG/DL (ref 65–100)
HCT VFR BLD AUTO: 37 % (ref 41–53)
HGB BLD-MCNC: 12.3 G/DL (ref 13.5–17.5)
LACTATE SERPL-SCNC: 1.7 MMOL/L (ref 0.4–2)
LYMPHOCYTES # BLD: 0.9 K/UL (ref 1–4.8)
LYMPHOCYTES NFR BLD: 12 % (ref 20.5–51.1)
MCH RBC QN AUTO: 27.2 PG (ref 31–34)
MCHC RBC AUTO-ENTMCNC: 33.3 G/DL (ref 31–36)
MCV RBC AUTO: 81.7 FL (ref 80–100)
MONOCYTES # BLD: 0.4 K/UL (ref 0.2–2.4)
MONOCYTES NFR BLD: 5 % (ref 1.7–9.3)
NEUTS SEG # BLD: 6 K/UL (ref 1.8–7.7)
NEUTS SEG NFR BLD: 80 % (ref 42–75)
NRBC # BLD: 0.01 K/UL
NRBC BLD-RTO: 0.1 PER 100 WBC
PLATELET # BLD AUTO: 328 K/UL (ref 150–400)
PMV BLD AUTO: 6.8 FL (ref 6.5–11.5)
POTASSIUM SERPL-SCNC: 3.1 MMOL/L (ref 3.5–5.1)
PROT SERPL-MCNC: 7.8 G/DL (ref 6.4–8.2)
RBC # BLD AUTO: 4.54 M/UL (ref 4.5–5.9)
SODIUM SERPL-SCNC: 142 MMOL/L (ref 136–145)
WBC # BLD AUTO: 7.5 K/UL (ref 4.4–11.3)

## 2022-04-08 PROCEDURE — 83605 ASSAY OF LACTIC ACID: CPT

## 2022-04-08 PROCEDURE — 36415 COLL VENOUS BLD VENIPUNCTURE: CPT

## 2022-04-08 PROCEDURE — 85025 COMPLETE CBC W/AUTO DIFF WBC: CPT

## 2022-04-08 PROCEDURE — 87040 BLOOD CULTURE FOR BACTERIA: CPT

## 2022-04-08 PROCEDURE — 74011250636 HC RX REV CODE- 250/636: Performed by: EMERGENCY MEDICINE

## 2022-04-08 PROCEDURE — 96374 THER/PROPH/DIAG INJ IV PUSH: CPT

## 2022-04-08 PROCEDURE — 80053 COMPREHEN METABOLIC PANEL: CPT

## 2022-04-08 RX ORDER — AMOXICILLIN AND CLAVULANATE POTASSIUM 875; 125 MG/1; MG/1
1 TABLET, FILM COATED ORAL 2 TIMES DAILY
Qty: 14 TABLET | Refills: 0 | Status: SHIPPED | OUTPATIENT
Start: 2022-04-08 | End: 2022-04-15

## 2022-04-08 RX ORDER — OXYCODONE AND ACETAMINOPHEN 5; 325 MG/1; MG/1
1 TABLET ORAL
Qty: 12 TABLET | Refills: 0 | Status: SHIPPED | OUTPATIENT
Start: 2022-04-08 | End: 2022-04-11

## 2022-04-08 RX ORDER — LEVOFLOXACIN 5 MG/ML
750 INJECTION, SOLUTION INTRAVENOUS
Status: COMPLETED | OUTPATIENT
Start: 2022-04-08 | End: 2022-04-08

## 2022-04-08 RX ADMIN — LEVOFLOXACIN 750 MG: 5 INJECTION, SOLUTION INTRAVENOUS at 01:41

## 2022-04-08 NOTE — ED NOTES
Pt and spouse were educated and given discharge instructions, understanding was verbalized. Pt ambulated out of ED with all belongings.

## 2022-04-08 NOTE — ED PROVIDER NOTES
EMERGENCY DEPARTMENT HISTORY AND PHYSICAL EXAM  ?    Date: 4/7/2022  Patient Name: Indu Cruz. History of Presenting Illness    Patient presents with:  Back Pain      History Provided By: Patient    HPI: Indu Cruz., 60 y.o. 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 Pain radiates to the left hip and groin. He has chronic abdominal pain from IBS. Yesterday he vomited 4 times. He is known to have degenerative back pain that is recurrent but not chronic. He says this is different from that back pain. Pain is unrelieved with ibuprofen. [place]  was checked and he has not had any recent opiate prescriptions filled. There are no other complaints, changes, or physical findings at this time. PCP: Radu Hoang NP    Current Outpatient Medications:  amoxicillin-clavulanate (Augmentin) 875-125 mg per tablet, Take 1 Tablet by mouth two (2) times a day for 7 days. , Disp: 14 Tablet, Rfl: 0  oxyCODONE-acetaminophen (Percocet) 5-325 mg per tablet, Take 1 Tablet by mouth every six (6) hours as needed for Pain for up to 3 days. Max Daily Amount: 4 Tablets. , Disp: 12 Tablet, Rfl: 0  amLODIPine (NORVASC) 5 mg tablet, Take 1 Tablet by mouth nightly. For blood pressure--replaces felodipine that is not covered, Disp: 90 Tablet, Rfl: 3  hydrocortisone (CORTEF) 5 mg tablet, Take 4 tabs before breakfast --Dose change 12/3/21--updated med list--did not send prescription to the pharmacy, Disp: 500 Tablet, Rfl: 3  ondansetron hcl (Zofran) 4 mg tablet, Take 1 Tablet by mouth every eight (8) hours as needed for Nausea., Disp: 10 Tablet, Rfl: 1  Cetirizine (ZyrTEC) 10 mg cap, Take  by mouth., Disp: , Rfl:   aspirin/salicylamide/caffeine (ARTHRITIS STRENGTH BC POWDER PO), Take  by mouth., Disp: , Rfl:   dicyclomine (BENTYL) 10 mg capsule, Take 1 Cap by mouth three (3) times daily. (Patient taking differently: Take 10 mg by mouth as needed.), Disp: 270 Cap, Rfl: 3  DULoxetine (CYMBALTA) 30 mg capsule, Take 1 Cap by mouth daily. (Patient taking differently: Take 30 mg by mouth as needed.), Disp: 90 Cap, Rfl: 3  omeprazole (PRILOSEC) 20 mg capsule, Take 1 Cap by mouth daily. (Patient taking differently: Take 20 mg by mouth as needed.), Disp: 90 Cap, Rfl: 3  rosuvastatin (CRESTOR) 10 mg tablet, Take 1 Tab by mouth daily. , Disp: 90 Tab, Rfl: 3  albuterol (PROVENTIL HFA, VENTOLIN HFA, PROAIR HFA) 90 mcg/actuation inhaler, Take 2 Puffs by inhalation every four (4) hours as needed for Wheezing., Disp: 3 Inhaler, Rfl: 1  montelukast (SINGULAIR) 10 mg tablet, Take 1 Tab by mouth daily. , Disp: 90 Tab, Rfl: 3  cholecalciferol, vitamin D3, 2,000 unit tab, Take 1 Tab by mouth., Disp: , Rfl:   naproxen sodium (NAPROSYN) 220 mg tablet, Take 220 mg by mouth as needed. , Disp: , Rfl:   acetaminophen (TYLENOL) 325 mg tablet, Take 2 Tabs by mouth every six (6) hours as needed. , Disp: 30 Tab, Rfl: 0  albuterol (PROVENTIL VENTOLIN) 2.5 mg /3 mL (0.083 %) nebulizer solution, 3 mL by Nebulization route every four (4) hours as needed for Wheezing., Disp: 25 Each, Rfl: 1  SIMETHICONE, Take 120 mg by mouth as needed. , Disp: , Rfl:         Past History    Past Medical History:  Past Medical History:  No date: Arthritis  2012: Basal cell carcinoma     Comment:  3 on left side of neck and 1 on right side of neck and               nasal bridge area with first dx starting aprroximately 12              yrs ago  No date: Frequent nosebleeds  No date: GERD (gastroesophageal reflux disease)  No date: H/O seasonal allergies  No date: IBS (irritable bowel syndrome)  No date: Migraine  2007: MRSA (methicillin resistant staph aureus) culture positive     Comment:  hand  No date: Panhypopituitarism ([address]  No date: Pituitary tumor  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  [address] BLISTERS               ON HANDS. LIVER OVERPRODUCED RED BLOOD CELLS--TX IS               PHLEBOTOMY AS NEEDED AND HAS HAD THIS 3 TIMES SO FAR  2/21/2019: Secondary adrenal insufficiency ([address]    Past Surgical History:  Past Surgical History:  No date: HX GI     Comment:  COLONOSCOPY  2013: HX HEART CATHETERIZATION     Comment:  minimal disease per cardio notes. No date: HX HEENT     Comment:  SINUS SURGERY  02/2019 and 11/19, 2020: HX HEENT     Comment:  Transphenoidal pituitary surgery [place], 2020 at [facility]  No date: HX ORTHOPAEDIC; Bilateral     Comment:  bilateral thumb surgery  ~2013: HX ORTHOPAEDIC; Right     Comment:  elbow surgery  No date: HX OTHER SURGICAL     Comment:  REMOVAL BCCA ON [place]. SIDE OF NECK AND BRIDGE OF NOSE  No date: HX ROTATOR CUFF REPAIR; Right  2002: HX SEPTOPLASTY  ~2013: HX SHOULDER ARTHROSCOPY; Left  11/2021: HX SHOULDER ARTHROSCOPY;  Left     Comment:  biceps tenodesis and extensive debridement partial               rotator cuff tears    No date: MN ANALYZ NEUROSTIM BRAIN, EACH ADD 30 MIN     Comment:  on 10/28/21 pt wife denies  No date: MN PROSTATE BIOPSY, NEEDLE, SATURATION SAMPLING    Family History:  Review of patient's family history indicates:  Problem: Alcohol abuse     Relation: Brother         Age of Onset: (Not Specified)  Problem: Heart Attack     Relation: Brother         Age of Onset: (Not Specified)  Problem: Diabetes     Relation: Mother         Age of Onset: (Not Specified)  Problem: Hypertension     Relation: Mother         Age of Onset: (Not Specified)  Problem: Cancer     Relation: Mother         Age of Onset: (Not Specified)         Comment: lung  Problem: Cancer     Relation: Father         Age of Onset: (Not Specified)         Comment: PROSTATE  Problem: OSTEOARTHRITIS     Relation: Sister         Age of Onset: (Not Specified)  Problem: Anesth Problems     Relation: Neg Hx         Age of Onset: (Not Specified)  Problem: Other     Relation: Neg Hx         Age of Onset: (Not Specified)         Comment: pituitary disease      Social History:  Social History   Tobacco Use     Smoking status: Never Smoker     Smokeless tobacco: Never Used   Alcohol use: Yes     Alcohol/week: 14.0 standard drinks     Types: 14 Cans of beer per week   Drug use: Yes     Types: Marijuana     Comment: cocaine use in 20's       Allergies:  -- Bactrim (Sulfamethoprim Ds) -- Rash  -- Lipitor (Atorvastatin) -- Myalgia  -- Sulfamethoxazole-Trimethoprim -- Rash      Review of Systems  [unfilled]    [facility]  [facility]    Diagnostic Study Results    Labs -  Recent Results (from the past 12 hour(s))  -CBC WITH AUTOMATED DIFF:   Collection Time: 04/08/22  1:09 AM      Result                      Value             Ref Range          WBC                         7.5               4.4 - 11.3 K*      RBC                         4.54              4.50 - 5.90 *      HGB                         12.3 (L)          13.5 - 17.5 *      HCT                         37.0 (L)          41 - 53 %          MCV                         81.7              80 - 100 FL        MCH                         27.2 (L)          31 - 34 PG         MCHC                        33.3              31.0 - 36.0 *      RDW                         15.3 (H)          11.5 - 14.5 %      PLATELET                    328               150 - 400 K/*      MPV                         6.8               6.5 - 11.5 FL      NRBC                        0.1                WBC        ABSOLUTE NRBC               0.01              K/uL               NEUTROPHILS                 80 (H)            42 - 75 %          LYMPHOCYTES                 12 (L)            20.5 - 51.1 %      MONOCYTES                   5                 1.7 - 9.3 %        EOSINOPHILS                 2                 0.9 - 2.9 %        BASOPHILS                   1                 0.0 - 2.5 %        ABS. NEUTROPHILS            6.0               1.8 - 7.7 K/*      ABS.  LYMPHOCYTES 0.9 (L)           1.0 - 4.8 K/*      ABS. MONOCYTES              0.4               0.2 - 2.4 K/*      ABS. EOSINOPHILS            0.2               0.0 - 0.7 K/*      ABS. BASOPHILS              0.0               0.0 - 0.2 K/*  -METABOLIC PANEL, COMPREHENSIVE:   Collection Time: 04/08/22  1:09 AM      Result                      Value             Ref Range          Sodium                      142               136 - 145 mm*      Potassium                   3.1 (L)           3.5 - 5.1 mm*      Chloride                    102               97 - 108 mmo*      CO2                         26                21 - 32 mmol*      Anion gap                   14                5 - 15 mmol/L      Glucose                     81                65 - 100 mg/*      BUN                         16                6 - 20 mg/dL       Creatinine                  0.72              0.70 - 1.30 *      BUN/Creatinine ratio        22 (H)            12 - 20            GFR est AA                  >60               >60 ml/min/1*      GFR est non-AA              >60               >60 ml/min/1*      Calcium                     8.4 (L)           8.5 - 10.1 m*      Bilirubin, total            0.7               0.2 - 1.0 mg*      AST (SGOT)                  42 (H)            15 - 37 U/L        ALT (SGPT)                  46                12 - 78 U/L        Alk.  phosphatase            78                45 - 117 U/L       Protein, total              7.8               6.4 - 8.2 g/*      Albumin                     4.3               3.5 - 5.0 g/*      Globulin                    3.5               2.0 - 4.0 g/*      A-G Ratio                   1.2               1.1 - 2.2      -LACTIC ACID:   Collection Time: 04/08/22  1:09 AM      Result                      Value             Ref Range          Lactic acid                 1.7               0.4 - 2.0 mm*    Radiologic Studies -   CT ABD PELV WO CONT  Final Result   Findings compatible with uncomplicated descending colonic   diverticulitis. Follow-up with colonoscopy if not already recently performed as   other underlying bowel wall pathology cannot be discerned in the setting of   diverticular disease. Small hemorrhagic left-sided renal cyst as described. Other findings as above       CT Results  (Last 48 hours)             04/08/22 0002  CT ABD PELV WO CONT Final result   Impression:  Findings compatible with uncomplicated descending colonic   diverticulitis. Follow-up with colonoscopy if not already recently   performed as   other underlying bowel wall pathology cannot be discerned in the setting   of   diverticular disease. Small hemorrhagic left-sided renal cyst as   described. Other findings as above        Narrative:  HISTORY:   groin and sacroiliac pain     Dose reduction technique: All CT scans at this facility are performed using dose reduction   optimization   technique as appropriate on the exam including the following: Automated   exposure   control, adjustment of the MA and/or KV according to patient size and/or   use of   iterative reconstructive technique. TECHNIQUE: CT of the abdomen and pelvis without contrast   COMPARISON: 1/24/2017   LIMITATIONS: None      CHEST: No acute airspace process or pleural effusion seen at the lung   bases. LIVER: Normal.        GALLBLADDER: Normal.        BILIARY TREE: Normal.          PANCREAS: Normal.           SPLEEN: Normal.          ADRENAL GLANDS: Normal.       KIDNEYS/URETERS/BLADDER: Negative for acute abnormality. 8 mm hemorrhagic   cyst   in the left kidney. There are other too small to characterize bilateral   renal   hypodensities      RETROPERITONEUM/AORTA: Normal.      BOWEL/MESENTERY: There is some faint stranding about the posterior aspect   of the   descending colon at the level of appears represent an inflamed   diverticula,   axial images 101-107 series 201.  No bowel obstruction, perforation or   perienteric abscess. APPENDIX: Identified and normal.         PERITONEAL CAVITY: Normal.          REPRODUCTIVE ORGANS: Normal.          BONE/TISSUES: No acute abnormality. OTHER: None. CXR Results  (Last 48 hours)   None       Medical Decision Making and ED Course  I am the first provider for this patient. I reviewed the vital signs, available nursing notes, past medical history, past surgical history, family history and social history. Vital Signs-Reviewed the patient's vital signs. Empty flowsheet group. Records Reviewed: Nursing Notes and Old Medical Records    Provider Notes (Medical Decision Making):   Patient presents with atypical back pain. The patient presents with differential diagnosis of kidney stone, disc disease, sciatica. ED Course:   CT scan shows descending diverticulitis. I suggest the possibility of admission because of severe pain, but patient wants to try outpatient treatment. Initial assessment performed. The patients presenting problems have been discussed, and they are in agreement with the care plan formulated and outlined with them. I have encouraged them to ask questions as they arise throughout their visit. Disposition      Discharged      DISCHARGE PLAN:  1. Cannot display discharge medications since this patient is not currently admitted. 2. Follow-up Information    Follow up With Specialties Details Why Contact Info   Joce Anguiano NP Nurse Practitioner   [address]  [address] [address]  [phone]      3. Return to ED if worse     Diagnosis    Clinical Impression: Diverticulitis  (primary encounter diagnosis)    Attestations:    Sam Cantu MD    Please note that this dictation was completed with Qianda[employer], the computer voice recognition software. Quite often unanticipated grammatical, syntax, homophones, and other interpretive errors are inadvertently transcribed by the computer software. Please disregard these errors. Please excuse any errors that have escaped final proofreading. Thank you.    ? Past Medical History:   Diagnosis Date    Arthritis     Basal cell carcinoma 2012    3 on left side of neck and 1 on right side of neck and nasal bridge area with first dx starting aprroximately 12 yrs ago    Frequent nosebleeds     GERD (gastroesophageal reflux disease)     H/O seasonal allergies     IBS (irritable bowel syndrome)     Migraine     MRSA (methicillin resistant staph aureus) culture positive 2007    hand    Panhypopituitarism ([address]     Pituitary tumor    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 BORN WITH IT. MAKES HIM HAVE BLISTERS ON HANDS. LIVER OVERPRODUCED RED BLOOD CELLS--TX IS PHLEBOTOMY AS NEEDED AND HAS HAD THIS 3 TIMES SO FAR    Secondary adrenal insufficiency ([address] 2/21/2019       Past Surgical History:   Procedure Laterality Date    HX GI      COLONOSCOPY    HX HEART CATHETERIZATION  2013    minimal disease per cardio notes.  HX HEENT      SINUS SURGERY    HX HEENT  02/2019 and 11/19, 2020    [facility]phen[facility] pituitary surgery [facility], 2020 at [facility]    HX ORTHOPAEDIC Bilateral     bilateral thumb surgery    HX ORTHOPAEDIC Right ~2013    elbow surgery    HX OTHER SURGICAL      REMOVAL BCCA ON [address].  SIDE OF NECK AND BRIDGE OF NOSE    HX ROTATOR CUFF REPAIR Right     HX SEPTOPLASTY  2002    HX SHOULDER ARTHROSCOPY Left ~2013    HX SHOULDER ARTHROSCOPY Left 11/2021    biceps tenodesis and extensive debridement partial rotator cuff tears      AR ANALYZ NEUROSTIM BRAIN, EACH ADD 30 MIN      on 10/28/21 pt wife denies    AR PROSTATE BIOPSY, NEEDLE, SATURATION SAMPLING           Family History:   Problem Relation Age of Onset    Alcohol abuse Brother     Heart Attack Brother     Diabetes Mother     Hypertension Mother     Cancer Mother         lung    Cancer Father         PROSTATE    OSTEOARTHRITIS Sister     Anesth Problems Neg Hx  Other Neg Hx         pituitary disease       Social History     Socioeconomic History    Marital status:      Spouse name: Not on file    Number of children: Not on file    Years of education: Not on file    Highest education level: Not on file   Occupational History    Not on file   Tobacco Use    Smoking status: Never Smoker    Smokeless tobacco: Never Used   Substance and Sexual Activity    Alcohol use: Yes     Alcohol/week: 14.0 standard drinks     Types: 14 Cans of beer per week    Drug use: Yes     Types: Marijuana     Comment: cocaine use in 20's     Sexual activity: Yes     Partners: Female   Other Topics Concern     Service Not Asked    Blood Transfusions Not Asked    Caffeine Concern Not Asked    Occupational Exposure Not Asked    Hobby Hazards Not Asked    Sleep Concern Not Asked    Stress Concern Not Asked    Weight Concern Not Asked    Special Diet Not Asked    Back Care Not Asked    Exercise Not Asked    Bike Helmet Not Asked   [address] Not Asked    Self-Exams Not Asked   Social History Narrative    Lives in [place] with wife. No kids. Works as a  for [employer]. Likes to fish. Social Determinants of Health     Financial Resource Strain:     Difficulty of Paying Living Expenses: Not on file   Food Insecurity:     Worried About Running Out of Food in the Last Year: Not on file    Jason of Food in the Last Year: Not on file   Transportation Needs:     Lack of Transportation (Medical): Not on file    Lack of Transportation (Non-Medical):  Not on file   Physical Activity:     Days of Exercise per Week: Not on file    Minutes of Exercise per Session: Not on file   Stress:     Feeling of Stress : Not on file   Social Connections:     Frequency of Communication with Friends and Family: Not on file    Frequency of Social Gatherings with Friends and Family: Not on file    Attends Adventism Services: Not on file   [place] Active Member of Clubs or Organizations: Not on file    Attends Club or Organization Meetings: Not on file    Marital Status: Not on file   Intimate Partner Violence:     Fear of Current or Ex-Partner: Not on file    Emotionally Abused: Not on file    Physically Abused: Not on file    Sexually Abused: Not on file   Housing Stability:     Unable to Pay for Housing in the Last Year: Not on file    Number of [place] in the Last Year: Not on file    Unstable Housing in the Last Year: Not on file         ALLERGIES: Bactrim [sulfamethoprim ds], Lipitor [atorvastatin], and Sulfamethoxazole-trimethoprim    Review of Systems   Constitutional: Negative. HENT: Negative. Eyes: Negative. Respiratory: Negative. Cardiovascular: Negative. Gastrointestinal: Positive for nausea and vomiting. Endocrine: Negative. Genitourinary: Negative. Musculoskeletal: Positive for back pain. Allergic/Immunologic: Negative. Neurological: Negative. Hematological: Negative. Psychiatric/Behavioral: Negative. Vitals:    04/07/22 2330 04/08/22 0027 04/08/22 0147 04/08/22 0309   BP: (!) 216/113 (!) 180/105 (!) 178/89 (!) 161/87   Pulse: 84 86 74 78   Resp: 18 18     Temp:       SpO2: 95% 94% 97% 96%            Physical Exam  Vitals and nursing note reviewed. Constitutional:       General: He is in acute distress. Appearance: Normal appearance. He is normal weight. HENT:      Head: Normocephalic and atraumatic. Right Ear: External ear normal.      Left Ear: External ear normal.      Nose: Nose normal.      Mouth/Throat:      Mouth: Mucous membranes are moist.      Pharynx: Oropharynx is clear. Eyes:      Extraocular Movements: Extraocular movements intact. Conjunctiva/sclera: Conjunctivae normal.      Pupils: Pupils are equal, round, and reactive to light. Cardiovascular:      Rate and Rhythm: Normal rate and regular rhythm. Pulses: Normal pulses.       Heart sounds: Normal heart sounds. Pulmonary:      Effort: Pulmonary effort is normal.      Breath sounds: Normal breath sounds. Abdominal:      General: Abdomen is flat. Bowel sounds are normal.      Palpations: Abdomen is soft. Musculoskeletal:         General: Normal range of motion. Cervical back: Normal range of motion and neck supple. Skin:     General: Skin is warm and dry. Neurological:      General: No focal deficit present. Mental Status: He is alert and oriented to person, place, and time.           MDM       Procedures

## 2022-04-08 NOTE — ED TRIAGE NOTES
bilat lower back pain starting today. Chronic back pain. Hx scoliosis, bone spurs, degenerative disk disease. Took aleve and flexeril w/o relief. States the pain has been so bad, its made him throw up.

## 2022-04-11 ENCOUNTER — PATIENT OUTREACH (OUTPATIENT)
Dept: OTHER | Age: 57
End: 2022-04-11

## 2022-04-11 RX ORDER — MELOXICAM 15 MG/1
TABLET ORAL
COMMUNITY
Start: 2022-03-15 | End: 2022-06-03

## 2022-04-11 NOTE — PROGRESS NOTES
Patient identified as eligible for Employee Care Management. Care Manager contacted the patient, verified  and zip code as identifiers. Provided introduction and explanation of the Nurse Care Manager role. Agreed to CM follow-up and assistance. CM initial assessment completed. 59 yo male presented to the [facility] ER on 22 for c/o worsening abdominal pain. PMH: HTN, Hyperlipidemia, Adrenal Insuffiency (hydrocortisone), IBS, Migraines, GERD, Porphyria Cutanea Tarda; Identified chronic condition(s) with impact to ER visit:   IBS, GERD,   CT Scan of abdomen:  uncomplicated descending colonic diverticulitis  · Medication List currently includes these drugs potentially worsening GI:  · Mobic  · Hx  BC powder, Naproxen; Inpatient Readmission Risk score: No data recorded  Admission or ED Risk:  9%    Past Medical History:   Diagnosis Date    Arthritis     Basal cell carcinoma     3 on left side of neck and 1 on right side of neck and nasal bridge area with first dx starting aprroximately 12 yrs ago    Frequent nosebleeds     GERD (gastroesophageal reflux disease)     H/O seasonal allergies     IBS (irritable bowel syndrome)     Migraine     MRSA (methicillin resistant staph aureus) culture positive     hand    [facility] ([facility]     Pituitary tumor    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 BORN WITH IT. MAKES HIM HAVE BLISTERS ON HANDS. LIVER OVERPRODUCED RED BLOOD CELLS--TX IS PHLEBOTOMY AS NEEDED AND HAS HAD THIS 3 TIMES SO FAR    Secondary adrenal insufficiency (HCC) 2019       Medications:   New Medications at Discharge:   Percocet, Augmentin;  Restart daily Bentyl and Prilosec; Changed Medications at Discharge:    N/A   Discontinued Medications at Discharge:       HOLD:  Naproxen, ASA products,     Barriers Identified / Support system:  patient and spouse    Discharge Instructions:  Reviewed discharge instructions with patient.   Patient verbalizes understanding of discharge instructions and importance of follow-up care. Barriers/Challenges to Care: [] Decline in memory    [] Language barrier     [] Emotional       [] Limited mobility  []  Lack of motivation       [] Vision, hearing or cognitive impairment    [x] Learning Need    [] Financial    [] Lack of support  [x] Pain [] Other     []  None    CM Intitial Plan of Care (Support to self management skills)  Goal:  Demonstrates pain controlled with diet modification and new medications ordered;   Goal:  Demonstrates self-mgmt skills with proper food choices as per DC diet instructions to allow for healing of the infection and rest of the bowel;  · Encouraged Clear to Full Liquids only, avoid dairy products initially;  · No high fat, fried or spicy foods, no caffeine, no alcohol;  · Stick with soups that are mostly broth, white rice, toast, saltine crackers;    · Avoid Fiber, Avoid raw vegetables  · Provided education on need to let bowel rest with mostly fluids for pain to resolve;    Red Flags:  Call your doctor now or seek immediate medical care if:  · You develop a fever of at least 100.5;  · You are vomiting and unable to keep anything down;  · You have worsening belly pain;  · You cannot pass gas or stools; Review and discussion of initial plan of care with patient, who has provided input to plan, verbalized understanding and agrees with current goals.       Readiness to Change:  [] Pre-contemplative   [] Contemplative   [x] Preparation    [] Action    [] Maintenance      Upcoming appointments:   Future Appointments   Date Time Provider Tara Jennifer   6/3/2022  4:10 PM Dima Napier MD [facility] [address] AMB     Non [address] appointments:  N/A    Discussed appropriate site of care needs based on patient's symptoms and resources available to patient at that time, including use of the Nurse Access Line at [phone] to identify appropriate level of care for current service needs and in-network care, such as Walk In or Urgent Care clinic, and Mynor Li villeda online, when PCP or Specialist is unable to see patient, prior to going to the ER with lower level of care needs. Patient agrees to contact the PCP or Specialist's office for questions or follow up related to their healthcare or any urgent needs. Patient provided opportunity to ask further questions. No other clinical, social, or functional needs identified. Patient verbalized understanding of all information discussed. Patient received my contact information for any further questions, concerns, or needs.     Plan for follow-up call in 3-5 days to continue CM support for: symptom management-GI pain, support with Diet change to liquids, limit protein, try Ensure Clear;

## 2022-04-14 LAB
BACTERIA SPEC CULT: NORMAL
BACTERIA SPEC CULT: NORMAL
SPECIAL REQUESTS,SREQ: NORMAL
SPECIAL REQUESTS,SREQ: NORMAL

## 2022-04-15 ENCOUNTER — PATIENT OUTREACH (OUTPATIENT)
Dept: OTHER | Age: 57
End: 2022-04-15

## 2022-04-18 ENCOUNTER — PATIENT OUTREACH (OUTPATIENT)
Dept: OTHER | Age: 57
End: 2022-04-18

## 2022-04-18 NOTE — PROGRESS NOTES
CM outreach attempt in follow up, however VM box was full, unable to leave VM message;   Will attempt another follow up call in next 7-10 days;

## 2022-04-26 ENCOUNTER — PATIENT OUTREACH (OUTPATIENT)
Dept: OTHER | Age: 57
End: 2022-04-26

## 2022-04-26 NOTE — LETTER
Mr. Monet Juan. [address] [place] [id number]        Dear  Gm Tuyet,      My name is Chino Haro, RN, Associate Care Manager for [facility] and I have been trying to reach you. We last spoke following your Emergency Room visit, and I wanted to see if you are continuing to recover or have any lingering symptoms. The Associate Care Management (ACM) program is a free-of-charge confidential service provided to our associates and their family members covered by the [facility]. The program will provide an associate and his/her family with the [facility] expertise to assist in navigating the health care delivery system, provider services, and their overall care needsso as to assure and improve health care interactions and enhance the quality of life. This program is designed to provide you with the opportunity to have a Erika Jacob partner with you for support, assistance, and education:     1) when you come home from the hospital or emergency room   2) when help is needed to manage your disease   3) when you need assistance coordinating services or appointments  4) when you need information about new illness, diagnosis, or medications  5) when you need additional education, resources or assistance reaching your Be Well            Health Program goals such as Be Well With Diabetes      [facility] is dedicated to empowering the good health of its community and improving the quality of care and care experiences for associates and their families. We are committed to safeguarding patient confidentiality and privacy, assuring that every associate has the respect he or she deserves in managing their health.  The information shared with your care manager will not be shared with anyone else aside from those you identify as part of your care team and will only be used to assist you with any identified care needs. Please contact me if you would like to continue this [employer] provided to you.        Sincerely,      CLYDE HillN, RN, [address] [place] [address].   Mechelle Jim [phone]  F [phone]  [email or web address]

## 2022-04-26 NOTE — PROGRESS NOTES
CM outreach attempt x 3 with VM message left, but no response from patient in follow up. Spoke with patient x 1 following his ER visit, but have been unable to reach since that time. A My Chart letter pended to patient in My Chart communication, awaiting response.

## 2022-05-17 ENCOUNTER — PATIENT OUTREACH (OUTPATIENT)
Dept: OTHER | Age: 57
End: 2022-05-17

## 2022-05-17 NOTE — LETTER
Mr. Liyah Lantigua. [address] [place] [phone]      Dear  Paula Jenkinsara,    My name is Gareth Odom RN, [facility], Associate Care Manager within our [facility] [address] Management (ACM) program.  I have been trying to reach you for a few weeks to offer an opportunity to enroll in the [facility] program.    The Associate Care Management (ACM) program is a free-of-charge confidential service provided to our associates and their family members covered by the [facility]. The program will provide an associate and his/her family with the [facility] expertise to assist in navigating the health care delivery system, provider services, and their overall care needs--so as to assure and improve health care interactions and enhance the quality of life. This program is designed to provide you with the opportunity to have a [address] [place] [address] partner with you for support, assistance, and education:     1) when you come home from the hospital or emergency room   2) when help is needed to manage your disease   3) when you need assistance coordinating services or appointments  4) when you need information about new illness, diagnosis, or medications  5) to reach your Be Well Health Program criteria, such as Be Well With Diabetes     [facility] is dedicated to empowering the good health of its community and improving the quality of care and care experiences for associates and their families. We are committed to safeguarding patient confidentiality and privacy, assuring that every associate has the respect he or she deserves in managing their health. The information shared with your care manager will not be shared with anyone else aside from those you identify as part of your care team and will only be used to assist you with any identified care needs.      Since I was unable to reach you over the past few weeks, I am closing out this current episode of care, but will remain available to you should you have any questions. Please contact me at the below number if I can provide you with assistance for any of the above services.       Sincerely,    Taiwo Toth RN, BSN, [address] [place] [address],[address].   [facility] [phone]  F [phone]  [email or web address]

## 2022-06-03 ENCOUNTER — VIRTUAL VISIT (OUTPATIENT)
Dept: ENDOCRINOLOGY | Age: 57
End: 2022-06-03
Payer: COMMERCIAL

## 2022-06-03 DIAGNOSIS — E27.49 SECONDARY ADRENAL INSUFFICIENCY (HCC): ICD-10-CM

## 2022-06-03 DIAGNOSIS — E29.1 SECONDARY MALE HYPOGONADISM: ICD-10-CM

## 2022-06-03 DIAGNOSIS — I10 ESSENTIAL HYPERTENSION, BENIGN: ICD-10-CM

## 2022-06-03 DIAGNOSIS — D35.2 BENIGN TUMOR OF PITUITARY GLAND (HCC): Primary | ICD-10-CM

## 2022-06-03 PROCEDURE — 99214 OFFICE O/P EST MOD 30 MIN: CPT | Performed by: INTERNAL MEDICINE

## 2022-06-03 RX ORDER — HYDROCORTISONE 5 MG/1
TABLET ORAL
Qty: 500 TABLET | Refills: 3
Start: 2022-06-03

## 2022-06-03 RX ORDER — AMLODIPINE BESYLATE 5 MG/1
TABLET ORAL
Qty: 90 TABLET | Refills: 3
Start: 2022-06-03

## 2022-06-03 NOTE — PROGRESS NOTES
Chief Complaint   Patient presents with    Pituitary Problem     [phone]     Other     pcp and pharmacy confirmed       **THIS IS A VIRTUAL VISIT VIA A VIDEO SYNCHRONOUS DISCUSSION. PATIENT AGREED TO HAVE THEIR CARE DELIVERED OVER A MYCHART VIDEO VISIT IN PLACE OF THEIR REGULARLY SCHEDULED OFFICE VISIT**    History of Present Illness: Velia Benoit is a 60 y.o. male here for follow up of pituitary. Since last visit in 12/21, he returned to work after his shoulder surgery and sometime this winter he went back to taking hydrocortisone 4 tabs in the morning and 2 tabs around lunch. Finds that the longer he goes between doses that he can get some itching and restless sensation and sometimes has had to take a benadryl at night if this gets bad. Once took a dose of hydrocortisone in the evening and this helped this symptom. He states he took the amlodipine 5 mg in the morning but felt more fatigued and weak while doing this so he stopped. He never tried taking it at bedtime and is willing to try this at 1/2 tab to see if he can tolerate this better. Never had his labs drawn but will be going back to graveyard shift in the next 2 weeks and hopes to get labs drawn at that time. Current Outpatient Medications   Medication Sig    hydrocortisone (CORTEF) 5 mg tablet Take 4 tabs before breakfast --Dose change 12/3/21--updated med list--did not send prescription to the pharmacy    ondansetron hcl (Zofran) 4 mg tablet Take 1 Tablet by mouth every eight (8) hours as needed for Nausea. Cetirizine (ZyrTEC) 10 mg cap Take  by mouth. aspirin/salicylamide/caffeine (ARTHRITIS STRENGTH BC POWDER PO) Take  by mouth. DULoxetine (CYMBALTA) 30 mg capsule Take 1 Cap by mouth daily. omeprazole (PRILOSEC) 20 mg capsule Take 1 Cap by mouth daily. rosuvastatin (CRESTOR) 10 mg tablet Take 1 Tab by mouth daily.     albuterol (PROVENTIL HFA, VENTOLIN HFA, PROAIR HFA) 90 mcg/actuation inhaler Take 2 Puffs by inhalation every four (4) hours as needed for Wheezing.    montelukast (SINGULAIR) 10 mg tablet Take 1 Tab by mouth daily. cholecalciferol, vitamin D3, 2,000 unit tab Take 1 Tablet by mouth. naproxen sodium (NAPROSYN) 220 mg tablet Take 220 mg by mouth as needed. albuterol (PROVENTIL VENTOLIN) 2.5 mg /3 mL (0.083 %) nebulizer solution 3 mL by Nebulization route every four (4) hours as needed for Wheezing. SIMETHICONE Take 120 mg by mouth as needed. amLODIPine (NORVASC) 5 mg tablet Take 1 Tablet by mouth nightly. For blood pressure--replaces felodipine that is not covered (Patient not taking: Reported on 6/3/2022)     No current facility-administered medications for this visit. Allergies   Allergen Reactions    Bactrim [Sulfamethoprim Ds] Rash    Lipitor [Atorvastatin] Myalgia    Sulfamethoxazole-Trimethoprim Rash     Review of Systems: PER HPI    Physical Examination:  - GENERAL: NCAT, Appears well nourished   - EYES: EOMI, non-icteric, no proptosis   - Ear/Nose/Throat: NCAT, no visible inflammation or masses   - CARDIOVASCULAR: no cyanosis, no visible JVD   - RESPIRATORY: respiratory effort normal without any distress or labored breathing   - MUSCULOSKELETAL: Normal ROM of neck and upper extremities observed   - SKIN: No rash on face  - NEUROLOGIC:  No facial asymmetry (Cranial nerve 7 motor function), No gaze palsy   - PSYCHIATRIC: Normal affect, Normal insight and judgement       Data Reviewed:   - none new for review    Assessment/Plan:     1. Benign tumor of pituitary gland [facility]): s/p transphenoidal resection of a 2.5 cm nonfunctioning pituitary tumor on 2/12/19 by Dr. Dannie Alexander. 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 His repeat MRI on 6/3/19 showed decrease in size to 1.9 cm.   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 His free T4 was 0.94 in 2/19 and 0.98 in 5/19 and 1.20 in 11/19 and 0.8 in 7/20 so doesn't appear to have secondary hypothyroidism. His IGF-1 was 209 in 5/19 and I was checking to see if this would be low and was up to 317 in 11/19 after surgery. Doesn't have evidence of DI and Na is normal.  Repeat MRI at [facility] in 1/20 showed resolution of carvernous sinus disease so did not need gamma knife. IGF-1 back to normal at 96 in 5/20 and 207 in 11/20. His repeat MRI in 7/20 does not show any evidence of tumor. MRI in 7/21 showed mild interval enlargement of a 4.4 mm enhancing nodule in the posterior left cavernous sinus suspicious for residual/recurrent tumor and Dr. Jeff Melvin at [facility] is following this. - check bmp and IGF-1 now  - f/u with Dr. Jeff Melvin at [facility] in July 2022        2. Secondary adrenal insufficiency ([address]): was found to have a cortisol of 0.6 and inappropriately low ACTH of 2.2. He was started on hydrocortisone 20/10 and was feeling better with starting this but got the flu in 3/19 and needed an increase to 40/20 and then when I tapered back to 20/10, he felt worse and had been taking 30/20. This is more than I want him to be on and gave him a very slow taper by 5 mg every 3 weeks to get back to 20/10 as a maintenance dose and was there in 11/19 but increased to 30/10 during COVID so I had him try to taper back to 20 and 10 as his cortisol was still low at 4.2 in 5/20 showing continued suppression of his adrenal gland. His cortisol was 7.8 and ACTH low at 3 in 7/20 at [facility] but had taken his [facility]. the day of the lab draw. They thought he was on 10/10 and wanted him to taper to 10/5 but he remains on 20/10. Cortisol up to 7.0 in 11/20 so tried to taper to 15/10 but couldn't tolerate this so went back to 20/10 so in 5/21 tried to taper to 17.5/10.   He had been taking 20 mg in the morning only since his shoulder surgery in 11/21 but in the winter of 2022 he went back to taking 20 in the morning and 10 at lunch. I will have him divide up his dose to 15/10/5 to see if this helps give better control of his symptoms during the day. I previously explained the importance of giving stress dose steroids in the future if he needs surgery or has an infection and is wearing a medic alert bracelet at this time. - dose hydrocortisone 15 mg at breakfast, 10 mg at lunch and 5 mg at dinner   - check cortisol now   - wear medic alert bracelet or necklace       3. Secondary male hypogonadism: his testosterone was low at 228 in 2/19 and lower at 104 in 5/19 but he did not have many hypogonadal symptoms so held on treatment and up to 281 in 11/19 with wt loss and 373 in 1/20 and 360 in 7/20 and 347 in 7/21.  - repeat total testosterone in the future if symptoms arise        4. Benign Essential Hypertension: did not tolerate 5 mg of amlodipine in the morning so will try 2.5 mg at bedtime  - begin amlodipine 5 mg 1/2 tab at bedtime  - monitor home blood pressure readings    Patient Instructions   1) Try splitting up the hydrocortisone to 15 mg = 3 tabs at breakfast, 10 mg = 2 tabs at lunch, and 5 mg = 1 tab at dinner to see if this gives you better energy and less itching in between doses. 2) Try taking amlodipine 5 mg 1/2 tab at bedtime to see if you can tolerate this dose better without causing as much fatigue. If you still cannot tolerate this dose then let me know and we can try a different medication for blood pressure. 3) Start monitoring blood pressure about 2-3 times per week at alternating times either in the morning or evening after resting for 5 minutes and sitting upright in a chair with your arm at heart level. Please let me know if you are having readings over 140 on the top number or 90 on the bottom number after 2 weeks of starting this dose.     4) I put an order directly into the [facility] system to repeat your labs now so just ask for the order under my name and I will be in touch with the results. 5) Please come for a follow up visit on 12/8/22 at 9:50am in our [place] office. Follow-up and Dispositions    Return 12/8/22 at 9:50am.             Isaac Kalyn., was evaluated through a synchronous (real-time) audio-video encounter. The patient (or guardian if applicable) is aware that this is a billable service, which includes applicable co-pays. Verbal consent to proceed has been obtained. The visit was conducted pursuant to the emergency declaration under the [facility], [address] [place] [address] authority and the [employer] and [employer] Act. Patient identification was verified, and a caregiver was present when appropriate. The patient was located at home in a state where the provider was licensed to provide care. --Дмитрий Pacheco MD on 6/3/2022 at 4:52 PM    An electronic signature was used to authenticate this note. Copy sent to: Faye Ndiaye NP as PCP - General (Nurse Practitioner)  Lillie Epps MD (Internal Medicine)      Lab follow up: 08/26/22  Component      Latest Ref Rng & Units 8/22/2022 8/22/2022 8/22/2022           8:09 AM  8:09 AM  8:09 AM   Glucose      65 - 99 mg/dL 91     BUN      6 - 24 mg/dL 24     Creatinine      0.76 - 1.27 mg/dL 0.97     eGFR      >59 mL/min/1.73 90     BUN/Creatinine ratio      9 - 20 25 (H)     Sodium      134 - 144 mmol/L 141     Potassium      3.5 - 5.2 mmol/L 4.3     Chloride      96 - 106 mmol/L 106     CO2      20 - 29 mmol/L 22     Calcium      8.7 - 10.2 mg/dL 9.5     Cortisol, a.m.      6.2 - 19.4 ug/dL   10.0   Insulin-Like Growth Factor I      68 - 247 ng/mL  197      Sent him the following message through eFolder:  BUN and creatinine are markers of kidney function.   Your values are normal.  -------------------------------------------------------------------------------------------------------------------  Your electrolytes (sodium, potassium, and calcium) are all normal.  Your glucose (blood sugar) is normal.  -------------------------------------------------------------------------------------------------------------------  Your IGF-1 (growth hormone) level is normal.  -------------------------------------------------------------------------------------------------------------------  Your morning cortisol level is normal at 10 so your current dose of hydrocortisone 15 mg in the morning, 10 mg at lunch and 5 mg at dinner is working well and I recommend you keep it the same.  -------------------------------------------------------------------------------------------------------------------  You won't need any additional labs before your next visit in December.

## 2022-06-03 NOTE — PATIENT INSTRUCTIONS
1) Try splitting up the hydrocortisone to 15 mg = 3 tabs at breakfast, 10 mg = 2 tabs at lunch, and 5 mg = 1 tab at dinner to see if this gives you better energy and less itching in between doses. 2) Try taking amlodipine 5 mg 1/2 tab at bedtime to see if you can tolerate this dose better without causing as much fatigue. If you still cannot tolerate this dose then let me know and we can try a different medication for blood pressure. 3) Start monitoring blood pressure about 2-3 times per week at alternating times either in the morning or evening after resting for 5 minutes and sitting upright in a chair with your arm at heart level. Please let me know if you are having readings over 140 on the top number or 90 on the bottom number after 2 weeks of starting this dose. 4) I put an order directly into the [facility] system to repeat your labs now so just ask for the order under my name and I will be in touch with the results. 5) Please come for a follow up visit on 12/8/22 at 9:50am in our [place] office.

## 2022-06-14 ENCOUNTER — VIRTUAL VISIT (OUTPATIENT)
Dept: FAMILY MEDICINE CLINIC | Age: 57
End: 2022-06-14
Payer: COMMERCIAL

## 2022-06-14 DIAGNOSIS — E78.00 HYPERCHOLESTEROLEMIA: ICD-10-CM

## 2022-06-14 DIAGNOSIS — I10 PRIMARY HYPERTENSION: Primary | ICD-10-CM

## 2022-06-14 DIAGNOSIS — G89.29 OTHER CHRONIC PAIN: ICD-10-CM

## 2022-06-14 DIAGNOSIS — K21.9 GASTROESOPHAGEAL REFLUX DISEASE WITHOUT ESOPHAGITIS: ICD-10-CM

## 2022-06-14 DIAGNOSIS — D35.2 PITUITARY ADENOMA (HCC): ICD-10-CM

## 2022-06-14 DIAGNOSIS — E27.49 SECONDARY ADRENAL INSUFFICIENCY (HCC): ICD-10-CM

## 2022-06-14 PROBLEM — D49.7 PITUITARY TUMOR: Status: RESOLVED | Noted: 2019-02-12 | Resolved: 2022-06-14

## 2022-06-14 PROBLEM — R03.0 ELEVATED BLOOD PRESSURE READING WITHOUT DIAGNOSIS OF HYPERTENSION: Status: RESOLVED | Noted: 2019-05-21 | Resolved: 2022-06-14

## 2022-06-14 PROCEDURE — 99214 OFFICE O/P EST MOD 30 MIN: CPT | Performed by: NURSE PRACTITIONER

## 2022-06-14 RX ORDER — MONTELUKAST SODIUM 10 MG/1
10 TABLET ORAL DAILY
Qty: 90 TABLET | Refills: 1 | Status: SHIPPED | OUTPATIENT
Start: 2022-06-14 | End: 2022-09-14

## 2022-06-14 RX ORDER — DULOXETIN HYDROCHLORIDE 30 MG/1
30 CAPSULE, DELAYED RELEASE ORAL DAILY
Qty: 90 CAPSULE | Refills: 1 | Status: SHIPPED | OUTPATIENT
Start: 2022-06-14 | End: 2022-09-14

## 2022-06-14 RX ORDER — OMEPRAZOLE 20 MG/1
20 CAPSULE, DELAYED RELEASE ORAL DAILY
Qty: 90 CAPSULE | Refills: 1 | Status: SHIPPED | OUTPATIENT
Start: 2022-06-14 | End: 2022-09-14

## 2022-06-14 RX ORDER — ROSUVASTATIN CALCIUM 10 MG/1
10 TABLET, COATED ORAL DAILY
Qty: 90 TABLET | Refills: 1 | Status: SHIPPED | OUTPATIENT
Start: 2022-06-14

## 2022-06-14 NOTE — PROGRESS NOTES
Jens Sandoval. is a 60 y.o. male who was seen by synchronous (real-time) audio-video technology on 6/14/2022 for Medication Refill        Assessment & Plan:   Diagnoses and all orders for this visit:    1. Primary hypertension  Last 2 blood pressures on file are well above goal, and patient has stopped taking amlodipine since that time. Continue amlodipine 2.5 mg at bedtime for now, advised patient we will likely need to try to increase the dose at follow-up in order to improve blood pressure control  Cautioned patient's against stopping medications without letting me know, we will need to find an alternative agent he can tolerate  Low-sodium diet recommended  Weight loss recommended  150 minutes of exercise weekly recommended    2. Hypercholesterolemia  Control unknown, overdue for fasting lipid assessment  Repeat fasting lipids at follow-up appointment in 2 weeks  Continue rosuvastatin 10 mg daily in the interim  Heart healthy diet low in saturated fat recommended  -     rosuvastatin (CRESTOR) 10 mg tablet; Take 1 Tablet by mouth daily. 3. Pituitary adenoma ([address]  No acute symptoms warranting change in treatment plan today at  Continue follow-up with endocrinology and surgery    4. Secondary adrenal insufficiency (HCC)  No acute symptoms warranting change in treatment plan today  Follow-up as scheduled with endocrinology    5. Other chronic pain  Controlled  Continue duloxetine  -     DULoxetine (CYMBALTA) 30 mg capsule; Take 1 Capsule by mouth daily. 6. Gastroesophageal reflux disease without esophagitis  Controlled  Continue omeprazole  GERD diet recommended  Weight loss recommended  -     omeprazole (PRILOSEC) 20 mg capsule; Take 1 Capsule by mouth daily. Other orders  -     montelukast (SINGULAIR) 10 mg tablet; Take 1 Tablet by mouth daily.         Follow-up and Dispositions    · Return in about 2 weeks (around 6/28/2022), or if symptoms worsen or fail to improve, for blood pressure- in office appt.       I have discussed the diagnosis with the patient and the intended plan as seen in the above orders, and questions were answered concerning future plans. Patient conveyed understanding of the plan at the time of the visit. 712  Subjective:     HPI:    Presents for follow-up of hypertension, hyperlipidemia, GERD, allergic rhinitis, pituitary adenoma, secondary adrenal insufficiency, and chronic pain. Cardiovascular risk analysis - LDL goal is under 100  hypertension  hyperlipidemia  obese. Compliance with treatment thus far has been good, tolerating rosuvastatin. Diet and Lifestyle: generally follows a low fat low cholesterol diet, generally follows a low sodium diet, sedentary, nonsmoker  Home BP Monitoring: is not measured at home    Cardiovascular ROS: taking medications as instructed, no medication side effects noted, no TIA's, no chest pain on exertion, no dyspnea on exertion, no swelling of ankles, no orthostatic dizziness or lightheadedness, no orthopnea or paroxysmal nocturnal dyspnea, no palpitations, no intermittent claudication symptoms. Had follow-up visit with Dr. Mg Gutiérrez last week for ongoing management of pituitary adenoma and secondary adrenal insufficiency. He underwent resection of the pituitary tumor and then a subsequent revision for evidence of recurrence. Since that time he has been monitored with serial MRIs by his surgeon at [facility]. Adjustments were made to his hydrocortisone doses for adrenal insufficiency at his recent visit. Patient reported extreme fatigue on amlodipine and advised Dr. Mg Gutiérrez that he had stopped taking the medication. Dr. Mg Gutiérrez recommended he restart amlodipine at 2.5 mg at bedtime, patient has done so and has tolerated the medication thus far. Reports GERD symptoms well controlled on current dose omeprazole, notes recurrence of symptoms promptly with late or missed doses. No apparent side effects wants to continue current treatment. Compliant with recommended diet restrictions for the most part. Reports improvement in chronic pain symptoms on duloxetine, tolerating well, good compliance. No apparent side effects. Wants to continue current treatment plan. Prior to Admission medications    Medication Sig Start Date End Date Taking? Authorizing Provider   DULoxetine (CYMBALTA) 30 mg capsule Take 1 Capsule by mouth daily. 6/14/22  Yes Bob Hoff NP   omeprazole (PRILOSEC) 20 mg capsule Take 1 Capsule by mouth daily. 6/14/22  Yes Bob Hoff NP   rosuvastatin (CRESTOR) 10 mg tablet Take 1 Tablet by mouth daily. 6/14/22  Yes Bob Hoff NP   montelukast (SINGULAIR) 10 mg tablet Take 1 Tablet by mouth daily. 6/14/22  Yes Juliane Hoff NP   hydrocortisone (CORTEF) 5 mg tablet Take 3 tabs before breakfast, 2 tabs with lunch and 1 tab with dinner--Dose change 06/03/22--updated med list--did not send prescription to the pharmacy 6/3/22  Yes John Wilkins MD   amLODIPine (NORVASC) 5 mg tablet Take 1/2 tab at bedtime--For blood pressure--replaces felodipine that is not covered--Dose change 06/03/22--updated med list--did not send prescription to the pharmacy 6/3/22  Yes John Wilkins MD   ondansetron hcl (Zofran) 4 mg tablet Take 1 Tablet by mouth every eight (8) hours as needed for Nausea. 11/4/21  Yes HONG Suazo   Cetirizine (ZyrTEC) 10 mg cap Take  by mouth. Yes Provider, Historical   aspirin/salicylamide/caffeine (ARTHRITIS STRENGTH BC POWDER PO) Take  by mouth. Yes Provider, Historical   albuterol (PROVENTIL HFA, VENTOLIN HFA, PROAIR HFA) 90 mcg/actuation inhaler Take 2 Puffs by inhalation every four (4) hours as needed for Wheezing. 11/22/20  Yes Bob Hoff NP   cholecalciferol, vitamin D3, 2,000 unit tab Take 1 Tablet by mouth. Yes Provider, Historical   naproxen sodium (NAPROSYN) 220 mg tablet Take 220 mg by mouth as needed.    Yes Provider, Historical   albuterol (PROVENTIL VENTOLIN) 2.5 mg /3 mL (0.083 %) nebulizer solution 3 mL by Nebulization route every four (4) hours as needed for Wheezing. 10/25/17  Yes Bob Hoff NP   SIMETHICONE Take 120 mg by mouth as needed. Yes Other, MD Erick   DULoxetine (CYMBALTA) 30 mg capsule Take 1 Cap by mouth daily. 11/22/20 6/14/22  Edwin Shearer NP   omeprazole (PRILOSEC) 20 mg capsule Take 1 Cap by mouth daily. 11/22/20 6/14/22  Edwin Shearer NP   rosuvastatin (CRESTOR) 10 mg tablet Take 1 Tab by mouth daily. 11/22/20 6/14/22  Jcarlso Hoff NP   montelukast (SINGULAIR) 10 mg tablet Take 1 Tab by mouth daily. 11/22/20 6/14/22  Edwin Shearer NP     Patient Active Problem List   Diagnosis Code    ACS (acute coronary syndrome) (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) D35.2    Secondary adrenal insufficiency ([facility]) E27.49    Complete tear of left rotator cuff M75.122     Allergies   Allergen Reactions    Bactrim [Sulfamethoprim Ds] Rash    Lipitor [Atorvastatin] Myalgia    Sulfamethoxazole-Trimethoprim Rash     Past Medical History:   Diagnosis Date    Arthritis     Basal cell carcinoma 2012    3 on left side of neck and 1 on right side of neck and nasal bridge area with first dx starting aprroximately 12 yrs ago    Frequent nosebleeds     GERD (gastroesophageal reflux disease)     H/O seasonal allergies     IBS (irritable bowel syndrome)     Migraine     MRSA (methicillin resistant staph aureus) culture positive 2007    hand    Panhypopituitarism ([facility]     Pituitary tumor    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[facility]     followed by Derm.  BORN WITH IT. MAKES HIM HAVE BLISTERS ON HANDS. LIVER OVERPRODUCED RED BLOOD CELLS--TX IS PHLEBOTOMY AS NEEDED AND HAS HAD THIS 3 TIMES SO FAR    Secondary adrenal insufficiency ([facility] 2/21/2019     Past Surgical History:   Procedure Laterality Date    HX GI      COLONOSCOPY    HX HEART CATHETERIZATION  2013    minimal disease per cardio notes.  HX HEENT      SINUS SURGERY    HX HEENT  02/2019 and 11/19, 2020    Transphenoidal pituitary surgery [facility], 2020 at [facility]    HX ORTHOPAEDIC Bilateral     bilateral thumb surgery    HX ORTHOPAEDIC Right ~2013    elbow surgery    HX OTHER SURGICAL      REMOVAL BCCA ON [place]ACHO. SIDE OF NECK AND BRIDGE OF NOSE    HX ROTATOR CUFF REPAIR Right     HX SEPTOPLASTY  2002    HX SHOULDER ARTHROSCOPY Left ~2013    HX SHOULDER ARTHROSCOPY Left 11/2021    biceps tenodesis and extensive debridement partial rotator cuff tears      MN ANALYZ NEUROSTIM BRAIN, EACH ADD 30 MIN      on 10/28/21 pt wife denies    MN PROSTATE BIOPSY, NEEDLE, SATURATION SAMPLING       Family History   Problem Relation Age of Onset    Alcohol abuse Brother     Heart Attack Brother     Diabetes Mother     Hypertension Mother     Cancer Mother         lung    Cancer Father         PROSTATE    OSTEOARTHRITIS Sister     Anesth Problems Neg Hx     Other Neg Hx         pituitary disease     Social History     Tobacco Use    Smoking status: Never Smoker    Smokeless tobacco: Never Used   Substance Use Topics    Alcohol use: Yes     Alcohol/week: 14.0 standard drinks     Types: 14 Cans of beer per week       Review of Systems   Constitutional: Positive for malaise/fatigue. Negative for chills, diaphoresis, fever and weight loss. HENT: Negative. Eyes: Negative. Respiratory: Negative. Cardiovascular: Negative. Gastrointestinal: Negative. Genitourinary: Negative. Musculoskeletal: Negative. Skin: Negative. Neurological: Negative. Endo/Heme/Allergies: Negative. Psychiatric/Behavioral: Negative. Objective:   No flowsheet data found. General: alert, cooperative, no distress   Mental  status: normal mood, behavior, speech, dress, motor activity, and thought processes, able to follow commands   HENT: NCAT   Neck: no visualized mass   Resp: no respiratory distress   Neuro: no gross deficits   Skin: no discoloration or lesions of concern on visible areas   Psychiatric: normal affect, consistent with stated mood, no evidence of hallucinations     Additional exam findings:   none    We discussed the expected course, resolution and complications of the diagnosis(es) in detail. Medication risks, benefits, costs, interactions, and alternatives were discussed as indicated. I advised him to contact the office if his condition worsens, changes or fails to improve as anticipated. He expressed understanding with the diagnosis(es) and plan. Soni Carrillo, was evaluated through a synchronous (real-time) audio-video encounter. The patient (or guardian if applicable) is aware that this is a billable service, which includes applicable co-pays. This Virtual Visit was conducted with patient's (and/or legal guardian's) consent. The visit was conducted pursuant to the emergency declaration under the [address] [place] [address], [address] [place] [address] waiver authority and the [employer] General Act. Patient identification was verified, and a caregiver was present when appropriate.   The patient was located at: Home: [address] [place] [id number]  The provider was located at: Home: [address] [place] [address]  6/14/2022

## 2022-06-30 ENCOUNTER — TELEPHONE (OUTPATIENT)
Dept: FAMILY MEDICINE CLINIC | Age: 57
End: 2022-06-30

## 2022-06-30 NOTE — TELEPHONE ENCOUNTER
Returned pts call, [email or web address] to call us back to set up appt and to see what he needed to be seen for. Thanks.

## 2022-06-30 NOTE — TELEPHONE ENCOUNTER
----- Message from Sung Gaetano sent at 6/30/2022 11:26 AM EDT -----  Subject: Appointment Request    Reason for Call: Routine Existing Condition Follow Up    QUESTIONS  Type of Appointment? Established Patient  Reason for appointment request? Available appointments did not meet   patient need  Additional Information for Provider? Please call PT to schedule in person   office visit. Screen green. ---------------------------------------------------------------------------  --------------  Sushile Arrow INFO  What is the best way for the office to contact you? OK to leave message on   voicemail  Preferred Call Back Phone Number? [phone]  ---------------------------------------------------------------------------  --------------  SCRIPT ANSWERS  Relationship to Patient? Self  Is this follow up request related to routine Diabetes Management? No  Have you been diagnosed with COVID-19 in the past 10 days? No  (Service Expert  click yes below to proceed with Fluencr As Usual   Scheduling)?  Yes

## 2022-07-20 PROCEDURE — 99283 EMERGENCY DEPT VISIT LOW MDM: CPT

## 2022-07-21 ENCOUNTER — APPOINTMENT (OUTPATIENT)
Dept: GENERAL RADIOLOGY | Age: 57
End: 2022-07-21
Attending: EMERGENCY MEDICINE
Payer: COMMERCIAL

## 2022-07-21 ENCOUNTER — HOSPITAL ENCOUNTER (EMERGENCY)
Age: 57
Discharge: HOME | End: 2022-07-21
Attending: EMERGENCY MEDICINE
Payer: COMMERCIAL

## 2022-07-21 VITALS
HEART RATE: 86 BPM | BODY MASS INDEX: 30.31 KG/M2 | DIASTOLIC BLOOD PRESSURE: 74 MMHG | HEIGHT: 68 IN | OXYGEN SATURATION: 97 % | SYSTOLIC BLOOD PRESSURE: 134 MMHG | WEIGHT: 200 LBS | TEMPERATURE: 98.4 F | RESPIRATION RATE: 20 BRPM

## 2022-07-21 DIAGNOSIS — S60.212A CONTUSION OF LEFT WRIST, INITIAL ENCOUNTER: Primary | ICD-10-CM

## 2022-07-21 DIAGNOSIS — T14.8XXA ABRASION: ICD-10-CM

## 2022-07-21 PROCEDURE — 74011000250 HC RX REV CODE- 250: Performed by: EMERGENCY MEDICINE

## 2022-07-21 PROCEDURE — 74011250637 HC RX REV CODE- 250/637: Performed by: EMERGENCY MEDICINE

## 2022-07-21 PROCEDURE — 73100 X-RAY EXAM OF WRIST: CPT

## 2022-07-21 RX ORDER — BACITRACIN ZINC 500 [USP'U]/G
1 CREAM TOPICAL
Status: COMPLETED | OUTPATIENT
Start: 2022-07-21 | End: 2022-07-21

## 2022-07-21 RX ORDER — HYDROCODONE BITARTRATE AND ACETAMINOPHEN 5; 325 MG/1; MG/1
1 TABLET ORAL ONCE
Status: COMPLETED | OUTPATIENT
Start: 2022-07-21 | End: 2022-07-21

## 2022-07-21 RX ADMIN — BACITRACIN ZINC 1 PACKET: 500 OINTMENT TOPICAL at 02:10

## 2022-07-21 RX ADMIN — HYDROCODONE BITARTRATE AND ACETAMINOPHEN 1 TABLET: 5; 325 TABLET ORAL at 00:49

## 2022-07-21 NOTE — ED PROVIDER NOTES
EMERGENCY DEPARTMENT HISTORY AND PHYSICAL EXAM  ?    Date: 7/21/2022  Patient Name: Sim Eisenmenger. History of Presenting Illness    Patient presents with:  Thumb Pain      History Provided By: Patient    HPI: Sim Eisenmenger., 60 y.o. male with a past medical history significant for panhypopituitarism, and hypercholesterolemia presents to the ED with cc of left wrist injury that occurred today. He had several dogs on leash and they all ran out the door and pulled his hand against the rim of the door. He already had baseline range of motion limitation. There are no other complaints, changes, or physical findings at this time. PCP: Cristal Pandya NP    No current facility-administered medications on file prior to encounter. Current Outpatient Medications on File Prior to Encounter:  DULoxetine (CYMBALTA) 30 mg capsule, Take 1 Capsule by mouth daily. , Disp: 90 Capsule, Rfl: 1  omeprazole (PRILOSEC) 20 mg capsule, Take 1 Capsule by mouth daily. , Disp: 90 Capsule, Rfl: 1  rosuvastatin (CRESTOR) 10 mg tablet, Take 1 Tablet by mouth daily. , Disp: 90 Tablet, Rfl: 1  montelukast (SINGULAIR) 10 mg tablet, Take 1 Tablet by mouth daily. , Disp: 90 Tablet, Rfl: 1  hydrocortisone (CORTEF) 5 mg tablet, Take 3 tabs before breakfast, 2 tabs with lunch and 1 tab with dinner--Dose change 06/03/22--updated med list--did not send prescription to the pharmacy, Disp: 500 Tablet, Rfl: 3  amLODIPine (NORVASC) 5 mg tablet, Take 1/2 tab at bedtime--For blood pressure--replaces felodipine that is not covered--Dose change 06/03/22--updated med list--did not send prescription to the pharmacy, Disp: 90 Tablet, Rfl: 3  ondansetron hcl (Zofran) 4 mg tablet, Take 1 Tablet by mouth every eight (8) hours as needed for Nausea., Disp: 10 Tablet, Rfl: 1  Cetirizine (ZyrTEC) 10 mg cap, Take  by mouth., Disp: , Rfl:   aspirin/salicylamide/caffeine (ARTHRITIS STRENGTH BC POWDER PO), Take  by mouth., Disp: , Rfl:   albuterol (PROVENTIL HFA, VENTOLIN HFA, PROAIR HFA) 90 mcg/actuation inhaler, Take 2 Puffs by inhalation every four (4) hours as needed for Wheezing., Disp: 3 Inhaler, Rfl: 1  cholecalciferol, vitamin D3, 2,000 unit tab, Take 1 Tablet by mouth., Disp: , Rfl:   naproxen sodium (NAPROSYN) 220 mg tablet, Take 220 mg by mouth as needed. , Disp: , Rfl:   albuterol (PROVENTIL VENTOLIN) 2.5 mg /3 mL (0.083 %) nebulizer solution, 3 mL by Nebulization route every four (4) hours as needed for Wheezing., Disp: 25 Each, Rfl: 1  SIMETHICONE, Take 120 mg by mouth as needed. , Disp: , Rfl:         Past History    Past Medical History:  Past Medical History:  No date: Arthritis  2012: Basal cell carcinoma      Comment:  3 on left side of neck and 1 on right side of neck and                nasal bridge area with first dx starting aprroximately 12               yrs ago  No date: Frequent nosebleeds  No date: GERD (gastroesophageal reflux disease)  No date: H/O seasonal allergies  No date: IBS (irritable bowel syndrome)  No date: Migraine  2007: MRSA (methicillin resistant staph aureus) culture positive      Comment:  hand  No date: Panhypopituitarism ([facility] [address]  No date: Pituitary tumor  No date: Porphyria cutanea tarda ([facility] [address]      Comment:  followed by Anny Franco. BORN WITH IT. MAKES HIM HAVE BLISTERS                ON HANDS. LIVER OVERPRODUCED RED BLOOD CELLS--TX IS                PHLEBOTOMY AS NEEDED AND HAS HAD THIS 3 TIMES SO FAR  2/21/2019: Secondary adrenal insufficiency ([facility] [address]    Past Surgical History:  Past Surgical History:  No date: HX GI      Comment:  COLONOSCOPY  2013: HX HEART CATHETERIZATION      Comment:  minimal disease per cardio notes.   No date: HX HEENT      Comment:  SINUS SURGERY  02/2019 and 11/19, 2020: HX HEENT      Comment:  Transphenoidal pituitary surgery [place], 2020 at [facility]  No date: HX ORTHOPAEDIC; Bilateral      Comment:  bilateral thumb surgery  ~2013: HX ORTHOPAEDIC; Right      Comment:  elbow surgery  No date: HX OTHER SURGICAL Comment:  REMOVAL BCCA ON [place]ACHO. SIDE OF NECK AND BRIDGE OF NOSE  No date: HX ROTATOR CUFF REPAIR; Right  2002: HX SEPTOPLASTY  ~2013: HX SHOULDER ARTHROSCOPY; Left  11/2021: HX SHOULDER ARTHROSCOPY; Left      Comment:  biceps tenodesis and extensive debridement partial                rotator cuff tears    No date: [place] ANALYZ NEUROSTIM BRAIN, EACH ADD 30 MIN      Comment:  on 10/28/21 pt wife denies  No date: [place] PROSTATE BIOPSY, NEEDLE, SATURATION SAMPLING    Family History:  Review of patient's family history indicates:  Problem: Alcohol abuse      Relation: Brother          Age of Onset: (Not Specified)  Problem: Heart Attack      Relation: Brother          Age of Onset: (Not Specified)  Problem: Diabetes      Relation: Mother          Age of Onset: (Not Specified)  Problem: Hypertension      Relation: Mother          Age of Onset: (Not Specified)  Problem: Cancer      Relation: Mother          Age of Onset: (Not Specified)          Comment: lung  Problem: Cancer      Relation: Father          Age of Onset: (Not Specified)          Comment: PROSTATE  Problem: OSTEOARTHRITIS      Relation: Sister          Age of Onset: (Not Specified)  Problem: Anesth Problems      Relation: Neg Hx          Age of Onset: (Not Specified)  Problem: Other      Relation: Neg Hx          Age of Onset: (Not Specified)          Comment: pituitary disease      Social History:  Social History    Tobacco Use      Smoking status: Never      Smokeless tobacco: Never    Vaping Use      Vaping Use: Never used    Alcohol use:  Yes      Alcohol/week: 14.0 standard drinks      Types: 14 Cans of beer per week    Drug use: Yes      Types: Marijuana      Comment: cocaine use in 20's       Allergies:   -- Bactrim [Sulfamethoprim Ds] -- Rash   -- Lipitor [Atorvastatin] -- Myalgia   -- Sulfamethoxazole-Trimethoprim -- Rash      Review of Systems  [unfilled]    Physical Exam  [unfilled]    Diagnostic Study Results    Labs -   No results found for this or any previous visit (from the past 12 hour(s)). Radiologic Studies -   XR WRIST LT AP/LAT   Final Result    No acute findings. CT Results  (Last 48 hours)    None      CXR Results  (Last 48 hours)    None          Medical Decision Making  I am the first provider for this patient. I reviewed the vital signs, available nursing notes, past medical history, past surgical history, family history and social history. Vital Signs-Reviewed the patient's vital signs. Empty flowsheet group. Records Reviewed: Nurses notes and old record    Provider Notes (Medical Decision Making):   X-ray to rule out fracture. ED Course:   X-rays negative for bone fracture. Initial assessment performed. The patients presenting problems have been discussed, and they are in agreement with the care plan formulated and outlined with them. I have encouraged them to ask questions as they arise throughout their visit. PLAN:  1. Discharge Medication List as of 7/21/2022  2:12 AM      2. Follow-up Information    None     Return to ED if worse     Diagnosis    Clinical Impression: Contusion of left wrist, initial encounter  (primary encounter diagnosis)  Abrasion      ? Past Medical History:   Diagnosis Date    Arthritis     Basal cell carcinoma 2012    3 on left side of neck and 1 on right side of neck and nasal bridge area with first dx starting aprroximately 12 yrs ago    Frequent nosebleeds     GERD (gastroesophageal reflux disease)     H/O seasonal allergies     IBS (irritable bowel syndrome)     Migraine     MRSA (methicillin resistant staph aureus) culture positive 2007    hand    Panhypopituitarism ([facility] [address]     Pituitary tumor     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 BORN WITH IT. MAKES HIM HAVE BLISTERS ON HANDS.  LIVER OVERPRODUCED RED BLOOD CELLS--TX IS PHLEBOTOMY AS NEEDED AND HAS HAD THIS 3 TIMES SO FAR    Secondary adrenal insufficiency ([address] 2/21/2019       Past Surgical History: Procedure Laterality Date    HX GI      COLONOSCOPY    HX HEART CATHETERIZATION  2013    minimal disease per cardio notes. HX HEENT      SINUS SURGERY    HX HEENT  02/2019 and 11/19, 2020    Transphenoidal pituitary surgery [facility], 2020 at [facility]    HX ORTHOPAEDIC Bilateral     bilateral thumb surgery    HX ORTHOPAEDIC Right ~2013    elbow surgery    HX OTHER SURGICAL      REMOVAL BCCA ON [place]ACH[place]. SIDE OF NECK AND BRIDGE OF NOSE    HX ROTATOR CUFF REPAIR Right     HX SEPTOPLASTY  2002    HX SHOULDER ARTHROSCOPY Left ~2013    HX SHOULDER ARTHROSCOPY Left 11/2021    biceps tenodesis and extensive debridement partial rotator cuff tears      [place] ANALYZ NEUROSTIM BRAIN, EACH ADD 30 MIN      on 10/28/21 pt wife denies    [place] PROSTATE BIOPSY, NEEDLE, SATURATION SAMPLING           Family History:   Problem Relation Age of Onset    Alcohol abuse Brother     Heart Attack Brother     Diabetes Mother     Hypertension Mother     Cancer Mother         lung    Cancer Father         PROSTATE    OSTEOARTHRITIS Sister     Anesth Problems Neg Hx     Other Neg Hx         pituitary disease       Social History     Socioeconomic History    Marital status:      Spouse name: Not on file    Number of children: Not on file    Years of education: Not on file    Highest education level: Not on file   Occupational History    Not on file   Tobacco Use    Smoking status: Never    Smokeless tobacco: Never   Vaping Use    Vaping Use: Never used   Substance and Sexual Activity    Alcohol use:  Yes     Alcohol/week: 14.0 standard drinks     Types: 14 Cans of beer per week    Drug use: Yes     Types: Marijuana     Comment: cocaine use in 20's     Sexual activity: Yes     Partners: Female   Other Topics Concern     Service Not Asked    Blood Transfusions Not Asked    Caffeine Concern Not Asked    Occupational Exposure Not Asked    Hobby Hazards Not Asked    Sleep Concern Not Asked    Stress Concern Not Asked    Weight Concern Not Asked Special Diet Not Asked    Back Care Not Asked    Exercise Not Asked    Bike Helmet Not Asked    Seat Belt Not Asked    Self-Exams Not Asked   Social History Narrative    Lives in [place] with wife. No kids. Works as a  for [employer]. Likes to fish. Social Determinants of Health     Financial Resource Strain: Not on file   Food Insecurity: Not on file   Transportation Needs: Not on file   Physical Activity: Not on file   Stress: Not on file   Social Connections: Not on file   Intimate Partner Violence: Not on file   Housing Stability: Not on file         ALLERGIES: Bactrim [sulfamethoprim ds], Lipitor [atorvastatin], and Sulfamethoxazole-trimethoprim    Review of Systems   Constitutional: Negative. HENT: Negative. Musculoskeletal:         Left wrist injury   Neurological: Negative. Vitals:    07/21/22 0000   BP: 134/74   Pulse: 86   Resp: 20   Temp: 98.4 °F (36.9 °C)   SpO2: 97%   Weight: 90.7 kg (200 lb)   Height: 5' 8\" (1.727 m)            Physical Exam  Vitals and nursing note reviewed. Constitutional:       Appearance: Normal appearance. HENT:      Head: Normocephalic. Musculoskeletal:        Hands:    Skin:     Findings: Bruising present. Comments: Abrasion   Neurological:      Mental Status: He is alert.         MDM     Amount and/or Complexity of Data Reviewed  Tests in the radiology section of CPT®: reviewed    Risk of Complications, Morbidity, and/or Mortality  Presenting problems: moderate  Diagnostic procedures: moderate  Management options: low    Patient Progress  Patient progress: stable         Procedures

## 2022-07-21 NOTE — ED TRIAGE NOTES
Patient the left thumb and wrist was pulled out of door with heavy force by his 3 dogs. Patient with swelling, deformity, and pain.  Previous surgery to area to fusion thumb joint done

## 2022-07-21 NOTE — ED NOTES
Bacitracin placed to left wrist abrasion with nonadherent dressing. Pt tolerated well. Pt ambulatory with steady gait and no assistance with wife. A/ox4. Pt in no apparent distress.

## 2022-08-09 ENCOUNTER — TELEPHONE (OUTPATIENT)
Dept: FAMILY MEDICINE CLINIC | Age: 57
End: 2022-08-09

## 2022-08-09 NOTE — TELEPHONE ENCOUNTER
Attempted to reach pt to check if pt can come in at 6 for appointment since we had cancellation. Left detailed vm to return call.

## 2022-08-12 ENCOUNTER — TRANSCRIBE ORDER (OUTPATIENT)
Dept: SCHEDULING | Age: 57
End: 2022-08-12

## 2022-08-12 DIAGNOSIS — D35.3 BENIGN NEOPLASM OF PITUITARY GLAND AND CRANIOPHARYNGEAL DUCT (POUCH) (HCC): Primary | ICD-10-CM

## 2022-08-12 DIAGNOSIS — D35.2 BENIGN NEOPLASM OF PITUITARY GLAND AND CRANIOPHARYNGEAL DUCT (POUCH) (HCC): Primary | ICD-10-CM

## 2022-08-15 ENCOUNTER — HOSPITAL ENCOUNTER (OUTPATIENT)
Dept: MRI IMAGING | Age: 57
Discharge: HOME | End: 2022-08-15
Payer: COMMERCIAL

## 2022-08-15 DIAGNOSIS — D35.2 BENIGN NEOPLASM OF PITUITARY GLAND AND CRANIOPHARYNGEAL DUCT (POUCH) (HCC): ICD-10-CM

## 2022-08-15 DIAGNOSIS — D35.3 BENIGN NEOPLASM OF PITUITARY GLAND AND CRANIOPHARYNGEAL DUCT (POUCH) (HCC): ICD-10-CM

## 2022-08-15 PROCEDURE — 74011250636 HC RX REV CODE- 250/636

## 2022-08-15 PROCEDURE — A9576 INJ PROHANCE MULTIPACK: HCPCS

## 2022-08-15 PROCEDURE — 70553 MRI BRAIN STEM W/O & W/DYE: CPT

## 2022-08-15 RX ADMIN — GADOTERIDOL 19 ML: 279.3 INJECTION, SOLUTION INTRAVENOUS at 11:55

## 2022-08-23 LAB
BUN SERPL-MCNC: 24 MG/DL (ref 6–24)
BUN/CREAT SERPL: 25 (ref 9–20)
CALCIUM SERPL-MCNC: 9.5 MG/DL (ref 8.7–10.2)
CHLORIDE SERPL-SCNC: 106 MMOL/L (ref 96–106)
CO2 SERPL-SCNC: 22 MMOL/L (ref 20–29)
CORTIS AM PEAK SERPL-MCNC: 10 UG/DL (ref 6.2–19.4)
CREAT SERPL-MCNC: 0.97 MG/DL (ref 0.76–1.27)
EGFR: 90 ML/MIN/1.73
GLUCOSE SERPL-MCNC: 91 MG/DL (ref 65–99)
IGF-I SERPL-MCNC: 197 NG/ML (ref 68–247)
POTASSIUM SERPL-SCNC: 4.3 MMOL/L (ref 3.5–5.2)
SODIUM SERPL-SCNC: 141 MMOL/L (ref 134–144)

## 2022-09-09 DIAGNOSIS — K58.9 IRRITABLE BOWEL SYNDROME, UNSPECIFIED TYPE: ICD-10-CM

## 2022-09-14 ENCOUNTER — OFFICE VISIT (OUTPATIENT)
Dept: FAMILY MEDICINE CLINIC | Age: 57
End: 2022-09-14
Payer: COMMERCIAL

## 2022-09-14 VITALS
RESPIRATION RATE: 14 BRPM | OXYGEN SATURATION: 98 % | HEART RATE: 71 BPM | DIASTOLIC BLOOD PRESSURE: 101 MMHG | SYSTOLIC BLOOD PRESSURE: 160 MMHG | WEIGHT: 203.4 LBS | HEIGHT: 68 IN | TEMPERATURE: 97.6 F | BODY MASS INDEX: 30.83 KG/M2

## 2022-09-14 DIAGNOSIS — I10 PRIMARY HYPERTENSION: Primary | ICD-10-CM

## 2022-09-14 DIAGNOSIS — D35.2 PITUITARY ADENOMA (HCC): ICD-10-CM

## 2022-09-14 DIAGNOSIS — E27.49 SECONDARY ADRENAL INSUFFICIENCY (HCC): ICD-10-CM

## 2022-09-14 DIAGNOSIS — K58.9 IRRITABLE BOWEL SYNDROME, UNSPECIFIED TYPE: ICD-10-CM

## 2022-09-14 DIAGNOSIS — E78.00 HYPERCHOLESTEROLEMIA: ICD-10-CM

## 2022-09-14 DIAGNOSIS — G43.009 MIGRAINE WITHOUT AURA AND WITHOUT STATUS MIGRAINOSUS, NOT INTRACTABLE: ICD-10-CM

## 2022-09-14 DIAGNOSIS — J45.20 MILD INTERMITTENT REACTIVE AIRWAY DISEASE WITH WHEEZING WITHOUT COMPLICATION: ICD-10-CM

## 2022-09-14 DIAGNOSIS — G89.29 OTHER CHRONIC PAIN: ICD-10-CM

## 2022-09-14 PROBLEM — J45.909 REACTIVE AIRWAY DISEASE WITH WHEEZING: Status: ACTIVE | Noted: 2022-09-14

## 2022-09-14 PROCEDURE — 99214 OFFICE O/P EST MOD 30 MIN: CPT | Performed by: NURSE PRACTITIONER

## 2022-09-14 RX ORDER — SUMATRIPTAN 100 MG/1
100 TABLET, FILM COATED ORAL
Qty: 15 TABLET | Refills: 2 | Status: SHIPPED | OUTPATIENT
Start: 2022-09-14 | End: 2022-09-14

## 2022-09-14 RX ORDER — DICYCLOMINE HYDROCHLORIDE 10 MG/1
CAPSULE ORAL
Qty: 270 CAPSULE | Refills: 3 | Status: SHIPPED | OUTPATIENT
Start: 2022-09-14

## 2022-09-14 RX ORDER — CHLORTHALIDONE 25 MG/1
25 TABLET ORAL DAILY
Qty: 30 TABLET | Refills: 1 | Status: SHIPPED | OUTPATIENT
Start: 2022-09-14

## 2022-09-14 NOTE — PROGRESS NOTES
Subjective:     Arely Pichardo. is a 60 y.o. male who presents for follow up of hypertension, hyperlipidemia, asthma, IBS, chronic pain, migraine headache, pituitary adenoma, and secondary adrenal insufficiency. Diet and Lifestyle: generally follows a low fat low cholesterol diet, generally follows a low sodium diet, exercises sporadically, nonsmoker  Home BP Monitoring: is not well controlled at home, ranging 140-150's/80's  He takes amlodipine at night as it makes him tired. Has not been taking rosuvastatin, unsure for how long, possibly for 3 months or more. Cardiovascular risk analysis - LDL goal is under 100  hypertension  hyperlipidemia  obese  sedentary lifestyle. Compliance with treatment thus far has been good. Cardiovascular ROS: taking medications as instructed, no medication side effects noted, no TIA's, no chest pain on exertion, no dyspnea on exertion, no swelling of ankles, no orthostatic dizziness or lightheadedness, no orthopnea or paroxysmal nocturnal dyspnea, no palpitations, no intermittent claudication symptoms    Has not been taking duloxetine for the past several months, has not noted any worsening of his chronic pain, does not want to restart rx. Has not been taking omeprazole for several months, no increase in GERD symptoms. Has not been taking montelukast for several months, no increase in allergy or asthma symptoms, does not want to restart. Requests refill of sumatriptan, reports his migraine headaches respond well to this. Reports heaches are occurring less than once weekly. He is taking dicyclomine for IBS, feels it is working well and wants to continue rx. He is seeing Dr. Debbie Kruse for ongoing management of secondary adrenal insufficiency following resection of pituitary adenoma. Currently taking 15 mg of hydrocortisone daily. New concerns: none.      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    Actinic keratosis due to exposure to sunlight L57.0    IBS (irritable bowel syndrome) K58.9    High triglycerides E78.1    Complete tear of right rotator cuff M75.121    Pituitary adenoma (HCC) D35.2    Secondary adrenal insufficiency (HCC) E27.49    Complete tear of left rotator cuff M75.122    Reactive airway disease with wheezing J45.909    Primary hypertension I10    Other chronic pain G89.29     Allergies   Allergen Reactions    Bactrim [Sulfamethoprim Ds] Rash    Lipitor [Atorvastatin] Myalgia    Sulfamethoxazole-Trimethoprim Rash     Past Medical History:   Diagnosis Date    Arthritis     Basal cell carcinoma 2012    3 on left side of neck and 1 on right side of neck and nasal bridge area with first dx starting aprroximately 12 yrs ago    Frequent nosebleeds     GERD (gastroesophageal reflux disease)     H/O seasonal allergies     IBS (irritable bowel syndrome)     Migraine     MRSA (methicillin resistant staph aureus) culture positive 2007    hand    Panhypopituitarism ([address]     Pituitary tumor     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 BORN WITH IT. MAKES HIM HAVE BLISTERS ON HANDS. LIVER OVERPRODUCED RED BLOOD CELLS--TX IS PHLEBOTOMY AS NEEDED AND HAS HAD THIS 3 TIMES SO FAR    Secondary adrenal insufficiency ([address] 2/21/2019     Past Surgical History:   Procedure Laterality Date    HX GI      COLONOSCOPY    HX HEART CATHETERIZATION  2013    minimal disease per cardio notes. HX HEENT      SINUS SURGERY    HX HEENT  02/2019 and 11/19, 2020    Transphenoidal pituitary surgery [facility], 2020 at [facility]    HX ORTHOPAEDIC Bilateral     bilateral thumb surgery    HX ORTHOPAEDIC Right ~2013    elbow surgery    HX OTHER SURGICAL      REMOVAL BCCA ON LACHO.  SIDE OF NECK AND BRIDGE OF NOSE    HX ROTATOR CUFF REPAIR Right     HX SEPTOPLASTY  2002    HX SHOULDER ARTHROSCOPY Left ~2013    HX SHOULDER ARTHROSCOPY Left 11/2021    biceps tenodesis and extensive debridement partial rotator cuff tears      [place] ANALYZ NEUROSTIM BRAIN, EACH ADD 30 MIN      on 10/28/21 pt wife denies    [place] PROSTATE BIOPSY, NEEDLE, SATURATION SAMPLING       Family History   Problem Relation Age of Onset    Alcohol abuse Brother     Heart Attack Brother     Diabetes Mother     Hypertension Mother     Cancer Mother         lung    Cancer Father         PROSTATE    OSTEOARTHRITIS Sister     Anesth Problems Neg Hx     Other Neg Hx         pituitary disease     Social History     Tobacco Use    Smoking status: Never    Smokeless tobacco: Never   Substance Use Topics    Alcohol use: Yes     Alcohol/week: 14.0 standard drinks     Types: 14 Cans of beer per week        Lab Results   Component Value Date/Time    WBC 7.5 04/08/2022 01:09 AM    HGB 12.3 (L) 04/08/2022 01:09 AM    HCT 37.0 (L) 04/08/2022 01:09 AM    PLATELET 326 24/37/8812 01:09 AM    MCV 81.7 04/08/2022 01:09 AM     Lab Results   Component Value Date/Time    Hemoglobin A1c 5.1 02/06/2019 01:51 AM    Hemoglobin A1c 5.2 02/05/2019 03:01 PM    Glucose 91 08/22/2022 08:09 AM    Glucose (POC) 82 01/24/2017 01:29 PM    LDL,Direct 121 (H) 03/22/2013 02:15 AM    LDL, calculated 74 05/19/2021 08:48 AM    LDL, calculated 112.8 (H) 02/07/2019 02:32 AM    Creatinine 0.97 08/22/2022 08:09 AM      Lab Results   Component Value Date/Time    Cholesterol, total 172 05/19/2021 08:48 AM    HDL Cholesterol 57 05/19/2021 08:48 AM    LDL,Direct 121 (H) 03/22/2013 02:15 AM    LDL, calculated 74 05/19/2021 08:48 AM    LDL, calculated 112.8 (H) 02/07/2019 02:32 AM    Triglyceride 256 (H) 05/19/2021 08:48 AM    CHOL/HDL Ratio 3.2 02/07/2019 02:32 AM     Lab Results   Component Value Date/Time    ALT (SGPT) 46 04/08/2022 01:09 AM    Alk.  phosphatase 78 04/08/2022 01:09 AM    Bilirubin, total 0.7 04/08/2022 01:09 AM Albumin 4.3 04/08/2022 01:09 AM    Protein, total 7.8 04/08/2022 01:09 AM    INR 1.0 02/05/2019 11:24 AM    Prothrombin time 10.3 02/05/2019 11:24 AM    PLATELET 538 48/84/5318 01:09 AM       Lab Results   Component Value Date/Time    GFR est non-AA >60 04/08/2022 01:09 AM    GFR est AA >60 04/08/2022 01:09 AM    Creatinine 0.97 08/22/2022 08:09 AM    BUN 24 08/22/2022 08:09 AM    Sodium 141 08/22/2022 08:09 AM    Potassium 4.3 08/22/2022 08:09 AM    Chloride 106 08/22/2022 08:09 AM    CO2 22 08/22/2022 08:09 AM     Lab Results   Component Value Date/Time    TSH 1.400 05/19/2021 08:48 AM    T4, Free 1.20 11/19/2019 10:41 AM         Review of Systems, additional:  A comprehensive review of systems was negative except for that written in the HPI.     Objective:     Visit Vitals  BP (!) 149/91 (BP 1 Location: Left upper arm, BP Patient Position: Sitting, BP Cuff Size: Small adult)   Pulse 71   Temp 97.6 °F (36.4 °C) (Oral)   Resp 14   Ht 5' 8\" (1.727 m)   Wt 203 lb 6.4 oz (92.3 kg)   SpO2 98%   BMI 30.93 kg/m²     Physical Examination: General appearance - alert, well appearing, and in no distress and oriented to person, place, and time  Mental status - normal mood, behavior, speech, dress, motor activity, and thought processes  Eyes - sclera anicteric  Neck - supple, no significant adenopathy, thyroid exam: thyroid is normal in size without nodules or tenderness  Chest - clear to auscultation, no wheezes, rales or rhonchi, symmetric air entry  Heart - normal rate, regular rhythm, normal S1, S2, no murmurs, rubs, clicks or gallops, normal bilateral carotid upstroke without bruits, no JVD  Abdomen - soft, nontender, nondistended, no masses or organomegaly  bowel sounds normal  Neurological - alert, oriented, normal speech, no focal findings or movement disorder noted  Extremities - no pedal edema noted, intact peripheral pulses, no edema, redness or tenderness in the calves or thighs  Skin - normal coloration and vlad robles      Assessment/Plan:       reviewed diet, exercise and weight control  copy of written low fat low cholesterol diet provided and reviewed  cardiovascular risk and specific lipid/LDL goals reviewed  reviewed medications and side effects in detail  reviewed potential future medication changes and side effects. Diagnoses and all orders for this visit:    1. Primary hypertension  Above goal  Continue amlodipine  Add chlorthalidone, recommend taking in the morning as may cause increased urinary frequency  Low-sodium diet recommended  Weight loss recommended  Increased exercise recommended  Repeat assessment of blood pressure in 4 weeks  -     CBC W/O DIFF; Future  -     TSH 3RD GENERATION; Future  -     chlorthalidone (HYGROTON) 25 mg tablet; Take 1 Tablet by mouth daily. 2. Hypercholesterolemia  Last LDL at goal but has not been taking rosuvastatin for several months  Repeat fasting lipid panel, anticipate resuming rosuvastatin  Heart healthy diet low in saturated fat recommended  -     LIPID PANEL; Future  -     METABOLIC PANEL, COMPREHENSIVE; Future    3. Irritable bowel syndrome, unspecified type  Stable  Continue dicyclomine    4. Other chronic pain  No change in symptoms off of duloxetine, will not restart as this is patient preference    5. Mild intermittent reactive airway disease with wheezing without complication  Stable symptom pattern  Continue albuterol as needed    6. Migraine without aura and without status migrainosus, not intractable  Infrequent episodes  Refill sumatriptan as requested  -     SUMAtriptan (Imitrex) 100 mg tablet; Take 1 Tablet by mouth once as needed for Migraine for up to 1 dose. 7. Pituitary adenoma ([address]  8. Secondary adrenal insufficiency (HCC)  No acute symptoms warranting change in treatment plan  Follow-up with endocrine as scheduled    Follow-up and Dispositions    Return in about 4 weeks (around 10/12/2022) for blood pressure.        I have discussed the diagnosis with the patient and the intended plan as seen in the above orders. The patient has received an after-visit summary and questions were answered concerning future plans. Patient conveyed understanding of the plan at the time of the visit.     Patrick Bright NP  9/14/2022

## 2022-09-14 NOTE — LETTER
9/26/2022    Mr. Aurora Hopper. [address] [facility] [address]      Dear Aurora Hopper.:    Please find your most recent results below. Resulted Orders   CBC W/O DIFF   Result Value Ref Range    WBC 5.2 4.1 - 11.1 K/uL    RBC 4.39 4. 10 - 5.70 M/uL    HGB 11.5 (L) 12.1 - 17.0 g/dL    HCT 38.9 36.6 - 50.3 %    MCV 88.6 80.0 - 99.0 FL    MCH 26.2 26.0 - 34.0 PG    MCHC 29.6 (L) 30.0 - 36.5 g/dL    RDW 15.0 (H) 11.5 - 14.5 %    PLATELET 381 664 - 273 K/uL    MPV 9.3 8.9 - 12.9 FL    NRBC 0.0 0  WBC    ABSOLUTE NRBC 0.00 0.00 - 3.27 K/uL   METABOLIC PANEL, COMPREHENSIVE   Result Value Ref Range    Sodium 140 136 - 145 mmol/L    Potassium 4.1 3.5 - 5.1 mmol/L    Chloride 108 97 - 108 mmol/L    CO2 25 21 - 32 mmol/L    Anion gap 7 5 - 15 mmol/L    Glucose 87 65 - 100 mg/dL    BUN 31 (H) 6 - 20 MG/DL    Creatinine 0.98 0.70 - 1.30 MG/DL    BUN/Creatinine ratio 32 (H) 12 - 20      GFR est AA >60 >60 ml/min/1.73m2    GFR est non-AA >60 >60 ml/min/1.73m2    Calcium 8.7 8.5 - 10.1 MG/DL    Bilirubin, total 0.5 0.2 - 1.0 MG/DL    ALT (SGPT) 22 12 - 78 U/L    AST (SGOT) 15 15 - 37 U/L    Alk. phosphatase 68 45 - 117 U/L    Protein, total 6.7 6.4 - 8.2 g/dL    Albumin 4.0 3.5 - 5.0 g/dL    Globulin 2.7 2.0 - 4.0 g/dL    A-G Ratio 1.5 1.1 - 2.2     LIPID PANEL   Result Value Ref Range    Cholesterol, total 200 (H) <200 MG/DL    Triglyceride 185 (H) <150 MG/DL    HDL Cholesterol 46 MG/DL    LDL, calculated 117 (H) 0 - 100 MG/DL    VLDL, calculated 37 MG/DL    CHOL/HDL Ratio 4.3 0.0 - 5.0       We have attempted to reach you several times through phone but were unable to get in contact with you. Please see below recommendations.        RECOMMENDATIONS:    -Thyroid levels normal.    -There is significant anemia present compared to levels noted 1 year ago, and progression compared to level noted 5 months ago. Wilfredo Camacho should investigate for reason why.  When did you last have a colonoscopy?    -Electrolytes, liver, and kidney function are normal.    -LDL has worsened significantly compared to value from 1 year ago.  I believe I will call you advise that he had run out of rosuvastatin, which would explain this change.  Recommend repeating this test after 6 to 8 weeks of being back on rosuvastatin.       Please call me if you have any questions: [phone]    Sincerely,      Parmjit Monsalve NP

## 2022-09-14 NOTE — PROGRESS NOTES
Leighton Whiting. is a 60 y.o. male      Chief Complaint   Patient presents with    Blood Pressure Check    Labs     Pt  is fasting for labs. Medication Refill       1. Have you been to the ER, urgent care clinic since your last visit? Hospitalized since your last visit? No    2. Have you seen or consulted any other health care providers outside of the [address] [place] [address] since your last visit? Include any pap smears or colon screening.  No

## 2022-09-15 LAB
ALBUMIN SERPL-MCNC: 4 G/DL (ref 3.5–5)
ALBUMIN/GLOB SERPL: 1.5 {RATIO} (ref 1.1–2.2)
ALP SERPL-CCNC: 68 U/L (ref 45–117)
ALT SERPL-CCNC: 22 U/L (ref 12–78)
ANION GAP SERPL CALC-SCNC: 7 MMOL/L (ref 5–15)
AST SERPL-CCNC: 15 U/L (ref 15–37)
BILIRUB SERPL-MCNC: 0.5 MG/DL (ref 0.2–1)
BUN SERPL-MCNC: 31 MG/DL (ref 6–20)
BUN/CREAT SERPL: 32 (ref 12–20)
CALCIUM SERPL-MCNC: 8.7 MG/DL (ref 8.5–10.1)
CHLORIDE SERPL-SCNC: 108 MMOL/L (ref 97–108)
CHOLEST SERPL-MCNC: 200 MG/DL
CO2 SERPL-SCNC: 25 MMOL/L (ref 21–32)
CREAT SERPL-MCNC: 0.98 MG/DL (ref 0.7–1.3)
ERYTHROCYTE [DISTWIDTH] IN BLOOD BY AUTOMATED COUNT: 15 % (ref 11.5–14.5)
GLOBULIN SER CALC-MCNC: 2.7 G/DL (ref 2–4)
GLUCOSE SERPL-MCNC: 87 MG/DL (ref 65–100)
HCT VFR BLD AUTO: 38.9 % (ref 36.6–50.3)
HDLC SERPL-MCNC: 46 MG/DL
HDLC SERPL: 4.3 {RATIO} (ref 0–5)
HGB BLD-MCNC: 11.5 G/DL (ref 12.1–17)
LDLC SERPL CALC-MCNC: 117 MG/DL (ref 0–100)
MCH RBC QN AUTO: 26.2 PG (ref 26–34)
MCHC RBC AUTO-ENTMCNC: 29.6 G/DL (ref 30–36.5)
MCV RBC AUTO: 88.6 FL (ref 80–99)
NRBC # BLD: 0 K/UL (ref 0–0.01)
NRBC BLD-RTO: 0 PER 100 WBC
PLATELET # BLD AUTO: 284 K/UL (ref 150–400)
PMV BLD AUTO: 9.3 FL (ref 8.9–12.9)
POTASSIUM SERPL-SCNC: 4.1 MMOL/L (ref 3.5–5.1)
PROT SERPL-MCNC: 6.7 G/DL (ref 6.4–8.2)
RBC # BLD AUTO: 4.39 M/UL (ref 4.1–5.7)
SODIUM SERPL-SCNC: 140 MMOL/L (ref 136–145)
TRIGL SERPL-MCNC: 185 MG/DL (ref ?–150)
TSH SERPL DL<=0.05 MIU/L-ACNC: 1.38 UIU/ML (ref 0.36–3.74)
VLDLC SERPL CALC-MCNC: 37 MG/DL
WBC # BLD AUTO: 5.2 K/UL (ref 4.1–11.1)

## 2022-09-18 NOTE — PROGRESS NOTES
Thyroid levels normal.  There is significant anemia present compared to levels noted 1 year ago, and progression compared to level noted 5 months ago. We should investigate for reason why. When did you last have a colonoscopy? Electrolytes, liver, and kidney function are normal.  LDL has worsened significantly compared to value from 1 year ago. I believe I will call you advise that he had run out of rosuvastatin, which would explain this change. Recommend repeating this test after 6 to 8 weeks of being back on rosuvastatin.

## 2022-11-22 RX ORDER — HYDROCORTISONE 5 MG/1
TABLET ORAL
Qty: 540 TABLET | Refills: 3 | Status: SHIPPED | OUTPATIENT
Start: 2022-11-22

## 2022-12-01 ENCOUNTER — OFFICE VISIT (OUTPATIENT)
Dept: PRIMARY CARE CLINIC | Age: 57
End: 2022-12-01
Payer: COMMERCIAL

## 2022-12-01 VITALS
SYSTOLIC BLOOD PRESSURE: 148 MMHG | HEART RATE: 91 BPM | RESPIRATION RATE: 18 BRPM | TEMPERATURE: 98.4 F | HEIGHT: 68 IN | WEIGHT: 200 LBS | OXYGEN SATURATION: 96 % | DIASTOLIC BLOOD PRESSURE: 92 MMHG | BODY MASS INDEX: 30.31 KG/M2

## 2022-12-01 DIAGNOSIS — B34.9 VIRAL ILLNESS: ICD-10-CM

## 2022-12-01 DIAGNOSIS — J20.8 ACUTE VIRAL BRONCHITIS: Primary | ICD-10-CM

## 2022-12-01 LAB
FLUAV+FLUBV AG NOSE QL IA.RAPID: NEGATIVE
FLUAV+FLUBV AG NOSE QL IA.RAPID: NEGATIVE
SARS-COV-2 PCR, POC: NEGATIVE
VALID INTERNAL CONTROL?: YES

## 2022-12-01 RX ORDER — MELOXICAM 15 MG/1
TABLET ORAL
COMMUNITY
Start: 2022-11-14

## 2022-12-01 RX ORDER — DEXAMETHASONE SODIUM PHOSPHATE 100 MG/10ML
10 INJECTION INTRAMUSCULAR; INTRAVENOUS ONCE
Status: COMPLETED | OUTPATIENT
Start: 2022-12-01 | End: 2022-12-01

## 2022-12-01 RX ORDER — BENZONATATE 200 MG/1
200 CAPSULE ORAL
Qty: 20 CAPSULE | Refills: 0 | Status: SHIPPED | OUTPATIENT
Start: 2022-12-01 | End: 2022-12-08

## 2022-12-01 RX ORDER — SUMATRIPTAN 100 MG/1
TABLET, FILM COATED ORAL
COMMUNITY
Start: 2022-09-16

## 2022-12-01 RX ADMIN — DEXAMETHASONE SODIUM PHOSPHATE 10 MG: 100 INJECTION INTRAMUSCULAR; INTRAVENOUS at 17:00

## 2022-12-01 NOTE — PROGRESS NOTES
Chief Complaint   Patient presents with    Cold Symptoms     Cough, vomiting, fever and sinus drainage since Sunday; hx bronchitis and seasonal allergies; taking mucinex for sx;    HISTORY OF PRESENT ILLNESS  Khalida Grier is a 59 y.o. male. Patient presents with cough, nasal congestion. Associated subjective fever, body aches, and vomiting 4-5 days ago which have resolved. He is taking mucinex with some relief. He denies asthma, copd, or shortness of breath. No covid testing. Review of Systems   Constitutional: Negative. HENT:  Positive for congestion. Respiratory:  Positive for cough. Negative for shortness of breath and wheezing. Gastrointestinal: Negative. Musculoskeletal:  Positive for myalgias. 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 BORN WITH IT. MAKES HIM HAVE BLISTERS ON HANDS. LIVER OVERPRODUCED RED BLOOD CELLS--TX IS PHLEBOTOMY AS NEEDED AND HAS HAD THIS 3 TIMES SO FAR    Secondary adrenal insufficiency ([address] 2/21/2019     Past Surgical History:   Procedure Laterality Date    HX GI      COLONOSCOPY    HX HEART CATHETERIZATION  2013    minimal disease per cardio notes. HX HEENT      SINUS SURGERY    HX HEENT  02/2019 and 11/19, 2020    Transphenoidal pituitary surgery [facility], 2020 at [facility]    HX ORTHOPAEDIC Bilateral     bilateral thumb surgery    HX ORTHOPAEDIC Right ~2013    elbow surgery    HX OTHER SURGICAL      REMOVAL BCCA ON [place].  SIDE OF NECK AND BRIDGE OF NOSE    HX ROTATOR CUFF REPAIR Right     HX SEPTOPLASTY  2002    HX SHOULDER ARTHROSCOPY Left ~2013    HX SHOULDER ARTHROSCOPY Left 11/2021    biceps tenodesis and extensive debridement partial rotator cuff tears      DC ANALYZ NEUROSTIM BRAIN, EACH ADD 30 MIN      on 10/28/21 pt wife denies    DC PROSTATE BIOPSY, NEEDLE, SATURATION SAMPLING     Physical Exam  Vitals and nursing note reviewed. HENT:      Right Ear: Tympanic membrane normal.      Left Ear: Tympanic membrane normal.      Nose: Congestion present. Mouth/Throat:      Pharynx: Oropharynx is clear. No oropharyngeal exudate or posterior oropharyngeal erythema. Cardiovascular:      Rate and Rhythm: Normal rate and regular rhythm. Pulmonary:      Effort: Pulmonary effort is normal. No respiratory distress. Breath sounds: Normal breath sounds. Musculoskeletal:      Cervical back: Neck supple. Neurological:      Mental Status: He is alert. Visit Vitals  BP (!) 148/92   Pulse 91   Temp 98.4 °F (36.9 °C) (Temporal)   Resp 18   Ht 5' 8\" (1.727 m)   Wt 200 lb (90.7 kg)   SpO2 96%   BMI 30.41 kg/m²     Results for orders placed or performed in visit on 12/01/22   AMB POC ARIANA INFLUENZA A/B TEST   Result Value Ref Range    VALID INTERNAL CONTROL POC Yes     Influenza A Ag POC Negative Negative    Influenza B Ag POC Negative Negative   POCT COVID-19, SARS-COV-2, PCR   Result Value Ref Range    SARS-COV-2 PCR, POC Negative Negative   ASSESSMENT and PLAN    ICD-10-CM ICD-9-CM    1. Acute viral bronchitis  J20.8 466.0       2. Viral illness  B34.9 079.99 AMB POC ARIANA INFLUENZA A/B TEST      POCT COVID-19, SARS-COV-2, PCR        Encounter Diagnoses   Name Primary?     Acute viral bronchitis Yes    Viral illness      Orders Placed This Encounter    AMB POC ARIANA INFLUENZA A/B TEST    POCT COVID-19, SARS-COV-2, PCR    meloxicam (MOBIC) 15 mg tablet    SUMAtriptan (IMITREX) 100 mg tablet    benzonatate (TESSALON) 200 mg capsule    dexamethasone (DECADRON) 10 mg/mL Oral 10 mg   Once dose of Decadron given in clinic. Discussed with patient likely viral illness that should resolve on it's own. Recommended rest, push fluids, and take tylenol or ibuprofen for fever or symptom relief as needed. Instructed to seek additional care if does not resolve or symptoms worsens, ED for shortness of breath/difficulty breathing. He agrees with plan.     Signed By: IRISH Chung     December 1, 2022

## 2022-12-08 ENCOUNTER — OFFICE VISIT (OUTPATIENT)
Dept: ENDOCRINOLOGY | Age: 57
End: 2022-12-08
Payer: COMMERCIAL

## 2022-12-08 VITALS
HEIGHT: 68 IN | BODY MASS INDEX: 31.16 KG/M2 | HEART RATE: 97 BPM | SYSTOLIC BLOOD PRESSURE: 130 MMHG | DIASTOLIC BLOOD PRESSURE: 82 MMHG | WEIGHT: 205.6 LBS

## 2022-12-08 DIAGNOSIS — D35.2 BENIGN TUMOR OF PITUITARY GLAND (HCC): Primary | ICD-10-CM

## 2022-12-08 DIAGNOSIS — I10 ESSENTIAL HYPERTENSION, BENIGN: ICD-10-CM

## 2022-12-08 DIAGNOSIS — E29.1 SECONDARY MALE HYPOGONADISM: ICD-10-CM

## 2022-12-08 DIAGNOSIS — R22.1 NECK MASS: ICD-10-CM

## 2022-12-08 DIAGNOSIS — E27.49 SECONDARY ADRENAL INSUFFICIENCY (HCC): ICD-10-CM

## 2022-12-08 NOTE — PROGRESS NOTES
Chief Complaint   Patient presents with    Pituitary Problem     PCP and pharmacy confirmed      History of Present Illness: Nikhil Holman is a 59 y.o. male here for follow up of pituitary disease. Weight down 7 lbs since last visit in person in 12/21 but had gotten up to about 240 lbs this spring after his shoulder surgery and now is down to 205 today. Did try taking 1/2 tab of amlodipine at bedtime but still had fatigue with this. Saw his PCP in 9/22 and she began chlorthalidone 25 mg daily and has been taking this and stopped the amlodipine in 9/22 as this was his understanding of the plan even though his PCP's note states to add this on top of the amlodipine. Home BP readings have been in the 140s. Has felt better energy taking the HC 3 times daily instead of twice daily and has allowed him to get through the work day better. Had a visit with the pituitary clinic in 8/22 at [facility] and they are keeping an eye on the areas on his MRI and will hold on any gamma knife treatment and will repeat an MRI in 2/23. May get an occasional headache but nothing regular. Will get a nose bleed on occasion and this can make him more tired and will take an extra tab of [facility]. when this happens which can be once a week and rarely twice a week. No abd pain or nausea or dizziness. Has noticed a knot in the left side of his neck for the past 6 months that appeared after his last visit. He did have TFTs checked by [facility] in 8/22 that showed his TSH was 1.42 and FT4 was 0.9. Does have some neck tenderness when it swells. No dysphagia and no dyspnea when supine. Never smoked. Current Outpatient Medications   Medication Sig    meloxicam (MOBIC) 15 mg tablet as needed. SUMAtriptan (IMITREX) 100 mg tablet TAKE 1 TABLET BY MOUTH ONCE DAILY AS NEEDED UP TO FOR 1 DAY    benzonatate (TESSALON) 200 mg capsule Take 1 Capsule by mouth three (3) times daily as needed for Cough for up to 7 days.     hydrocortisone (CORTEF) 5 mg tablet Take 3 tabs before breakfast, 2 tabs with lunch and 1 tab with dinner    dicyclomine (BENTYL) 10 mg capsule TAKE 1 CAPSULE BY MOUTH 3 TIMES A DAY    chlorthalidone (HYGROTON) 25 mg tablet Take 1 Tablet by mouth daily. rosuvastatin (CRESTOR) 10 mg tablet Take 1 Tablet by mouth daily. ondansetron hcl (Zofran) 4 mg tablet Take 1 Tablet by mouth every eight (8) hours as needed for Nausea. aspirin/salicylamide/caffeine (ARTHRITIS STRENGTH BC POWDER PO) Take  by mouth. albuterol (PROVENTIL HFA, VENTOLIN HFA, PROAIR HFA) 90 mcg/actuation inhaler Take 2 Puffs by inhalation every four (4) hours as needed for Wheezing. naproxen sodium (NAPROSYN) 220 mg tablet Take 220 mg by mouth as needed. SIMETHICONE Take 120 mg by mouth as needed. No current facility-administered medications for this visit. Allergies   Allergen Reactions    Bactrim [Sulfamethoprim Ds] Rash    Lipitor [Atorvastatin] Myalgia    Sulfamethoxazole-Trimethoprim Rash     Review of Systems: PER HPI    Physical Examination:  Blood pressure 130/82, pulse 97, height 5' 8\" (1.727 m), weight 205 lb 9.6 oz (93.3 kg). General: pleasant, no distress, good eye contact   Neck: no thyromegaly, (+) deep left lateral mass felt about 1 cm in size  Cardiovascular: regular, normal rate, nl s1 and s2, no m/r/g   Respiratory: clear to auscultation bilaterally   Integumentary: skin is normal, no edema  Neurological: reflexes 2+ at biceps, no tremors  Psychiatric: normal mood and affect    Data Reviewed: MRI 8/22  EXAM:  MRI BRAIN W WO CONT     INDICATION:    Pituitary adenoma status post surgery. COMPARISON:  MRI brain 6/3/2019. CONTRAST: 19 ml ProHance. TECHNIQUE:    Multiplanar multisequence acquisition without and with contrast of the brain. FINDINGS:  Status post transsphenoidal pituitary surgery.  Significant interval decrease in  size of previously seen residual left pituitary adenoma with left cavernous  sinus extension, with a small amount of residual hypoenhancing tissue within the  left lateral sella measuring 8 x 4 mm (series 13 image 9), and hypoenhancing  tissue in the left cavernous sinus measuring 4 x 4 mm (series 13 image 13). There is interval decrease in size of hypoenhancing tissue in the right sella  now measuring 7 x 4 mm (series 13 image 11). The infundibulum is midline. No  evidence of suprasellar tumor. The optic chiasm is unremarkable. The ventricles are normal in size and position. Unchanged scattered  periventricular and deep white matter T2/FLAIR hyperintensities, consistent with  mild chronic microvascular ischemic disease. There is no acute infarct,  extra-axial fluid collection, or mass effect. There is no cerebellar tonsillar  herniation. Expected arterial flow-voids are present. The paranasal sinuses, mastoid air cells, and middle ears are clear. The orbital  contents are within normal limits. No significant osseous or scalp lesions are  identified. IMPRESSION     1. Status post transsphenoidal pituitary surgery. Significant interval decrease  in size of previously seen residual pituitary adenoma since 2019 as described  above. Small amount of hypoenhancing tissue in the bilateral sella and left  cavernous sinus may represent some combination of residual adenoma and  postoperative/posttreatment change. 2. Unchanged mild chronic microvascular ischemic disease. No acute intracranial  abnormality. Assessment/Plan:     1. Benign tumor of pituitary gland [facility]): s/p transphenoidal resection of a 2.5 cm nonfunctioning pituitary tumor on 2/12/19 by Dr. Eva Nyhan. 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 His repeat MRI on 6/3/19 showed decrease in size to 1.9 cm.   Ended up going to [facility] for a 2nd opinion in 7/19 and they recommended repeat surgery as his tumor grew to 2.1 cm in 11/19 and his vision was worsening and this was done on 11/7/19 and pathology showed a gonadotroph adenoma but did not stain for IGF-1. His free T4 was 0.94 in 2/19 and 0.98 in 5/19 and 1.20 in 11/19 and 0.8 in 7/20 and 0.9 in 8/22 so doesn't appear to have secondary hypothyroidism. His IGF-1 was 209 in 5/19 and I was checking to see if this would be low and was up to 317 in 11/19 after surgery. Doesn't have evidence of DI and Na is normal.  Repeat MRI at [facility] in 1/20 showed resolution of carvernous sinus disease so did not need gamma knife. IGF-1 back to normal at 96 in 5/20 and 207 in 11/20 and 197 in 8/22. His repeat MRI in 7/20 does not show any evidence of tumor. MRI in 7/21 showed mild interval enlargement of a 4.4 mm enhancing nodule in the posterior left cavernous sinus suspicious for residual/recurrent tumor and [facility] is following this and repeat MRI in 8/22 showed a small amount of residual hypoenhancing tissue within the left lateral sella measuring 8 x 4 mm (series 13 image 9), and hypoenhancing tissue in the left cavernous sinus measuring 4 x 4 mm (series 13 image 13). There is interval decrease in size of hypoenhancing tissue in the right sella now measuring 7 x 4 mm (series 13 image 11). This is being watched more closely with repeat MRI every 6 months through [facility]  - repeat MRI in 2/23 per [facility]      2. Secondary adrenal insufficiency ([facility] [address] [address]): was found to have a cortisol of 0.6 and inappropriately low ACTH of 2.2. He was started on hydrocortisone 20/10 and was feeling better with starting this but got the flu in 3/19 and needed an increase to 40/20 and then when I tapered back to 20/10, he felt worse and had been taking 30/20.   This is more than I want him to be on and gave him a very slow taper by 5 mg every 3 weeks to get back to 20/10 as a maintenance dose and was there in 11/19 but increased to 30/10 during COVID so I had him try to taper back to 20 and 10 as his cortisol was still low at 4.2 in 5/20 showing continued suppression of his adrenal gland. His cortisol was 7.8 and ACTH low at 3 in 7/20 at [facility] but had taken his [facility] the day of the lab draw. They thought he was on 10/10 and wanted him to taper to 10/5 but he remains on 20/10. Cortisol up to 7.0 in 11/20 so tried to taper to 15/10 but couldn't tolerate this so went back to 20/10 so in 5/21 tried to taper to 17.5/10. He had been taking 20 mg in the morning only since his shoulder surgery in 11/21 but in the winter of 2022 he went back to taking 20 in the morning and 10 at lunch. In 6/22 I had him divide up his dose to 15/10/5 to see if this helps give better control of his symptoms during the day and cortisol level was 10 in 8/22 so kept dose the same. I previously explained the importance of giving stress dose steroids in the future if he needs surgery or has an infection and is wearing a medic alert bracelet at this time. - dose hydrocortisone 15 mg at breakfast, 10 mg at lunch and 5 mg at dinner   - check cortisol and bmp prior to next visit  - wear medic alert bracelet or necklace       3. Secondary male hypogonadism: his testosterone was low at 228 in 2/19 and lower at 104 in 5/19 but he did not have many hypogonadal symptoms so held on treatment and up to 281 in 11/19 with wt loss and 373 in 1/20 and 360 in 7/20 and 347 in 7/21 and 661 in 8/22  - repeat total testosterone in the future if symptoms arise          4. Essential hypertension, benign: did not tolerate 5 mg of amlodipine nor 2.5 mg at bedtime and PCP began chlorthalidone 25 mg daily in 9/22 and BP controlled today  - cont chlorthalidone 25 mg daily  - monitor home blood pressure readings        6.  Neck mass: has a deep left lateral neck mass about 1 cm in size that has been present for the past 6 months per his recall so will order a CT scan to evaluate  - order CT of neck with and without contrast            Patient Instructions   1) I will order a neck CT scan to evaluate the lump you have felt and will be in touch over mychart with a plan. Please call the Patient Care team at [phone] to schedule this appointment at your earliest convenience    2) Your blood pressure is controlled on the chlorthalidone. 3) Your dose of hydrocortisone looks good so stay on the 3 tabs in the morning, 2 tabs at lunch and 1 tab at dinner. Follow-up and Dispositions    Return in about 6 months (around 6/8/2023). Copy sent to: Sulaiman Ramires NP as PCP - General (Nurse Practitioner)  Selvin Decker MD (Endocrinology Physician)    Addendum: 12/15/22  INDICATION: new left sided neck lump palpated on exam, evaluate for abnormal  lymph nodes or tumor Localized swelling, mass and lump, neck  Dx: Neck mass [R22.1 (ICD-10-CM)]      new left sided neck lump palpated on exam, evaluate for abnormal lymph nodes or  tumor         COMPARISON: None     TECHNIQUE:  Axial neck CT was performed after the uneventful administration of  IV contrast. Sagittal and coronal reconstructions were generated. CT dose reduction was achieved through use of a standardized protocol tailored  for this examination and automatic exposure control for dose modulation. FINDINGS:     NASOPHARYNX: Normal.  SUPRAHYOID NECK: Normal oropharynx, oral cavity, parapharyngeal space, and  retropharyngeal space. INFRAHYOID NECK: Motion artifact with no definite abnormality. PAROTID GLANDS: Normal.  SUBMANDIBULAR GLANDS: Normal.  THYROID GLAND: Motion artifact, no definite abnormality. LYMPH NODES: None enlarged by CT size criteria . LUNG APICES: 2.  OSSEOUS STRUCTURES: No destructive bone lesion. ADDITIONAL COMMENTS: No area of concern was marked with a marker, with no  definite left-sided soft tissue abnormality. Left maxillary sinus fluid level. IMPRESSION     1. Area of palpable concern in the left neck not marked for evaluation. No  definite abnormality.  Clinical follow-up for any area of palpable concern is  recommended, consider focused ultrasound. 2. Motion artifact in the lower neck as above. 3. Fluid level left maxillary sinus.  -------------------------------------------------------------------------------------------------------------------  Sent him the following message through [employer]:  It doesn't appear there is any area of concern in the left side of your neck where you were having some swelling so if this continues, you may need to see your PCP for further evaluation but there's nothing to do at this time.

## 2022-12-08 NOTE — PATIENT INSTRUCTIONS
1) I will order a neck CT scan to evaluate the lump you have felt and will be in touch over [facility] with a plan. Please call the Patient Care team at [phone] to schedule this appointment at your earliest convenience    2) Your blood pressure is controlled on the chlorthalidone. 3) Your dose of hydrocortisone looks good so stay on the 3 tabs in the morning, 2 tabs at lunch and 1 tab at dinner.

## 2022-12-13 DIAGNOSIS — E78.00 HYPERCHOLESTEROLEMIA: ICD-10-CM

## 2022-12-13 RX ORDER — ROSUVASTATIN CALCIUM 10 MG/1
TABLET, COATED ORAL
Qty: 90 TABLET | Refills: 1 | Status: SHIPPED | OUTPATIENT
Start: 2022-12-13

## 2022-12-14 ENCOUNTER — HOSPITAL ENCOUNTER (OUTPATIENT)
Dept: CT IMAGING | Age: 57
Discharge: HOME | End: 2022-12-14
Payer: COMMERCIAL

## 2022-12-14 DIAGNOSIS — R22.1 NECK MASS: ICD-10-CM

## 2022-12-14 PROCEDURE — 74011000636 HC RX REV CODE- 636: Performed by: INTERNAL MEDICINE

## 2022-12-14 PROCEDURE — 70491 CT SOFT TISSUE NECK W/DYE: CPT

## 2022-12-14 RX ADMIN — IOPAMIDOL 100 ML: 755 INJECTION, SOLUTION INTRAVENOUS at 08:28

## 2023-01-05 RX ORDER — HYDROCORTISONE 5 MG/1
TABLET ORAL
Qty: 540 TABLET | Refills: 3 | Status: SHIPPED | OUTPATIENT
Start: 2023-01-05

## 2023-01-10 RX ORDER — HYDROCORTISONE 5 MG/1
TABLET ORAL
Qty: 540 TABLET | Refills: 3 | Status: SHIPPED | OUTPATIENT
Start: 2023-01-10

## 2023-01-20 ENCOUNTER — TRANSCRIBE ORDER (OUTPATIENT)
Dept: SCHEDULING | Age: 58
End: 2023-01-20

## 2023-01-20 DIAGNOSIS — M23.203 DEGENERATIVE TEAR OF MEDIAL MENISCUS OF RIGHT KNEE: ICD-10-CM

## 2023-01-20 DIAGNOSIS — M25.561 ACUTE PAIN OF RIGHT KNEE: ICD-10-CM

## 2023-01-20 DIAGNOSIS — M17.11 PRIMARY OSTEOARTHRITIS OF RIGHT KNEE: Primary | ICD-10-CM

## 2023-01-25 ENCOUNTER — HOSPITAL ENCOUNTER (OUTPATIENT)
Dept: MRI IMAGING | Age: 58
Discharge: HOME | End: 2023-01-25
Payer: COMMERCIAL

## 2023-01-25 DIAGNOSIS — M25.561 ACUTE PAIN OF RIGHT KNEE: ICD-10-CM

## 2023-01-25 DIAGNOSIS — M23.203 DEGENERATIVE TEAR OF MEDIAL MENISCUS OF RIGHT KNEE: ICD-10-CM

## 2023-01-25 DIAGNOSIS — M17.11 PRIMARY OSTEOARTHRITIS OF RIGHT KNEE: ICD-10-CM

## 2023-01-25 PROCEDURE — 73721 MRI JNT OF LWR EXTRE W/O DYE: CPT

## 2023-02-03 ENCOUNTER — TRANSCRIBE ORDER (OUTPATIENT)
Dept: SCHEDULING | Age: 58
End: 2023-02-03

## 2023-02-03 DIAGNOSIS — M21.961 ACQUIRED DEFORMITY OF KNEE, RIGHT: Primary | ICD-10-CM

## 2023-02-22 ENCOUNTER — DOCUMENTATION ONLY (OUTPATIENT)
Dept: FAMILY MEDICINE CLINIC | Age: 58
End: 2023-02-22

## 2023-02-22 ENCOUNTER — TELEPHONE (OUTPATIENT)
Dept: FAMILY MEDICINE CLINIC | Age: 58
End: 2023-02-22

## 2023-02-22 NOTE — TELEPHONE ENCOUNTER
Patient needs a pre-op appointment between 02/27/2023 and 03/03/2023. No appointments available. Please call Wife Aracelis Tubbs back at [phone].  His surgery is schedule for 03/15/2023

## 2023-02-23 NOTE — TELEPHONE ENCOUNTER
Wife called in and Adriana Estrella had an opening for March 3rd I put him in there. No further action needed.

## 2023-02-27 ENCOUNTER — HOSPITAL ENCOUNTER (OUTPATIENT)
Dept: CT IMAGING | Age: 58
Discharge: HOME | End: 2023-02-27
Attending: ORTHOPAEDIC SURGERY
Payer: COMMERCIAL

## 2023-02-27 DIAGNOSIS — M21.961 ACQUIRED DEFORMITY OF KNEE, RIGHT: ICD-10-CM

## 2023-02-27 PROCEDURE — 73700 CT LOWER EXTREMITY W/O DYE: CPT

## 2023-03-03 ENCOUNTER — TELEPHONE (OUTPATIENT)
Dept: FAMILY MEDICINE CLINIC | Age: 58
End: 2023-03-03

## 2023-03-03 ENCOUNTER — OFFICE VISIT (OUTPATIENT)
Dept: FAMILY MEDICINE CLINIC | Age: 58
End: 2023-03-03

## 2023-03-03 VITALS
WEIGHT: 204.4 LBS | SYSTOLIC BLOOD PRESSURE: 135 MMHG | BODY MASS INDEX: 30.98 KG/M2 | OXYGEN SATURATION: 99 % | HEART RATE: 93 BPM | RESPIRATION RATE: 16 BRPM | HEIGHT: 68 IN | DIASTOLIC BLOOD PRESSURE: 77 MMHG | TEMPERATURE: 97.8 F

## 2023-03-03 DIAGNOSIS — J45.20 MILD INTERMITTENT REACTIVE AIRWAY DISEASE WITH WHEEZING WITHOUT COMPLICATION: ICD-10-CM

## 2023-03-03 DIAGNOSIS — M17.11 PRIMARY OSTEOARTHRITIS OF RIGHT KNEE: Primary | ICD-10-CM

## 2023-03-03 DIAGNOSIS — I10 PRIMARY HYPERTENSION: ICD-10-CM

## 2023-03-03 DIAGNOSIS — K21.9 GASTROESOPHAGEAL REFLUX DISEASE WITHOUT ESOPHAGITIS: ICD-10-CM

## 2023-03-03 DIAGNOSIS — E27.49 SECONDARY ADRENAL INSUFFICIENCY (HCC): ICD-10-CM

## 2023-03-03 DIAGNOSIS — D35.2 PITUITARY ADENOMA (HCC): ICD-10-CM

## 2023-03-03 RX ORDER — MONTELUKAST SODIUM 10 MG/1
10 TABLET ORAL DAILY
COMMUNITY

## 2023-03-03 RX ORDER — CYCLOBENZAPRINE HCL 5 MG
5 TABLET ORAL
Qty: 90 TABLET | Refills: 0 | Status: SHIPPED | OUTPATIENT
Start: 2023-03-03

## 2023-03-03 NOTE — PROGRESS NOTES
Zachary Ibrahim. is a 59 y.o. male , id x 2(name and ). Reviewed record, history, and  medications. Chief Complaint   Patient presents with    Pre-op Exam     (R) knee surgery on 3/15/23. Vitals:    23 1447   BP: 135/77   Pulse: 93   Resp: 16   Temp: 97.8 °F (36.6 °C)   TempSrc: Oral   SpO2: 99%   Weight: 204 lb 6.4 oz (92.7 kg)   Height: 5' 8\" (1.727 m)       Coordination of Care Questionnaire:   1. Have you been to the ER, urgent care clinic since your last visit? Hospitalized since your last visit? No    2. Have you seen or consulted any other health care providers outside of the [address] [place] [address] since your last visit? Include any pap smears or colon screening.  No

## 2023-03-03 NOTE — TELEPHONE ENCOUNTER
Mr. Niharika Manley is scheduled for R total knee replacement with Dr. Tiffany Bridges at [facility] on 3/15/23. I did his pre-operative evaluation today and he will be seen in PAT at [facility] on 3/7/23. I wanted to check in with you for guidance on any adjustments to his hydrocortisone if  indicated for management of secondary adrenal insufficiency in the perioperative period. Thanks for your help!   Ethel Bella

## 2023-03-03 NOTE — PROGRESS NOTES
Preoperative Evaluation    Date of Exam: 3/3/2023    Ivanna Oconnell is a 59 y.o. male (:1965) who presents for preoperative evaluation. R TKR is scheduled 3/15/23 at [facility] with Dr. Tracie Vaughn under general anesthesia. Has appt at PAT at [facility] on 3/7/23. 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    Actinic keratosis due to exposure to sunlight 2015    IBS (irritable bowel syndrome) 2015    High triglycerides 2015    Hypercholesterolemia 2013     Medical History:     Past Medical History:   Diagnosis Date    Arthritis     Basal cell carcinoma     3 on left side of neck and 1 on right side of neck and nasal bridge area with first dx starting aprroximately 12 yrs ago    Frequent nosebleeds     GERD (gastroesophageal reflux disease)     H/O seasonal allergies     IBS (irritable bowel syndrome)     Migraine     MRSA (methicillin resistant staph aureus) culture positive     hand    Panhypopituitarism ([address]     Pituitary tumor     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 BORN WITH IT. MAKES HIM HAVE BLISTERS ON HANDS. LIVER OVERPRODUCED RED BLOOD CELLS--TX IS PHLEBOTOMY AS NEEDED AND HAS HAD THIS 3 TIMES SO FAR    Secondary adrenal insufficiency ([address] 2019     Allergies:      Allergies   Allergen Reactions    Bactrim [Sulfamethoprim Ds] Rash    Lipitor [Atorvastatin] Myalgia    Sulfamethoxazole-Trimethoprim Rash      Medications:     Current Outpatient Medications   Medication Sig    montelukast (Singulair) 10 mg tablet Take 10 mg by mouth daily. cetirizine HCl (ZYRTEC PO) Take  by mouth. chlorthalidone (HYGROTON) 25 mg tablet Take 1 Tablet by mouth daily. hydrocortisone (CORTEF) 5 mg tablet TAKE 3 TABLETS BY MOUTH BEFORE BREAKFAST, 2 TABLETS BY MOUTH AT LUNCH and 1 tab at dinner    rosuvastatin (CRESTOR) 10 mg tablet TAKE 1 TABLET BY MOUTH ONE TIME A DAY    meloxicam (MOBIC) 15 mg tablet as needed. SUMAtriptan (IMITREX) 100 mg tablet TAKE 1 TABLET BY MOUTH ONCE DAILY AS NEEDED UP TO FOR 1 DAY    dicyclomine (BENTYL) 10 mg capsule TAKE 1 CAPSULE BY MOUTH 3 TIMES A DAY    aspirin/salicylamide/caffeine (ARTHRITIS STRENGTH BC POWDER PO) Take  by mouth. albuterol (PROVENTIL HFA, VENTOLIN HFA, PROAIR HFA) 90 mcg/actuation inhaler Take 2 Puffs by inhalation every four (4) hours as needed for Wheezing. naproxen sodium (NAPROSYN) 220 mg tablet Take 220 mg by mouth as needed. SIMETHICONE Take 120 mg by mouth as needed. ondansetron hcl (Zofran) 4 mg tablet Take 1 Tablet by mouth every eight (8) hours as needed for Nausea. (Patient not taking: Reported on 3/3/2023)     No current facility-administered medications for this visit. Surgical History:     Past Surgical History:   Procedure Laterality Date    HX GI      COLONOSCOPY    HX HEART CATHETERIZATION  2013    minimal disease per cardio notes. HX HEENT      SINUS SURGERY    HX HEENT  02/2019 and 11/19, 2020    Transphenoidal pituitary surgery [facility], 2020 at [facility]    HX ORTHOPAEDIC Bilateral     bilateral thumb surgery    HX ORTHOPAEDIC Right ~2013    elbow surgery    HX OTHER SURGICAL      REMOVAL BCCA ON [address].  SIDE OF NECK AND BRIDGE OF NOSE    HX ROTATOR CUFF REPAIR Right     HX SEPTOPLASTY  2002    HX SHOULDER ARTHROSCOPY Left ~2013    HX SHOULDER ARTHROSCOPY Left 11/2021    biceps tenodesis and extensive debridement partial rotator cuff tears ND ANALYZ NEUROSTIM BRAIN, EACH ADD 30 MIN      on 10/28/21 pt wife denies    ND PROSTATE BIOPSY, NEEDLE, SATURATION SAMPLING       Social History:     Social History     Socioeconomic History    Marital status:    Tobacco Use    Smoking status: Never    Smokeless tobacco: Never   Vaping Use    Vaping Use: Never used   Substance and Sexual Activity    Alcohol use: Yes     Alcohol/week: 14.0 standard drinks     Types: 14 Cans of beer per week    Drug use: Yes     Types: Marijuana     Comment: cocaine use in 20's     Sexual activity: Yes     Partners: Female   Social History Narrative    Lives in [place] with wife. No kids. Works as a  for [employer]. Likes to fish. Anesthesia Complications: None  History of abnormal bleeding : None  History of Blood Transfusions: no  Health Care Directive or Living Will: no    Objective:     ROS:    Feeling well. No dyspnea or chest pain on exertion. No abdominal pain, change in bowel habits, black or bloody stools. No urinary tract or prostatic symptoms. No neurological complaints. OBJECTIVE:   The patient appears well, alert, oriented x 3, in no distress. Visit Vitals  /77 (BP 1 Location: Right upper arm, BP Patient Position: Sitting)   Pulse 93   Temp 97.8 °F (36.6 °C) (Oral)   Resp 16   Ht 5' 8\" (1.727 m)   Wt 204 lb 6.4 oz (92.7 kg)   SpO2 99%   BMI 31.08 kg/m²     ENT normal.  Neck supple. No adenopathy or thyromegaly. ROX. Lungs are clear, good air entry, no wheezes, rhonchi or rales. Cardiovascular:S1 and S2 normal, no murmurs, regular rate and rhythm. Abdomen is soft without tenderness, guarding, mass or organomegaly.  exam: deferred. Extremities show no edema, normal peripheral pulses. Neurological is normal without focal findings. DIAGNOSTICS:   Pending (PAT appt scheduled next week)      Diagnoses and all orders for this visit:    1.  Primary osteoarthritis of right knee  Surgery is planned in 10 days  At acceptable risk for planned surgery  Will ask endocrine (Dr. Juli Brown) to give guidance on hydrocortisone dosing during perioperative period  No contraindications to planned surgery    2. Primary hypertension  At goal  Continue chlorthalidone 25 mg daily  Low sodium diet recommended     3. Pituitary adenoma [facility])  S/p surgery x 2  Sees endocrine at [facility] and locally (Dr. John Cueto)    4. Secondary adrenal insufficiency (HCC)  Current steroid regimen:  Hydrocortisone 15 mg q am, 10 mg midday, and 5 mg in evening  Will message Dr. Juli Brown for guidance on any perioperative adjustments to steroid that may be indicated    5. Mild intermittent reactive airway disease with wheezing without complication  Symptom pattern stable  Continue albuterol prn    6. Gastroesophageal reflux disease without esophagitis  Controlled on diet, prn otc meds    Follow-up and Dispositions    Return in about 3 months (around 6/3/2023), or if symptoms worsen or fail to improve. I have discussed the diagnosis with the patient and the intended plan as seen in the above orders. The patient has received an after-visit summary and questions were answered concerning future plans. Patient conveyed understanding of the plan at the time of the visit.       Dante Vera NP   3/3/2023

## 2023-03-04 NOTE — TELEPHONE ENCOUNTER
I recommend that he receive a dose of IV hydrocortisone 100 mg in the OR. After surgery, he can receive a double dose of his home hydrocortisone for 24 hours = 30 mg in the morning (6 of the 5 mg tabs), 20 mg with lunch (4 of the 5 mg tabs) and 10 mg at dinner (2 of the 5 mg tabs) and then go back to his normal dose of 15 mg (3 of the 5 mg tab) , 10 mg with lunch (2 of the 5 mg tabs) and 5 mg at dinner (one tab). I sent him a [employer] message about this and also routed this to Dr. Taty Vee so he is aware. Thanks.

## 2023-03-07 ENCOUNTER — HOSPITAL ENCOUNTER (OUTPATIENT)
Dept: PREADMISSION TESTING | Age: 58
Discharge: HOME | End: 2023-03-07
Attending: ORTHOPAEDIC SURGERY
Payer: COMMERCIAL

## 2023-03-07 VITALS
WEIGHT: 209 LBS | TEMPERATURE: 98.6 F | HEART RATE: 97 BPM | DIASTOLIC BLOOD PRESSURE: 83 MMHG | RESPIRATION RATE: 18 BRPM | HEIGHT: 67 IN | BODY MASS INDEX: 32.8 KG/M2 | SYSTOLIC BLOOD PRESSURE: 145 MMHG | OXYGEN SATURATION: 98 %

## 2023-03-07 LAB
ABO + RH BLD: NORMAL
ALBUMIN SERPL-MCNC: 3.8 G/DL (ref 3.5–5)
ALBUMIN/GLOB SERPL: 1.2 (ref 1.1–2.2)
ALP SERPL-CCNC: 62 U/L (ref 45–117)
ALT SERPL-CCNC: 28 U/L (ref 12–78)
ANION GAP SERPL CALC-SCNC: 3 MMOL/L (ref 5–15)
APPEARANCE UR: CLEAR
AST SERPL-CCNC: 16 U/L (ref 15–37)
ATRIAL RATE: 94 BPM
BACTERIA URNS QL MICRO: NEGATIVE /HPF
BILIRUB SERPL-MCNC: 0.4 MG/DL (ref 0.2–1)
BILIRUB UR QL: NEGATIVE
BLOOD GROUP ANTIBODIES SERPL: NORMAL
BUN SERPL-MCNC: 32 MG/DL (ref 6–20)
BUN/CREAT SERPL: 28 (ref 12–20)
CALCIUM SERPL-MCNC: 8.8 MG/DL (ref 8.5–10.1)
CALCULATED P AXIS, ECG09: 39 DEGREES
CALCULATED R AXIS, ECG10: 3 DEGREES
CALCULATED T AXIS, ECG11: 19 DEGREES
CHLORIDE SERPL-SCNC: 107 MMOL/L (ref 97–108)
CO2 SERPL-SCNC: 27 MMOL/L (ref 21–32)
COLOR UR: ABNORMAL
CREAT SERPL-MCNC: 1.16 MG/DL (ref 0.7–1.3)
DIAGNOSIS, 93000: NORMAL
EPITH CASTS URNS QL MICRO: ABNORMAL /LPF
ERYTHROCYTE [DISTWIDTH] IN BLOOD BY AUTOMATED COUNT: 16.7 % (ref 11.5–14.5)
EST. AVERAGE GLUCOSE BLD GHB EST-MCNC: 105 MG/DL
GLOBULIN SER CALC-MCNC: 3.2 G/DL (ref 2–4)
GLUCOSE SERPL-MCNC: 116 MG/DL (ref 65–100)
GLUCOSE UR STRIP.AUTO-MCNC: NEGATIVE MG/DL
HBA1C MFR BLD: 5.3 % (ref 4–5.6)
HCT VFR BLD AUTO: 33 % (ref 36.6–50.3)
HGB BLD-MCNC: 10.3 G/DL (ref 12.1–17)
HGB UR QL STRIP: NEGATIVE
INR PPP: 1 (ref 0.9–1.1)
KETONES UR QL STRIP.AUTO: NEGATIVE MG/DL
LEUKOCYTE ESTERASE UR QL STRIP.AUTO: NEGATIVE
MCH RBC QN AUTO: 25 PG (ref 26–34)
MCHC RBC AUTO-ENTMCNC: 31.2 G/DL (ref 30–36.5)
MCV RBC AUTO: 80.1 FL (ref 80–99)
NITRITE UR QL STRIP.AUTO: NEGATIVE
NRBC # BLD: 0 K/UL (ref 0–0.01)
NRBC BLD-RTO: 0 PER 100 WBC
P-R INTERVAL, ECG05: 128 MS
PH UR STRIP: 5.5 (ref 5–8)
PLATELET # BLD AUTO: 335 K/UL (ref 150–400)
PMV BLD AUTO: 8.8 FL (ref 8.9–12.9)
POTASSIUM SERPL-SCNC: 3.8 MMOL/L (ref 3.5–5.1)
PROT SERPL-MCNC: 7 G/DL (ref 6.4–8.2)
PROT UR STRIP-MCNC: ABNORMAL MG/DL
PROTHROMBIN TIME: 10.4 SEC (ref 9–11.1)
Q-T INTERVAL, ECG07: 340 MS
QRS DURATION, ECG06: 92 MS
QTC CALCULATION (BEZET), ECG08: 425 MS
RBC # BLD AUTO: 4.12 M/UL (ref 4.1–5.7)
RBC #/AREA URNS HPF: ABNORMAL /HPF (ref 0–5)
SODIUM SERPL-SCNC: 137 MMOL/L (ref 136–145)
SP GR UR REFRACTOMETRY: 1.03 (ref 1–1.03)
SPECIMEN EXP DATE BLD: NORMAL
UA: UC IF INDICATED,UAUC: ABNORMAL
UROBILINOGEN UR QL STRIP.AUTO: 1 EU/DL (ref 0.2–1)
VENTRICULAR RATE, ECG03: 94 BPM
WBC # BLD AUTO: 8.4 K/UL (ref 4.1–11.1)
WBC URNS QL MICRO: ABNORMAL /HPF (ref 0–4)

## 2023-03-07 PROCEDURE — 93005 ELECTROCARDIOGRAM TRACING: CPT

## 2023-03-07 PROCEDURE — 36415 COLL VENOUS BLD VENIPUNCTURE: CPT

## 2023-03-07 PROCEDURE — 85027 COMPLETE CBC AUTOMATED: CPT

## 2023-03-07 PROCEDURE — 83036 HEMOGLOBIN GLYCOSYLATED A1C: CPT

## 2023-03-07 PROCEDURE — 86900 BLOOD TYPING SEROLOGIC ABO: CPT

## 2023-03-07 PROCEDURE — 80053 COMPREHEN METABOLIC PANEL: CPT

## 2023-03-07 PROCEDURE — 85610 PROTHROMBIN TIME: CPT

## 2023-03-07 PROCEDURE — 81001 URINALYSIS AUTO W/SCOPE: CPT

## 2023-03-07 RX ORDER — VANCOMYCIN/0.9 % SOD CHLORIDE 1.5G/250ML
1500 PLASTIC BAG, INJECTION (ML) INTRAVENOUS ONCE
OUTPATIENT
Start: 2023-03-15 | End: 2023-03-15

## 2023-03-07 RX ORDER — SODIUM CHLORIDE, SODIUM LACTATE, POTASSIUM CHLORIDE, CALCIUM CHLORIDE 600; 310; 30; 20 MG/100ML; MG/100ML; MG/100ML; MG/100ML
25 INJECTION, SOLUTION INTRAVENOUS CONTINUOUS
OUTPATIENT
Start: 2023-03-15

## 2023-03-07 RX ORDER — ACETAMINOPHEN 500 MG
1000 TABLET ORAL ONCE
OUTPATIENT
Start: 2023-03-15 | End: 2023-03-15

## 2023-03-07 RX ORDER — CELECOXIB 200 MG/1
200 CAPSULE ORAL ONCE
OUTPATIENT
Start: 2023-03-15 | End: 2023-03-15

## 2023-03-07 RX ORDER — PREGABALIN 150 MG/1
150 CAPSULE ORAL ONCE
OUTPATIENT
Start: 2023-03-15 | End: 2023-03-15

## 2023-03-07 NOTE — PERIOP NOTES
Hibiclens/Chlorhexidine    Preventing Infections Before and After - Your Surgery    IMPORTANT INSTRUCTIONS    Please read and follow these instructions carefully. If you are unable to comply with the below instructions your procedure will be cancelled. Every Night for Three (3) nights before your surgery:  Shower with an antibacterial soap, such as Dial, or the soap provided at your preassessment appointment. A shower is better than a bath for cleaning your skin. If needed, ask someone to help you reach all areas of your body. Dont forget to clean your belly button with every shower. The night before your surgery: If you lose your Hibiclens/chlorhexidine please contact surgery center or you can purchase it at a local pharmacy  On the night before your surgery, shower with an antibacterial soap, such as Dial, or the soap provided at your preassessment appointment. With one packet of Hibiclens/Chlorhexidine in hand, turn water off. Apply Hibiclens antiseptic skin cleanser with a clean, freshly washed washcloth. Gently apply to your body from chin to toes (except the genital area) and especially the area(s) where your incision(s) will be. Leave Hibiclens/Chlorhexidine on your skin for at least 20 seconds. CAUTION: If needed, Hibiclens/chlorhexidine may be used to clean the folds of skin of the legs (such as in the area of the groin) and on your buttocks and hips. However, do not use Hibiclens/Chlorhexidine above the neck or in the genital area (your bottom) or put inside any area of your body. Turn the water back on and rinse. Dry gently with a clean, freshly washed towel. After your shower, do not use any powder, deodorant, perfumes or lotion. Use clean, freshly washed towels and washcloths every time you shower. Wear clean, freshly washed pajamas to bed the night before surgery. Sleep on clean, freshly washed sheets. Do not allow pets to sleep in your bed with you.         The Morning of your surgery:  Shower again thoroughly with an antibacterial soap, such as Dial or the soap provided at your preassessment appointment. If needed, ask someone for help to reach all areas of your body. Dont forget to clean your belly button! Rinse. Dry gently with a clean, freshly washed towel. After your shower, do not use any powder, deodorant, perfumes or lotion prior to surgery. Put on clean, freshly washed clothing. Tips to help prevent infections after your surgery:  Protect your surgical wound from germs:  Hand washing is the most important thing you and your caregivers can do to prevent infections. Keep your bandage clean and dry! Do not touch your surgical wound. Use clean, freshly washed towels and washcloths every time you shower; do not share bath linens with others. Until your surgical wound is healed, wear clothing and sleep on bed linens each day that are clean and freshly washed. Do not allow pets to sleep in your bed with you or touch your surgical wound. Do not smoke - smoking delays wound healing. This may be a good time to stop smoking. If you have diabetes, it is important for you to manage your blood sugar levels properly before your surgery as well as after your surgery. Poorly managed blood sugar levels slow down wound healing and prevent you from healing completely. If you lose your Hibiclens/chlorhexidine, please call the [facility], or it is available for purchase at your pharmacy.                ___________________      ___________________      3/7/2023 @ 5473  (Signature of Patient)          (Witness)                   (Date and Time)

## 2023-03-07 NOTE — PERIOP NOTES

## 2023-03-07 NOTE — PERIOP NOTES
[facility]  Joint/Spine Preoperative Instructions    Surgery Date 3/15/2023          Time of Arrival to be called  Contact # [phone]    1. On the day of your surgery, please report to the Surgical Services Registration Desk and sign in at your designated time. The Surgery Center is located to the right of the Emergency Room. 2. You must have someone with you to drive you home. You should not drive a car for 24 hours following surgery. Please make arrangements for a friend or family member to stay with you for the first 24 hours after your surgery. 3. No food after midnight 3/14/2023. Medications morning of surgery should be taken with a sip of water. Please follow pre-surgery drink instructions that were given at your Pre Admission Testing appointment. 4. We recommend you do not drink any alcoholic beverages for 24 hours before and after your surgery. 5. Contact your surgeons office for instructions on the following medications: non-steroidal anti-inflammatory drugs (i.e. Advil, Aleve), vitamins, and supplements. (Some surgeons will want you to stop these medications prior to surgery and others may allow you to take them)  **If you are currently taking Plavix, Coumadin, Aspirin and/or other blood-thinning agents, contact your surgeon for instructions. ** Your surgeon will partner with the physician prescribing these medications to determine if it is safe to stop or if you need to continue taking. Please do not stop taking these medications without instructions from your surgeon    6. Wear comfortable clothes. Wear glasses instead of contacts. Do not bring any money or jewelry. Please bring picture ID, insurance card, and any prearranged co-payment or hospital payment. Do not wear make-up, particularly mascara the morning of your surgery. Do not wear nail polish, particularly if you are having foot /hand surgery.   Wear your hair loose or down, no ponytails, buns, partick pins or clips. All body piercings must be removed. Please shower with antibacterial soap for three consecutive days before and on the morning of surgery, but do not apply any lotions, powders or deodorants after the shower on the day of surgery. Please use a fresh towels after each shower. Please sleep in clean clothes and change bed linens the night before surgery. Please do not shave for 48 hours prior to surgery. Shaving of the face is acceptable. 7. You should understand that if you do not follow these instructions your surgery may be cancelled. If your physical condition changes (I.e. fever, cold or flu) please contact your surgeon as soon as possible. 8. It is important that you be on time. If a situation occurs where you may be late, please call [phone] (OR Holding Area). 9. If you have any questions and or problems, please call [phone] (Pre-admission Testing). 10. Your surgery time may be subject to change. You will receive a phone call the evening prior with your time of arrival.    11.  If having outpatient surgery, you must have someone to drive you here, stay with you during the duration of your stay, and to drive you home at time of discharge. 12. The following link is for the educational video for patients and/or families. http://[facility]org/. [email or web address]/[email or web address]-jwuotli-wufqvqt/[email or web address]-materials    Special Instructions:   Bring your Albuterol inhaler on day of surgery. TAKE ALL MEDICATIONS THE DAY OF SURGERY EXCEPT: BC powder      I understand a pre-operative phone call will be made to verify my surgery time. In the event that I am not available, I give permission for a message to be left on my answering service and/or with another person?   yes         ___________________        __________   3/7/2023 @ 6567    (Signature of Patient)             (Witness)                (Date and Time)

## 2023-03-07 NOTE — PROGRESS NOTES
Patient attended the Joint Replacement Education Class at [facility]. The content of the class was presented using a power point presentation specific for patients undergoing hip and knee replacement surgery. The [facility] Joint Replacement Education Handbook was given to the patient. Preparing for surgery, day of surgery routine and expectations, discharge process and help at home expectations, nutrition,medications, infection control, pain management, DVT prevention, ice therapy and safety were reviewed in class. Opportunity for questions provided, patient verbalized understanding of instructions.

## 2023-03-07 NOTE — PERIOP NOTES

## 2023-03-08 LAB
BACTERIA SPEC CULT: NORMAL
BACTERIA SPEC CULT: NORMAL
SERVICE CMNT-IMP: NORMAL

## 2023-03-08 NOTE — ADVANCED PRACTICE NURSE
PAT Nurse Practitioner   Pre-Operative Chart Review/Assessment:-ORTHOPEDIC/NEUROSURGICAL SPINE                Patient Name:  Geovanna Chicas. Age:   59 y.o.    :  1965     Today's Date:  3/9/2023     Date of PAT:   3/7/23      Date of Surgery:    3/15/2023      Procedure(s):  Right  Total Knee Arthroplasty     Surgeon:   Olivia Bobby     Primary Care Provider:    Rodney Sears NP                    PLAN:      1)  Cardiac Clearance:  Not requested        2)  Sleep apnea screening:  Not indicated-RAFA 3       3)   Program for Diabetes Health Consult:  Not indicated-A1C 5.3      4) Treatment for MRSA/MSSA:  + MSSA. Tx w/ Mupirocin ointment BID x 5 days to B nostrils starting 3/9/23      5) Additional Concerns:  Pituitary macroadenoma, secondary adrenal insufficiency, RAD, chronic pain, GERD                Vital Signs:         Visit Vitals  BP (!) 145/83 (BP 1 Location: Left arm, BP Patient Position: Sitting)   Pulse 97   Temp 98.6 °F (37 °C)   Resp 18   Ht 5' 7.25\" (1.708 m)   Wt 94.8 kg (208 lb 15.9 oz)   SpO2 98%   BMI 32.49 kg/m²                        ____________________________________________  PAST MEDICAL HISTORY  Past Medical History:   Diagnosis Date    Arthritis     Basal cell carcinoma     3 on left side of neck and 1 on right side of neck and nasal bridge area with first dx starting aprroximately 12 yrs ago    Frequent nosebleeds     GERD (gastroesophageal reflux disease)     H/O seasonal allergies     Hypertension     IBS (irritable bowel syndrome)     Migraine     MRSA (methicillin resistant staph aureus) culture positive     hand    Panhypopituitarism ([facility] [address]     Pituitary tumor     =Endocrine, Dr. 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 SUN EXPOSURE MAKES HIM HAVE BLISTERS ON HANDS.  LIVER OVERPRODUCED RED BLOOD CELLS--TX IS PHLEBOTOMY AS NEEDED AND HAS HAD THIS 3 TIMES SO FAR    Secondary adrenal insufficiency ([address] 02/21/2019      ____________________________________________  PAST SURGICAL HISTORY  Past Surgical History:   Procedure Laterality Date    HX GI      COLONOSCOPY    HX HEART CATHETERIZATION  2013    Kirsten Big seen since 2013-minimal disease per cardio notes. HX HEENT      SINUS SURGERY    HX HEENT  02/2019 and 11/19, 2020    Transphenoidal pituitary surgery [facility], 2020 at [facility]    HX ORTHOPAEDIC Bilateral     bilateral thumb surgery    HX ORTHOPAEDIC Right ~2013    elbow surgery    HX OTHER SURGICAL      REMOVAL BCCA ON [place]. SIDE OF NECK AND BRIDGE OF NOSE    HX ROTATOR CUFF REPAIR Right     HX SEPTOPLASTY  2002    HX SHOULDER ARTHROSCOPY Left ~2013    HX SHOULDER ARTHROSCOPY Left 11/2021    biceps tenodesis and extensive debridement partial rotator cuff tears      MD ANALYZ NEUROSTIM BRAIN, EACH ADD 30 MIN      on 10/28/21 pt wife denies    MD PROSTATE BIOPSY, NEEDLE, SATURATION SAMPLING        ____________________________________________  HOME MEDICATIONS    Current Outpatient Medications   Medication Sig    mupirocin (BACTROBAN) 2 % ointment by Both Nostrils route two (2) times a day for 5 days. montelukast (SINGULAIR) 10 mg tablet Take 10 mg by mouth daily. cetirizine HCl (ZYRTEC PO) Take  by mouth. cyclobenzaprine (FLEXERIL) 5 mg tablet Take 1 Tablet by mouth three (3) times daily as needed for Muscle Spasm(s). chlorthalidone (HYGROTON) 25 mg tablet Take 1 Tablet by mouth daily. hydrocortisone (CORTEF) 5 mg tablet TAKE 3 TABLETS BY MOUTH BEFORE BREAKFAST, 2 TABLETS BY MOUTH AT LUNCH and 1 tab at dinner    rosuvastatin (CRESTOR) 10 mg tablet TAKE 1 TABLET BY MOUTH ONE TIME A DAY    meloxicam (MOBIC) 15 mg tablet as needed.     SUMAtriptan (IMITREX) 100 mg tablet TAKE 1 TABLET BY MOUTH ONCE DAILY AS NEEDED UP TO FOR 1 DAY    dicyclomine (BENTYL) 10 mg capsule TAKE 1 CAPSULE BY MOUTH 3 TIMES A DAY    ondansetron hcl (Zofran) 4 mg tablet Take 1 Tablet by mouth every eight (8) hours as needed for Nausea. aspirin/salicylamide/caffeine (ARTHRITIS STRENGTH BC POWDER PO) Take  by mouth. albuterol (PROVENTIL HFA, VENTOLIN HFA, PROAIR HFA) 90 mcg/actuation inhaler Take 2 Puffs by inhalation every four (4) hours as needed for Wheezing. naproxen sodium (NAPROSYN) 220 mg tablet Take 220 mg by mouth as needed. SIMETHICONE Take 120 mg by mouth as needed. No current facility-administered medications for this encounter.      ____________________________________________  ALLERGIES  Allergies   Allergen Reactions    Bactrim [Sulfamethoprim Ds] Rash     10 yrs ago    Lipitor [Atorvastatin] Myalgia    Sulfamethoxazole-Trimethoprim Rash      ____________________________________________  SOCIAL HISTORY  Social History     Tobacco Use    Smoking status: Never    Smokeless tobacco: Never   Substance Use Topics    Alcohol use:  Yes     Alcohol/week: 14.0 standard drinks     Types: 14 Cans of beer per week      ____________________________________________  COVID VACCINATION STATUS:      Internal Administration   First Dose COVID-19, PFIZER PURPLE top, DILUTE for use, (age 15 y+), IM, 30mcg/0.3mL  03/12/2021   Second Dose         Last COVID Lab SARS-CoV-2 ( )   Date Value   11/01/2021 Not detected                      Labs:     Hospital Outpatient Visit on 03/07/2023   Component Date Value Ref Range Status    Crossmatch Expiration 03/07/2023 03/18/2023,2359   Final    ABO/Rh(D) 03/07/2023 B POSITIVE   Final    Antibody screen 03/07/2023 NEG   Final    Sodium 03/07/2023 137  136 - 145 mmol/L Final    Potassium 03/07/2023 3.8  3.5 - 5.1 mmol/L Final    Chloride 03/07/2023 107  97 - 108 mmol/L Final    CO2 03/07/2023 27  21 - 32 mmol/L Final    Anion gap 03/07/2023 3 (A)  5 - 15 mmol/L Final    Glucose 03/07/2023 116 (A)  65 - 100 mg/dL Final    BUN 03/07/2023 32 (A)  6 - 20 MG/DL Final    Creatinine 03/07/2023 1.16  0.70 - 1.30 MG/DL Final    BUN/Creatinine ratio 03/07/2023 28 (A)  12 - 20 Final    eGFR 03/07/2023 >60  >60 ml/min/1.73m2 Final    Comment:      Pediatric calculator link: [employer].at. org/professionals/kdoqi/gfr_calculatorped       These results are not intended for use in patients <25years of age. eGFR results are calculated without a race factor using  the 2021 CKD-EPI equation. Careful clinical correlation is recommended, particularly when comparing to results calculated using previous equations. The CKD-EPI equation is less accurate in patients with extremes of muscle mass, extra-renal metabolism of creatinine, excessive creatine ingestion, or following therapy that affects renal tubular secretion. Calcium 03/07/2023 8.8  8.5 - 10.1 MG/DL Final    Bilirubin, total 03/07/2023 0.4  0.2 - 1.0 MG/DL Final    ALT (SGPT) 03/07/2023 28  12 - 78 U/L Final    AST (SGOT) 03/07/2023 16  15 - 37 U/L Final    Alk. phosphatase 03/07/2023 62  45 - 117 U/L Final    Protein, total 03/07/2023 7.0  6.4 - 8.2 g/dL Final    Albumin 03/07/2023 3.8  3.5 - 5.0 g/dL Final    Globulin 03/07/2023 3.2  2.0 - 4.0 g/dL Final    A-G Ratio 03/07/2023 1.2  1.1 - 2.2   Final    WBC 03/07/2023 8.4  4.1 - 11.1 K/uL Final    RBC 03/07/2023 4.12  4.10 - 5.70 M/uL Final    HGB 03/07/2023 10.3 (A)  12.1 - 17.0 g/dL Final    HCT 03/07/2023 33.0 (A)  36.6 - 50.3 % Final    MCV 03/07/2023 80.1  80.0 - 99.0 FL Final    MCH 03/07/2023 25.0 (A)  26.0 - 34.0 PG Final    MCHC 03/07/2023 31.2  30.0 - 36.5 g/dL Final    RDW 03/07/2023 16.7 (A)  11.5 - 14.5 % Final    PLATELET 75/00/1428 259  150 - 400 K/uL Final    MPV 03/07/2023 8.8 (A)  8.9 - 12.9 FL Final    NRBC 03/07/2023 0.0  0  WBC Final    ABSOLUTE NRBC 03/07/2023 0.00  0.00 - 0.01 K/uL Final    INR 03/07/2023 1.0  0.9 - 1.1   Final    A single therapeutic range for Vit K antagonists may not be optimal for all indications - see June, 2008 issue of Chest, American College of Chest Physicians Evidence-Based Clinical Practice Guidelines, 8th Edition. Prothrombin time 03/07/2023 10.4  9.0 - 11.1 sec Final    Color 03/07/2023 YELLOW/STRAW    Final    Color Reference Range: Straw, Yellow or Dark Yellow    Appearance 03/07/2023 CLEAR  CLEAR   Final    Specific gravity 03/07/2023 1.030  1.003 - 1.030   Final    pH (UA) 03/07/2023 5.5  5.0 - 8.0   Final    Protein 03/07/2023 TRACE (A)  NEG mg/dL Final    Glucose 03/07/2023 Negative  NEG mg/dL Final    Ketone 03/07/2023 Negative  NEG mg/dL Final    Bilirubin 03/07/2023 Negative  NEG   Final    Blood 03/07/2023 Negative  NEG   Final    Urobilinogen 03/07/2023 1.0  0.2 - 1.0 EU/dL Final    Nitrites 03/07/2023 Negative  NEG   Final    Leukocyte Esterase 03/07/2023 Negative  NEG   Final    WBC 03/07/2023 0-4  0 - 4 /hpf Final    RBC 03/07/2023 0-5  0 - 5 /hpf Final    Epithelial cells 03/07/2023 FEW  FEW /lpf Final    Epithelial cell category consists of squamous cells and /or transitional urothelial cells. Renal tubular cells, if present, are separately identified as such.     Bacteria 03/07/2023 Negative  NEG /hpf Final    UA:UC IF INDICATED 03/07/2023 CULTURE NOT INDICATED BY UA RESULT  CNI   Final    Ventricular Rate 03/07/2023 94  BPM Final    Atrial Rate 03/07/2023 94  BPM Final    P-R Interval 03/07/2023 128  ms Final    QRS Duration 03/07/2023 92  ms Final    Q-T Interval 03/07/2023 340  ms Final    QTC Calculation (Bezet) 03/07/2023 425  ms Final    Calculated P Axis 03/07/2023 39  degrees Final    Calculated R Axis 03/07/2023 3  degrees Final    Calculated T Axis 03/07/2023 19  degrees Final    Diagnosis 03/07/2023    Final                    Value:Sinus rhythm with marked sinus arrhythmia  Nonspecific ST abnormality  When compared with ECG of 21-MAR-2013 17:24,  No significant change was found  Confirmed by Bailee Curtis ([id number]) on 3/7/2023 5:48:28 PM      Hemoglobin A1c 03/07/2023 5.3  4.0 - 5.6 % Final    Comment: NEW METHOD  PLEASE NOTE NEW REFERENCE RANGE  (NOTE)  HbA1C Interpretive Ranges  <5.7 Normal  5.7 - 6.4         Consider Prediabetes  >6.5              Consider Diabetes      Est. average glucose 03/07/2023 105  mg/dL Final    Special Requests: 03/07/2023 NO SPECIAL REQUESTS    Final    Culture result: 03/07/2023 MRSA NOT PRESENT. Apparent Staphylococus aureus (not MRSA noted). Final    Culture result: 03/07/2023     Final                    Value:Screening of patient nares for MRSA is for surveillance purposes and, if positive, to facilitate isolation considerations in high risk settings. It is not intended for automatic decolonization interventions per se as regimens are not sufficiently effective to warrant routine use. XR Results (most recent):    Results from Hospital Encounter encounter on 07/21/22    XR WRIST LT AP/LAT    Narrative  EXAM: XR WRIST LT AP/LAT    INDICATION:  trauma    COMPARISON: None. FINDINGS: Two views of the left wrist demonstrate normal bone mineralization. There is no fracture or other osseous, articular or soft tissue abnormality. Impression  No acute findings. Skin:   Denies open wounds, cuts, sores, rashes or other areas of concern in PAT assessment. Benitez Raines NP       Addendum from endocrinologist's 3/3/23 note:      Purvi Hicks MD   Physician   Specialty:  Endocrinology Physician   Telephone Encounter      Signed   Encounter Date:  3/3/2023                         I recommend that he receive a dose of IV hydrocortisone 100 mg in the OR. After surgery, he can receive a double dose of his home hydrocortisone for 24 hours = 30 mg in the morning (6 of the 5 mg tabs), 20 mg with lunch (4 of the 5 mg tabs) and 10 mg at dinner (2 of the 5 mg tabs) and then go back to his normal dose of 15 mg (3 of the 5 mg tab) , 10 mg with lunch (2 of the 5 mg tabs) and 5 mg at dinner (one tab). I sent him a [employer] message about this and also routed this to Dr. Marc Santiago so he is aware. Thanks.       Electronically signed by Jens Bautista Mary Malone MD at 03/03/23 1938  Note Details    Author Sneha Perez MD File Time 03/03/23 1938   Author Type Physician Status Signed   Last  Sneha Perez MD Specialty Endocrinology Physician     Telephone on 3/3/2023    Telephone on 3/3/2023      Detailed Report

## 2023-03-09 RX ORDER — MUPIROCIN 20 MG/G
OINTMENT TOPICAL 2 TIMES DAILY
Qty: 22 G | Refills: 0 | Status: SHIPPED | OUTPATIENT
Start: 2023-03-09 | End: 2023-03-14

## 2023-03-09 NOTE — PERIOP NOTES
Called to patient LVM requested CB. Reviewed pre admission testing (PAT) lab results with patient and provided instructions to start Mupirocin prescription that was sent to patients pharmacy, BID x 5 days in both nostrils; patient verbalized understanding.

## 2023-03-15 ENCOUNTER — APPOINTMENT (OUTPATIENT)
Dept: GENERAL RADIOLOGY | Age: 58
End: 2023-03-15
Attending: ORTHOPAEDIC SURGERY
Payer: COMMERCIAL

## 2023-03-15 ENCOUNTER — ANESTHESIA (OUTPATIENT)
Dept: SURGERY | Age: 58
End: 2023-03-15
Payer: COMMERCIAL

## 2023-03-15 ENCOUNTER — ANESTHESIA EVENT (OUTPATIENT)
Dept: SURGERY | Age: 58
End: 2023-03-15
Payer: COMMERCIAL

## 2023-03-15 ENCOUNTER — HOSPITAL ENCOUNTER (OUTPATIENT)
Age: 58
Setting detail: OBSERVATION
Discharge: HOME | End: 2023-03-15
Attending: ORTHOPAEDIC SURGERY | Admitting: ORTHOPAEDIC SURGERY
Payer: COMMERCIAL

## 2023-03-15 VITALS
SYSTOLIC BLOOD PRESSURE: 149 MMHG | OXYGEN SATURATION: 100 % | TEMPERATURE: 97.9 F | WEIGHT: 211.2 LBS | RESPIRATION RATE: 16 BRPM | BODY MASS INDEX: 32.01 KG/M2 | DIASTOLIC BLOOD PRESSURE: 85 MMHG | HEART RATE: 75 BPM | HEIGHT: 68 IN

## 2023-03-15 DIAGNOSIS — Z96.651 S/P TOTAL KNEE ARTHROPLASTY, RIGHT: Primary | ICD-10-CM

## 2023-03-15 DIAGNOSIS — Z96.651 STATUS POST RIGHT KNEE REPLACEMENT: Primary | ICD-10-CM

## 2023-03-15 PROBLEM — M17.11 PRIMARY OSTEOARTHRITIS OF RIGHT KNEE: Status: ACTIVE | Noted: 2023-03-15

## 2023-03-15 PROCEDURE — 74011000250 HC RX REV CODE- 250: Performed by: ANESTHESIOLOGY

## 2023-03-15 PROCEDURE — C1776 JOINT DEVICE (IMPLANTABLE): HCPCS | Performed by: ORTHOPAEDIC SURGERY

## 2023-03-15 PROCEDURE — 73560 X-RAY EXAM OF KNEE 1 OR 2: CPT

## 2023-03-15 PROCEDURE — 77030029820: Performed by: ORTHOPAEDIC SURGERY

## 2023-03-15 PROCEDURE — 74011000250 HC RX REV CODE- 250: Performed by: NURSE ANESTHETIST, CERTIFIED REGISTERED

## 2023-03-15 PROCEDURE — 74011000250 HC RX REV CODE- 250: Performed by: ORTHOPAEDIC SURGERY

## 2023-03-15 PROCEDURE — G0378 HOSPITAL OBSERVATION PER HR: HCPCS

## 2023-03-15 PROCEDURE — 77030018673: Performed by: ORTHOPAEDIC SURGERY

## 2023-03-15 PROCEDURE — 77030031139 HC SUT VCRL2 J&J -A: Performed by: ORTHOPAEDIC SURGERY

## 2023-03-15 PROCEDURE — 77030013079 HC BLNKT BAIR HGGR 3M -A: Performed by: ANESTHESIOLOGY

## 2023-03-15 PROCEDURE — 77030003666 HC NDL SPINAL BD -A: Performed by: ORTHOPAEDIC SURGERY

## 2023-03-15 PROCEDURE — 74011250636 HC RX REV CODE- 250/636: Performed by: STUDENT IN AN ORGANIZED HEALTH CARE EDUCATION/TRAINING PROGRAM

## 2023-03-15 PROCEDURE — 76210000016 HC OR PH I REC 1 TO 1.5 HR: Performed by: ORTHOPAEDIC SURGERY

## 2023-03-15 PROCEDURE — 74011250636 HC RX REV CODE- 250/636: Performed by: ANESTHESIOLOGY

## 2023-03-15 PROCEDURE — 77030014125 HC TY EPDRL BBMI -B: Performed by: ANESTHESIOLOGY

## 2023-03-15 PROCEDURE — 97530 THERAPEUTIC ACTIVITIES: CPT

## 2023-03-15 PROCEDURE — 76060000033 HC ANESTHESIA 1 TO 1.5 HR: Performed by: ORTHOPAEDIC SURGERY

## 2023-03-15 PROCEDURE — 77030010507 HC ADH SKN DERMBND J&J -B: Performed by: ORTHOPAEDIC SURGERY

## 2023-03-15 PROCEDURE — 74011250636 HC RX REV CODE- 250/636: Performed by: ORTHOPAEDIC SURGERY

## 2023-03-15 PROCEDURE — 94760 N-INVAS EAR/PLS OXIMETRY 1: CPT

## 2023-03-15 PROCEDURE — 74011000258 HC RX REV CODE- 258: Performed by: NURSE ANESTHETIST, CERTIFIED REGISTERED

## 2023-03-15 PROCEDURE — 97116 GAIT TRAINING THERAPY: CPT

## 2023-03-15 PROCEDURE — 2709999900 HC NON-CHARGEABLE SUPPLY

## 2023-03-15 PROCEDURE — 64450 NJX AA&/STRD OTHER PN/BRANCH: CPT | Performed by: ANESTHESIOLOGY

## 2023-03-15 PROCEDURE — 97161 PT EVAL LOW COMPLEX 20 MIN: CPT

## 2023-03-15 PROCEDURE — C1713 ANCHOR/SCREW BN/BN,TIS/BN: HCPCS | Performed by: ORTHOPAEDIC SURGERY

## 2023-03-15 PROCEDURE — 76010000161 HC OR TIME 1 TO 1.5 HR INTENSV-TIER 1: Performed by: ORTHOPAEDIC SURGERY

## 2023-03-15 PROCEDURE — 77030013837 HC NERV BLK KT BBMI -B: Performed by: ANESTHESIOLOGY

## 2023-03-15 PROCEDURE — 77030035236 HC SUT PDS STRATFX BARB J&J -B: Performed by: ORTHOPAEDIC SURGERY

## 2023-03-15 PROCEDURE — 74011250636 HC RX REV CODE- 250/636: Performed by: NURSE ANESTHETIST, CERTIFIED REGISTERED

## 2023-03-15 PROCEDURE — 77030002933 HC SUT MCRYL J&J -A: Performed by: ORTHOPAEDIC SURGERY

## 2023-03-15 PROCEDURE — 74011250637 HC RX REV CODE- 250/637: Performed by: ORTHOPAEDIC SURGERY

## 2023-03-15 PROCEDURE — 2709999900 HC NON-CHARGEABLE SUPPLY: Performed by: ORTHOPAEDIC SURGERY

## 2023-03-15 PROCEDURE — 77030000032 HC CUF TRNQT ZIMM -B: Performed by: ORTHOPAEDIC SURGERY

## 2023-03-15 DEVICE — CRUCIATE RETAINING FEMORAL
Type: IMPLANTABLE DEVICE | Site: KNEE | Status: FUNCTIONAL
Brand: TRIATHLON

## 2023-03-15 DEVICE — COMPONENT TOT KNEE CAPPED PRIMARY K2STRYKER] STRYKER CORP]: Type: IMPLANTABLE DEVICE | Site: KNEE | Status: FUNCTIONAL

## 2023-03-15 DEVICE — TIBIAL BEARING INSERT - CS
Type: IMPLANTABLE DEVICE | Site: KNEE | Status: FUNCTIONAL
Brand: TRIATHLON

## 2023-03-15 DEVICE — TIBIAL COMPONENT
Type: IMPLANTABLE DEVICE | Site: KNEE | Status: FUNCTIONAL
Brand: TRIATHLON

## 2023-03-15 RX ORDER — MELOXICAM 15 MG/1
15 TABLET ORAL
Qty: 30 TABLET | Refills: 0 | Status: SHIPPED | OUTPATIENT
Start: 2023-03-16 | End: 2023-04-15

## 2023-03-15 RX ORDER — PROPOFOL 10 MG/ML
INJECTION, EMULSION INTRAVENOUS AS NEEDED
Status: DISCONTINUED | OUTPATIENT
Start: 2023-03-15 | End: 2023-03-15 | Stop reason: HOSPADM

## 2023-03-15 RX ORDER — HYDROMORPHONE HYDROCHLORIDE 1 MG/ML
0.5 INJECTION, SOLUTION INTRAMUSCULAR; INTRAVENOUS; SUBCUTANEOUS
Status: DISCONTINUED | OUTPATIENT
Start: 2023-03-15 | End: 2023-03-15 | Stop reason: HOSPADM

## 2023-03-15 RX ORDER — FACIAL-BODY WIPES
10 EACH TOPICAL DAILY PRN
Status: DISCONTINUED | OUTPATIENT
Start: 2023-03-17 | End: 2023-03-15 | Stop reason: HOSPADM

## 2023-03-15 RX ORDER — MORPHINE SULFATE 4 MG/ML
2 INJECTION INTRAVENOUS
Status: DISCONTINUED | OUTPATIENT
Start: 2023-03-15 | End: 2023-03-15 | Stop reason: HOSPADM

## 2023-03-15 RX ORDER — ASPIRIN 81 MG/1
81 TABLET ORAL 2 TIMES DAILY
Qty: 60 TABLET | Refills: 0 | Status: SHIPPED | OUTPATIENT
Start: 2023-03-15 | End: 2023-04-14

## 2023-03-15 RX ORDER — PROPOFOL 10 MG/ML
INJECTION, EMULSION INTRAVENOUS
Status: DISCONTINUED | OUTPATIENT
Start: 2023-03-15 | End: 2023-03-15 | Stop reason: HOSPADM

## 2023-03-15 RX ORDER — KETOROLAC TROMETHAMINE 30 MG/ML
30 INJECTION, SOLUTION INTRAMUSCULAR; INTRAVENOUS EVERY 6 HOURS
Status: DISCONTINUED | OUTPATIENT
Start: 2023-03-15 | End: 2023-03-15 | Stop reason: HOSPADM

## 2023-03-15 RX ORDER — AMOXICILLIN 250 MG
1 CAPSULE ORAL 2 TIMES DAILY
Qty: 14 TABLET | Refills: 0 | Status: SHIPPED | OUTPATIENT
Start: 2023-03-15 | End: 2023-03-22

## 2023-03-15 RX ORDER — CEPHALEXIN 500 MG/1
500 CAPSULE ORAL EVERY 6 HOURS
Qty: 56 CAPSULE | Refills: 0 | Status: SHIPPED | OUTPATIENT
Start: 2023-03-16 | End: 2023-03-30

## 2023-03-15 RX ORDER — FENTANYL CITRATE 50 UG/ML
25 INJECTION, SOLUTION INTRAMUSCULAR; INTRAVENOUS
Status: DISCONTINUED | OUTPATIENT
Start: 2023-03-15 | End: 2023-03-15 | Stop reason: HOSPADM

## 2023-03-15 RX ORDER — LIDOCAINE HYDROCHLORIDE 10 MG/ML
0.1 INJECTION, SOLUTION EPIDURAL; INFILTRATION; INTRACAUDAL; PERINEURAL AS NEEDED
Status: DISCONTINUED | OUTPATIENT
Start: 2023-03-15 | End: 2023-03-15 | Stop reason: HOSPADM

## 2023-03-15 RX ORDER — ROPIVACAINE HYDROCHLORIDE 5 MG/ML
INJECTION, SOLUTION EPIDURAL; INFILTRATION; PERINEURAL
Status: COMPLETED | OUTPATIENT
Start: 2023-03-15 | End: 2023-03-15

## 2023-03-15 RX ORDER — FAMOTIDINE 20 MG/1
20 TABLET, FILM COATED ORAL 2 TIMES DAILY
Status: DISCONTINUED | OUTPATIENT
Start: 2023-03-15 | End: 2023-03-15 | Stop reason: HOSPADM

## 2023-03-15 RX ORDER — SODIUM CHLORIDE 9 MG/ML
125 INJECTION, SOLUTION INTRAVENOUS CONTINUOUS
Status: DISCONTINUED | OUTPATIENT
Start: 2023-03-15 | End: 2023-03-15 | Stop reason: HOSPADM

## 2023-03-15 RX ORDER — HYDROMORPHONE HYDROCHLORIDE 1 MG/ML
.2-.5 INJECTION, SOLUTION INTRAMUSCULAR; INTRAVENOUS; SUBCUTANEOUS
Status: DISCONTINUED | OUTPATIENT
Start: 2023-03-15 | End: 2023-03-15 | Stop reason: HOSPADM

## 2023-03-15 RX ORDER — BUPIVACAINE HYDROCHLORIDE 7.5 MG/ML
INJECTION, SOLUTION EPIDURAL; RETROBULBAR
Status: COMPLETED | OUTPATIENT
Start: 2023-03-15 | End: 2023-03-15

## 2023-03-15 RX ORDER — ACETAMINOPHEN 500 MG
1000 TABLET ORAL EVERY 6 HOURS
Qty: 56 TABLET | Refills: 0 | Status: SHIPPED | OUTPATIENT
Start: 2023-03-15 | End: 2023-03-22

## 2023-03-15 RX ORDER — MONTELUKAST SODIUM 10 MG/1
10 TABLET ORAL DAILY
Status: DISCONTINUED | OUTPATIENT
Start: 2023-03-16 | End: 2023-03-15 | Stop reason: HOSPADM

## 2023-03-15 RX ORDER — SODIUM CHLORIDE, SODIUM LACTATE, POTASSIUM CHLORIDE, CALCIUM CHLORIDE 600; 310; 30; 20 MG/100ML; MG/100ML; MG/100ML; MG/100ML
25 INJECTION, SOLUTION INTRAVENOUS CONTINUOUS
Status: DISCONTINUED | OUTPATIENT
Start: 2023-03-15 | End: 2023-03-15 | Stop reason: HOSPADM

## 2023-03-15 RX ORDER — FENTANYL CITRATE 50 UG/ML
50 INJECTION, SOLUTION INTRAMUSCULAR; INTRAVENOUS AS NEEDED
Status: DISCONTINUED | OUTPATIENT
Start: 2023-03-15 | End: 2023-03-15 | Stop reason: HOSPADM

## 2023-03-15 RX ORDER — TRANEXAMIC ACID 100 MG/ML
INJECTION, SOLUTION INTRAVENOUS AS NEEDED
Status: DISCONTINUED | OUTPATIENT
Start: 2023-03-15 | End: 2023-03-15 | Stop reason: HOSPADM

## 2023-03-15 RX ORDER — CELECOXIB 200 MG/1
200 CAPSULE ORAL ONCE
Status: COMPLETED | OUTPATIENT
Start: 2023-03-15 | End: 2023-03-15

## 2023-03-15 RX ORDER — HYDROCORTISONE 10 MG/1
10 TABLET ORAL 3 TIMES DAILY
Status: DISCONTINUED | OUTPATIENT
Start: 2023-03-16 | End: 2023-03-15 | Stop reason: HOSPADM

## 2023-03-15 RX ORDER — DEXAMETHASONE SODIUM PHOSPHATE 4 MG/ML
INJECTION, SOLUTION INTRA-ARTICULAR; INTRALESIONAL; INTRAMUSCULAR; INTRAVENOUS; SOFT TISSUE AS NEEDED
Status: DISCONTINUED | OUTPATIENT
Start: 2023-03-15 | End: 2023-03-15 | Stop reason: HOSPADM

## 2023-03-15 RX ORDER — VANCOMYCIN 2 GRAM/500 ML IN 0.9 % SODIUM CHLORIDE INTRAVENOUS
2000 ONCE
Status: DISCONTINUED | OUTPATIENT
Start: 2023-03-15 | End: 2023-03-15 | Stop reason: DRUGHIGH

## 2023-03-15 RX ORDER — NALOXONE HYDROCHLORIDE 0.4 MG/ML
0.4 INJECTION, SOLUTION INTRAMUSCULAR; INTRAVENOUS; SUBCUTANEOUS AS NEEDED
Status: DISCONTINUED | OUTPATIENT
Start: 2023-03-15 | End: 2023-03-15 | Stop reason: HOSPADM

## 2023-03-15 RX ORDER — SODIUM CHLORIDE 0.9 % (FLUSH) 0.9 %
5-40 SYRINGE (ML) INJECTION EVERY 8 HOURS
Status: DISCONTINUED | OUTPATIENT
Start: 2023-03-15 | End: 2023-03-15 | Stop reason: HOSPADM

## 2023-03-15 RX ORDER — OXYCODONE HYDROCHLORIDE 5 MG/1
5-10 TABLET ORAL
Qty: 40 TABLET | Refills: 0 | Status: SHIPPED | OUTPATIENT
Start: 2023-03-15 | End: 2023-03-22

## 2023-03-15 RX ORDER — ASPIRIN 81 MG/1
81 TABLET ORAL 2 TIMES DAILY
Status: DISCONTINUED | OUTPATIENT
Start: 2023-03-15 | End: 2023-03-15 | Stop reason: HOSPADM

## 2023-03-15 RX ORDER — OXYCODONE HYDROCHLORIDE 5 MG/1
5-10 TABLET ORAL
Status: DISCONTINUED | OUTPATIENT
Start: 2023-03-15 | End: 2023-03-15 | Stop reason: HOSPADM

## 2023-03-15 RX ORDER — PROPOFOL 10 MG/ML
INJECTION, EMULSION INTRAVENOUS AS NEEDED
Status: DISCONTINUED | OUTPATIENT
Start: 2023-03-15 | End: 2023-03-15

## 2023-03-15 RX ORDER — POLYETHYLENE GLYCOL 3350 17 G/17G
17 POWDER, FOR SOLUTION ORAL DAILY
Qty: 7 PACKET | Refills: 0 | Status: SHIPPED | OUTPATIENT
Start: 2023-03-16 | End: 2023-03-23

## 2023-03-15 RX ORDER — ONDANSETRON 2 MG/ML
INJECTION INTRAMUSCULAR; INTRAVENOUS AS NEEDED
Status: DISCONTINUED | OUTPATIENT
Start: 2023-03-15 | End: 2023-03-15 | Stop reason: HOSPADM

## 2023-03-15 RX ORDER — MIDAZOLAM HYDROCHLORIDE 1 MG/ML
INJECTION, SOLUTION INTRAMUSCULAR; INTRAVENOUS AS NEEDED
Status: DISCONTINUED | OUTPATIENT
Start: 2023-03-15 | End: 2023-03-15 | Stop reason: HOSPADM

## 2023-03-15 RX ORDER — CEPHALEXIN 250 MG/1
500 CAPSULE ORAL EVERY 6 HOURS
Status: DISCONTINUED | OUTPATIENT
Start: 2023-03-16 | End: 2023-03-15 | Stop reason: HOSPADM

## 2023-03-15 RX ORDER — SODIUM CHLORIDE 0.9 % (FLUSH) 0.9 %
5-40 SYRINGE (ML) INJECTION AS NEEDED
Status: DISCONTINUED | OUTPATIENT
Start: 2023-03-15 | End: 2023-03-15 | Stop reason: HOSPADM

## 2023-03-15 RX ORDER — POLYETHYLENE GLYCOL 3350 17 G/17G
17 POWDER, FOR SOLUTION ORAL DAILY
Status: DISCONTINUED | OUTPATIENT
Start: 2023-03-16 | End: 2023-03-15 | Stop reason: HOSPADM

## 2023-03-15 RX ORDER — MELOXICAM 7.5 MG/1
15 TABLET ORAL
Status: DISCONTINUED | OUTPATIENT
Start: 2023-03-16 | End: 2023-03-15 | Stop reason: HOSPADM

## 2023-03-15 RX ORDER — ONDANSETRON 2 MG/ML
4 INJECTION INTRAMUSCULAR; INTRAVENOUS AS NEEDED
Status: DISCONTINUED | OUTPATIENT
Start: 2023-03-15 | End: 2023-03-15 | Stop reason: HOSPADM

## 2023-03-15 RX ORDER — MIDAZOLAM HYDROCHLORIDE 1 MG/ML
0.5 INJECTION, SOLUTION INTRAMUSCULAR; INTRAVENOUS
Status: DISCONTINUED | OUTPATIENT
Start: 2023-03-15 | End: 2023-03-15 | Stop reason: HOSPADM

## 2023-03-15 RX ORDER — VANCOMYCIN/0.9 % SOD CHLORIDE 1.5G/250ML
1500 PLASTIC BAG, INJECTION (ML) INTRAVENOUS ONCE
Status: COMPLETED | OUTPATIENT
Start: 2023-03-15 | End: 2023-03-15

## 2023-03-15 RX ORDER — ONDANSETRON 4 MG/1
4 TABLET, ORALLY DISINTEGRATING ORAL
Status: DISCONTINUED | OUTPATIENT
Start: 2023-03-15 | End: 2023-03-15 | Stop reason: HOSPADM

## 2023-03-15 RX ORDER — DEXAMETHASONE SODIUM PHOSPHATE 4 MG/ML
10 INJECTION, SOLUTION INTRA-ARTICULAR; INTRALESIONAL; INTRAMUSCULAR; INTRAVENOUS; SOFT TISSUE ONCE
Status: COMPLETED | OUTPATIENT
Start: 2023-03-15 | End: 2023-03-15

## 2023-03-15 RX ORDER — PREGABALIN 150 MG/1
150 CAPSULE ORAL ONCE
Status: COMPLETED | OUTPATIENT
Start: 2023-03-15 | End: 2023-03-15

## 2023-03-15 RX ORDER — AMOXICILLIN 250 MG
1 CAPSULE ORAL 2 TIMES DAILY
Status: DISCONTINUED | OUTPATIENT
Start: 2023-03-15 | End: 2023-03-15 | Stop reason: HOSPADM

## 2023-03-15 RX ORDER — DICYCLOMINE HYDROCHLORIDE 10 MG/1
10 CAPSULE ORAL 3 TIMES DAILY
Status: DISCONTINUED | OUTPATIENT
Start: 2023-03-15 | End: 2023-03-15 | Stop reason: HOSPADM

## 2023-03-15 RX ORDER — DIPHENHYDRAMINE HYDROCHLORIDE 50 MG/ML
12.5 INJECTION, SOLUTION INTRAMUSCULAR; INTRAVENOUS AS NEEDED
Status: DISCONTINUED | OUTPATIENT
Start: 2023-03-15 | End: 2023-03-15 | Stop reason: HOSPADM

## 2023-03-15 RX ORDER — FAMOTIDINE 20 MG/1
20 TABLET, FILM COATED ORAL 2 TIMES DAILY
Qty: 60 TABLET | Refills: 0 | Status: SHIPPED | OUTPATIENT
Start: 2023-03-15 | End: 2023-04-14

## 2023-03-15 RX ORDER — ACETAMINOPHEN 500 MG
1000 TABLET ORAL EVERY 6 HOURS
Status: DISCONTINUED | OUTPATIENT
Start: 2023-03-15 | End: 2023-03-15 | Stop reason: HOSPADM

## 2023-03-15 RX ORDER — ROPIVACAINE HYDROCHLORIDE 5 MG/ML
INJECTION, SOLUTION EPIDURAL; INFILTRATION; PERINEURAL AS NEEDED
Status: DISCONTINUED | OUTPATIENT
Start: 2023-03-15 | End: 2023-03-15 | Stop reason: HOSPADM

## 2023-03-15 RX ORDER — ACETAMINOPHEN 500 MG
1000 TABLET ORAL ONCE
Status: COMPLETED | OUTPATIENT
Start: 2023-03-15 | End: 2023-03-15

## 2023-03-15 RX ORDER — OXYCODONE HYDROCHLORIDE 5 MG/1
5-10 TABLET ORAL
Qty: 30 TABLET | Refills: 0 | Status: SHIPPED | OUTPATIENT
Start: 2023-03-15 | End: 2023-03-15 | Stop reason: RX

## 2023-03-15 RX ADMIN — DEXAMETHASONE SODIUM PHOSPHATE 10 MG: 4 INJECTION, SOLUTION INTRAMUSCULAR; INTRAVENOUS at 07:48

## 2023-03-15 RX ADMIN — MIDAZOLAM HYDROCHLORIDE 2 MG: 1 INJECTION, SOLUTION INTRAMUSCULAR; INTRAVENOUS at 07:07

## 2023-03-15 RX ADMIN — WATER 2 G: 1 INJECTION INTRAMUSCULAR; INTRAVENOUS; SUBCUTANEOUS at 07:39

## 2023-03-15 RX ADMIN — PROPOFOL 100 MCG/KG/MIN: 10 INJECTION, EMULSION INTRAVENOUS at 07:38

## 2023-03-15 RX ADMIN — PREGABALIN 150 MG: 150 CAPSULE ORAL at 06:42

## 2023-03-15 RX ADMIN — SODIUM CHLORIDE 125 ML/HR: 9 INJECTION, SOLUTION INTRAVENOUS at 10:45

## 2023-03-15 RX ADMIN — KETOROLAC TROMETHAMINE 30 MG: 30 INJECTION, SOLUTION INTRAMUSCULAR at 16:28

## 2023-03-15 RX ADMIN — CELECOXIB 200 MG: 200 CAPSULE ORAL at 06:42

## 2023-03-15 RX ADMIN — PHENYLEPHRINE HYDROCHLORIDE 120 MCG: 10 INJECTION INTRAVENOUS at 08:21

## 2023-03-15 RX ADMIN — PROPOFOL 10 MG: 10 INJECTION, EMULSION INTRAVENOUS at 07:20

## 2023-03-15 RX ADMIN — BUPIVACAINE HYDROCHLORIDE 15 MG: 7.5 INJECTION, SOLUTION EPIDURAL; RETROBULBAR at 07:12

## 2023-03-15 RX ADMIN — SODIUM CHLORIDE, POTASSIUM CHLORIDE, SODIUM LACTATE AND CALCIUM CHLORIDE 25 ML/HR: 600; 310; 30; 20 INJECTION, SOLUTION INTRAVENOUS at 06:29

## 2023-03-15 RX ADMIN — PROPOFOL 10 MG: 10 INJECTION, EMULSION INTRAVENOUS at 07:11

## 2023-03-15 RX ADMIN — CEFAZOLIN 2 G: 1 INJECTION, POWDER, FOR SOLUTION INTRAMUSCULAR; INTRAVENOUS at 16:27

## 2023-03-15 RX ADMIN — PHENYLEPHRINE HYDROCHLORIDE 120 MCG: 10 INJECTION INTRAVENOUS at 08:04

## 2023-03-15 RX ADMIN — PROPOFOL 10 MG: 10 INJECTION, EMULSION INTRAVENOUS at 07:23

## 2023-03-15 RX ADMIN — ACETAMINOPHEN 1000 MG: 500 TABLET ORAL at 14:31

## 2023-03-15 RX ADMIN — ACETAMINOPHEN 1000 MG: 500 TABLET ORAL at 06:42

## 2023-03-15 RX ADMIN — ONDANSETRON HYDROCHLORIDE 4 MG: 2 INJECTION, SOLUTION INTRAMUSCULAR; INTRAVENOUS at 08:10

## 2023-03-15 RX ADMIN — TRANEXAMIC ACID 1 G: 100 INJECTION, SOLUTION INTRAVENOUS at 08:19

## 2023-03-15 RX ADMIN — SODIUM CHLORIDE, POTASSIUM CHLORIDE, SODIUM LACTATE AND CALCIUM CHLORIDE: 600; 310; 30; 20 INJECTION, SOLUTION INTRAVENOUS at 07:28

## 2023-03-15 RX ADMIN — DEXAMETHASONE SODIUM PHOSPHATE 10 MG: 4 INJECTION, SOLUTION INTRAMUSCULAR; INTRAVENOUS at 14:31

## 2023-03-15 RX ADMIN — TRANEXAMIC ACID 1 G: 100 INJECTION, SOLUTION INTRAVENOUS at 07:35

## 2023-03-15 RX ADMIN — PROPOFOL 10 MG: 10 INJECTION, EMULSION INTRAVENOUS at 07:15

## 2023-03-15 RX ADMIN — OXYCODONE HYDROCHLORIDE 5 MG: 5 TABLET ORAL at 16:28

## 2023-03-15 RX ADMIN — VANCOMYCIN HYDROCHLORIDE 1500 MG: 10 INJECTION, POWDER, LYOPHILIZED, FOR SOLUTION INTRAVENOUS at 07:07

## 2023-03-15 RX ADMIN — ROPIVACAINE HYDROCHLORIDE 20 ML: 5 INJECTION, SOLUTION EPIDURAL; INFILTRATION; PERINEURAL at 07:25

## 2023-03-15 RX ADMIN — PROPOFOL 10 MG: 10 INJECTION, EMULSION INTRAVENOUS at 07:26

## 2023-03-15 RX ADMIN — PHENYLEPHRINE HYDROCHLORIDE 120 MCG: 10 INJECTION INTRAVENOUS at 08:06

## 2023-03-15 NOTE — PERIOP NOTES
TRANSFER - OUT REPORT:    Verbal report given to BOUBACAR Villa(name) on [employer].  being transferred to [facility](unit) for routine post - op       Report consisted of patients Situation, Background, Assessment and   Recommendations(SBAR). Information from the following report(s) OR Summary, Procedure Summary, Intake/Output, and MAR was reviewed with the receiving nurse. Opportunity for questions and clarification was provided. Patient transported with:   O2 @ 2 liters  Tech    Reviewed with receiving nurse that only PACU orders were released. Postop orders will need to be released when pt gets to room.

## 2023-03-15 NOTE — OP NOTES
OPERATIVE REPORT    FACILITY: [facility]    PATIENT NAME: Srini Haines. DATE OF OPERATION: 3/15/23    PREOPERATIVE DIAGNOSIS: right knee end stage osteoarthritis    POSTOPERATIVE DIAGNOSIS: same    SURGERIES PERFORMED:   right total knee arthroplasty    ATTENDING PHYSICIAN: Rachana Roach MD    ASSISTANT: Jacy Ansari    IMPLANTS:    [place] Triathlon size 4 femur, 5 tibia, 9 mm CS poly    SPECIMENS:  none    OPERATIVE FINDINGS: End stage osteoarthritis. Stable total knee at conclusion. ANESTHESIA: spinal plus regional    FLUIDS: Please see anesthesia record    ESTIMATED BLOOD LOSS: 50 cc     TOURNIQUET TIME: 29 min at 250 mmHg    INDICATIONS FOR PROCEDURE: Srini Fenton is a 59 y.o. male who presented to clinic with right knee pain. Radiographs demonstrated advanced arthritic changes of the affected knee. They were counseled on the risks, benefits, and alternatives to surgery. Risks were outlined to include, but were not limited to: bleeding, infection, fracture, damage to blood vessels or nerves, hardware failure, loosening, continued pain, stiffness, leg length inequality, and medical risks including heart attack, stroke, blood clot, and even death. The patient elected to continue with the operation, and gave informed consent to do so. DETAILED DESCRIPTION OF PROCEDURE:   The patient was identified in the preoperative holding area. The operative site was marked. The nature of procedure was reviewed and informed consent was confirmed. The patient was evaluated by anesthesia and a regional block was completed. The patient was subsequently taken to the operating room and anesthesia was administered. The patient was positioned supine and a nonsterile thigh tourniquet was placed. The lower extremity was prepped and draped in a standard fashion. Preoperative antibiotics were administered. A time-out was performed. A standard midline incision was made.  Sharp dissection was taken down to the retinaculum. A medial parapatellar arthrotomy was performed. A medial release was completed. The fat pad was excised. Inspection of the joint revealed full-thickness tricompartmental degenerative changes and osteophytes consistent with advanced DJD. Femur and tibial pins were placed and the appropriate femoral and tibial arrays were placed. The bony landmarks were registered. The pre-templated plan was evaluated and appropriate changes were made to coronal, sagittal, and axial position and rotation of both the femoral and tibial components as well as proposed bony resection depths and angles. This pre-templated plan was based on pre-operative CT scan of the operative limb. The knee was brought into extension and stressed in both valgus and varus directions. These numbers were recored. The knee was brought into 90 degrees of flexion and ligament tension was tested using a 1/4 inch curved osteotome. Appropriate adjustments were made to the plan based on these findings. Once happy with the registration, templating, and soft tissue balancing the bony cuts were executed. We then brought the knee to 90 degrees and placed a lamina  in the medial and lateral tibiofemoral compartment sequentially removing the meniscus and posterior osteophytes. The trials were placed and a 9 mm polyethylene was trialed with excellent stability. We were able to achieve full extension and 120 degrees of flexion with balanced rectangular gaps. We had good stability through a full range of motion. Alignment was appropriate. All pins, arrays, and checkpoints were then removed. The patella was everted and inspected. The decision was made to not resurface the patella based on intra-operative appearance. Any excessive patellar osteophytes were removed. The patella demonstrated excellent tracking with a no-touch technique. At this point the patient's bone quality was inspected and felt to be good.  With good bone quality and a well balanced knee, the decision was made to proceed with press fit fixation of both components. Femoral lug holes were drilled. The tibia was punched. The final implants were then impacted into position with excellent bony contact. The final polyethylene liner was locked into the tibial tray and confirmed to be engaged in the locking mechanism. The tourniquet was then let down and the knee was irrigated. Hemostasis was achieved. The arthrotomy was closed with #1 StrataFix. The wound was then closed in layers with #2 Vicryl, Stratafix, #2-0 Monocryl, running #3-0 Monocryl, Dermabond, and sterile dressings. The patient was awakened from anesthesia and transferred to the recovery room in stable condition. There were no apparent intraoperative complications. Counts were correct. DISPOSITION: Stable to PACU. Radiographs to be taken there. POSTOPERATIVE PLAN: The plan will be to admit the patient to obs patient status. There they will undergo physical therapy and will mobilize as able. Pain will be controlled with oral and IV medications. DVT prophylaxis will be stratified based on risk factors. After the patient has mobilized appropriately, they will be discharged, with the plan for outpatient physical therapy to continue on a regular basis. FOLLOW UP: We will plan to see the patient back in clinic approximately 2 weeks after surgery for a wound check and follow up on clinical progress.      Sherie Cason MD

## 2023-03-15 NOTE — DISCHARGE SUMMARY
Ortho Discharge Summary    Patient ID:  Christie Reeder  [id number]  male  59 y.o.  1965    Admit date: 3/15/2023    Discharge date: 3/15/2023    Admitting Physician: Oj Epstein MD     Consulting Physician(s):   Treatment Team: Attending Provider: Chad Vanegas MD; Primary Nurse: Alycia Muniz; Nurse Practitioner: Lincoln Lockett NP; Care Manager: Phu Ram    Date of Surgery:   3/15/2023     Preoperative Diagnosis:  OSTEOARTHRITIS    Postoperative Diagnosis:   OSTEOARTHRITIS    Procedure(s):   RIGHT TOTAL KNEE ARTHROPLASTY WITH MAKOPLASTY     Anesthesia Type:   Spinal     Surgeon: Chad Vanegas MD                            HPI:  Pt is a 59 y.o. male who has a history of OSTEOARTHRITIS  with pain and limitations of activities of daily living who presents at this time for a RIGHT TOTAL [address] following the failure of conservative management. PMH:   Past Medical History:   Diagnosis Date    Arthritis     Basal cell carcinoma 2012    3 on left side of neck and 1 on right side of neck and nasal bridge area with first dx starting aprroximately 12 yrs ago    Frequent nosebleeds     GERD (gastroesophageal reflux disease)     H/O seasonal allergies     Hypertension     IBS (irritable bowel syndrome)     Migraine     MRSA (methicillin resistant staph aureus) culture positive 2007    hand    Panhypopituitarism ([facility] [address]     Pituitary tumor     =Endocrine, Dr. Zimmerman=Neuro [facility]    Porphyria cutanea tarda ([facility] [address]     BORN WITH IT. SUN EXPOSURE MAKES HIM HAVE BLISTERS ON HANDS. LIVER OVERPRODUCED RED BLOOD CELLS--TX IS PHLEBOTOMY AS NEEDED AND HAS HAD THIS 3 TIMES SO FAR    Secondary adrenal insufficiency (HCC) 02/21/2019       Body mass index is 32.11 kg/m². : A BMI > 30 is classified as obesity and > 40 is classified as morbid obesity. Medications upon admission :   Prior to Admission Medications   Prescriptions Last Dose Informant Patient Reported? Taking? SIMETHICONE Not Taking Self Yes No   Sig: Take 120 mg by mouth as needed. Patient not taking: Reported on 3/15/2023   SUMAtriptan (IMITREX) 100 mg tablet Not Taking  Yes No   Sig: TAKE 1 TABLET BY MOUTH ONCE DAILY AS NEEDED UP TO FOR 1 DAY   Patient not taking: Reported on 3/15/2023   albuterol (PROVENTIL HFA, VENTOLIN HFA, PROAIR HFA) 90 mcg/actuation inhaler Not Taking  No No   Sig: Take 2 Puffs by inhalation every four (4) hours as needed for Wheezing. Patient not taking: Reported on 3/98/4759   aspirin/salicylamide/caffeine (ARTHRITIS STRENGTH BC POWDER PO) 3/9/2023  Yes No   Sig: Take  by mouth. cetirizine HCl (ZYRTEC PO) 3/15/2023 at 0430  Yes Yes   Sig: Take  by mouth. chlorthalidone (HYGROTON) 25 mg tablet  at 0430  No No   Sig: Take 1 Tablet by mouth daily. cyclobenzaprine (FLEXERIL) 5 mg tablet 3/13/2023  No No   Sig: Take 1 Tablet by mouth three (3) times daily as needed for Muscle Spasm(s). dicyclomine (BENTYL) 10 mg capsule 3/15/2023 at 0430  No Yes   Sig: TAKE 1 CAPSULE BY MOUTH 3 TIMES A DAY   hydrocortisone (CORTEF) 5 mg tablet 3/14/2023 at 1600  No Yes   Sig: TAKE 3 TABLETS BY MOUTH BEFORE BREAKFAST, 2 TABLETS BY MOUTH AT LUNCH and 1 tab at dinner   meloxicam (MOBIC) 15 mg tablet 3/12/2023  Yes No   Sig: as needed. montelukast (SINGULAIR) 10 mg tablet 3/15/2023 at 0430  Yes Yes   Sig: Take 10 mg by mouth daily. mupirocin (BACTROBAN) 2 % ointment   No No   Sig: by Both Nostrils route two (2) times a day for 5 days. naproxen sodium (NAPROSYN) 220 mg tablet 3/15/2023 at 0430  Yes Yes   Sig: Take 220 mg by mouth as needed. ondansetron hcl (Zofran) 4 mg tablet Not Taking  No No   Sig: Take 1 Tablet by mouth every eight (8) hours as needed for Nausea. Patient not taking: Reported on 3/15/2023   rosuvastatin (CRESTOR) 10 mg tablet 3/15/2023 at 0430  No Yes   Sig: TAKE 1 TABLET BY MOUTH ONE TIME A DAY      Facility-Administered Medications: None        Allergies:     Allergies Allergen Reactions    Bactrim [Sulfamethoprim Ds] Rash     10 yrs ago    Lipitor [Atorvastatin] Myalgia    Sulfamethoxazole-Trimethoprim Rash        Hospital Course: The patient underwent surgery. Complications:  None; patient tolerated the procedure well. Was taken to the PACU in stable condition and then transferred to the ortho floor. Perioperative Antibiotics:  Ancef     Postoperative Pain Management:  Oxycodone & Tylenol     DVT Prophylaxis: Aspirin 81BID    Postoperative transfusions:    Number of units banked PRBCs =   none     Post Op complications: none    Hemoglobin at discharge:    Lab Results   Component Value Date/Time    HGB 10.3 (L) 03/07/2023 09:10 AM    INR 1.0 03/07/2023 09:10 AM       Dressing remained clean, dry and intact. No significant erythema or swelling. Wound appears to be healing without any evidence of infection. Neurovascular exam found to be within normal limits. Physical Therapy started following surgery and participated in bed mobility, transfers and ambulation. Gait:  Gait  Base of Support: Widened  Speed/Rosalee: Slow, Shuffled  Step Length: Right shortened, Left shortened  Gait Abnormalities: Decreased step clearance, Antalgic  Ambulation - Level of Assistance: Contact guard assistance  Distance (ft): 120 Feet (ft)  Assistive Device: Gait belt, Walker, rolling  Rail Use: Right  (B UE on R Rail)  Stairs - Level of Assistance: Contact guard assistance  Number of Stairs Trained: 4                   Discharged to: Home with . Condition on Discharge:   stable    Discharge instructions:  - Anticoagulate with Aspirin 81 BID  - Take pain medications as prescribed  - Resume pre hospital diet      - Discharge activity: activity as tolerated  - Ambulate with assistive device as needed. - Weight bearing status - WBAT  - Wound Care Keep wound clean and dry. See discharge instruction sheet.             -DISCHARGE MEDICATION LIST     Current Discharge Medication List START taking these medications    Details   acetaminophen (TYLENOL) 500 mg tablet Take 2 Tablets by mouth every six (6) hours for 7 days. Qty: 56 Tablet, Refills: 0  Start date: 3/15/2023, End date: 3/22/2023      aspirin delayed-release 81 mg tablet Take 1 Tablet by mouth two (2) times a day for 30 days. Qty: 60 Tablet, Refills: 0  Start date: 3/15/2023, End date: 4/14/2023      cephALEXin (KEFLEX) 500 mg capsule Take 1 Capsule by mouth every six (6) hours for 56 doses. Qty: 56 Capsule, Refills: 0  Start date: 3/16/2023, End date: 3/30/2023      famotidine (PEPCID) 20 mg tablet Take 1 Tablet by mouth two (2) times a day for 30 days. Qty: 60 Tablet, Refills: 0  Start date: 3/15/2023, End date: 4/14/2023      oxyCODONE IR (ROXICODONE) 5 mg immediate release tablet Take 1-2 Tablets by mouth every four (4) hours as needed for Pain for up to 7 days. Max Daily Amount: 60 mg. One tab for mild to moderate pain level 1-6, or 2 tabs for severe pain level 7-10  Qty: 30 Tablet, Refills: 0  Start date: 3/15/2023, End date: 3/22/2023    Associated Diagnoses: S/P total knee arthroplasty, right      polyethylene glycol (MIRALAX) 17 gram packet Take 1 Packet by mouth daily for 7 days. Qty: 7 Packet, Refills: 0  Start date: 3/16/2023, End date: 3/23/2023      senna-docusate (PERICOLACE) 8.6-50 mg per tablet Take 1 Tablet by mouth two (2) times a day for 7 days. Qty: 14 Tablet, Refills: 0  Start date: 3/15/2023, End date: 3/22/2023           CONTINUE these medications which have CHANGED    Details   meloxicam (MOBIC) 15 mg tablet Take 1 Tablet by mouth daily (with breakfast) for 30 days. Qty: 30 Tablet, Refills: 0  Start date: 3/16/2023, End date: 4/15/2023           CONTINUE these medications which have NOT CHANGED    Details   montelukast (SINGULAIR) 10 mg tablet Take 10 mg by mouth daily. cetirizine HCl (ZYRTEC PO) Take  by mouth.       hydrocortisone (CORTEF) 5 mg tablet TAKE 3 TABLETS BY MOUTH BEFORE BREAKFAST, 2 TABLETS BY MOUTH AT LUNCH and 1 tab at dinner  Qty: 540 Tablet, Refills: 3      rosuvastatin (CRESTOR) 10 mg tablet TAKE 1 TABLET BY MOUTH ONE TIME A DAY  Qty: 90 Tablet, Refills: 1    Associated Diagnoses: Hypercholesterolemia      dicyclomine (BENTYL) 10 mg capsule TAKE 1 CAPSULE BY MOUTH 3 TIMES A DAY  Qty: 270 Capsule, Refills: 3    Associated Diagnoses: Irritable bowel syndrome, unspecified type      naproxen sodium (NAPROSYN) 220 mg tablet Take 220 mg by mouth as needed. cyclobenzaprine (FLEXERIL) 5 mg tablet Take 1 Tablet by mouth three (3) times daily as needed for Muscle Spasm(s). Qty: 90 Tablet, Refills: 0      chlorthalidone (HYGROTON) 25 mg tablet Take 1 Tablet by mouth daily. Qty: 90 Tablet, Refills: 0    Associated Diagnoses: Primary hypertension      albuterol (PROVENTIL HFA, VENTOLIN HFA, PROAIR HFA) 90 mcg/actuation inhaler Take 2 Puffs by inhalation every four (4) hours as needed for Wheezing.   Qty: 3 Inhaler, Refills: 1    Associated Diagnoses: Mild intermittent reactive airway disease with wheezing without complication           STOP taking these medications       mupirocin (BACTROBAN) 2 % ointment Comments:   Reason for Stopping:         SUMAtriptan (IMITREX) 100 mg tablet Comments:   Reason for Stopping:         ondansetron hcl (Zofran) 4 mg tablet Comments:   Reason for Stopping:         aspirin/salicylamide/caffeine (ARTHRITIS STRENGTH BC POWDER PO) Comments:   Reason for Stopping:         SIMETHICONE Comments:   Reason for Stopping:            per medical continuation form      -Follow up in office in 2 weeks      Signed:  Silver Masters NP  Orthopaedic Nurse Practitioner    3/15/2023  4:08 PM

## 2023-03-15 NOTE — H&P
HISTORY OF PRESENT ILLNESS  Chief Complaint: Pain of the Right Knee     Age: 59 y.o. Sex: male   Hand-dominance: right     History of present illness: Mr. Elo Pineda presents today for evaluation of right knee pain. Pt denies inciting event or history of trauma to the affected knee. Pt reports knee pain described as constant, aching, and throbbing. The patient locates the pain: medial. The patient does not experience groin or hip pain. The patient does not experience instability. The patient does experience mechanical symptoms about the knee. The pain is affecting the patient's ADLs and ability to sleep through the night. The patient's symptoms/pain have progressed despite attempts at conservative management including: RICE, activity modification, NSAIDs, Tylenol, low impact exercise, IASI. The patient denies any recent fever, chills, night sweats or calf pain. Likes to fish. [place] marge [place] referred him to me. The past medical history, social history, medications and allergies were updated and reviewed during this visit. OBJECTIVE  Constitutional:  No acute distress. His body mass index is 30.41 kg/m². Eyes:  Sclera are nonicteric. Respiratory:  No labored breathing. Cardiovascular:  No marked edema. Skin:  No marked skin ulcers. Neurological:  No marked sensory loss noted. Psychiatric: Alert and oriented x3. right KNEE EXAM:  APPEARANCE- No redness, swelling or inflammation. mild effusion. ALIGNMENT- mild varus deformity that is correctable  STABILITY-  Negative posterior and anterior drawer sign. Minimal varus/ valgus laxity noted. ROM-  ROM is 5 to 115 degrees. STRENGTH/FUNCTION- 5/5 strength with flexion and extension   PERFUSION/SENSATION- Palpable distal pulses, sensation and capillary refill on the lower extremity. GAIT- antalgic  OTHER-  medial joint line TTP noted. Positive for crepitus.      No pain reproduced with hip ROM, WING, Stinchfield, or log roll on the affected side. IMAGING / STUDIES     I have independently reviewed and interpreted MRI of right knee which demonstrates full thickness cartilage loss in medial compartment with severe medial compartment OA. ASSESSMENT  1. Primary osteoarthritis of right knee    2. Acquired deformity of right knee          Impression: 59 y.o. male with right knee pain with Severe osteoarthritis        PLAN  We reviewed the patient's imaging, diagnosis, and pathology in detail. Ultimately they have symptomatic degenerative joint disease, management for which depends on the degree of symptomatolgy. We talked about treatment options. Pt understands the options boil down to conservative measures versus joint replacement surgery. We discussed a comprehensive non-operative protocol including activity modification, bracing/compression, ice or heat, acetaminophen, NSAIDs, gait aids, low impact exercise such as walking, stationary bike, and swimming, and the importance of maintaining an appropriate body weight. We also discussed injections for symptomatic relief. Finally, we discussed what joint replacement involves in terms of surgery as well as recuperation, pros, cons and risks and benefits. We discussed specific risks in detail including infection, wound healing issues, instability, stiffness, swelling, bleeding, blood clots, need for revision, continued pain, fracture, damage to nerves/vessels/other structures, limp, need for assistive devices, and anesthesia/medical complications including death. The patient is an acceptable candidate for joint replacement. This patient's knee pain is consistent with the osteoarthritis that is seen on x-ray. The patient has failed conservative management and is interested in having definitive treatment by way of total knee arthroplasty.   The pain is affecting the patient's ADLs and desired lifestyle, therefore, the patient appears to be a reasonable candidate for total knee arthroplasty. Further non-operative management is futile and will delay the most appropriate management for this patient's bothersome severe DJD. The patient understands that they will have to undergo pre-op evaluation and clearance by a primary care physician before surgery. Discussed uni vs TKA and he would prefer TKA. He also notes history of skin infections but never had surgical infection. Will do 2 week PO abx post op.

## 2023-03-15 NOTE — DISCHARGE INSTRUCTIONS
Discharge Instructions:  Balbina Nunez. Surgery: RIGHT TOTAL KNEE ARTHROPLASTY WITH MAKOPLASTY  Surgeon:   Dr. Edin Jane  Surgery Date:  3/15/2023    To relieve pain:  Use ice/gel packs.    -Put the ice pack directly over the wound, or anywhere you are hurting or swollen.   -To control pain and swelling, keep ice on regularly, especially after physical activity.  -The packs should stay cold for 3-4 hours. When it is not cold anymore, rotate with the packs in the freezer. Elevate your leg. This will also keep swelling down. Rest for at least 20 minutes between activity or exercises. To keep track of your pain medications, write down what you take and when you take it. The last dose of pain medication you got in the hospital was:     Medication    Dose    Date & Time      Choose your medications based on the pain scale below: To keep your pain under control, take Tylenol every 6 hours for 14 days - even if you feel like you dont need it. For mild to moderate pain (4-6 on pain scale), take one pain pill every 4 hours or as instructed. For severe pain (7-10 on pain scale), take two pain pills every 4 hours or as instructed. To prevent nausea, take your pain medications with food. Pain Scale              As your pain lessens:    Slowly start taking less pain medication. You may do this by waiting longer between doses or by taking smaller doses. Stop using the pain medications as soon as you no longer need it, usually in 2-3 weeks. Aspirin  To prevent blood clots, you will need to take Aspirin 81 mg twice a day for 30 days. To prevent stomach upset or bleeding:  Do not take non-steroidal anti-inflammatory medications (Ibuprofen, Advil, Motrin, Naproxen, etc.)   Take Pepcid 20 mg twice a day, or a similar home medication, while you are taking a blood thinner. Keep your waterproof dressing in place.  It will be removed by your surgeon during your follow-up appointment in 2 weeks. You may need to change the dressing if you are having drainage to where the dressing is no longer intact. You will be given an extra dressing to use at home. You will have some swelling, warmth, and bruising around the incision and up and down the leg after surgery. This will may get worse in the first few days you are home and will slowly get better over the next few weeks. You may shower with this dressing over your wound. After showering pat the dressing dry. DO NOT's:  Do not rub your surgical wound  Do not put lotion or oils on your wound. Do not take a tub bath or go swimming until your doctor says it is ok. To increase and promote healing:  Stop Smoking (or at least cut back on smoking). Eat a well-balanced diet. High in protein and Vitamin C. If you have a poor appetite, supplement your diet by drinking Ensure, Glucerna or [place] Instant Breakfast for the next 30 days     If you are a diabetic, control you blood sugars to prevent infection and help your wound heal.                     Prevent Infection:    Wash your hands                       -This is the most important thing you or your caregiver can do. -Wash your hands with soap and water (or use the hand ) before you touch any wounds. Shower  -Use the antibacterial soap we gave you when you take a shower.   -Shower with this soap until your wounds are healed. Use Clean Sheets   -Put freshly cleaned sheets on your bed after surgery.   -To keep the surgery site clean, do not allow pets to sleep with you while your wound is still healing. To prevent constipation, stay active and drink plenty of fluid. While using pain medications, you should also take stool softeners and laxatives, such as Pericolace and Miralax.        If you are having too many bowel movements, then you may need to stop taking the laxatives. You should have a bowel movement 3-4 days after surgery and then at least every other day while on pain medication. To improve your recovery, you must be active! Use your walker and take short walks (in your home) about every 2 hours during the day. Try to increase how far you walk each day. You can put as much weight on your leg as you can tolerate while walking. Home health physical therapy will come to your home the day after you leave the hospital. The therapist will visit about 4 times over the next couple of weeks to teach you exercises, how to get out of bed and how to safely walk in your home. NO DRIVING until your surgeon tells you it is ok. You can return to work when cleared by a physician. Please call your physician immediately if you have:  Constant bleeding from your wound. Increasing redness or swelling around your wound (some warmth, bruising and swelling is normal). Change in wound drainage (increase in amount, color, or bad smell). Change in mental status (unusual behavior). Temperature over 101.5 degrees Fahrenheit   Pain or redness in the calf (back of your lower leg)  Increased swelling of the thigh, ankle, calf, or foot. Emergency! CALL 911 if you have:  Shortness of breath  Chest pain when you cough or taking a deep breath        Please call your surgeons office to make your follow up appointment in 2 weeks. If you have questions or concerns during normal business hours, you may reach Dr. Felix Bangura Team at [phone] .                              Instructions for 24 hours after receiving General Anesthesia or Intravenous Sedation,   and while taking prescription Narcotics    Common side effects associated with each of these medications includes:  - Drowsiness, dizziness, euphoria, sleepiness or confusion  - Impaired memory recall  - Unsteady gait, loss of fine muscle control and delayed reaction time  - Visual disturbances, difficulty focusing, blurred vision    You may experience some of these side effects or you may not be aware of subtle changes in your behavior or reaction time. Because you received these medications, we are giving you the following instructions. Discharge Instructions:   - Someone should be with you for the next 24 hours  - For your own safety, a responsible adult must drive you home  - Do not consume alcoholic beverages   - Do not make important personal, legal or business decisions for 24 hours  - Move slowly and carefully, do not make sudden position changes. Be alert for dizziness or lightheadedness and move accordingly. - Do not operate equipment for 24 hours - [employer], power tools, Kitchen accessories: stove, etc.  - If you have not urinated within 8 hours after discharge, please contact your surgeon's office.

## 2023-03-15 NOTE — PROGRESS NOTES
Problem: Falls - Risk of  Goal: *Absence of Falls  Description: Document Remedios Mend Fall Risk and appropriate interventions in the flowsheet. Outcome: Progressing Towards Goal  Note: Fall Risk Interventions:  Mobility Interventions: Communicate number of staff needed for ambulation/transfer         Medication Interventions: Patient to call before getting OOB    Elimination Interventions: Patient to call for help with toileting needs              Problem: Pressure Injury - Risk of  Goal: *Prevention of pressure injury  Description: Document Bert Scale and appropriate interventions in the flowsheet.   Outcome: Progressing Towards Goal  Note: Pressure Injury Interventions:  Sensory Interventions: Assess changes in LOC    Moisture Interventions: Absorbent underpads    Activity Interventions: Increase time out of bed    Mobility Interventions: Float heels    Nutrition Interventions: Document food/fluid/supplement intake                     Problem: Knee Replacement: Day of Surgery/Unit  Goal: Activity/Safety  Outcome: Progressing Towards Goal     Problem: Knee Replacement: Day of Surgery/Unit  Goal: Nutrition/Diet  Outcome: Progressing Towards Goal     Problem: Knee Replacement: Day of Surgery/Unit  Goal: Medications  Outcome: Progressing Towards Goal

## 2023-03-15 NOTE — PROGRESS NOTES
Patient to d/c POD0. Pt owns DME needed. No HH preference. Multiple referrals sent out, [facility] accepted. AVS updated.      [address], [place], [address]

## 2023-03-15 NOTE — ANESTHESIA PROCEDURE NOTES
Spinal Block    Start time: 7/8/2023 7:51 AM  End time: 7/13/2023 7:52 AM  Performed by: Stella Matthew CRNA  Authorized by: Ruby Tapia MD     Pre-procedure: Indications: at surgeon's request  Preanesthetic Checklist: patient identified, risks and benefits discussed, anesthesia consent, site marked, patient being monitored, timeout performed and fire risk safety assessment completed and verbalized    Timeout Time: 07:07 EDT      Spinal Block:   Patient Position:  Seated  Prep Region:  Lumbar  Prep: DuraPrep      Location:  L3-4  Technique:  Single shot  Local: bupivacaine (PF) (MARCAINE) 0.75 % (7.5 mg/mL) Intrathecal - Intrathecal   15 mg - 3/15/2023 7:12:00 AM    Med Admin Time: 3/15/2023 7:12 AM    Needle:   Needle Type:   Donita  Needle Gauge:  25 G  Attempts:  1      Events: CSF confirmed, no blood with aspiration and no paresthesia        Assessment:  Insertion:  Uncomplicated  Patient tolerance:  Patient tolerated the procedure well with no immediate complications

## 2023-03-15 NOTE — PERIOP NOTES
Handoff Report from Operating Room to PACU    Report received from Tanmay Winchester RN and Connor Leon CRNA regarding Caren ByrneJennifer Green      Surgeon(s):  Hugo Fonseca MD  And Procedure(s) (LRB):  RIGHT TOTAL KNEE ARTHROPLASTY WITH MAKOPLASTY (Right)  confirmed   with dressings discussed. Anesthesia type, drugs, patient history, complications, estimated blood loss, vital signs, intake and output, and last pain medication, lines, and temperature were reviewed.

## 2023-03-15 NOTE — ANESTHESIA POSTPROCEDURE EVALUATION
Procedure(s):  RIGHT TOTAL KNEE ARTHROPLASTY WITH MAKOPLASTY. MAC, regional, spinal    Anesthesia Post Evaluation        Patient location during evaluation: PACU  Note status: Adequate. Level of consciousness: responsive to verbal stimuli and sleepy but conscious  Pain management: satisfactory to patient  Airway patency: patent  Anesthetic complications: no  Cardiovascular status: acceptable  Respiratory status: acceptable  Hydration status: acceptable  Comments: +Post-Anesthesia Evaluation and Assessment    Patient: Jarocho Dewey. MRN: [id number]  SSN: [id number]   YOB: 1965  Age: 59 y.o. Sex: male      Cardiovascular Function/Vital Signs    /68   Pulse 82   Temp 36.6 °C (97.9 °F)   Resp 14   Ht 5' 8\" (1.727 m)   Wt 95.8 kg (211 lb 3.2 oz)   SpO2 98%   BMI 32.11 kg/m²     Patient is status post Procedure(s):  RIGHT TOTAL KNEE ARTHROPLASTY WITH MAKOPLASTY. Nausea/Vomiting: Controlled. Postoperative hydration reviewed and adequate. Pain:  Pain Scale 1: Numeric (0 - 10) (03/15/23 1000)  Pain Intensity 1: 0 (03/15/23 1000)   Managed. Neurological Status:   Neuro (WDL): Exceptions to WDL (03/15/23 0901)   At baseline. Mental Status and Level of Consciousness: Arousable. Pulmonary Status:   O2 Device: Nasal cannula (03/15/23 0901)   Adequate oxygenation and airway patent. Complications related to anesthesia: None    Post-anesthesia assessment completed. No concerns. Signed By: Henna Ratliff MD    3/15/2023  Post anesthesia nausea and vomiting:  controlled      INITIAL Post-op Vital signs:   Vitals Value Taken Time   /68 03/15/23 1000   Temp 36.6 °C (97.9 °F) 03/15/23 0901   Pulse 83 03/15/23 1012   Resp 15 03/15/23 1012   SpO2 98 % 03/15/23 1012   Vitals shown include unvalidated device data.

## 2023-03-15 NOTE — PROGRESS NOTES
Ortho / Neurosurgery NP Note    POD# 0  s/p RIGHT TOTAL KNEE ARTHROPLASTY WITH MAKOPLASTY   Pt seen with wife at bedside. Pt resting in bed. AO, in NAD. Reports minimal postop pain, requesting oxycodone preemptively for long ride home   No other complaints. Reports BLE numbness resolved s/p spinal block   Recently seen by PT and cleared for discharge and would like to return home today. Tolerating regular diet. No nausea. VSS Afebrile. Room air. Visit Vitals  BP (!) 149/85   Pulse 75   Temp 97.9 °F (36.6 °C)   Resp 16   Ht 5' 8\" (1.727 m)   Wt 95.8 kg (211 lb 3.2 oz)   SpO2 100%   BMI 32.11 kg/m²       Voiding status: voiding   Output (mL)  Last Bowel Movement Date: 03/15/23 (03/15/23 4905)  Unmeasurable Output  Urine Occurrence(s): 2 (03/15/23 1455)      Labs    Lab Results   Component Value Date/Time    HGB 10.3 (L) 03/07/2023 09:10 AM      Lab Results   Component Value Date/Time    INR 1.0 03/07/2023 09:10 AM      Lab Results   Component Value Date/Time    Sodium 137 03/07/2023 09:10 AM    Potassium 3.8 03/07/2023 09:10 AM    Chloride 107 03/07/2023 09:10 AM    CO2 27 03/07/2023 09:10 AM    Glucose 116 (H) 03/07/2023 09:10 AM    BUN 32 (H) 03/07/2023 09:10 AM    Creatinine 1.16 03/07/2023 09:10 AM    Calcium 8.8 03/07/2023 09:10 AM     Recent Glucose Results: No results found for: GLU, GLUPOC, GLUCPOC        Body mass index is 32.11 kg/m². : A BMI > 30 is classified as obesity and > 40 is classified as morbid obesity. Dressing c.d. I   Cryotherapy in place over incision  Calves soft and supple; No pain with passive stretch  Sensation and motor intact.  +PF/DF/EHL intact 5/5  SCDs for mechanical DVT proph while in bed     PLAN:  1) PT BID - WBAT  2) Aspirin 81 mg PO BID for DVT Prophylaxis   3) GI Prophylaxis - Pepcid  4) Pain control - scheduled tylenol  and celebrex, and prn  oxycodone    5) Readniess for discharge:     [x] Vital Signs stable    [x] + Voiding    [x] Wound intact, drainage minimal    [x] Tolerating PO intake     [x] Cleared by PT (OT if applicable)     [x] Stair training completed (if applicable)    [x] Independent / Contact Guard Assist (household distance)     [x] Bed mobility     [x] Car transfers     [x] ADLs    [x] Adequate pain control on oral medication alone     Discharge home today with [facility] and wife's support.      Mellie Goldmann, NP

## 2023-03-15 NOTE — PROGRESS NOTES
Physical Therapy    Chart reviewed and orders acknowledged. Attempted to see pt for PT eval, pt c/o B numbness. Will defer and follow up later as appropriate.     Kiesha Lenz PT, DPT, NCS

## 2023-03-15 NOTE — PROGRESS NOTES
DISCHARGE NOTE FROM [facility]    Patient determined to be stable for discharge by attending provider. I have reviewed the discharge instructions with the patient and spouse. They verbalized understanding and all questions were answered to their satisfaction. No complaints or further questions were expressed. Medications sent to pharmacy. Appropriate educational materials and medication side effect teaching were provided. PIV were removed prior to discharge. Patient did not discharge with any line, henao, or drain. Personal items and valuables accounted for at discharge by patient and/or family: YES    Post-op patient: Yes- Patient given post-op discharge kit and instructed on use.        Brent Nix

## 2023-03-15 NOTE — PROGRESS NOTES
PHYSICAL THERAPY EVALUATION/DISCHARGE  Patient: Maylin Castro (62 y.o. male)  Date: 3/15/2023  Primary Diagnosis: Primary osteoarthritis of right knee [M17.11]  Procedure(s) (LRB):  RIGHT TOTAL KNEE ARTHROPLASTY WITH MAKOPLASTY (Right) Day of Surgery   Precautions: Fall, WBAT R LE         ASSESSMENT  Based on the objective data described below, the patient presents POD 0 s/p R TKA. Pt received supine in bed, wife at bedside, agreeable to PT session. Vitals stable in all positions during eval. Pt performed bed mobility with CGA for R LE management 2/2 discomfort. Sit <> Stands during session with RW and CGA, cues for hand placement and to squeeze R quad in standing to improve stability. Pt walked 20ft bed > bathroom with RW and CGA, sit <> Stand from commode with rail and CGA for safety, RW in front. Pt walked 120ft with RW and CGA, mild antalgic R stance phase but no buckling or LOB noted. Pt educated on and performed car transfer with CGA/Min A for RLE management. Up/down 4 stairs with R rail only, B UE on rail and sidestep method with CGA for balance. Pt returned to room at end of session and was left supine in bed with HOB elevated, call bell in reach, and bed alarm on. Pt has cleared acute care PT services at this time, recommend [facility] PT follow up as indicated. Functional Outcome Measure: The patient scored 80/100 on the Barthel Index outcome measure which is indicative of 20% impairment in mobility and ADLs. Other factors to consider for discharge: lives with wife who can provide assist as needed     Further skilled acute physical therapy is not indicated at this time.      PLAN :  Recommendation for discharge: (in order for the patient to meet his/her long term goals)  Physical therapy at least 2 days/week in the home AND ensure assist and/or supervision for safety with mobility and fall prevention strategies    This discharge recommendation:  Has been made in collaboration with the attending provider and/or case management    IF patient discharges home will need the following DME: patient owns DME required for discharge       SUBJECTIVE:   Patient stated I can't wait to go home.     OBJECTIVE DATA SUMMARY:   HISTORY:    Past Medical History:   Diagnosis Date    Arthritis     Basal cell carcinoma 2012    3 on left side of neck and 1 on right side of neck and nasal bridge area with first dx starting aprroximately 12 yrs ago    Frequent nosebleeds     GERD (gastroesophageal reflux disease)     H/O seasonal allergies     Hypertension     IBS (irritable bowel syndrome)     Migraine     MRSA (methicillin resistant staph aureus) culture positive 2007    hand    Panhypopituitarism ([address]     Pituitary tumor     =Endocrine, Dr. 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 SUN EXPOSURE MAKES HIM HAVE BLISTERS ON HANDS. LIVER OVERPRODUCED RED BLOOD CELLS--TX IS PHLEBOTOMY AS NEEDED AND HAS HAD THIS 3 TIMES SO FAR    Secondary adrenal insufficiency ([address] 02/21/2019     Past Surgical History:   Procedure Laterality Date    HX GI      COLONOSCOPY    HX HEART CATHETERIZATION  2013    Ced Zhao seen since 2013-minimal disease per cardio notes. HX HEENT      SINUS SURGERY    HX HEENT  02/2019 and 11/19, 2020    Transphenoidal pituitary surgery [facility], 2020 at [facility]    HX ORTHOPAEDIC Bilateral     bilateral thumb surgery    HX ORTHOPAEDIC Right ~2013    elbow surgery    HX OTHER SURGICAL      REMOVAL BCCA ON [address].  SIDE OF NECK AND BRIDGE OF NOSE    HX ROTATOR CUFF REPAIR Right     HX SEPTOPLASTY  2002    HX SHOULDER ARTHROSCOPY Left ~2013    HX SHOULDER ARTHROSCOPY Left 11/2021    biceps tenodesis and extensive debridement partial rotator cuff tears      NE ANALYZ NEUROSTIM BRAIN, EACH ADD 30 MIN      on 10/28/21 pt wife denies    NE PROSTATE BIOPSY, NEEDLE, SATURATION SAMPLING         Prior level of function: independent and active, limited by pain  Personal factors and/or comorbidities impacting plan of care: multiple prior orthopedic surgeries    Home Situation  Patient Expects to be Discharged to[de-identified] Home with home health    EXAMINATION/PRESENTATION/DECISION MAKING:   Critical Behavior:  Neurologic State: Drowsy  Orientation Level: Oriented X4  Cognition: Appropriate decision making, Appropriate for age attention/concentration, Appropriate safety awareness     Hearing:     Skin:  appears intact - ace wrap dry and intact throughout  Edema: none noted  Range Of Motion:  AROM: Generally decreased, functional                       Strength:    Strength: Generally decreased, functional                    Tone & Sensation:   Tone: Normal              Sensation: Intact               Coordination:  Coordination: Within functional limits  Vision:      Functional Mobility:  Bed Mobility:  Rolling: Contact guard assistance (CGA-SBA for R LE management)  Supine to Sit: Contact guard assistance  Sit to Supine: Stand-by assistance  Scooting: Stand-by assistance  Transfers:  Sit to Stand: Contact guard assistance  Stand to Sit: Contact guard assistance                       Balance:   Sitting: Intact  Standing: Impaired; With support  Standing - Static: Constant support;Good  Standing - Dynamic : Constant support;Good  Ambulation/Gait Training:  Distance (ft): 120 Feet (ft)  Assistive Device: Gait belt;Walker, rolling  Ambulation - Level of Assistance: Contact guard assistance        Gait Abnormalities: Decreased step clearance; Antalgic        Base of Support: Widened     Speed/Rosalee: Slow;Shuffled  Step Length: Right shortened;Left shortened              Stairs:  Number of Stairs Trained: 4  Stairs - Level of Assistance: Contact guard assistance   Rail Use: Right  (B UE on R Rail)        Functional Measure:  Barthel Index:    Bathin  Bladder: 10  Bowels: 10  Groomin  Dressing: 10  Feeding: 10  Mobility: 10  Stairs: 5  Toilet Use: 10  Transfer (Bed to Chair and Back): 10  Total: 80/100 The Barthel ADL Index: Guidelines  1. The index should be used as a record of what a patient does, not as a record of what a patient could do. 2. The main aim is to establish degree of independence from any help, physical or verbal, however minor and for whatever reason. 3. The need for supervision renders the patient not independent. 4. A patient's performance should be established using the best available evidence. Asking the patient, friends/relatives and nurses are the usual sources, but direct observation and common sense are also important. However direct testing is not needed. 5. Usually the patient's performance over the preceding 24-48 hours is important, but occasionally longer periods will be relevant. 6. Middle categories imply that the patient supplies over 50 per cent of the effort. 7. Use of aids to be independent is allowed. Score Interpretation (from [address] [address])    Independent   60-79 Minimally independent   40-59 Partially dependent   20-39 Very dependent   <20 Totally dependent     -Umair Hopper., Barthel, D.W. (1965). Functional evaluation: the Barthel Index. [facility] ([address] (1997). The Barthel activities of daily living index: self-reporting versus actual performance in the old (> or = 75 years). Journal [address] 45(7), [address] [facility], JISAIASF, Chantal Graham., Kenisha Cutler. (1999). Measuring the change in disability after inpatient rehabilitation; comparison of the responsiveness of the Barthel Index and Functional [place] Measure. Journal of Neurology, Neurosurgery, and Psychiatry, 66(4), 340-199. Yuki Navarro, N.JENS.A, GUY Mistry, & Richard Fisher MJenniferA. (2004) Assessment of post-stroke quality of life in cost-effectiveness studies: The usefulness of the Barthel Index and the EuroQoL-5D.  Quality of Life Research, 15, 391-15           Physical Therapy Evaluation Charge Determination History Examination Presentation Decision-Making   MEDIUM  Complexity : 1-2 comorbidities / personal factors will impact the outcome/ POC  LOW Complexity : 1-2 Standardized tests and measures addressing body structure, function, activity limitation and / or participation in recreation  LOW Complexity : Stable, uncomplicated  LOW Complexity : FOTO score of       Based on the above components, the patient evaluation is determined to be of the following complexity level: LOW     Pain Rating:  Denies pain    Activity Tolerance:   Good, SpO2 stable on RA, and requires rest breaks      After treatment patient left in no apparent distress:   Supine in bed, Call bell within reach, Bed / chair alarm activated, Caregiver / family present, and Side rails x 3    COMMUNICATION/EDUCATION:   The patients plan of care was discussed with: Registered nurse and Case management. Fall prevention education was provided and the patient/caregiver indicated understanding.     Thank you for this referral.  Sangeetha Bobby, PT, DPT, NCS   Time Calculation: 40 mins

## 2023-03-15 NOTE — PERIOP NOTES
No  coviod test   no s/s/   took   vaccine   mepilex  will be      placed  in or   as spinal to be done. Vss   time out   070   see   documentation . Antibiotic dosing   verified   with dr. Lori Waite  no nozin  used   as  pt  states  he  gets   blisters   with   oranges.

## 2023-03-15 NOTE — ANESTHESIA PREPROCEDURE EVALUATION
Relevant Problems   NEUROLOGY   (+) Migraines      CARDIOVASCULAR   (+) Primary hypertension      GASTROINTESTINAL   (+) GERD (gastroesophageal reflux disease)   (+) Porphyria cutanea tarda (HCC)       Anesthetic History   No history of anesthetic complications            Review of Systems / Medical History  Patient summary reviewed, nursing notes reviewed and pertinent labs reviewed    Pulmonary  Within defined limits                 Neuro/Psych         Headaches     Cardiovascular    Hypertension          CAD    Exercise tolerance: >4 METS     GI/Hepatic/Renal     GERD      Liver disease    Comments: IBS Endo/Other        Obesity and arthritis     Other Findings   Comments: Porphyria cutanea tarda   Sinusitis  History of pituitary adenoma, resected  Adrenal insufficency  Frequent nosebleeds   Hx Basal cell carcinoma   DJD  chronic pain           Physical Exam    Airway  Mallampati: II  TM Distance: 4 - 6 cm  Neck ROM: normal range of motion   Mouth opening: Normal     Cardiovascular  Regular rate and rhythm,  S1 and S2 normal,  no murmur, click, rub, or gallop  Rhythm: regular  Rate: normal         Dental      Comments: 1 missing tooth   Pulmonary  Breath sounds clear to auscultation               Abdominal  GI exam deferred       Other Findings            Anesthetic Plan    ASA: 3  Anesthesia type: general    Monitoring Plan: BIS  Post-op pain plan if not by surgeon: peripheral nerve block single    Induction: Intravenous  Anesthetic plan and risks discussed with: Patient

## 2023-03-16 ENCOUNTER — TELEPHONE (OUTPATIENT)
Dept: FAMILY MEDICINE CLINIC | Age: 58
End: 2023-03-16

## 2023-03-16 NOTE — TELEPHONE ENCOUNTER
----- Message from Valorie Watters sent at 3/16/2023  8:28 AM EDT -----  Subject: Message to Provider    QUESTIONS  Information for Provider? Jane from [facility] is calling   in regards of receiving a referral from [facility] and would like   to know if  will follow and sign home health orders. Please contact   Jane [phone]   ---------------------------------------------------------------------------  --------------  Cachorro MENDEZ  [phone]; OK to leave message on voicemail  ---------------------------------------------------------------------------  --------------  SCRIPT ANSWERS  Relationship to Patient? Third Party  Third Party Type? Home Health Care? Representative Name?  Maicol Houston [facility]

## 2023-05-25 DIAGNOSIS — R22.1 NECK MASS: ICD-10-CM

## 2023-05-25 DIAGNOSIS — D35.2 BENIGN TUMOR OF PITUITARY GLAND (HCC): ICD-10-CM

## 2023-05-25 DIAGNOSIS — E29.1 SECONDARY MALE HYPOGONADISM: ICD-10-CM

## 2023-05-25 DIAGNOSIS — I10 ESSENTIAL HYPERTENSION, BENIGN: ICD-10-CM

## 2023-05-25 DIAGNOSIS — E27.49 SECONDARY ADRENAL INSUFFICIENCY (HCC): ICD-10-CM

## 2023-06-06 ENCOUNTER — OFFICE VISIT (OUTPATIENT)
Age: 58
End: 2023-06-06
Payer: COMMERCIAL

## 2023-06-06 VITALS
TEMPERATURE: 97.4 F | WEIGHT: 208.6 LBS | RESPIRATION RATE: 16 BRPM | SYSTOLIC BLOOD PRESSURE: 148 MMHG | HEART RATE: 95 BPM | BODY MASS INDEX: 31.61 KG/M2 | HEIGHT: 68 IN | OXYGEN SATURATION: 100 % | DIASTOLIC BLOOD PRESSURE: 91 MMHG

## 2023-06-06 DIAGNOSIS — E27.49 OTHER ADRENOCORTICAL INSUFFICIENCY (HCC): ICD-10-CM

## 2023-06-06 DIAGNOSIS — M25.512 CHRONIC LEFT SHOULDER PAIN: ICD-10-CM

## 2023-06-06 DIAGNOSIS — E78.5 HYPERLIPIDEMIA LDL GOAL <100: ICD-10-CM

## 2023-06-06 DIAGNOSIS — J45.20 MILD INTERMITTENT ASTHMA, UNCOMPLICATED: ICD-10-CM

## 2023-06-06 DIAGNOSIS — I10 ESSENTIAL (PRIMARY) HYPERTENSION: Primary | ICD-10-CM

## 2023-06-06 DIAGNOSIS — G89.29 CHRONIC LEFT SHOULDER PAIN: ICD-10-CM

## 2023-06-06 PROCEDURE — 3077F SYST BP >= 140 MM HG: CPT | Performed by: NURSE PRACTITIONER

## 2023-06-06 PROCEDURE — 3080F DIAST BP >= 90 MM HG: CPT | Performed by: NURSE PRACTITIONER

## 2023-06-06 PROCEDURE — 99214 OFFICE O/P EST MOD 30 MIN: CPT | Performed by: NURSE PRACTITIONER

## 2023-06-06 RX ORDER — CYCLOBENZAPRINE HCL 5 MG
5 TABLET ORAL 3 TIMES DAILY PRN
Qty: 90 TABLET | Refills: 2 | Status: SHIPPED | OUTPATIENT
Start: 2023-06-06

## 2023-06-06 SDOH — ECONOMIC STABILITY: FOOD INSECURITY: WITHIN THE PAST 12 MONTHS, THE FOOD YOU BOUGHT JUST DIDN'T LAST AND YOU DIDN'T HAVE MONEY TO GET MORE.: PATIENT DECLINED

## 2023-06-06 SDOH — ECONOMIC STABILITY: FOOD INSECURITY: WITHIN THE PAST 12 MONTHS, YOU WORRIED THAT YOUR FOOD WOULD RUN OUT BEFORE YOU GOT MONEY TO BUY MORE.: PATIENT DECLINED

## 2023-06-06 SDOH — ECONOMIC STABILITY: INCOME INSECURITY: HOW HARD IS IT FOR YOU TO PAY FOR THE VERY BASICS LIKE FOOD, HOUSING, MEDICAL CARE, AND HEATING?: PATIENT DECLINED

## 2023-06-06 SDOH — ECONOMIC STABILITY: HOUSING INSECURITY
IN THE LAST 12 MONTHS, WAS THERE A TIME WHEN YOU DID NOT HAVE A STEADY PLACE TO SLEEP OR SLEPT IN A SHELTER (INCLUDING NOW)?: PATIENT REFUSED

## 2023-06-06 ASSESSMENT — PATIENT HEALTH QUESTIONNAIRE - PHQ9
SUM OF ALL RESPONSES TO PHQ QUESTIONS 1-9: 0
SUM OF ALL RESPONSES TO PHQ9 QUESTIONS 1 & 2: 0
SUM OF ALL RESPONSES TO PHQ QUESTIONS 1-9: 0
1. LITTLE INTEREST OR PLEASURE IN DOING THINGS: 0
2. FEELING DOWN, DEPRESSED OR HOPELESS: 0

## 2023-06-06 NOTE — PATIENT INSTRUCTIONS
Patient Education        A Healthy Lifestyle: Care Instructions  A healthy lifestyle can help you feel good, have more energy, and stay at a weight that's healthy for you. You can share a healthy lifestyle with your friends and family. And you can do it on your own. Eat meals with your friends or family. You could try cooking together. Plan activities with other people. Go for a walk with a friend, try a free online fitness class, or join a sports league. Eat a variety of healthy foods. These include fruits, vegetables, whole grains, low-fat dairy, and lean protein. Choose healthy portions of food. You can use the Nutrition Facts label on food packages as a guide. Eat more fruits and vegetables. You could add vegetables to sandwiches or add fruit to cereal.   Drink water when you are thirsty. Limit soda, juice, and sports drinks. Try to exercise most days. Aim for at least 2½ hours of exercise each week. Keep moving. Work in the garden or take your dog on a walk. Use the stairs instead of the elevator. If you use tobacco or nicotine, try to quit. Ask your doctor about programs and medicines to help you quit. Limit alcohol. Men should have no more than 2 drinks a day. Women should have no more than 1. For some people, no alcohol is the best choice. Follow-up care is a key part of your treatment and safety. Be sure to make and go to all appointments, and call your doctor if you are having problems. It's also a good idea to know your test results and keep a list of the medicines you take. Where can you learn more? Go to http://www.drew.com/ and enter U807 to learn more about \"A Healthy Lifestyle: Care Instructions. \"  Current as of: November 14, 2022               Content Version: 13.6  © 3579-4787 Healthwise, [employer]. Care instructions adapted under license by [facility] ([facility]).  If you have questions about a medical condition or this instruction, always ask your

## 2023-06-06 NOTE — PROGRESS NOTES
Laisha Estevez is a 59 y.o. male , id x 2(name and ). Reviewed record, history, and  medications. Chief Complaint   Patient presents with    Follow-up    Hypertension       Vitals:    23 0751   BP: (!) 148/91   Site: Left Upper Arm   Position: Sitting   Pulse: 95   Resp: 16   Temp: 97.4 °F (36.3 °C)   TempSrc: Oral   SpO2: 100%   Weight: 208 lb 9.6 oz (94.6 kg)   Height: 5' 8\" (1.727 m)       Coordination of Care Questionnaire:   1. Have you been to the ER, urgent care clinic since your last visit? Hospitalized since your last visit? No     2. Have you seen or consulted any other health care providers outside of the [address] [place] [address] since your last visit? Include any pap smears or colon screening.  [facility]
(SINGULAIR) 10 MG tablet Take 1 tablet by mouth daily    naproxen sodium (ANAPROX) 220 MG tablet Take 1 tablet by mouth as needed    rosuvastatin (CRESTOR) 10 MG tablet TAKE 1 TABLET BY MOUTH ONE TIME A DAY     No current facility-administered medications for this visit.      Physical Examination: General appearance - alert, well appearing, and in no distress and oriented to person, place, and time  Mental status - normal mood, behavior, speech, dress, motor activity, and thought processes  Eyes - sclera anicteric  Neck - supple, no significant adenopathy, thyroid exam: thyroid is normal in size without nodules or tenderness  Chest - clear to auscultation, no wheezes, rales or rhonchi, symmetric air entry  Heart - normal rate, regular rhythm, normal S1, S2, no murmurs, rubs, clicks or gallops, normal bilateral carotid upstroke without bruits, no JVD  Abdomen - soft, nontender, nondistended, no masses or organomegaly  bowel sounds normal  Neurological - alert, oriented, normal speech, no focal findings or movement disorder noted  Extremities - no pedal edema noted, intact peripheral pulses, no edema, redness or tenderness in the calves or thighs  Skin - normal coloration and turgor, no rashes      SHYANN Sosa - NP  6/6/2023

## 2023-06-08 ENCOUNTER — OFFICE VISIT (OUTPATIENT)
Age: 58
End: 2023-06-08
Payer: COMMERCIAL

## 2023-06-08 VITALS
DIASTOLIC BLOOD PRESSURE: 85 MMHG | SYSTOLIC BLOOD PRESSURE: 132 MMHG | WEIGHT: 209 LBS | HEART RATE: 106 BPM | BODY MASS INDEX: 31.67 KG/M2 | HEIGHT: 68 IN

## 2023-06-08 DIAGNOSIS — E29.1 TESTICULAR HYPOFUNCTION: ICD-10-CM

## 2023-06-08 DIAGNOSIS — I10 ESSENTIAL (PRIMARY) HYPERTENSION: ICD-10-CM

## 2023-06-08 DIAGNOSIS — D35.2 BENIGN NEOPLASM OF PITUITARY GLAND (HCC): Primary | ICD-10-CM

## 2023-06-08 DIAGNOSIS — E27.49 SECONDARY ADRENAL INSUFFICIENCY (HCC): ICD-10-CM

## 2023-06-08 PROCEDURE — 3079F DIAST BP 80-89 MM HG: CPT | Performed by: INTERNAL MEDICINE

## 2023-06-08 PROCEDURE — 99214 OFFICE O/P EST MOD 30 MIN: CPT | Performed by: INTERNAL MEDICINE

## 2023-06-08 PROCEDURE — 3075F SYST BP GE 130 - 139MM HG: CPT | Performed by: INTERNAL MEDICINE

## 2023-06-08 NOTE — PROGRESS NOTES
tablet TAKE 1 TABLET BY MOUTH ONE TIME A DAY     No current facility-administered medications for this visit. Allergies   Allergen Reactions    Atorvastatin Myalgia    Sulfa Antibiotics Rash     10 yrs ago    Sulfamethoxazole-Trimethoprim Rash     Review of Systems: PER HPI    Physical Examination:  Blood pressure 132/85, pulse (!) 106, height 5' 8\" (1.727 m), weight 209 lb (94.8 kg). General: pleasant, no distress, good eye contact   Neck: no carotid bruits  Cardiovascular: regular, normal rate, nl s1 and s2, no m/r/g   Respiratory: clear to auscultation bilaterally   Integumentary: skin is normal, no edema  Psychiatric: normal mood and affect    Data Reviewed:   Lina Norwood MD - 02/16/2023   Formatting of this note might be different from the original.   PROCEDURE:   MRI BRAIN W/WO CONTRAST     CLINICAL INDICATION:   pituitary adenoma     TECHNIQUE:   Multiplanar pre and post contrast images of the brain as per protocol     CONTRAST:   19 ml Clariscan     COMPARISON:   Previous MRI from 08/22 and 07/21     FINDINGS:     Postsurgical changes of transsphenoidal surgery for adenoma resection is noted, with surgical granuloma left lateral sella. There is enhancing tissue in the right aspect of the sella consistent with native pituitary gland. In the posterior aspect of the left cavernous sinus, there is unchanged soft tissue nodule suspicious for residual tumor. (0.5 cm) (series 109, image 11)     No additional intracranial mass lesion visualized. Scattered foci of cerebral white matter T2 prolongation suggestive of chronic small vessel ischemic disease. No hydrocephalus. Patent basilar cisterns. Osseous structures and extracranial soft tissues reveal no significant abnormality, other than the left maxillary sinus mucosal thickening.      IMPRESSION:     Stable postsurgical changes, along with the previously noted hypo enhancing nodule within the left posterior cavernous sinus     Assessment/Plan:

## 2023-06-08 NOTE — PATIENT INSTRUCTIONS
1) Keep taking hydrocortisone 15 mg in the morning and as you increase your activity, feel free to add back 1-2 tabs at lunch and if needed 1 at dinner. 2) Your blood pressure looks great.

## 2023-06-26 RX ORDER — ROSUVASTATIN CALCIUM 10 MG/1
TABLET, COATED ORAL
Qty: 90 TABLET | Refills: 1 | Status: SHIPPED | OUTPATIENT
Start: 2023-06-26 | End: 2023-08-17

## 2023-06-26 RX ORDER — CHLORTHALIDONE 25 MG/1
TABLET ORAL
Qty: 90 TABLET | Refills: 1 | Status: SHIPPED | OUTPATIENT
Start: 2023-06-26

## 2023-07-03 NOTE — PATIENT INSTRUCTIONS
Learning About Relief for Back Pain  What is back tension and strain? Back strain happens when you overstretch, or pull, a muscle in your back. You may hurt your back in an accident or when you exercise or lift something. Most back pain will get better with rest and time. You can take care of yourself at home to help your back heal.  What can you do first to relieve back pain? When you first feel back pain, try these steps:  · Walk. Take a short walk (10 to 20 minutes) on a level surface (no slopes, hills, or stairs) every 2 to 3 hours. Walk only distances you can manage without pain, especially leg pain. · Relax. Find a comfortable position for rest. Some people are comfortable on the floor or a medium-firm bed with a small pillow under their head and another under their knees. Some people prefer to lie on their side with a pillow between their knees. Don't stay in one position for too long. · Try heat or ice. Try using a heating pad on a low or medium setting, or take a warm shower, for 15 to 20 minutes every 2 to 3 hours. Or you can buy single-use heat wraps that last up to 8 hours. You can also try an ice pack for 10 to 15 minutes every 2 to 3 hours. You can use an ice pack or a bag of frozen vegetables wrapped in a thin towel. There is not strong evidence that either heat or ice will help, but you can try them to see if they help. You may also want to try switching between heat and cold. · Take pain medicine exactly as directed. ¨ If the doctor gave you a prescription medicine for pain, take it as prescribed. ¨ If you are not taking a prescription pain medicine, ask your doctor if you can take an over-the-counter medicine. What else can you do? · Stretch and exercise. Exercises that increase flexibility may relieve your pain and make it easier for your muscles to keep your spine in a good, neutral position. And don't forget to keep walking. · Do self-massage.  You can use self-massage to unwind after work or school or to energize yourself in the morning. You can easily massage your feet, hands, or neck. Self-massage works best if you are in comfortable clothes and are sitting or lying in a comfortable position. Use oil or lotion to massage bare skin. · Reduce stress. Back pain can lead to a vicious Mi[place]maq: Distress about the pain tenses the muscles in your back, which in turn causes more pain. Learn how to relax your mind and your muscles to lower your stress. Where can you learn more? Go to http://marie-coleman.info/. Enter Y883 in the search box to learn more about \"Learning About Relief for Back Pain. \"  Current as of: May 23, 2016  Content Version: 11.2  © 6705-6974 [employer]. Care instructions adapted under license by [employer] (which disclaims liability or warranty for this information). If you have questions about a medical condition or this instruction, always ask your healthcare professional. [place] [address] any warranty or liability for your use of this information. Chronic Sinusitis: Care Instructions  Your Care Instructions    Sinusitis is an infection of the lining of the sinus cavities in your head. It causes pain and pressure in your head and face. Sinusitis can be short-term (acute) or long-term (chronic). Chronic sinusitis lasts 12 weeks or longer. It is often caused by a bacterial or fungal infection. Other things, such as allergies, may also be involved. Chronic sinusitis may be hard to treat. It can lead to permanent changes in the mucous membranes that line the sinuses. It may make future sinus infections more likely. The infection may take some time to treat. Antibiotics are usually used if the infection is caused by bacteria. You may also need to use a corticosteroid nasal spray.  If the infection is not cured after you try two or more different antibiotics, you may want to talk with your doctor about surgery or allergy testing. If the sinusitis is caused by a fungal infection, you may need to take antifungals or other medicines. You may also need surgery. Follow-up care is a key part of your treatment and safety. Be sure to make and go to all appointments, and call your doctor if you are having problems. It's also a good idea to know your test results and keep a list of the medicines you take. How can you care for yourself at home? Medicines  · Be safe with medicines. Take your medicines exactly as prescribed. Call your doctor if you think you are having a problem with your medicine. You will get more details on the specific medicines your doctor prescribes. · Take your antibiotics as directed. Do not stop taking them just because you feel better. You need to take the full course of antibiotics. · Your doctor may recommend a corticosteroid nasal spray, wash, drops, or pills. Take this medicine exactly as prescribed. At home  · Breathe warm, moist air. You can use a steamy shower, a hot bath, or a sink filled with hot water. Avoid cold, dry air. Using a humidifier in your home may help. Follow the instructions for cleaning the machine. · Use saline (saltwater) nasal washes every day. This helps keep your nasal passages open. It also can wash out mucus and bacteria. ¨ You can buy saline nose drops at a grocery store or drugstore. ¨ You can make your own at home. Add 1 teaspoon of salt and 1 teaspoon of baking soda to 2 cups of distilled water. If you make your own, fill a bulb syringe with the solution. Then insert the tip into your nostril and squeeze gently. Sherryn Anis your nose. · Put a warm, wet towel or a warm gel pack on your face 3 or 4 times a day. Leave it on 5 to 10 minutes each time. · Do not smoke or breathe secondhand smoke. Smoking can make sinusitis worse. If you need help quitting, talk to your doctor about stop-smoking programs and medicines.  These can increase your chances of quitting for good.  When should you call for help? Call your doctor now or seek immediate medical care if:  · You have new or worse swelling or redness in face or around eyes. Watch closely for changes in your health, and be sure to contact your doctor if:  · You have a new or higher fever. · You have new or worse facial pain. · The mucus from your nose becomes thicker (like pus) or has new blood in it. · You do not get better as expected. Where can you learn more? Go to http://marie-coleman.info/. Enter Q193 in the search box to learn more about \"Chronic Sinusitis: Care Instructions. \"  Current as of: July 29, 2016  Content Version: 11.2  © 1441-3523 [employer]. Care instructions adapted under license by [employer] (which disclaims liability or warranty for this information). If you have questions about a medical condition or this instruction, always ask your healthcare professional. [place] [address] any warranty or liability for your use of this information. Nosebleeds: Care Instructions  Your Care Instructions    Nosebleeds are common, especially if you have colds or allergies. Many things can cause a nosebleed. Some nosebleeds stop on their own with pressure. Others need packing. Some get cauterized (sealed). If you have gauze or other packing materials in your nose, you will need to follow up with your doctor to have the packing removed. You may need more treatment if you get nosebleeds a lot. The doctor has checked you carefully, but problems can develop later. If you notice any problems or new symptoms, get medical treatment right away. Follow-up care is a key part of your treatment and safety. Be sure to make and go to all appointments, and call your doctor if you are having problems. It's also a good idea to know your test results and keep a list of the medicines you take. How can you care for yourself at home?   · If you get another nosebleed:  ¨ Sit up and tilt your head slightly forward. This keeps blood from going down your throat. ¨ Use your thumb and index finger to pinch your nose shut for 10 minutes. Use a clock. Do not check to see if the bleeding has stopped before the 10 minutes are up. If the bleeding has not stopped, pinch your nose shut for another 10 minutes. ¨ When the bleeding has stopped, try not to pick, rub, or blow your nose for 12 hours. Avoiding these things helps keep your nose from bleeding again. · If your doctor prescribed antibiotics, take them as directed. Do not stop taking them just because you feel better. You need to take the full course of antibiotics. To prevent nosebleeds  · Do not blow your nose too hard. · Try not to lift or strain after a nosebleed. · Raise your head on a pillow while you sleep. · Put a thin layer of a saline- or water-based nasal gel, such as NasoGel, inside your nose. Put it on the septum, which divides your nostrils. This will prevent dryness that can cause nosebleeds. · Use a vaporizer or humidifier to add moisture to your bedroom. Follow the directions for cleaning the machine. · Do not use aspirin, ibuprofen (Advil, Motrin), or naproxen (Aleve) for 36 to 48 hours after a nosebleed unless your doctor tells you to. You can use acetaminophen (Tylenol) for pain relief. · Talk to your doctor about stopping any other medicines you are taking. Some medicines may make you more likely to get a nosebleed. · Do not use cold medicines or nasal sprays without first talking to your doctor. They can make your nose dry. When should you call for help? Call 911 anytime you think you may need emergency care. For example, call if:  · You passed out (lost consciousness). Call your doctor now or seek immediate medical care if:  · You get another nosebleed and your nose is still bleeding after you have applied pressure 3 times for 10 minutes each time (30 minutes total).   · There is a lot of blood running down the back of your throat even after you pinch your nose and tilt your head forward. · You have a fever. · You have sinus pain. Watch closely for changes in your health, and be sure to contact your doctor if:  · You get nosebleeds often, even if they stop. · You do not get better as expected. Where can you learn more? Go to http://marie-coleman.info/. Enter S156 in the search box to learn more about \"Nosebleeds: Care Instructions. \"  Current as of: May 27, 2016  Content Version: 11.2  © 9417-7426 [employer]. Care instructions adapted under license by [employer] (which disclaims liability or warranty for this information). If you have questions about a medical condition or this instruction, always ask your healthcare professional. [address] any warranty or liability for your use of this information. Tissue Cultured Epidermal Autograft Text: The defect edges were debeveled with a #15 scalpel blade. Given the location of the defect, shape of the defect and the proximity to free margins a tissue cultured epidermal autograft was deemed most appropriate.  The graft was then trimmed to fit the size of the defect.  The graft was then placed in the primary defect and oriented appropriately.

## 2023-08-17 RX ORDER — ROSUVASTATIN CALCIUM 10 MG/1
TABLET, COATED ORAL
Qty: 90 TABLET | Refills: 1 | Status: SHIPPED | OUTPATIENT
Start: 2023-08-17

## 2023-09-15 ENCOUNTER — TELEPHONE (OUTPATIENT)
Age: 58
End: 2023-09-15

## 2023-09-15 NOTE — TELEPHONE ENCOUNTER
Called pt to r/s 10.4.23 apt, lvm that it will be canceled and to call us back. Was going to offer a provider requested slot on another day, ok per [place].

## 2023-10-25 ENCOUNTER — OFFICE VISIT (OUTPATIENT)
Age: 58
End: 2023-10-25
Payer: COMMERCIAL

## 2023-10-25 VITALS
BODY MASS INDEX: 32.13 KG/M2 | TEMPERATURE: 98.1 F | OXYGEN SATURATION: 95 % | DIASTOLIC BLOOD PRESSURE: 75 MMHG | SYSTOLIC BLOOD PRESSURE: 136 MMHG | HEART RATE: 100 BPM | WEIGHT: 212 LBS | HEIGHT: 68 IN | RESPIRATION RATE: 18 BRPM

## 2023-10-25 DIAGNOSIS — E27.49 OTHER ADRENOCORTICAL INSUFFICIENCY (HCC): ICD-10-CM

## 2023-10-25 DIAGNOSIS — D35.2 BENIGN NEOPLASM OF PITUITARY GLAND (HCC): ICD-10-CM

## 2023-10-25 DIAGNOSIS — I10 ESSENTIAL (PRIMARY) HYPERTENSION: ICD-10-CM

## 2023-10-25 DIAGNOSIS — R73.9 HYPERGLYCEMIA: ICD-10-CM

## 2023-10-25 DIAGNOSIS — J45.20 MILD INTERMITTENT ASTHMA, UNCOMPLICATED: ICD-10-CM

## 2023-10-25 DIAGNOSIS — E78.5 HYPERLIPIDEMIA LDL GOAL <100: Primary | ICD-10-CM

## 2023-10-25 PROCEDURE — 3078F DIAST BP <80 MM HG: CPT | Performed by: NURSE PRACTITIONER

## 2023-10-25 PROCEDURE — 3075F SYST BP GE 130 - 139MM HG: CPT | Performed by: NURSE PRACTITIONER

## 2023-10-25 PROCEDURE — 99214 OFFICE O/P EST MOD 30 MIN: CPT | Performed by: NURSE PRACTITIONER

## 2023-10-25 ASSESSMENT — PATIENT HEALTH QUESTIONNAIRE - PHQ9
SUM OF ALL RESPONSES TO PHQ QUESTIONS 1-9: 0
SUM OF ALL RESPONSES TO PHQ9 QUESTIONS 1 & 2: 0
SUM OF ALL RESPONSES TO PHQ QUESTIONS 1-9: 0
2. FEELING DOWN, DEPRESSED OR HOPELESS: 0
SUM OF ALL RESPONSES TO PHQ QUESTIONS 1-9: 0
SUM OF ALL RESPONSES TO PHQ QUESTIONS 1-9: 0
1. LITTLE INTEREST OR PLEASURE IN DOING THINGS: 0

## 2023-10-25 NOTE — PROGRESS NOTES
Chief Complaint   Patient presents with    Hypertension       1. Have you been to the ER, urgent care clinic since your last visit? Hospitalized since your last visit? No    2. Have you seen or consulted any other health care providers outside of the [address] [place] [address] since your last visit? Include any pap smears or colon screening.  No
daily    naproxen sodium (ANAPROX) 220 MG tablet Take 1 tablet by mouth as needed     No current facility-administered medications for this visit.      Physical Examination: General appearance - alert, well appearing, and in no distress and oriented to person, place, and time  Mental status - normal mood, behavior, speech, dress, motor activity, and thought processes  Eyes - sclera anicteric  Neck - supple, no significant adenopathy, thyroid exam: thyroid is normal in size without nodules or tenderness  Chest - clear to auscultation, no wheezes, rales or rhonchi, symmetric air entry  Heart - normal rate, regular rhythm, normal S1, S2, no murmurs, rubs, clicks or gallops, normal bilateral carotid upstroke without bruits, no JVD  Abdomen - soft, nontender, nondistended, no masses or organomegaly  bowel sounds normal  Neurological - alert, oriented, normal speech, no focal findings or movement disorder noted  Extremities - no pedal edema noted, intact peripheral pulses, no edema, redness or tenderness in the calves or thighs  Skin - normal coloration and turgor, no rashes      SHYANN Meyers - NP  10/25/2023

## 2023-11-08 RX ORDER — HYDROCORTISONE 5 MG/1
TABLET ORAL
Qty: 270 TABLET | Refills: 3 | Status: SHIPPED | OUTPATIENT
Start: 2023-11-08

## 2023-11-10 RX ORDER — ROSUVASTATIN CALCIUM 10 MG/1
10 TABLET, COATED ORAL DAILY
Qty: 90 TABLET | Refills: 1 | Status: SHIPPED | OUTPATIENT
Start: 2023-11-10

## 2024-01-19 ENCOUNTER — TELEPHONE (OUTPATIENT)
Age: 59
End: 2024-01-19

## 2024-01-19 RX ORDER — CHLORTHALIDONE 25 MG/1
25 TABLET ORAL DAILY
Qty: 90 TABLET | Refills: 1 | Status: SHIPPED | OUTPATIENT
Start: 2024-01-19

## 2024-01-19 NOTE — TELEPHONE ENCOUNTER
We received a fax refill request for Scooter Masters Jr..  Please escribe Chlorthalidone to their pharmacy.  The pharmacy is correct in the chart and they are requesting a 90 day supply.

## 2024-03-26 RX ORDER — HYDROCORTISONE 5 MG/1
TABLET ORAL
Qty: 540 TABLET | Refills: 3 | Status: SHIPPED | OUTPATIENT
Start: 2024-03-26

## 2024-03-26 NOTE — TELEPHONE ENCOUNTER
Pt sent the following comment:    \"Patient Comment: Dr Xie. Jean Claude takes 6 tabs of this medication. 15 mg every am, 10 mg at lunch, 5mg at 5ish. He was only preacribed the 15mg in the am for 270 tabs but should have had 540 tabs prescribed. Could he have the rest called in, he is going to be out shortly. Thank you\"

## 2024-04-08 ENCOUNTER — HOSPITAL ENCOUNTER (EMERGENCY)
Facility: HOSPITAL | Age: 59
Discharge: HOME | End: 2024-04-08

## 2024-04-08 VITALS
SYSTOLIC BLOOD PRESSURE: 155 MMHG | RESPIRATION RATE: 16 BRPM | DIASTOLIC BLOOD PRESSURE: 95 MMHG | HEIGHT: 68 IN | OXYGEN SATURATION: 96 % | WEIGHT: 200 LBS | BODY MASS INDEX: 30.31 KG/M2 | TEMPERATURE: 98.2 F | HEART RATE: 80 BPM

## 2024-04-08 DIAGNOSIS — L55.0 SUNBURN OF FIRST DEGREE: Primary | ICD-10-CM

## 2024-04-08 PROCEDURE — 96374 THER/PROPH/DIAG INJ IV PUSH: CPT

## 2024-04-08 PROCEDURE — 99284 EMERGENCY DEPT VISIT MOD MDM: CPT

## 2024-04-08 PROCEDURE — 6370000000 HC RX 637 (ALT 250 FOR IP): Performed by: PHYSICIAN ASSISTANT

## 2024-04-08 PROCEDURE — 96361 HYDRATE IV INFUSION ADD-ON: CPT

## 2024-04-08 PROCEDURE — 2580000003 HC RX 258: Performed by: PHYSICIAN ASSISTANT

## 2024-04-08 PROCEDURE — 6360000002 HC RX W HCPCS: Performed by: PHYSICIAN ASSISTANT

## 2024-04-08 PROCEDURE — 96376 TX/PRO/DX INJ SAME DRUG ADON: CPT

## 2024-04-08 RX ORDER — SODIUM CHLORIDE, SODIUM LACTATE, POTASSIUM CHLORIDE, AND CALCIUM CHLORIDE .6; .31; .03; .02 G/100ML; G/100ML; G/100ML; G/100ML
1000 INJECTION, SOLUTION INTRAVENOUS
Status: COMPLETED | OUTPATIENT
Start: 2024-04-08 | End: 2024-04-08

## 2024-04-08 RX ORDER — CEPHALEXIN 500 MG/1
500 CAPSULE ORAL 2 TIMES DAILY
Qty: 14 CAPSULE | Refills: 0 | Status: SHIPPED | OUTPATIENT
Start: 2024-04-08 | End: 2024-04-08

## 2024-04-08 RX ORDER — CEPHALEXIN 500 MG/1
500 CAPSULE ORAL 2 TIMES DAILY
Qty: 14 CAPSULE | Refills: 0 | Status: SHIPPED | OUTPATIENT
Start: 2024-04-08 | End: 2024-04-15

## 2024-04-08 RX ORDER — GINSENG 100 MG
CAPSULE ORAL
Qty: 14 G | Refills: 0 | Status: SHIPPED | OUTPATIENT
Start: 2024-04-08 | End: 2024-04-18

## 2024-04-08 RX ORDER — HYDROCODONE BITARTRATE AND ACETAMINOPHEN 5; 325 MG/1; MG/1
1 TABLET ORAL EVERY 4 HOURS PRN
Qty: 12 TABLET | Refills: 0 | Status: SHIPPED | OUTPATIENT
Start: 2024-04-08 | End: 2024-04-08

## 2024-04-08 RX ORDER — HYDROCODONE BITARTRATE AND ACETAMINOPHEN 5; 325 MG/1; MG/1
1 TABLET ORAL EVERY 4 HOURS PRN
Qty: 12 TABLET | Refills: 0 | Status: SHIPPED | OUTPATIENT
Start: 2024-04-08 | End: 2024-04-11

## 2024-04-08 RX ORDER — MORPHINE SULFATE 4 MG/ML
4 INJECTION, SOLUTION INTRAMUSCULAR; INTRAVENOUS
Status: COMPLETED | OUTPATIENT
Start: 2024-04-08 | End: 2024-04-08

## 2024-04-08 RX ORDER — MORPHINE SULFATE 2 MG/ML
2 INJECTION, SOLUTION INTRAMUSCULAR; INTRAVENOUS
Status: COMPLETED | OUTPATIENT
Start: 2024-04-08 | End: 2024-04-08

## 2024-04-08 RX ORDER — BACITRACIN ZINC 500 [USP'U]/G
OINTMENT TOPICAL ONCE
Status: COMPLETED | OUTPATIENT
Start: 2024-04-08 | End: 2024-04-08

## 2024-04-08 RX ORDER — GINSENG 100 MG
CAPSULE ORAL
Qty: 14 G | Refills: 0 | Status: SHIPPED | OUTPATIENT
Start: 2024-04-08 | End: 2024-04-08

## 2024-04-08 RX ORDER — SODIUM CHLORIDE, SODIUM LACTATE, POTASSIUM CHLORIDE, AND CALCIUM CHLORIDE .6; .31; .03; .02 G/100ML; G/100ML; G/100ML; G/100ML
500 INJECTION, SOLUTION INTRAVENOUS ONCE
Status: DISCONTINUED | OUTPATIENT
Start: 2024-04-08 | End: 2024-04-08

## 2024-04-08 RX ADMIN — SODIUM CHLORIDE, POTASSIUM CHLORIDE, SODIUM LACTATE AND CALCIUM CHLORIDE 1000 ML: 600; 310; 30; 20 INJECTION, SOLUTION INTRAVENOUS at 17:30

## 2024-04-08 RX ADMIN — MORPHINE SULFATE 2 MG: 2 INJECTION, SOLUTION INTRAMUSCULAR; INTRAVENOUS at 19:24

## 2024-04-08 RX ADMIN — BACITRACIN ZINC: 500 OINTMENT TOPICAL at 19:23

## 2024-04-08 RX ADMIN — MORPHINE SULFATE 4 MG: 4 INJECTION, SOLUTION INTRAMUSCULAR; INTRAVENOUS at 17:31

## 2024-04-08 ASSESSMENT — PAIN - FUNCTIONAL ASSESSMENT
PAIN_FUNCTIONAL_ASSESSMENT: 0-10
PAIN_FUNCTIONAL_ASSESSMENT: 0-10
PAIN_FUNCTIONAL_ASSESSMENT: ACTIVITIES ARE NOT PREVENTED

## 2024-04-08 ASSESSMENT — PAIN SCALES - GENERAL
PAINLEVEL_OUTOF10: 7
PAINLEVEL_OUTOF10: 6
PAINLEVEL_OUTOF10: 6

## 2024-04-08 ASSESSMENT — LIFESTYLE VARIABLES
HOW OFTEN DO YOU HAVE A DRINK CONTAINING ALCOHOL: NEVER
HOW MANY STANDARD DRINKS CONTAINING ALCOHOL DO YOU HAVE ON A TYPICAL DAY: PATIENT DOES NOT DRINK

## 2024-04-08 ASSESSMENT — PAIN DESCRIPTION - LOCATION: LOCATION: OTHER (COMMENT)
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
## 2024-06-10 ENCOUNTER — OFFICE VISIT (OUTPATIENT)
Age: 59
End: 2024-06-10
Payer: COMMERCIAL

## 2024-06-10 VITALS
DIASTOLIC BLOOD PRESSURE: 66 MMHG | HEIGHT: 68 IN | BODY MASS INDEX: 29.13 KG/M2 | HEART RATE: 85 BPM | WEIGHT: 192.2 LBS | SYSTOLIC BLOOD PRESSURE: 107 MMHG

## 2024-06-10 DIAGNOSIS — E29.1 TESTICULAR HYPOFUNCTION: ICD-10-CM

## 2024-06-10 DIAGNOSIS — I10 ESSENTIAL (PRIMARY) HYPERTENSION: ICD-10-CM

## 2024-06-10 DIAGNOSIS — D35.2 BENIGN NEOPLASM OF PITUITARY GLAND (HCC): Primary | ICD-10-CM

## 2024-06-10 DIAGNOSIS — E27.49 SECONDARY ADRENAL INSUFFICIENCY (HCC): ICD-10-CM

## 2024-06-10 PROCEDURE — 3078F DIAST BP <80 MM HG: CPT | Performed by: INTERNAL MEDICINE

## 2024-06-10 PROCEDURE — 3074F SYST BP LT 130 MM HG: CPT | Performed by: INTERNAL MEDICINE

## 2024-06-10 PROCEDURE — 99214 OFFICE O/P EST MOD 30 MIN: CPT | Performed by: INTERNAL MEDICINE

## 2024-06-10 RX ORDER — HYDROCORTISONE 5 MG/1
TABLET ORAL
Qty: 540 TABLET | Refills: 3
Start: 2024-06-10

## 2024-06-10 RX ORDER — CHLORTHALIDONE 25 MG/1
12.5 TABLET ORAL DAILY
Qty: 90 TABLET | Refills: 1
Start: 2024-06-10

## 2024-06-10 NOTE — PROGRESS NOTES
stopping the pill.    2) Stay on your current dose of hydrocortisone 3 tabs in the morning and 2 tabs at lunch.  Try to taper off the dinner dose as follows:  - take a whole tab on Mon, Wed, and Fri and 1/2 tab the other 4 days for 1 week, then  - take 1/2 tab at dinner everyday for 1 week, then  - take 1/2 tab every other day at dinner for 1 week, then stop    See if you have any increase in dizziness with standing or fatigue or abdominal pain or n/v as you taper the dinner dose and provided you don't, then it's fine to stay off the dinner dose and just keep the breakfast and lunch dose.        Return in about 1 year (around 6/10/2025).     Copy sent to:  Mabel Reece APRN - Ibrahima Luna MD (Endocrinology Physician)

## 2024-06-10 NOTE — PATIENT INSTRUCTIONS
1) Decrease the chlorthalidone to 1/2 of the 25 mg tab daily now that you have lost weight as you may be able to get off this med altogether.  Start monitoring blood pressure about 2-3 times per week at alternating times either in the morning or evening after resting for 5 minutes and sitting upright in a chair with your arm at heart level. Please let me know if you are having readings over 140 on the top number or 90 on the bottom number and if so, then you should go back to a whole tab daily.  However, if you are still having readings under 110 on the top number or go under 60 on the bottom number, then you can try stopping the pill.    2) Stay on your current dose of hydrocortisone 3 tabs in the morning and 2 tabs at lunch.  Try to taper off the dinner dose as follows:  - take a whole tab on Mon, Wed, and Fri and 1/2 tab the other 4 days for 1 week, then  - take 1/2 tab at dinner everyday for 1 week, then  - take 1/2 tab every other day at dinner for 1 week, then stop    See if you have any increase in dizziness with standing or fatigue or abdominal pain or n/v as you taper the dinner dose and provided you don't, then it's fine to stay off the dinner dose and just keep the breakfast and lunch dose.

## 2024-06-24 RX ORDER — CHLORTHALIDONE 25 MG/1
25 TABLET ORAL DAILY
Qty: 30 TABLET | Refills: 0 | Status: SHIPPED | OUTPATIENT
Start: 2024-06-24

## 2024-06-24 RX ORDER — ROSUVASTATIN CALCIUM 10 MG/1
10 TABLET, COATED ORAL DAILY
Qty: 30 TABLET | Refills: 0 | Status: SHIPPED | OUTPATIENT
Start: 2024-06-24

## 2024-07-16 ENCOUNTER — TELEPHONE (OUTPATIENT)
Age: 59
End: 2024-07-16

## 2024-07-16 RX ORDER — CHLORTHALIDONE 25 MG/1
25 TABLET ORAL DAILY
Qty: 90 TABLET | Refills: 0 | Status: SHIPPED | OUTPATIENT
Start: 2024-07-16

## 2024-07-16 RX ORDER — CHLORTHALIDONE 25 MG/1
25 TABLET ORAL DAILY
Qty: 30 TABLET | Refills: 0 | Status: SHIPPED | OUTPATIENT
Start: 2024-07-16 | End: 2024-07-16

## 2024-07-19 ENCOUNTER — TELEPHONE (OUTPATIENT)
Age: 59
End: 2024-07-19

## 2024-07-22 RX ORDER — ROSUVASTATIN CALCIUM 10 MG/1
10 TABLET, COATED ORAL DAILY
Qty: 30 TABLET | Refills: 1 | Status: SHIPPED | OUTPATIENT
Start: 2024-07-22

## 2024-07-22 RX ORDER — ROSUVASTATIN CALCIUM 10 MG/1
10 TABLET, COATED ORAL DAILY
Qty: 30 TABLET | Refills: 1 | Status: SHIPPED | OUTPATIENT
Start: 2024-07-22 | End: 2024-07-22 | Stop reason: SDUPTHER

## 2024-09-05 ENCOUNTER — OFFICE VISIT (OUTPATIENT)
Dept: PRIMARY CARE CLINIC | Facility: CLINIC | Age: 59
End: 2024-09-05
Payer: COMMERCIAL

## 2024-09-05 VITALS
BODY MASS INDEX: 29.04 KG/M2 | HEART RATE: 97 BPM | HEIGHT: 68 IN | WEIGHT: 191.6 LBS | TEMPERATURE: 98.6 F | DIASTOLIC BLOOD PRESSURE: 88 MMHG | RESPIRATION RATE: 16 BRPM | SYSTOLIC BLOOD PRESSURE: 138 MMHG | OXYGEN SATURATION: 97 %

## 2024-09-05 DIAGNOSIS — G43.009 MIGRAINE WITHOUT AURA AND WITHOUT STATUS MIGRAINOSUS, NOT INTRACTABLE: ICD-10-CM

## 2024-09-05 DIAGNOSIS — E80.1 PORPHYRIA CUTANEA TARDA (HCC): ICD-10-CM

## 2024-09-05 DIAGNOSIS — E78.00 HYPERCHOLESTEROLEMIA: Primary | ICD-10-CM

## 2024-09-05 DIAGNOSIS — I10 PRIMARY HYPERTENSION: ICD-10-CM

## 2024-09-05 DIAGNOSIS — D35.2 PITUITARY ADENOMA (HCC): ICD-10-CM

## 2024-09-05 DIAGNOSIS — E87.6 HYPOKALEMIA: ICD-10-CM

## 2024-09-05 DIAGNOSIS — K58.9 IRRITABLE BOWEL SYNDROME, UNSPECIFIED TYPE: ICD-10-CM

## 2024-09-05 DIAGNOSIS — M17.11 PRIMARY OSTEOARTHRITIS OF RIGHT KNEE: ICD-10-CM

## 2024-09-05 PROCEDURE — 3079F DIAST BP 80-89 MM HG: CPT | Performed by: FAMILY MEDICINE

## 2024-09-05 PROCEDURE — 3075F SYST BP GE 130 - 139MM HG: CPT | Performed by: FAMILY MEDICINE

## 2024-09-05 RX ORDER — HYDROCHLOROTHIAZIDE 12.5 MG/1
12.5 CAPSULE ORAL EVERY MORNING
Qty: 90 CAPSULE | Refills: 1 | Status: SHIPPED | OUTPATIENT
Start: 2024-09-05

## 2024-09-05 RX ORDER — SUMATRIPTAN 100 MG/1
100 TABLET, FILM COATED ORAL DAILY PRN
Qty: 27 TABLET | Refills: 3 | Status: SHIPPED | OUTPATIENT
Start: 2024-09-05

## 2024-09-05 RX ORDER — DICYCLOMINE HYDROCHLORIDE 10 MG/1
10 CAPSULE ORAL 3 TIMES DAILY
Qty: 120 CAPSULE | Refills: 5 | Status: SHIPPED | OUTPATIENT
Start: 2024-09-05

## 2024-09-05 RX ORDER — MELOXICAM 15 MG/1
15 TABLET ORAL DAILY PRN
Qty: 90 TABLET | Refills: 1 | Status: SHIPPED | OUTPATIENT
Start: 2024-09-05

## 2024-09-05 SDOH — ECONOMIC STABILITY: INCOME INSECURITY: HOW HARD IS IT FOR YOU TO PAY FOR THE VERY BASICS LIKE FOOD, HOUSING, MEDICAL CARE, AND HEATING?: NOT VERY HARD

## 2024-09-05 SDOH — ECONOMIC STABILITY: FOOD INSECURITY: WITHIN THE PAST 12 MONTHS, YOU WORRIED THAT YOUR FOOD WOULD RUN OUT BEFORE YOU GOT MONEY TO BUY MORE.: NEVER TRUE

## 2024-09-05 SDOH — ECONOMIC STABILITY: FOOD INSECURITY: WITHIN THE PAST 12 MONTHS, THE FOOD YOU BOUGHT JUST DIDN'T LAST AND YOU DIDN'T HAVE MONEY TO GET MORE.: NEVER TRUE

## 2024-09-05 ASSESSMENT — PATIENT HEALTH QUESTIONNAIRE - PHQ9
SUM OF ALL RESPONSES TO PHQ QUESTIONS 1-9: 0
SUM OF ALL RESPONSES TO PHQ9 QUESTIONS 1 & 2: 0
SUM OF ALL RESPONSES TO PHQ QUESTIONS 1-9: 0
2. FEELING DOWN, DEPRESSED OR HOPELESS: NOT AT ALL
SUM OF ALL RESPONSES TO PHQ QUESTIONS 1-9: 0
SUM OF ALL RESPONSES TO PHQ QUESTIONS 1-9: 0
1. LITTLE INTEREST OR PLEASURE IN DOING THINGS: NOT AT ALL

## 2024-09-05 NOTE — PROGRESS NOTES
\"Have you been to the ER, urgent care clinic since your last visit?  Hospitalized since your last visit?\"    YES - When: approximately 4 months ago.  Where and Why: [place]/ sunburn.    “Have you seen or consulted any other health care providers outside of [facility] since your last visit?”    NO

## 2024-09-05 NOTE — ASSESSMENT & PLAN NOTE
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  We will obtain CBC to see if he needs a phlebotomy

## 2024-09-05 NOTE — PROGRESS NOTES
Subjective  Chief Complaint   Patient presents with    New Patient     Establish/ spots on legs     HPI:  Scooter Masters Jr. is a 58 y.o. male.    Patient presents for regular PCP visit.    Porphyria-patient reports he is having cutaneous symptoms and thinks he needs a phlebotomy.    Hypertension-patient has lost weight.  His endocrinologist reduce his chlorthalidone to 12.5 mg.  He reports difficulty cutting the pills in half.    He request refills of his regular medications.  He would also like to restart meloxicam as it was stopped at 1 point.  He would like to get regular labs today as well.    Pituitary adenoma-following with Endo and [facility].  Due for another gamma knife procedure to reduce size.  Past Medical History:   Diagnosis Date    Arthritis     Basal cell carcinoma 2012    3 on left side of neck and 1 on right side of neck and nasal bridge area with first dx starting aprroximately 12 yrs ago    Frequent nosebleeds     GERD (gastroesophageal reflux disease)     H/O seasonal allergies     Hypertension     IBS (irritable bowel syndrome)     Migraine     MRSA (methicillin resistant staph aureus) culture positive 2007    hand    Panhypopituitarism (HCC)     Pituitary tumor     =Endocrine, Dr. 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 SUN EXPOSURE MAKES HIM HAVE BLISTERS ON HANDS. LIVER OVERPRODUCED RED BLOOD CELLS--TX IS PHLEBOTOMY AS NEEDED AND HAS HAD THIS 3 TIMES SO FAR    Secondary adrenal insufficiency (HCC) 02/21/2019     Current Outpatient Medications on File Prior to Visit   Medication Sig Dispense Refill    rosuvastatin (CRESTOR) 10 MG tablet Take 1 tablet by mouth daily 30 tablet 1    hydrocortisone (CORTEF) 5 MG tablet Take 3 tabs every am, 2 tabs at lunch, and none at 5 PM--Dose change 06/10/24--updated med list--did not send prescription to the pharmacy 540 tablet 3    cyclobenzaprine (FLEXERIL) 5 MG tablet Take 1 tablet by mouth 3 times daily as needed for Muscle

## 2024-09-06 LAB
ALBUMIN SERPL-MCNC: 4.1 G/DL (ref 3.8–4.9)
ALP SERPL-CCNC: 88 IU/L (ref 44–121)
ALT SERPL-CCNC: 13 IU/L (ref 0–44)
AST SERPL-CCNC: 19 IU/L (ref 0–40)
BASOPHILS # BLD AUTO: 0 X10E3/UL (ref 0–0.2)
BASOPHILS NFR BLD AUTO: 1 %
BILIRUB SERPL-MCNC: 0.3 MG/DL (ref 0–1.2)
BUN SERPL-MCNC: 22 MG/DL (ref 8–27)
BUN/CREAT SERPL: 23 (ref 10–24)
CALCIUM SERPL-MCNC: 9.2 MG/DL (ref 8.6–10.2)
CHLORIDE SERPL-SCNC: 97 MMOL/L (ref 96–106)
CHOLEST SERPL-MCNC: 119 MG/DL (ref 100–199)
CO2 SERPL-SCNC: 27 MMOL/L (ref 20–29)
CREAT SERPL-MCNC: 0.94 MG/DL (ref 0.76–1.27)
EGFRCR SERPLBLD CKD-EPI 2021: 93 ML/MIN/1.73
EOSINOPHIL # BLD AUTO: 1 X10E3/UL (ref 0–0.4)
EOSINOPHIL NFR BLD AUTO: 14 %
ERYTHROCYTE [DISTWIDTH] IN BLOOD BY AUTOMATED COUNT: 17.9 % (ref 11.6–15.4)
GLOBULIN SER CALC-MCNC: 2.6 G/DL (ref 1.5–4.5)
GLUCOSE SERPL-MCNC: 116 MG/DL (ref 70–99)
HCT VFR BLD AUTO: 35.9 % (ref 37.5–51)
HDLC SERPL-MCNC: 42 MG/DL
HGB BLD-MCNC: 10.5 G/DL (ref 13–17.7)
IMM GRANULOCYTES # BLD AUTO: 0 X10E3/UL (ref 0–0.1)
IMM GRANULOCYTES NFR BLD AUTO: 0 %
LDLC SERPL CALC-MCNC: 49 MG/DL (ref 0–99)
LYMPHOCYTES # BLD AUTO: 0.9 X10E3/UL (ref 0.7–3.1)
LYMPHOCYTES NFR BLD AUTO: 12 %
MCH RBC QN AUTO: 23.2 PG (ref 26.6–33)
MCHC RBC AUTO-ENTMCNC: 29.2 G/DL (ref 31.5–35.7)
MCV RBC AUTO: 79 FL (ref 79–97)
MONOCYTES # BLD AUTO: 0.4 X10E3/UL (ref 0.1–0.9)
MONOCYTES NFR BLD AUTO: 6 %
NEUTROPHILS # BLD AUTO: 4.7 X10E3/UL (ref 1.4–7)
NEUTROPHILS NFR BLD AUTO: 67 %
PLATELET # BLD AUTO: 395 X10E3/UL (ref 150–450)
POTASSIUM SERPL-SCNC: 3.1 MMOL/L (ref 3.5–5.2)
PROT SERPL-MCNC: 6.7 G/DL (ref 6–8.5)
RBC # BLD AUTO: 4.53 X10E6/UL (ref 4.14–5.8)
SODIUM SERPL-SCNC: 140 MMOL/L (ref 134–144)
SPECIMEN STATUS REPORT: NORMAL
TRIGL SERPL-MCNC: 164 MG/DL (ref 0–149)
VLDLC SERPL CALC-MCNC: 28 MG/DL (ref 5–40)
WBC # BLD AUTO: 7 X10E3/UL (ref 3.4–10.8)

## 2024-09-06 RX ORDER — POTASSIUM CHLORIDE 1500 MG/1
20 TABLET, EXTENDED RELEASE ORAL DAILY
Qty: 30 TABLET | Refills: 0 | Status: SHIPPED | OUTPATIENT
Start: 2024-09-06

## 2024-09-06 NOTE — RESULT ENCOUNTER NOTE
Cholesterol well-controlled.  Your potassium was slightly low, likely due to reduced food intake on top of your blood pressure medication which will cause you to lose potassium.  I would like to give you a daily potassium pill for a month to try to bring that back up, I will send that into the pharmacy.  Your blood counts were slightly low, not enough to take medication for, but not high as I would expect you need a phlebotomy.  I contacted [facility] to add on a ferritin to double check your current iron load.  That test should be back next week.

## 2024-09-07 LAB
FERRITIN SERPL-MCNC: 12 NG/ML (ref 30–400)
HCV AB SERPL QL IA: NORMAL
HCV IGG SERPL QL IA: NON REACTIVE

## 2024-09-18 RX ORDER — HYDROCORTISONE 5 MG/1
TABLET ORAL
Qty: 540 TABLET | Refills: 3 | Status: SHIPPED | OUTPATIENT
Start: 2024-09-18

## 2024-09-30 ENCOUNTER — TELEPHONE (OUTPATIENT)
Dept: PRIMARY CARE CLINIC | Facility: CLINIC | Age: 59
End: 2024-09-30

## 2024-09-30 DIAGNOSIS — E80.1 PORPHYRIA CUTANEA TARDA (HCC): Primary | ICD-10-CM

## 2024-09-30 NOTE — TELEPHONE ENCOUNTER
As of discussed with patient a week and a half ago, I will place orders for therapeutic phlebotomy for his for porphyria.  I discussed with the infusion center and they requested a faxed order.

## 2024-10-03 DIAGNOSIS — K58.9 IRRITABLE BOWEL SYNDROME, UNSPECIFIED TYPE: ICD-10-CM

## 2024-10-03 DIAGNOSIS — M17.11 PRIMARY OSTEOARTHRITIS OF RIGHT KNEE: ICD-10-CM

## 2024-10-03 RX ORDER — DICYCLOMINE HYDROCHLORIDE 10 MG/1
10 CAPSULE ORAL 3 TIMES DAILY
Qty: 270 CAPSULE | Refills: 3 | Status: SHIPPED | OUTPATIENT
Start: 2024-10-03

## 2024-10-03 RX ORDER — MELOXICAM 15 MG/1
15 TABLET ORAL DAILY PRN
Qty: 90 TABLET | Refills: 3 | Status: SHIPPED | OUTPATIENT
Start: 2024-10-03

## 2024-10-04 ENCOUNTER — TELEPHONE (OUTPATIENT)
Dept: PRIMARY CARE CLINIC | Facility: CLINIC | Age: 59
End: 2024-10-04

## 2024-10-04 NOTE — TELEPHONE ENCOUNTER
Needing to have new order faxed for therapeutic draw, Need to include date, criteria Ex: do not draw if hgb < 14 and amount to be drawn. Need to be faxed to [phone]

## 2024-10-07 ENCOUNTER — TELEPHONE (OUTPATIENT)
Dept: PRIMARY CARE CLINIC | Facility: CLINIC | Age: 59
End: 2024-10-07

## 2024-10-07 NOTE — TELEPHONE ENCOUNTER
Patient called to advise Dr. Davis he was able to get his PHLEBOTOMY scheduled, he is going tomorrow morning at 8:00 am.

## 2024-10-08 ENCOUNTER — HOSPITAL ENCOUNTER (OUTPATIENT)
Facility: HOSPITAL | Age: 59
Setting detail: INFUSION SERIES
Discharge: HOME | End: 2024-10-08
Payer: COMMERCIAL

## 2024-10-08 VITALS
DIASTOLIC BLOOD PRESSURE: 87 MMHG | HEART RATE: 71 BPM | TEMPERATURE: 97.8 F | RESPIRATION RATE: 18 BRPM | SYSTOLIC BLOOD PRESSURE: 144 MMHG

## 2024-10-08 LAB
HCT VFR BLD AUTO: 35.4 % (ref 36.6–50.3)
HGB BLD-MCNC: 10.7 G/DL (ref 12.1–17)

## 2024-10-08 PROCEDURE — 36415 COLL VENOUS BLD VENIPUNCTURE: CPT

## 2024-10-08 PROCEDURE — 99195 PHLEBOTOMY: CPT

## 2024-10-08 PROCEDURE — 85014 HEMATOCRIT: CPT

## 2024-10-08 PROCEDURE — 85018 HEMOGLOBIN: CPT

## 2024-10-08 NOTE — PROCEDURES
Patient arrived to [facility] 30 for therapeutic phlebotomy. H&H within parameters per Mds order to have procedure. Left AC accessed for TP at 0924. 471mLs removed. Ended at 0930. Patient tolerated well with no complaints at this time. Declined PO fluids. DC instructions reviewed with patient and spouse-- verbalized understanding. Patient ambulatory off floor-- steady gait noted.

## 2024-12-11 RX ORDER — HYDROCORTISONE 5 MG/1
TABLET ORAL
Qty: 450 TABLET | Refills: 3 | Status: SHIPPED | OUTPATIENT
Start: 2024-12-11

## 2025-03-06 SDOH — ECONOMIC STABILITY: INCOME INSECURITY: IN THE LAST 12 MONTHS, WAS THERE A TIME WHEN YOU WERE NOT ABLE TO PAY THE MORTGAGE OR RENT ON TIME?: NO

## 2025-03-06 SDOH — ECONOMIC STABILITY: FOOD INSECURITY: WITHIN THE PAST 12 MONTHS, YOU WORRIED THAT YOUR FOOD WOULD RUN OUT BEFORE YOU GOT MONEY TO BUY MORE.: NEVER TRUE

## 2025-03-06 SDOH — ECONOMIC STABILITY: TRANSPORTATION INSECURITY
IN THE PAST 12 MONTHS, HAS THE LACK OF TRANSPORTATION KEPT YOU FROM MEDICAL APPOINTMENTS OR FROM GETTING MEDICATIONS?: NO

## 2025-03-06 SDOH — ECONOMIC STABILITY: FOOD INSECURITY: WITHIN THE PAST 12 MONTHS, THE FOOD YOU BOUGHT JUST DIDN'T LAST AND YOU DIDN'T HAVE MONEY TO GET MORE.: NEVER TRUE

## 2025-03-06 SDOH — ECONOMIC STABILITY: TRANSPORTATION INSECURITY
IN THE PAST 12 MONTHS, HAS LACK OF TRANSPORTATION KEPT YOU FROM MEETINGS, WORK, OR FROM GETTING THINGS NEEDED FOR DAILY LIVING?: NO

## 2025-03-07 ENCOUNTER — OFFICE VISIT (OUTPATIENT)
Dept: PRIMARY CARE CLINIC | Facility: CLINIC | Age: 60
End: 2025-03-07
Payer: COMMERCIAL

## 2025-03-07 VITALS
HEART RATE: 94 BPM | BODY MASS INDEX: 29.49 KG/M2 | OXYGEN SATURATION: 98 % | WEIGHT: 194.6 LBS | SYSTOLIC BLOOD PRESSURE: 143 MMHG | RESPIRATION RATE: 16 BRPM | TEMPERATURE: 98.4 F | DIASTOLIC BLOOD PRESSURE: 91 MMHG | HEIGHT: 68 IN

## 2025-03-07 DIAGNOSIS — E80.1 PORPHYRIA CUTANEA TARDA (HCC): Primary | ICD-10-CM

## 2025-03-07 DIAGNOSIS — D35.2 PITUITARY ADENOMA (HCC): ICD-10-CM

## 2025-03-07 DIAGNOSIS — M54.50 LUMBAR BACK PAIN: ICD-10-CM

## 2025-03-07 PROCEDURE — 3077F SYST BP >= 140 MM HG: CPT | Performed by: FAMILY MEDICINE

## 2025-03-07 PROCEDURE — 3080F DIAST BP >= 90 MM HG: CPT | Performed by: FAMILY MEDICINE

## 2025-03-07 PROCEDURE — 99214 OFFICE O/P EST MOD 30 MIN: CPT | Performed by: FAMILY MEDICINE

## 2025-03-07 RX ORDER — CYCLOBENZAPRINE HCL 10 MG
10 TABLET ORAL 2 TIMES DAILY PRN
Qty: 180 TABLET | Refills: 3 | Status: SHIPPED | OUTPATIENT
Start: 2025-03-07

## 2025-03-07 SDOH — ECONOMIC STABILITY: FOOD INSECURITY: WITHIN THE PAST 12 MONTHS, YOU WORRIED THAT YOUR FOOD WOULD RUN OUT BEFORE YOU GOT MONEY TO BUY MORE.: NEVER TRUE

## 2025-03-07 SDOH — ECONOMIC STABILITY: FOOD INSECURITY: WITHIN THE PAST 12 MONTHS, THE FOOD YOU BOUGHT JUST DIDN'T LAST AND YOU DIDN'T HAVE MONEY TO GET MORE.: NEVER TRUE

## 2025-03-07 ASSESSMENT — PATIENT HEALTH QUESTIONNAIRE - PHQ9
SUM OF ALL RESPONSES TO PHQ QUESTIONS 1-9: 0
2. FEELING DOWN, DEPRESSED OR HOPELESS: NOT AT ALL
SUM OF ALL RESPONSES TO PHQ QUESTIONS 1-9: 0
1. LITTLE INTEREST OR PLEASURE IN DOING THINGS: NOT AT ALL

## 2025-03-07 NOTE — PROGRESS NOTES
Subjective  Chief Complaint   Patient presents with    Other     6 month labs     HPI:  Scooter Masters Jr. is a 59 y.o. male.    History of Present Illness  The patient presents for a 6-month checkup.    Patient reports that his porphyria symptoms are better, he reports an improvement in his rash, attributing it to the phlebotomy procedure.     He is status post gamma knife procedure on his pituitary adenoma.  He is currently on hydrocortisone replacement therapy but expresses dissatisfaction with the current dosage. To manage his symptoms, he has been self-administering additional doses in the morning and at lunchtime, which he finds beneficial.    He has been managing his back pain with muscle relaxers, which he finds effective. He takes two doses at night to prevent cramps and avoids daytime use. He has a long-standing history of lower back pain, dating back to his early twenties, which has previously interfered with his sleep.        Past Medical History:   Diagnosis Date    Arthritis     Basal cell carcinoma 2012    3 on left side of neck and 1 on right side of neck and nasal bridge area with first dx starting aprroximately 12 yrs ago    Chronic back pain     Frequent nosebleeds     GERD (gastroesophageal reflux disease)     H/O seasonal allergies     Hyperlipidemia     Hypertension     IBS (irritable bowel syndrome)     Migraine     MRSA (methicillin resistant staph aureus) culture positive 2007    hand    Panhypopituitarism     Pituitary tumor     =Endocrine, Dr. 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 SUN EXPOSURE MAKES HIM HAVE BLISTERS ON HANDS. LIVER OVERPRODUCED RED BLOOD CELLS--TX IS PHLEBOTOMY AS NEEDED AND HAS HAD THIS 3 TIMES SO FAR    Secondary adrenal insufficiency 02/21/2019     Current Outpatient Medications on File Prior to Visit   Medication Sig Dispense Refill    hydrocortisone (CORTEF) 5 MG tablet Take 15 mg at breakfast and 10 mg at lunch 450 tablet 3

## 2025-03-08 LAB
ALBUMIN SERPL-MCNC: 4 G/DL (ref 3.9–4.9)
ALP SERPL-CCNC: 62 IU/L (ref 44–121)
ALT SERPL-CCNC: 17 IU/L (ref 0–44)
AST SERPL-CCNC: 23 IU/L (ref 0–40)
BASOPHILS # BLD AUTO: 0.1 X10E3/UL (ref 0–0.2)
BASOPHILS NFR BLD AUTO: 1 %
BILIRUB SERPL-MCNC: <0.2 MG/DL (ref 0–1.2)
BUN SERPL-MCNC: 23 MG/DL (ref 8–27)
BUN/CREAT SERPL: 25 (ref 10–24)
CALCIUM SERPL-MCNC: 8.6 MG/DL (ref 8.6–10.2)
CHLORIDE SERPL-SCNC: 107 MMOL/L (ref 96–106)
CO2 SERPL-SCNC: 23 MMOL/L (ref 20–29)
CREAT SERPL-MCNC: 0.91 MG/DL (ref 0.76–1.27)
EGFRCR SERPLBLD CKD-EPI 2021: 96 ML/MIN/1.73
EOSINOPHIL # BLD AUTO: 0.1 X10E3/UL (ref 0–0.4)
EOSINOPHIL NFR BLD AUTO: 2 %
ERYTHROCYTE [DISTWIDTH] IN BLOOD BY AUTOMATED COUNT: 17.1 % (ref 11.6–15.4)
GLOBULIN SER CALC-MCNC: 2.3 G/DL (ref 1.5–4.5)
GLUCOSE SERPL-MCNC: 111 MG/DL (ref 70–99)
HCT VFR BLD AUTO: 26 % (ref 37.5–51)
HGB BLD-MCNC: 7.4 G/DL (ref 13–17.7)
IMM GRANULOCYTES # BLD AUTO: 0.1 X10E3/UL (ref 0–0.1)
IMM GRANULOCYTES NFR BLD AUTO: 1 %
LYMPHOCYTES # BLD AUTO: 0.8 X10E3/UL (ref 0.7–3.1)
LYMPHOCYTES NFR BLD AUTO: 11 %
MCH RBC QN AUTO: 20.2 PG (ref 26.6–33)
MCHC RBC AUTO-ENTMCNC: 28.5 G/DL (ref 31.5–35.7)
MCV RBC AUTO: 71 FL (ref 79–97)
MONOCYTES # BLD AUTO: 0.3 X10E3/UL (ref 0.1–0.9)
MONOCYTES NFR BLD AUTO: 4 %
NEUTROPHILS # BLD AUTO: 5.7 X10E3/UL (ref 1.4–7)
NEUTROPHILS NFR BLD AUTO: 81 %
PLATELET # BLD AUTO: 445 X10E3/UL (ref 150–450)
POTASSIUM SERPL-SCNC: 4.1 MMOL/L (ref 3.5–5.2)
PROT SERPL-MCNC: 6.3 G/DL (ref 6–8.5)
RBC # BLD AUTO: 3.66 X10E6/UL (ref 4.14–5.8)
SODIUM SERPL-SCNC: 142 MMOL/L (ref 134–144)
WBC # BLD AUTO: 7.1 X10E3/UL (ref 3.4–10.8)

## 2025-03-10 LAB — SPECIMEN STATUS REPORT: NORMAL

## 2025-03-11 ENCOUNTER — RESULTS FOLLOW-UP (OUTPATIENT)
Dept: PRIMARY CARE CLINIC | Facility: CLINIC | Age: 60
End: 2025-03-11

## 2025-03-11 LAB
FERRITIN SERPL-MCNC: 7 NG/ML (ref 30–400)
IRON SATN MFR SERPL: 3 % (ref 15–55)
IRON SERPL-MCNC: 10 UG/DL (ref 38–169)
TIBC SERPL-MCNC: 339 UG/DL (ref 250–450)
UIBC SERPL-MCNC: 329 UG/DL (ref 111–343)

## 2025-03-11 NOTE — RESULT ENCOUNTER NOTE
Hemoglobin and iron saturation quite low, given your porphyria I would not recommend iron at this point, though if hemoglobin drops below 7 we would need to consider it. Otherwise labs stable.

## 2025-03-21 DIAGNOSIS — I10 PRIMARY HYPERTENSION: ICD-10-CM

## 2025-03-21 RX ORDER — HYDROCORTISONE 5 MG/1
TABLET ORAL
Qty: 450 TABLET | Refills: 0 | Status: SHIPPED | OUTPATIENT
Start: 2025-03-21

## 2025-03-21 RX ORDER — HYDROCHLOROTHIAZIDE 12.5 MG/1
12.5 CAPSULE ORAL EVERY MORNING
Qty: 90 CAPSULE | Refills: 1 | Status: SHIPPED | OUTPATIENT
Start: 2025-03-21

## 2025-04-21 DIAGNOSIS — M54.50 LUMBAR BACK PAIN: ICD-10-CM

## 2025-04-21 DIAGNOSIS — M17.11 PRIMARY OSTEOARTHRITIS OF RIGHT KNEE: ICD-10-CM

## 2025-04-21 DIAGNOSIS — I10 PRIMARY HYPERTENSION: ICD-10-CM

## 2025-04-21 DIAGNOSIS — K58.9 IRRITABLE BOWEL SYNDROME, UNSPECIFIED TYPE: ICD-10-CM

## 2025-04-21 RX ORDER — ROSUVASTATIN CALCIUM 10 MG/1
10 TABLET, COATED ORAL DAILY
Qty: 90 TABLET | Refills: 1 | Status: SHIPPED | OUTPATIENT
Start: 2025-04-21

## 2025-04-21 RX ORDER — CYCLOBENZAPRINE HCL 10 MG
10 TABLET ORAL 2 TIMES DAILY PRN
Qty: 180 TABLET | Refills: 3 | Status: SHIPPED | OUTPATIENT
Start: 2025-04-21

## 2025-04-21 RX ORDER — DICYCLOMINE HYDROCHLORIDE 10 MG/1
10 CAPSULE ORAL 3 TIMES DAILY
Qty: 270 CAPSULE | Refills: 3 | Status: SHIPPED | OUTPATIENT
Start: 2025-04-21

## 2025-04-21 RX ORDER — HYDROCHLOROTHIAZIDE 12.5 MG/1
12.5 CAPSULE ORAL EVERY MORNING
Qty: 90 CAPSULE | Refills: 1 | Status: SHIPPED | OUTPATIENT
Start: 2025-04-21

## 2025-04-21 RX ORDER — MELOXICAM 15 MG/1
15 TABLET ORAL DAILY PRN
Qty: 90 TABLET | Refills: 3 | Status: SHIPPED | OUTPATIENT
Start: 2025-04-21

## 2025-06-12 ENCOUNTER — OFFICE VISIT (OUTPATIENT)
Age: 60
End: 2025-06-12

## 2025-06-12 VITALS
DIASTOLIC BLOOD PRESSURE: 72 MMHG | HEART RATE: 104 BPM | WEIGHT: 172.6 LBS | SYSTOLIC BLOOD PRESSURE: 130 MMHG | BODY MASS INDEX: 26.24 KG/M2

## 2025-06-12 DIAGNOSIS — E27.49 SECONDARY ADRENAL INSUFFICIENCY: ICD-10-CM

## 2025-06-12 DIAGNOSIS — I10 ESSENTIAL (PRIMARY) HYPERTENSION: ICD-10-CM

## 2025-06-12 DIAGNOSIS — E29.1 TESTICULAR HYPOFUNCTION: ICD-10-CM

## 2025-06-12 DIAGNOSIS — D35.2 BENIGN NEOPLASM OF PITUITARY GLAND (HCC): Primary | ICD-10-CM

## 2025-06-12 PROCEDURE — 99214 OFFICE O/P EST MOD 30 MIN: CPT | Performed by: INTERNAL MEDICINE

## 2025-06-12 PROCEDURE — 3075F SYST BP GE 130 - 139MM HG: CPT | Performed by: INTERNAL MEDICINE

## 2025-06-12 PROCEDURE — 3078F DIAST BP <80 MM HG: CPT | Performed by: INTERNAL MEDICINE

## 2025-06-12 RX ORDER — HYDROCORTISONE 5 MG/1
TABLET ORAL
Qty: 540 TABLET | Refills: 3 | Status: SHIPPED | OUTPATIENT
Start: 2025-06-12

## 2025-06-12 NOTE — PATIENT INSTRUCTIONS
1) I refilled the hydrocortisone for 15 mg = 3 tabs in the morning, 10 mg = 2 tabs at lunch and 5 mg = 1 tab at dinner.    Try to taper off the dinner dose as follows:  - take a whole tab on Mon, Wed, and Fri and 1/2 tab the other 4 days for 1 week, then  - take 1/2 tab at dinner everyday for 1 week, then  - take 1/2 tab every other day at dinner for 1 week, then stop    See if you have any increase in dizziness with standing or fatigue or abdominal pain or n/v as you taper the dinner dose and provided you don't, then it's fine to stay off the dinner dose and just keep the breakfast and lunch dose.

## 2025-06-12 NOTE — PROGRESS NOTES
nonfunctioning pituitary tumor status post gamma knife September 19, 2024, follow-up    EXAMINATION PERFORMED:  MRI brain without and with contrast    TECHNIQUE:  Multiplanar multi sequence MRI brain with and without contrast    CONTRAST: 8 ml of Gadavist administered intravenously.    COMPARISON: MRI brain September 17, 2024    FINDINGS:    Slight interval decrease in size of the residual tumor in the left posterior cavernous sinus which now measures 6 mm, previously 7 mm. Stable postsurgical appearance of the sella. Native pituitary gland in the right lateral sella is unchanged. The pituitary stalk is midline. No new sellar or parasellar mass. No mass effect or signal abnormality of the optic apparatus. Cavernous sinus flow voids are unremarkable.    Bilateral FLAIR hyperintense white matter lesions are nonspecific but likely reflect chronic microvascular ischemic disease. The ventricles are normal in size and position. There is no evidence of acute infarct or hemorrhage. There is no cerebellar tonsillar herniation. Expected arterial flow-voids are present.    Circumferential left maxillary sinus mucosal thickening. The mastoid air cells and middle ears are clear. The orbital contents are within normal limits. No significant osseous or scalp lesions are identified.  Exam End: 03/12/25 10:25     Results  Labs:  Labs from 09/17/2024 showed prolactin of 5.6, TSH 0.99, free T4 0.9, ACTH 4, sodium 144, potassium 3.9, BUN 21, creatinine 0.9, glucose 61. Labs from 03/12/2025 showed cortisol of 4.9, ACTH of 33, free T4 0.7, TSH 1.45, LH 5.36, testosterone 685.    Imaging:  Repeat MRI in March showed slight interval decrease in the size of the residual tumor in the cavernous sinus, now 6 mm, previously 7 mm.        Assessment/Plan:     1. Benign tumor of pituitary gland (HCC): s/p transphenoidal resection of a 2.5 cm nonfunctioning pituitary tumor on 2/12/19 by Dr. Leone.  He appears to have panhypopituitarism from this

## (undated) DEVICE — DRAPE,ORTHOMAX,EXTREMITY: Brand: MEDLINE

## (undated) DEVICE — TELFA NON-ADHERENT ABSORBENT DRESSING: Brand: TELFA

## (undated) DEVICE — BONE WAX WHITE: Brand: BONE WAX WHITE

## (undated) DEVICE — 4.5 MM FULL RADIUS STRAIGHT                                    BLADES, POWER/EP-1, YELLOW, PACKAGED                                    6 PER BOX, STERILE: Brand: DYONICS

## (undated) DEVICE — MAYFIELD® DISPOSABLE ADULT SKULL PIN (PLASTIC BASE): Brand: MAYFIELD®

## (undated) DEVICE — NDL HYPO REG BVL TW 23GX1.5IN --

## (undated) DEVICE — ASTOUND STANDARD SURGICAL GOWN, XXL: Brand: CONVERTORS

## (undated) DEVICE — SUTURE FIBERWIRE SZ 2 L38IN NONABSORBABLE BLU WHT BLK AR7201

## (undated) DEVICE — PIN BNE 3.2X140MM STRL -- 2/PK MAKO

## (undated) DEVICE — SUTURE VCRL SZ 2-0 L18IN ABSRB UD L26MM CP-2 1/2 CIR REV J762D

## (undated) DEVICE — SLIM BODY SKIN STAPLER: Brand: APPOSE ULC

## (undated) DEVICE — 3M™ IOBAN™ 2 ANTIMICROBIAL INCISE DRAPE 6648EZ: Brand: IOBAN™ 2

## (undated) DEVICE — TRANSFER SET 3": Brand: MEDLINE INDUSTRIES, INC.

## (undated) DEVICE — SPONGE GZ W4XL4IN COT 12 PLY TYP VII WVN C FLD DSGN STERILE

## (undated) DEVICE — CANNULA ARTHSCP L4CM DIA8MM PASSPRT BTTN

## (undated) DEVICE — ROCKER SWITCH PENCIL BLADE ELECTRODE, HOLSTER: Brand: EDGE

## (undated) DEVICE — SHOULDER ARTHROSCOPY-MRMCASU: Brand: MEDLINE INDUSTRIES, INC.

## (undated) DEVICE — MEDI-VAC NON-CONDUCTIVE SUCTION TUBING: Brand: CARDINAL HEALTH

## (undated) DEVICE — ZIMMER® STERILE DISPOSABLE TOURNIQUET CUFF WITH PROTECTIVE SLEEVE AND PLC, DUAL PORT, SINGLE BLADDER, 34 IN. (86 CM)

## (undated) DEVICE — FLOSEAL MATRIX IS INDICATED IN SURGICAL PROCEDURES (OTHER THAN IN OPHTHALMIC) AS AN ADJUNCT TO HEMOSTASIS WHEN CONTROL OF BLEEDING BY LIGATURE OR CONVENTIONALPROCEDURES IS INEFFECTIVE OR IMPRACTICAL.: Brand: FLOSEAL HEMOSTATIC MATRIX

## (undated) DEVICE — REM POLYHESIVE ADULT PATIENT RETURN ELECTRODE: Brand: VALLEYLAB

## (undated) DEVICE — SURGICAL PROCEDURE PACK BASIN MAJ SET CUST NO CAUT

## (undated) DEVICE — KIT DRP FOR RIO ROBOTIC ARM ASST SYS

## (undated) DEVICE — BIPOLAR IRRIGATOR INTEGRATED TUBING AND BIPOLAR CORD SET, DISPOSABLE

## (undated) DEVICE — SOLUTION IV 1000ML 0.9% SOD CHL

## (undated) DEVICE — SHOULDER SUSPENSION KIT 6 PER BOX

## (undated) DEVICE — SUTURE STRATAFIX SYMMETRIC SZ 1 L18IN ABSRB VLT CT1 L36CM SXPP1A404

## (undated) DEVICE — SYRINGE MED 20ML STD CLR PLAS LUERLOCK TIP N CTRL DISP

## (undated) DEVICE — INFECTION CONTROL KIT SYS

## (undated) DEVICE — DRAPE,U/ SHT,SPLIT,PLAS,STERIL: Brand: MEDLINE

## (undated) DEVICE — BURR ACROMIONIZER 4.0 LG SUCT WDO DSPL: Brand: DYONICS / INCISOR

## (undated) DEVICE — APPLICATOR SEAL FLOSEAL 5MM+ --

## (undated) DEVICE — SPONGE GZ W4XL4IN COT 12 PLY TYP VII WVN C FLD DSGN

## (undated) DEVICE — ADHESIVE SKIN CLSR 0.7ML TOP DERMBND ADV

## (undated) DEVICE — SUTURE STRATAFIX SZ 3-0 30CM NONABSORB UD 26MM FS 3/8 SXMP2B412

## (undated) DEVICE — SUTURE VCRL SZ 1 L36IN ABSRB UD L36MM CT-1 1/2 CIR J947H

## (undated) DEVICE — HANDLE LT SNAP ON ULT DURABLE LENS FOR TRUMPF ALC DISPOSABLE

## (undated) DEVICE — TOOL 9AC90 LEGEND 9CM 9MM AC: Brand: MIDAS REX

## (undated) DEVICE — SOLUTION IRRIG 3000ML LAC R FLX CONT

## (undated) DEVICE — TOWEL SURG W17XL27IN STD BLU COT NONFENESTRATED PREWASHED

## (undated) DEVICE — CRANIOTOMY DRAPE, STERILE: Brand: MEDLINE

## (undated) DEVICE — INTENDED FOR TISSUE SEPARATION, AND OTHER PROCEDURES THAT REQUIRE A SHARP SURGICAL BLADE TO PUNCTURE OR CUT.: Brand: BARD-PARKER ® CARBON RIB-BACK BLADES

## (undated) DEVICE — KIT PROC KNE TRACKING PK/1 -- VIZADISC MAKO

## (undated) DEVICE — DYONICS 25 INFLOW TUBE SET, 3 PER BOX

## (undated) DEVICE — SOLUTION IV 250ML 0.9% SOD CHL CLR INJ FLX BG CONT PRT CLSR

## (undated) DEVICE — ALCOHOL  RUBBING  70% ISOPROPYL  4-OZ

## (undated) DEVICE — GOWN,SIRUS,NONRNF,SETINSLV,2XL,18/CS: Brand: MEDLINE

## (undated) DEVICE — SOLUTION IRRIG 1000ML STRL H2O USP PLAS POUR BTL

## (undated) DEVICE — NEEDLE BX L150MM OD1.8MM DISP TYP A

## (undated) DEVICE — NEEDLE SUT PASS FOR ROT CUF LABRAL REP MULTFI SCORPION

## (undated) DEVICE — BLADE ASSEMB CLP HAIR COARSE --

## (undated) DEVICE — MICROBORE EXTENSION SET, MALE LUER LOCK ADAPTER

## (undated) DEVICE — 3M™ IOBAN™ 2 ANTIMICROBIAL INCISE DRAPE 6650EZ: Brand: IOBAN™ 2

## (undated) DEVICE — BANDAGE COMPR M W6INXL10YD WHT BGE VELC E MTRX HK AND LOOP

## (undated) DEVICE — PIN BONE FIX L110MM DIA3.2MM

## (undated) DEVICE — SUT ETHLN 3-0 18IN PS2 BLK --

## (undated) DEVICE — SYR 10ML LUER LOK 1/5ML GRAD --

## (undated) DEVICE — STRETCH BANDAGE ROLL: Brand: DERMACEA

## (undated) DEVICE — STERILE POLYISOPRENE POWDER-FREE SURGICAL GLOVES WITH EMOLLIENT COATING: Brand: PROTEXIS

## (undated) DEVICE — STERILE POLYISOPRENE POWDER-FREE SURGICAL GLOVES: Brand: PROTEXIS

## (undated) DEVICE — NEEDLE SPNL L3.5IN PNK HUB S STL REG WALL FIT STYL W/ QNCKE

## (undated) DEVICE — SUPER TURBOVAC 90 INTEGRATED CABLE WAND ICW: Brand: COBLATION

## (undated) DEVICE — PREP SKN PREVAIL 40ML APPL --

## (undated) DEVICE — (D)PREP SKN CHLRAPRP APPL 26ML -- CONVERT TO ITEM 371833

## (undated) DEVICE — 4-PORT MANIFOLD: Brand: NEPTUNE 2

## (undated) DEVICE — CONTINU-FLO SOLUTION SET, 2 INJECTION SITES, MALE LUER LOCK ADAPTER WITH RETRACTABLE COLLAR, LARGE BORE STOPCOCK WITH ROTATING MALE LUER LOCK EXTENSION SET, 2 INJECTION SITES, MALE LUER LOCK ADAPTER WITH RETRACTABLE COLLAR: Brand: INTERLINK/CONTINU-FLO

## (undated) DEVICE — TUBING, SUCTION, 1/4" X 10', STRAIGHT: Brand: MEDLINE

## (undated) DEVICE — TUBING SUCT INFLO OUTFLO FORKED SET ST DISP INTELJET

## (undated) DEVICE — DEVON™ KNEE AND BODY STRAP 60" X 3" (1.5 M X 7.6 CM): Brand: DEVON

## (undated) DEVICE — GLOVE ORTHO 8   MSG9480

## (undated) DEVICE — PACK,EENT,TURBAN DRAPE,PK II: Brand: MEDLINE

## (undated) DEVICE — Device

## (undated) DEVICE — WRAP LEG DEMAYO STRL -- MAKO

## (undated) DEVICE — SUTURE CHROMIC GUT SZ 3-0 L27IN ABSRB BRN L19MM FS-2 3/8 636H

## (undated) DEVICE — PREP SKN CHLRAPRP APL 26ML STR --

## (undated) DEVICE — DRAPE MICSCP W46XL120IN POLY DRAWSTRAP W STEREO OBS TB AND

## (undated) DEVICE — CANNULA INJ L2.5IN BLNT TIP 3MM CLR BODY W/ 1 W VLV DLP

## (undated) DEVICE — TOTAL JOINT-MRMC: Brand: MEDLINE INDUSTRIES, INC.

## (undated) DEVICE — SOLUTION IRRIG 3000ML LAC RINGERS ARTHROMTC PLAS CONT

## (undated) DEVICE — DRAPE,LAPAROTOMY,PED,STERILE: Brand: MEDLINE

## (undated) DEVICE — SUTURE MCRYL SZ 2-0 L36IN ABSRB UD L36MM CT-1 1/2 CIR Y945H

## (undated) DEVICE — SURGIFOAM SPNG SZ 12-7

## (undated) DEVICE — DRESSING WND ISLAND STD 4X10 IN MULT LAYR STRL SILVERLON

## (undated) DEVICE — GLOVE SURG SZ 85 L12IN FNGR THK79MIL GRN LTX FREE

## (undated) DEVICE — 4.0 MM ELITE ACROMIONIZER STRAIGHT                                    DISPOSABLE BURRS, MAUVE, 10000                                    MAXIMUM RPM, PACKAGED 6 PER BOX, STERILE

## (undated) DEVICE — MARKER SKIN XR REFLCT LF SPHR DISP

## (undated) DEVICE — CODMAN® SURGICAL PATTIES 1" X 3" (2.54CM X 7.62CM): Brand: CODMAN®

## (undated) DEVICE — SHOULDER W/POUCH II-LF: Brand: MEDLINE INDUSTRIES, INC.

## (undated) DEVICE — CODMAN® SURGICAL PATTIES 1/2" X 1/2" (1.27CM X 1.27CM): Brand: CODMAN®

## (undated) DEVICE — 1200 GUARD II KIT W/5MM TUBE W/O VAC TUBE: Brand: GUARDIAN

## (undated) DEVICE — HANDPIECE SET WITH COAXIAL HIGH FLOW TIP AND SUCTION TUBE: Brand: INTERPULSE

## (undated) DEVICE — UNIQCOT 1/4" X 1/4": Brand: UNIQCOT

## (undated) DEVICE — SUTURE VCRL SZ 3-0 L27IN ABSRB UD L26MM SH 1/2 CIR J416H

## (undated) DEVICE — HOOD: Brand: FLYTE

## (undated) DEVICE — GAUZE SPONGES,12 PLY: Brand: CURITY

## (undated) DEVICE — KENDALL DL ECG CABLE AND LEAD WIRE SYSTEM, 3-LEAD, SINGLE PATIENT USE: Brand: KENDALL